# Patient Record
Sex: FEMALE | Race: OTHER | NOT HISPANIC OR LATINO | URBAN - METROPOLITAN AREA
[De-identification: names, ages, dates, MRNs, and addresses within clinical notes are randomized per-mention and may not be internally consistent; named-entity substitution may affect disease eponyms.]

---

## 2019-10-19 ENCOUNTER — INPATIENT (INPATIENT)
Facility: HOSPITAL | Age: 69
LOS: 12 days | Discharge: HOME | DRG: 974 | End: 2019-11-01
Attending: INTERNAL MEDICINE | Admitting: STUDENT IN AN ORGANIZED HEALTH CARE EDUCATION/TRAINING PROGRAM
Payer: SELF-PAY

## 2019-10-19 VITALS
SYSTOLIC BLOOD PRESSURE: 123 MMHG | OXYGEN SATURATION: 88 % | HEART RATE: 127 BPM | RESPIRATION RATE: 18 BRPM | TEMPERATURE: 101 F | WEIGHT: 111.99 LBS | DIASTOLIC BLOOD PRESSURE: 81 MMHG

## 2019-10-19 DIAGNOSIS — Z29.9 ENCOUNTER FOR PROPHYLACTIC MEASURES, UNSPECIFIED: ICD-10-CM

## 2019-10-19 DIAGNOSIS — J18.9 PNEUMONIA, UNSPECIFIED ORGANISM: ICD-10-CM

## 2019-10-19 DIAGNOSIS — R00.0 TACHYCARDIA, UNSPECIFIED: ICD-10-CM

## 2019-10-19 DIAGNOSIS — A41.9 SEPSIS, UNSPECIFIED ORGANISM: ICD-10-CM

## 2019-10-19 DIAGNOSIS — R13.10 DYSPHAGIA, UNSPECIFIED: ICD-10-CM

## 2019-10-19 DIAGNOSIS — N17.9 ACUTE KIDNEY FAILURE, UNSPECIFIED: ICD-10-CM

## 2019-10-19 DIAGNOSIS — R09.02 HYPOXEMIA: ICD-10-CM

## 2019-10-19 DIAGNOSIS — E03.9 HYPOTHYROIDISM, UNSPECIFIED: ICD-10-CM

## 2019-10-19 LAB
ALBUMIN SERPL ELPH-MCNC: 2 G/DL — LOW (ref 3.3–5)
ALP SERPL-CCNC: 87 U/L — SIGNIFICANT CHANGE UP (ref 40–120)
ALT FLD-CCNC: 26 U/L — SIGNIFICANT CHANGE UP (ref 12–78)
ANION GAP SERPL CALC-SCNC: 9 MMOL/L — SIGNIFICANT CHANGE UP (ref 5–17)
AST SERPL-CCNC: 57 U/L — HIGH (ref 15–37)
BASOPHILS # BLD AUTO: 0.02 K/UL — SIGNIFICANT CHANGE UP (ref 0–0.2)
BASOPHILS NFR BLD AUTO: 0.1 % — SIGNIFICANT CHANGE UP (ref 0–2)
BILIRUB SERPL-MCNC: 0.2 MG/DL — SIGNIFICANT CHANGE UP (ref 0.2–1.2)
BUN SERPL-MCNC: 47 MG/DL — HIGH (ref 7–23)
CALCIUM SERPL-MCNC: 9.1 MG/DL — SIGNIFICANT CHANGE UP (ref 8.5–10.1)
CHLORIDE SERPL-SCNC: 107 MMOL/L — SIGNIFICANT CHANGE UP (ref 96–108)
CO2 SERPL-SCNC: 20 MMOL/L — LOW (ref 22–31)
CREAT SERPL-MCNC: 1.4 MG/DL — HIGH (ref 0.5–1.3)
D DIMER BLD IA.RAPID-MCNC: 765 NG/ML DDU — HIGH
EOSINOPHIL # BLD AUTO: 0.13 K/UL — SIGNIFICANT CHANGE UP (ref 0–0.5)
EOSINOPHIL NFR BLD AUTO: 0.9 % — SIGNIFICANT CHANGE UP (ref 0–6)
FLU A RESULT: SIGNIFICANT CHANGE UP
FLU A RESULT: SIGNIFICANT CHANGE UP
FLUAV AG NPH QL: SIGNIFICANT CHANGE UP
FLUBV AG NPH QL: SIGNIFICANT CHANGE UP
GLUCOSE SERPL-MCNC: 105 MG/DL — HIGH (ref 70–99)
HCT VFR BLD CALC: 30 % — LOW (ref 34.5–45)
HGB BLD-MCNC: 10 G/DL — LOW (ref 11.5–15.5)
IMM GRANULOCYTES NFR BLD AUTO: 1.1 % — SIGNIFICANT CHANGE UP (ref 0–1.5)
LACTATE SERPL-SCNC: 1.1 MMOL/L — SIGNIFICANT CHANGE UP (ref 0.7–2)
LYMPHOCYTES # BLD AUTO: 14.4 % — SIGNIFICANT CHANGE UP (ref 13–44)
LYMPHOCYTES # BLD AUTO: 2.07 K/UL — SIGNIFICANT CHANGE UP (ref 1–3.3)
MCHC RBC-ENTMCNC: 30.2 PG — SIGNIFICANT CHANGE UP (ref 27–34)
MCHC RBC-ENTMCNC: 33.3 GM/DL — SIGNIFICANT CHANGE UP (ref 32–36)
MCV RBC AUTO: 90.6 FL — SIGNIFICANT CHANGE UP (ref 80–100)
MONOCYTES # BLD AUTO: 0.37 K/UL — SIGNIFICANT CHANGE UP (ref 0–0.9)
MONOCYTES NFR BLD AUTO: 2.6 % — SIGNIFICANT CHANGE UP (ref 2–14)
NEUTROPHILS # BLD AUTO: 11.59 K/UL — HIGH (ref 1.8–7.4)
NEUTROPHILS NFR BLD AUTO: 80.9 % — HIGH (ref 43–77)
NRBC # BLD: 0 /100 WBCS — SIGNIFICANT CHANGE UP (ref 0–0)
PLATELET # BLD AUTO: 279 K/UL — SIGNIFICANT CHANGE UP (ref 150–400)
POTASSIUM SERPL-MCNC: 4.8 MMOL/L — SIGNIFICANT CHANGE UP (ref 3.5–5.3)
POTASSIUM SERPL-SCNC: 4.8 MMOL/L — SIGNIFICANT CHANGE UP (ref 3.5–5.3)
PROT SERPL-MCNC: 7 G/DL — SIGNIFICANT CHANGE UP (ref 6–8.3)
RBC # BLD: 3.31 M/UL — LOW (ref 3.8–5.2)
RBC # FLD: 12.9 % — SIGNIFICANT CHANGE UP (ref 10.3–14.5)
RSV RESULT: SIGNIFICANT CHANGE UP
RSV RNA RESP QL NAA+PROBE: SIGNIFICANT CHANGE UP
SODIUM SERPL-SCNC: 136 MMOL/L — SIGNIFICANT CHANGE UP (ref 135–145)
WBC # BLD: 14.34 K/UL — HIGH (ref 3.8–10.5)
WBC # FLD AUTO: 14.34 K/UL — HIGH (ref 3.8–10.5)

## 2019-10-19 PROCEDURE — 99285 EMERGENCY DEPT VISIT HI MDM: CPT

## 2019-10-19 PROCEDURE — 99223 1ST HOSP IP/OBS HIGH 75: CPT

## 2019-10-19 PROCEDURE — 93010 ELECTROCARDIOGRAM REPORT: CPT

## 2019-10-19 PROCEDURE — 71045 X-RAY EXAM CHEST 1 VIEW: CPT | Mod: 26

## 2019-10-19 PROCEDURE — 71275 CT ANGIOGRAPHY CHEST: CPT | Mod: 26

## 2019-10-19 RX ORDER — SODIUM CHLORIDE 9 MG/ML
1000 INJECTION INTRAMUSCULAR; INTRAVENOUS; SUBCUTANEOUS ONCE
Refills: 0 | Status: DISCONTINUED | OUTPATIENT
Start: 2019-10-19 | End: 2019-10-19

## 2019-10-19 RX ORDER — LEVOTHYROXINE SODIUM 125 MCG
0 TABLET ORAL
Qty: 0 | Refills: 0 | DISCHARGE

## 2019-10-19 RX ORDER — ACETAMINOPHEN 500 MG
650 TABLET ORAL ONCE
Refills: 0 | Status: DISCONTINUED | OUTPATIENT
Start: 2019-10-19 | End: 2019-10-19

## 2019-10-19 RX ORDER — SODIUM CHLORIDE 9 MG/ML
500 INJECTION INTRAMUSCULAR; INTRAVENOUS; SUBCUTANEOUS ONCE
Refills: 0 | Status: COMPLETED | OUTPATIENT
Start: 2019-10-19 | End: 2019-10-19

## 2019-10-19 RX ORDER — AZITHROMYCIN 500 MG/1
500 TABLET, FILM COATED ORAL EVERY 24 HOURS
Refills: 0 | Status: DISCONTINUED | OUTPATIENT
Start: 2019-10-20 | End: 2019-10-20

## 2019-10-19 RX ORDER — IBUPROFEN 200 MG
400 TABLET ORAL ONCE
Refills: 0 | Status: COMPLETED | OUTPATIENT
Start: 2019-10-19 | End: 2019-10-19

## 2019-10-19 RX ORDER — ACETAMINOPHEN 500 MG
650 TABLET ORAL EVERY 6 HOURS
Refills: 0 | Status: DISCONTINUED | OUTPATIENT
Start: 2019-10-19 | End: 2019-11-01

## 2019-10-19 RX ORDER — PIPERACILLIN AND TAZOBACTAM 4; .5 G/20ML; G/20ML
3.38 INJECTION, POWDER, LYOPHILIZED, FOR SOLUTION INTRAVENOUS ONCE
Refills: 0 | Status: COMPLETED | OUTPATIENT
Start: 2019-10-19 | End: 2019-10-19

## 2019-10-19 RX ORDER — HEPARIN SODIUM 5000 [USP'U]/ML
5000 INJECTION INTRAVENOUS; SUBCUTANEOUS EVERY 8 HOURS
Refills: 0 | Status: DISCONTINUED | OUTPATIENT
Start: 2019-10-19 | End: 2019-10-21

## 2019-10-19 RX ORDER — CEFTRIAXONE 500 MG/1
1000 INJECTION, POWDER, FOR SOLUTION INTRAMUSCULAR; INTRAVENOUS ONCE
Refills: 0 | Status: COMPLETED | OUTPATIENT
Start: 2019-10-19 | End: 2019-10-19

## 2019-10-19 RX ORDER — SODIUM CHLORIDE 9 MG/ML
1000 INJECTION INTRAMUSCULAR; INTRAVENOUS; SUBCUTANEOUS ONCE
Refills: 0 | Status: COMPLETED | OUTPATIENT
Start: 2019-10-19 | End: 2019-10-19

## 2019-10-19 RX ORDER — PIPERACILLIN AND TAZOBACTAM 4; .5 G/20ML; G/20ML
3.38 INJECTION, POWDER, LYOPHILIZED, FOR SOLUTION INTRAVENOUS EVERY 8 HOURS
Refills: 0 | Status: DISCONTINUED | OUTPATIENT
Start: 2019-10-19 | End: 2019-10-22

## 2019-10-19 RX ORDER — SODIUM CHLORIDE 9 MG/ML
1000 INJECTION INTRAMUSCULAR; INTRAVENOUS; SUBCUTANEOUS
Refills: 0 | Status: DISCONTINUED | OUTPATIENT
Start: 2019-10-19 | End: 2019-10-21

## 2019-10-19 RX ORDER — LACTOBACILLUS ACIDOPHILUS 100MM CELL
1 CAPSULE ORAL THREE TIMES A DAY
Refills: 0 | Status: DISCONTINUED | OUTPATIENT
Start: 2019-10-19 | End: 2019-11-01

## 2019-10-19 RX ORDER — AZITHROMYCIN 500 MG/1
500 TABLET, FILM COATED ORAL ONCE
Refills: 0 | Status: COMPLETED | OUTPATIENT
Start: 2019-10-19 | End: 2019-10-19

## 2019-10-19 RX ORDER — ALBUTEROL 90 UG/1
2.5 AEROSOL, METERED ORAL EVERY 6 HOURS
Refills: 0 | Status: DISCONTINUED | OUTPATIENT
Start: 2019-10-19 | End: 2019-11-01

## 2019-10-19 RX ORDER — LEVOTHYROXINE SODIUM 125 MCG
75 TABLET ORAL DAILY
Refills: 0 | Status: DISCONTINUED | OUTPATIENT
Start: 2019-10-19 | End: 2019-10-21

## 2019-10-19 RX ADMIN — SODIUM CHLORIDE 1000 MILLILITER(S): 9 INJECTION INTRAMUSCULAR; INTRAVENOUS; SUBCUTANEOUS at 22:17

## 2019-10-19 RX ADMIN — CEFTRIAXONE 100 MILLIGRAM(S): 500 INJECTION, POWDER, FOR SOLUTION INTRAMUSCULAR; INTRAVENOUS at 18:59

## 2019-10-19 RX ADMIN — Medication 400 MILLIGRAM(S): at 18:58

## 2019-10-19 RX ADMIN — SODIUM CHLORIDE 1000 MILLILITER(S): 9 INJECTION INTRAMUSCULAR; INTRAVENOUS; SUBCUTANEOUS at 22:38

## 2019-10-19 RX ADMIN — Medication 400 MILLIGRAM(S): at 19:30

## 2019-10-19 RX ADMIN — AZITHROMYCIN 255 MILLIGRAM(S): 500 TABLET, FILM COATED ORAL at 19:34

## 2019-10-19 RX ADMIN — SODIUM CHLORIDE 1000 MILLILITER(S): 9 INJECTION INTRAMUSCULAR; INTRAVENOUS; SUBCUTANEOUS at 18:58

## 2019-10-19 RX ADMIN — AZITHROMYCIN 500 MILLIGRAM(S): 500 TABLET, FILM COATED ORAL at 22:17

## 2019-10-19 RX ADMIN — CEFTRIAXONE 1000 MILLIGRAM(S): 500 INJECTION, POWDER, FOR SOLUTION INTRAMUSCULAR; INTRAVENOUS at 19:34

## 2019-10-19 NOTE — ED PROVIDER NOTE - CARE PLAN
Principal Discharge DX:	Pneumonia of both lungs due to infectious organism, unspecified part of lung  Secondary Diagnosis:	Hypoxia

## 2019-10-19 NOTE — H&P ADULT - NSHPPHYSICALEXAM_GEN_ALL_CORE
T(C): 37.5 (10-19-19 @ 22:30), Max: 38.6 (10-19-19 @ 18:25)  HR: 95 (10-19-19 @ 22:30) (95 - 127)  BP: 103/70 (10-19-19 @ 22:30) (95/67 - 123/81)  RR: 16 (10-19-19 @ 22:30) (16 - 18)  SpO2: 99% (10-19-19 @ 22:30) (88% - 99%)    GENERAL: patient appears well, no acute distress, appropriate, pleasant  EYES: sclera clear, no exudates  ENMT: oropharynx clear without erythema, no exudates, moist mucous membranes  NECK: supple, soft, no thyromegaly noted  LUNGS: decreased breath sounds bilaterally, slight wheeze left upper lobe, good air entry  HEART: soft S1/S2, regular rate and rhythm, no murmurs noted, no lower extremity edema  GASTROINTESTINAL: abdomen is soft, nontender, nondistended, normoactive bowel sounds, no palpable masses  INTEGUMENT: good skin turgor, warm skin, appears well perfused  MUSCULOSKELETAL: no clubbing or cyanosis, no obvious deformity  NEUROLOGIC: awake, alert, oriented x3, good muscle tone in 4 extremities, no obvious sensory deficits  PSYCHIATRIC: mood is good, affect is congruent, linear and logical thought process  HEME/LYMPH: no palpable supraclavicular nodules, no obvious ecchymosis or petechiae

## 2019-10-19 NOTE — H&P ADULT - NSICDXPASTSURGICALHX_GEN_ALL_CORE_FT
PAST SURGICAL HISTORY:  No significant past surgical history denies surgical history but CT shows cholecystectomy

## 2019-10-19 NOTE — H&P ADULT - ASSESSMENT
70 y/o F with PMHx of hypothyroidism admitted with sepsis secondary to bilateral pneumonia and dysphagia. Possible aspiration pneumonia.

## 2019-10-19 NOTE — H&P ADULT - PROBLEM SELECTOR PLAN 8
DVT prophylaxis: heparin subQ  fall precuations, aspiration precautions    IMPROVE VTE Individual Risk Assessment          RISK                                                          Points  [  ] Previous VTE                                                3  [  ] Thrombophilia                                             2  [  ] Lower limb paralysis                                   2        (unable to hold up >15 seconds)    [  ] Current Cancer                                             2         (within 6 months)  [ x ] Immobilization > 24 hrs                              1  [  ] ICU/CCU stay > 24 hours                             1  [ x ] Age > 60                                                         1    IMPROVE VTE Score: 2

## 2019-10-19 NOTE — H&P ADULT - PROBLEM SELECTOR PLAN 7
continue synthroid 75mcg daily  unclear how patient follows up for refills (goes [facility])  will check TSH

## 2019-10-19 NOTE — H&P ADULT - HISTORY OF PRESENT ILLNESS
68 y/o F with PMHx of hypothyroidism presents with cough and congestion for 2 weeks in duration. Patient states that for the last 10 days she has been having difficulty swallowing, solids more than liquids. Niece at bedside states that patient has been eating and drinking less but more so noticed patient coughing with water but not with regular food. She now has more of a cough and dyspnea. She had a Tmax of 101.4 at home. Of note, she went to urgent care 1 month ago as she was feeling unwell and was found to be in SVT with HR sustaining in 170s. Was advised to go to the ER at that time but never followed up as she felt better and does not have insurance. Denies chest pain, palpitations, abdominal pain, n/v/d. Denies sick contacts or myalgias.     In the ED, patient met septic criteria with fever 101.4, . Labs significant for WBC 14.34, ddimer 765, BUN/Cr 47/1.40. CXR showed bilateral pna more prominent on right (personal read). CTA was negative for PE showed nonspecific groundglass opacities in both lungs. Nodular opacities in the left upper lobe and   lingula concerning for infection, neoplasm cannot be ruled out. EKG sinus tachycardia at 104bpm.

## 2019-10-19 NOTE — H&P ADULT - PROBLEM SELECTOR PLAN 1
admit to medicine with remote telemetry  s/p 30cc/kg IVF resuscitation (1L by ED +500cc bolus +100cc/hr maintenance)  keep maintenance fluids as pt with soft BP, re-evaluate fluids in AM  s/p azithro and rocephin in the ED, will continue azithro until legionella ruled out  will start on zosyn due to suspicion of aspiration pneumonia  follow up blood/urine cx, legionella ag, strep pna ag, procalcitonin  monitor for fevers, tylenol PRN  ID consult Dr. Ray Owens

## 2019-10-19 NOTE — ED PROVIDER NOTE - OBJECTIVE STATEMENT
70 yo F p/w cough, congestion x past ~ 2 weeks. Now with inc cough, now with some dyspnea. Pos fever at home as well. no chest pains. NO abd pain. no n/v/d. no recent travel. no known sick contacts. no weakness / dizziness. no agg/allev factors. no other inj or co.

## 2019-10-19 NOTE — ED ADULT NURSE NOTE - OBJECTIVE STATEMENT
Pt. received alert and oriented x3 with chief complaint of lethargy w/ cough for two weeks worsening in last three days. Pt. presents w/ bilateral crackles upon auscultation. Pt. denies SOB.

## 2019-10-19 NOTE — ED ADULT TRIAGE NOTE - CHIEF COMPLAINT QUOTE
Per daughter patient has had a cough, fever, chills, and difficulty breathing for 2 weeks. Pt has fever of 101.4 F

## 2019-10-19 NOTE — H&P ADULT - PROBLEM SELECTOR PLAN 2
suspect CAP vs aspiration  will cover with azithro and zosyn for now pending legionella ag  supplemental O2 as needed to keep O2 >91  bronchodilators, antitussives PRN  pulm consult Dr. Villalpando

## 2019-10-19 NOTE — H&P ADULT - PROBLEM SELECTOR PLAN 3
reports dysphagia over past 10 days  will keep NPO and have speech and swallow evaluation  suspect aspiration

## 2019-10-20 LAB
ANION GAP SERPL CALC-SCNC: 9 MMOL/L — SIGNIFICANT CHANGE UP (ref 5–17)
BASE EXCESS BLDA CALC-SCNC: -5.8 MMOL/L — LOW (ref -2–2)
BASE EXCESS BLDV CALC-SCNC: -6.3 MMOL/L — LOW (ref -2–2)
BASOPHILS # BLD AUTO: 0 K/UL — SIGNIFICANT CHANGE UP (ref 0–0.2)
BASOPHILS NFR BLD AUTO: 0 % — SIGNIFICANT CHANGE UP (ref 0–2)
BLOOD GAS COMMENTS ARTERIAL: SIGNIFICANT CHANGE UP
BLOOD GAS COMMENTS, VENOUS: SIGNIFICANT CHANGE UP
BUN SERPL-MCNC: 35 MG/DL — HIGH (ref 7–23)
CALCIUM SERPL-MCNC: 8.5 MG/DL — SIGNIFICANT CHANGE UP (ref 8.5–10.1)
CHLORIDE SERPL-SCNC: 110 MMOL/L — HIGH (ref 96–108)
CO2 SERPL-SCNC: 20 MMOL/L — LOW (ref 22–31)
CREAT SERPL-MCNC: 1.3 MG/DL — SIGNIFICANT CHANGE UP (ref 0.5–1.3)
CRP SERPL-MCNC: 10.69 MG/DL — HIGH (ref 0–0.4)
EOSINOPHIL # BLD AUTO: 0.13 K/UL — SIGNIFICANT CHANGE UP (ref 0–0.5)
EOSINOPHIL NFR BLD AUTO: 1 % — SIGNIFICANT CHANGE UP (ref 0–6)
ERYTHROCYTE [SEDIMENTATION RATE] IN BLOOD: 97 MM/HR — HIGH (ref 0–20)
FERRITIN SERPL-MCNC: 1271 NG/ML — HIGH (ref 15–150)
FOLATE SERPL-MCNC: >20 NG/ML — SIGNIFICANT CHANGE UP
GLUCOSE SERPL-MCNC: 95 MG/DL — SIGNIFICANT CHANGE UP (ref 70–99)
HAV IGM SER-ACNC: SIGNIFICANT CHANGE UP
HBV CORE IGM SER-ACNC: SIGNIFICANT CHANGE UP
HBV SURFACE AG SER-ACNC: SIGNIFICANT CHANGE UP
HCO3 BLDA-SCNC: 20 MMOL/L — LOW (ref 23–27)
HCO3 BLDV-SCNC: 18 MMOL/L — LOW (ref 21–29)
HCT VFR BLD CALC: 29.8 % — LOW (ref 34.5–45)
HCV AB S/CO SERPL IA: 0.25 S/CO — SIGNIFICANT CHANGE UP (ref 0–0.99)
HCV AB S/CO SERPL IA: 0.28 S/CO — SIGNIFICANT CHANGE UP (ref 0–0.99)
HCV AB SERPL-IMP: SIGNIFICANT CHANGE UP
HCV AB SERPL-IMP: SIGNIFICANT CHANGE UP
HGB BLD-MCNC: 9.9 G/DL — LOW (ref 11.5–15.5)
HOROWITZ INDEX BLDA+IHG-RTO: 32 — SIGNIFICANT CHANGE UP
HOROWITZ INDEX BLDV+IHG-RTO: 21 — SIGNIFICANT CHANGE UP
IRON SATN MFR SERPL: 14 % — SIGNIFICANT CHANGE UP (ref 14–50)
IRON SATN MFR SERPL: 21 UG/DL — LOW (ref 30–160)
LEGIONELLA AG UR QL: NEGATIVE — SIGNIFICANT CHANGE UP
LYMPHOCYTES # BLD AUTO: 1.54 K/UL — SIGNIFICANT CHANGE UP (ref 1–3.3)
LYMPHOCYTES # BLD AUTO: 12 % — LOW (ref 13–44)
MAGNESIUM SERPL-MCNC: 1.6 MG/DL — SIGNIFICANT CHANGE UP (ref 1.6–2.6)
MCHC RBC-ENTMCNC: 30.4 PG — SIGNIFICANT CHANGE UP (ref 27–34)
MCHC RBC-ENTMCNC: 33.2 GM/DL — SIGNIFICANT CHANGE UP (ref 32–36)
MCV RBC AUTO: 91.4 FL — SIGNIFICANT CHANGE UP (ref 80–100)
MONOCYTES # BLD AUTO: 0.38 K/UL — SIGNIFICANT CHANGE UP (ref 0–0.9)
MONOCYTES NFR BLD AUTO: 3 % — SIGNIFICANT CHANGE UP (ref 2–14)
NEUTROPHILS # BLD AUTO: 10.38 K/UL — HIGH (ref 1.8–7.4)
NEUTROPHILS NFR BLD AUTO: 77 % — SIGNIFICANT CHANGE UP (ref 43–77)
NRBC # BLD: SIGNIFICANT CHANGE UP /100 WBCS (ref 0–0)
NT-PROBNP SERPL-SCNC: 539 PG/ML — HIGH (ref 0–125)
PCO2 BLDA: 26 MMHG — LOW (ref 32–46)
PCO2 BLDV: 45 MMHG — SIGNIFICANT CHANGE UP (ref 35–50)
PH BLDA: 7.44 — SIGNIFICANT CHANGE UP (ref 7.35–7.45)
PH BLDV: 7.26 — LOW (ref 7.35–7.45)
PHOSPHATE SERPL-MCNC: 3.6 MG/DL — SIGNIFICANT CHANGE UP (ref 2.5–4.5)
PLATELET # BLD AUTO: 276 K/UL — SIGNIFICANT CHANGE UP (ref 150–400)
PO2 BLDA: 52 MMHG — LOW (ref 74–108)
PO2 BLDV: <44 MMHG — SIGNIFICANT CHANGE UP (ref 25–45)
POTASSIUM SERPL-MCNC: 4.2 MMOL/L — SIGNIFICANT CHANGE UP (ref 3.5–5.3)
POTASSIUM SERPL-SCNC: 4.2 MMOL/L — SIGNIFICANT CHANGE UP (ref 3.5–5.3)
PROCALCITONIN SERPL-MCNC: 0.18 NG/ML — HIGH (ref 0–0.04)
RBC # BLD: 3.26 M/UL — LOW (ref 3.8–5.2)
RBC # FLD: 13 % — SIGNIFICANT CHANGE UP (ref 10.3–14.5)
SAO2 % BLDA: 85 % — LOW (ref 92–96)
SAO2 % BLDV: 41 % — LOW (ref 67–88)
SODIUM SERPL-SCNC: 139 MMOL/L — SIGNIFICANT CHANGE UP (ref 135–145)
TIBC SERPL-MCNC: 148 UG/DL — LOW (ref 220–430)
TSH SERPL-MCNC: 1.54 UIU/ML — SIGNIFICANT CHANGE UP (ref 0.36–3.74)
UIBC SERPL-MCNC: 127 UG/DL — SIGNIFICANT CHANGE UP (ref 110–370)
VIT B12 SERPL-MCNC: 1287 PG/ML — HIGH (ref 232–1245)
WBC # BLD: 12.81 K/UL — HIGH (ref 3.8–10.5)
WBC # FLD AUTO: 12.81 K/UL — HIGH (ref 3.8–10.5)

## 2019-10-20 PROCEDURE — 93970 EXTREMITY STUDY: CPT | Mod: 26

## 2019-10-20 PROCEDURE — 93308 TTE F-UP OR LMTD: CPT | Mod: 26

## 2019-10-20 PROCEDURE — 93010 ELECTROCARDIOGRAM REPORT: CPT

## 2019-10-20 PROCEDURE — 99233 SBSQ HOSP IP/OBS HIGH 50: CPT

## 2019-10-20 PROCEDURE — 76604 US EXAM CHEST: CPT | Mod: 26

## 2019-10-20 RX ORDER — IBUPROFEN 200 MG
200 TABLET ORAL EVERY 6 HOURS
Refills: 0 | Status: DISCONTINUED | OUTPATIENT
Start: 2019-10-20 | End: 2019-10-21

## 2019-10-20 RX ORDER — IPRATROPIUM/ALBUTEROL SULFATE 18-103MCG
3 AEROSOL WITH ADAPTER (GRAM) INHALATION EVERY 6 HOURS
Refills: 0 | Status: DISCONTINUED | OUTPATIENT
Start: 2019-10-20 | End: 2019-10-24

## 2019-10-20 RX ADMIN — Medication 650 MILLIGRAM(S): at 13:48

## 2019-10-20 RX ADMIN — HEPARIN SODIUM 5000 UNIT(S): 5000 INJECTION INTRAVENOUS; SUBCUTANEOUS at 21:38

## 2019-10-20 RX ADMIN — Medication 3 MILLILITER(S): at 20:40

## 2019-10-20 RX ADMIN — Medication 1 TABLET(S): at 13:18

## 2019-10-20 RX ADMIN — Medication 650 MILLIGRAM(S): at 15:15

## 2019-10-20 RX ADMIN — Medication 1 TABLET(S): at 21:38

## 2019-10-20 RX ADMIN — SODIUM CHLORIDE 100 MILLILITER(S): 9 INJECTION INTRAMUSCULAR; INTRAVENOUS; SUBCUTANEOUS at 00:21

## 2019-10-20 RX ADMIN — Medication 200 MILLIGRAM(S): at 15:48

## 2019-10-20 RX ADMIN — Medication 650 MILLIGRAM(S): at 08:30

## 2019-10-20 RX ADMIN — HEPARIN SODIUM 5000 UNIT(S): 5000 INJECTION INTRAVENOUS; SUBCUTANEOUS at 13:18

## 2019-10-20 RX ADMIN — Medication 650 MILLIGRAM(S): at 06:52

## 2019-10-20 RX ADMIN — PIPERACILLIN AND TAZOBACTAM 25 GRAM(S): 4; .5 INJECTION, POWDER, LYOPHILIZED, FOR SOLUTION INTRAVENOUS at 09:05

## 2019-10-20 RX ADMIN — PIPERACILLIN AND TAZOBACTAM 200 GRAM(S): 4; .5 INJECTION, POWDER, LYOPHILIZED, FOR SOLUTION INTRAVENOUS at 00:21

## 2019-10-20 RX ADMIN — Medication 75 MICROGRAM(S): at 06:01

## 2019-10-20 RX ADMIN — Medication 200 MILLIGRAM(S): at 17:26

## 2019-10-20 RX ADMIN — HEPARIN SODIUM 5000 UNIT(S): 5000 INJECTION INTRAVENOUS; SUBCUTANEOUS at 06:02

## 2019-10-20 RX ADMIN — Medication 1 TABLET(S): at 06:01

## 2019-10-20 RX ADMIN — PIPERACILLIN AND TAZOBACTAM 25 GRAM(S): 4; .5 INJECTION, POWDER, LYOPHILIZED, FOR SOLUTION INTRAVENOUS at 15:56

## 2019-10-20 NOTE — CONSULT NOTE ADULT - ASSESSMENT
68 y/o F with PMHx of hypothyroidism presents with cough and congestion for 2 weeks in duration now presents with hypoxic resp failure, febrile to 101, likely with PNA, r/o TB     Neuro:  Cardio:  Pulm:  GI:  Gu/renal:  ID:  Heme: 70 y/o F with PMHx of hypothyroidism presents with cough and congestion for 2 weeks in duration now presents with hypoxic resp failure, febrile to 101, likely with PNA, r/o TB     Neuro: No active issues   Cardio: No active issues, keep SBP less than 140   Pulm: Hypoxic resp failure likely from PNA, r/o TB. TB panel sent. cont airborne precautions.  Ct suggestive of groundglass opacities. Deneis hx of smoking or exposure to smoke. Will switch from NC to HFNC. In the meantuime bridge with venti mask. Will accept sats of 92% and higher. Could even use CPAP 5/5 if no HFNC available. nebs as needed  GI: NO active issues. cont multivitamin   Gu/renal: f/u lytes   ID: Fever 2/2 PNA vs TB, no need for bronch at this time for resp failure. Cont zosyn. would give Tylenol and ibuprofen for fever.   Heme: heparin and scds for dvt prophylaxis   Endo: cont synthroid     Dispo: At this time patient does not need ICU level of care. Please reconsult as needed. Case d.w Dr. Cerda

## 2019-10-20 NOTE — CONSULT NOTE ADULT - ASSESSMENT
The patient is a 69 year old female with a history of hypothyroidism who is admitted with PNA.    Plan:  - Telemetry and ECG consistent with sinus tachycardia  - There was no documented SVT when at PMD's office, only that she was tachycardic when vitals were checked  - Tachycardia likely secondary to underlying infection, fever, hypoxia, etc.  - Continue to monitor on telemetry for the development of any arrhythmias  - Echocardiogram ordered; will follow-up results  - BNP pending  - TSH within normal limits  - On zosyn for PNA  - Being ruled-out for TB  - LE venous duplex pending

## 2019-10-20 NOTE — CHART NOTE - NSCHARTNOTEFT_GEN_A_CORE
Resident Rapid Response Note    Rapid response was called at 13:22 for hypoxia.    Events leading up to Rapid Response:    68 y/o F with PMHx of hypothyroidism admitted with sepsis secondary to bilateral pneumonia and dysphagia. Possible aspiration pneumonia. Patient was seen and examined at the bedside by the rapid response team and resident medicine team. Dr. Cerda / ICU PA at bedside. This afternoon the patient was noted to by hypoxic, tachypneic, and tachycardic. Rapid response was called for hypoxia. Patient states that she feels short of breath on 4L NC, O2 saturation noted to be 79%. She denies chest pain, headache, dizziness, numbness, tingling, cough, wheeze, abdominal pain, nausea, vomiting.    Rapid Response Vital Signs:  BP:140/86  HR:134  RR:42  SpO2: 79% on 4L NC  Temp:101.8  FS:99    Vital Signs Last 24 Hrs  T(C): 38.8 (20 Oct 2019 13:48), Max: 38.8 (20 Oct 2019 13:48)  T(F): 101.8 (20 Oct 2019 13:48), Max: 101.8 (20 Oct 2019 13:48)  HR: 113 (20 Oct 2019 12:43) (95 - 127)  BP: 115/78 (20 Oct 2019 12:43) (95/67 - 123/81)  RR: 18 (20 Oct 2019 12:43) (16 - 18)  SpO2: 93% (20 Oct 2019 12:43) (88% - 99%)    Physical Exam:  General: Well developed, well nourished, in no acute distress  HEENT: NCAT, EOMI bl, moist mucous membranes   Neck: Supple, no JVD  Neurology: A&Ox3, nonfocal, CN II-XII grossly intact   Respiratory: CTA B/L, No wheezing, rales, or rhonchi. Tachypneic (RR42)  CV: Tachycardic (), S1/S2 present, no murmurs, rubs, or gallops  Abdominal: Soft, nontender, non-distended, normoactive bowel sounds  Extremities: No C/C/E, peripheral pulses present  Skin: warm, dry, normal color, no obvious rash or abnormal lesions      Assessment/Plan:    Hypoxia likely secondary to pneumonia  - tachypneic, hypoxic on 4L NC  - Patient improved on venti mask (O2sat 99)  - Switch to high flow O2 when high flow is available.   - Patient to stay on 3west, Vitals Q4H    Tachycardia  - , EKG sinus tach  - Likely secondary to fever/hypoxia (temp 101.8)  - Continue to monitor vitals Q4H    Fever  - Temp 101.8, patient admitted for sepsis secondary to pneumonia  - Patient given Tylenol 650  - Continue Abx, Tylenol 650 Q6H for fever    -Discussed with  , who agreed with above plan.  -Will continue to follow, RN to call if any changes.    Dr. Quintero aware, agrees with above plan  Family present at bedside, understand plan going forward. Resident Rapid Response Note    Rapid response was called at 13:22 for hypoxia.    Events leading up to Rapid Response:    70 y/o F with PMHx of hypothyroidism admitted with sepsis secondary to bilateral pneumonia and dysphagia. Possible aspiration pneumonia. Patient was seen and examined at the bedside by the rapid response team and resident medicine team. Dr. Cerda / ICU PA at bedside. This afternoon the patient was noted to by hypoxic, tachypneic, and tachycardic. Rapid response was called for hypoxia. Patient states that she feels short of breath on 4L NC, O2 saturation noted to be 79%. She denies chest pain, headache, dizziness, numbness, tingling, cough, wheeze, abdominal pain, nausea, vomiting.    Rapid Response Vital Signs:  BP:140/86  HR:134  RR:42  SpO2: 79% on 4L NC  Temp:101.8  FS:99    Vital Signs Last 24 Hrs  T(C): 38.8 (20 Oct 2019 13:48), Max: 38.8 (20 Oct 2019 13:48)  T(F): 101.8 (20 Oct 2019 13:48), Max: 101.8 (20 Oct 2019 13:48)  HR: 113 (20 Oct 2019 12:43) (95 - 127)  BP: 115/78 (20 Oct 2019 12:43) (95/67 - 123/81)  RR: 18 (20 Oct 2019 12:43) (16 - 18)  SpO2: 93% (20 Oct 2019 12:43) (88% - 99%)    Physical Exam:  General: Well developed, well nourished, in no acute distress  HEENT: NCAT, EOMI bl, moist mucous membranes   Neck: Supple, no JVD  Neurology: A&Ox3, nonfocal  Respiratory: CTA B/L, No wheezing, rales, or rhonchi. Tachypneic (RR42)  CV: Tachycardic (), S1/S2 present  Abdominal: Soft, nontender, non-distended, normoactive bowel sounds  Extremities: No C/C/E, peripheral pulses present  Skin: warm, dry, normal color      Assessment/Plan:    Hypoxia likely secondary to pneumonia  - tachypneic, hypoxic on 4L NC  - Patient improved on venti mask (O2sat 99)  - Switch to high flow O2 when high flow is available.   - Patient to stay on 3west, Vitals Q4H    Tachycardia  - , EKG sinus tach  - Likely secondary to fever/hypoxia (temp 101.8)  - Continue to monitor vitals Q4H    Fever  - Temp 101.8, patient admitted for sepsis secondary to pneumonia  - Patient given Tylenol 650  - Continue Abx, Tylenol 650 Q6H for fever    -Dr. Quintero aware  -Will continue to follow, RN to call if any changes.    Dr. Quintero aware, agrees with above plan  Family present at bedside, understand plan going forward.

## 2019-10-20 NOTE — PROGRESS NOTE ADULT - PROBLEM SELECTOR PLAN 2
suspect aspiration??  will cover with zosyn for now pending legionella ag  supplemental O2 as needed to keep O2 >91  bronchodilators, antitussives PRN  pulm consult Dr. Villalpando suspect aspiration??  will cover with zosyn for now   supplemental O2 as needed to keep O2 >91  bronchodilators, antitussives PRN  pulm consult Dr. Villalpando

## 2019-10-20 NOTE — CONSULT NOTE ADULT - SUBJECTIVE AND OBJECTIVE BOX
MARYLOU WHYTE [facility] [phone]  1950 DOA 10/19/2019 DR NILAY GUERRA    ALLERGY     nka  CONTACT        Analisa MONET        Initial evaluation/Pulmonary Critical Care consultation requested on 10/20/2019   by Dr Guerra  from Dr Nicholas covering Dr Gonsalez    Patient examined chart reviewed    HOSPITAL ADMISSION   PATIENT CAME  FROM (if information available)      PATIENT MARYLOU WHYTE  1950 DOA 10/19/2019                                  PT DESCRIPTION       This section was excerpted from ER md note but was independently verified by me    · Chief Complaint: The patient is a 69y Female complaining of cold symptoms.  · HPI Objective Statement: 70 yo F p/w cough, congestion x past ~ 2 weeks. Now with inc cough, now with some dyspnea. Pos fever at home as well. no chest pains. NO abd pain. no n/v/d. no recent travel. no known sick contacts. no weakness / dizziness. no agg/allev factors. no other inj or co.  · Negative Findings: no body aches, no chest pain, no diaphoresis, no edema, no fever, no headache, no hemoptysis, no wheezing  · Duration: week(s)  ~2  · Quality: alteration in breathing pattern  · Severity: PAIN SCALE 4 OF 10.  · Context: unknown  · Recent Exposure To: none known  · Aggravated Factors: none  · Relieving Factors: none    PAST MEDICAL/SURGICAL/FAMILY/SOCIAL HISTORY:    Past Medical History:  Hypothyroidism, unspecified type.     Tobacco Usage:  · Tobacco Usage Unknown if ever smoked    ALLERGIES AND HOME MEDICATIONS:   Allergies:        Allergies:  No Known Allergies:     Home Medications:   * Patient Currently Takes Medications as of 19-Oct-2019 18:25 documented in Structured Notes  · levothyroxine: Last Dose Taken:      REVIEW OF SYSTEMS:    Review of Systems:  · RESPIRATORY: - - -  · Respiratory [+]: COUGH, SHORTNESS OF BREATH  · ROS STATEMENT: all other ROS negative except as per HPI    VITAL SIGNS( Pullset):    ,,ED ADULT Flow Sheet:    19-Oct-2019 18:25  · Temp (F): 101.4  · Temp (C) Temp (C): 38.6  · Temp site Temp Site: oral  · Heart Rate Heart Rate (beats/min): 127  · BP Systolic Systolic: 123  · BP Diastolic Diastolic (mm Hg): 81  · Respiration Rate (breaths/min) Respiration Rate (breaths/min): 18  · SpO2 (%) SpO2 (%): 88  · O2 delivery Patient On: room air  · Dosing Weight (KILOGRAMS) Dosing Weight (KILOGRAMS): 50.8  · Dosing Weight  (POUNDS) Dosing Weight (POUNDS): 111.9  · SpO2 (%) SpO2 (%): 88  · O2 delivery Patient On: room air    18:27  · Preferred Language to Address Healthcare Preferred Language to Address Healthcare: Other    PHYSICAL EXAM:   · CONSTITUTIONAL: Well appearing, well nourished, awake, alert, oriented to person, place, time/situation and in no apparent distress.  · ENMT: Airway patent, Nasal mucosa clear. Mouth with normal mucosa. Throat has no vesicles, no oropharyngeal exudates and uvula is midline. MM Moist. neck supple. no meningeal signs.  · EYES: Clear bilaterally, pupils equal, round and reactive to light.  · CARDIAC: Normal rate, regular rhythm.  Heart sounds S1, S2.  No murmurs, rubs or gallops.  · RESPIRATORY: Breath sounds equal bilaterally. nl resp effort. no acc muscle use. Diffuse bs bl bases  · GASTROINTESTINAL: Abdomen soft, non-tender, no guarding. non-distended. no hsm . no cvat  · GENITOURINARY: - - -  · Bladder: non-tender  · MUSCULOSKELETAL: Spine appears normal, range of motion is not limited, no muscle or joint tenderness  · NEUROLOGICAL: Alert and oriented, no focal deficits, no motor or sensory deficits.  · SKIN: Skin normal color for race, warm, dry and intact. No evidence of rash.  · PSYCHIATRIC: Alert and oriented to person, place, time/situation. normal mood and affect. no apparent risk to self or others.  · HEME LYMPH: No adenopathy or splenomegaly. No cervical or inguinal lymphadenopathy.              PATIENT MARYLOU PATO  1950 DOA 10/19/2019                                  VITALS/LABS       10/20/2019 100f 104/65 17 95%   10/20/2019 W 12.8 Hb 9.9 Plt 276 Na 139 K 4.2 CO2 20 Cr 1.2   10/20/2019 pc .18   10/20/2019 vbg ph 726  10/19 Flu ab n RSV N   10/19/2019 CXR  bl reticulonod opac alejandra central l perihilar region Nodular opac mary and lingula   10/19/2019 cta ch  No pe Nonsp gg opac both lungs 1.8 cm r hilar ln        REVIEW OF SYMPTOMS     NOTE Noteworthy changes  if any  in ROS and PE are also entered  in note  below      Able to give ROS  Yes     RELIABLE No   CONSTITUTIONAL Weakness Yes  Chills No Vision changes No  ENDOCRINE No unexplained hair loss No heat or cold intolerance    ALLERGY No hives  Sore throat No   RESP Coughing blood no  Shortness of breath YES   NEURO No Headache  Confusion Pain neck No   CARDIAC No Chest pain No Palpitations   GI No Pain abdomen NO   Vomiting NO     PHYSICAL EXAM    HEENT Unremarkable PERRLA atraumatic   RESP Fair air entry EXP prolonged    Harsh breath sound Resp distres mild   CARDIAC S1 S2 No S3     NO JVD    ABDOMEN SOFT BS PRESENT NOT DISTENDED No hepatosplenomegaly PEDAL EDEMA present No calf tenderness  NO rash   GENERAL Not TOXIC looking    PATIENT MARYLOU WHYTE  1950 DOA 10/19/2019                                  PATIENT DATA   ALLERGY nka  WT      54         BMI                                                                                                                                            HEAD OF BED ELEVATION Yes   DVT PROPHYLAXIS   hpsc (10/19)       STRESS ULCER PROPHYLAXIS  INFECTION PPLX                                                                                 DIET npo (10/19)   DYSPHAGIA EVAL IF APPLICABLE                                                                    GAS EXCHANGE   10/20/2019 ra 88%                                                                                  HEMODYNAMICS 10/20/2019 120/70  DRIPS                                                                                          IV FLUIDS  ns 100.1/2 d (10/19)  INTAKE OUTPUT                                                     MICROBIO    10/20/2019 leg n   10/20/2019 pc .18   10/19 Flu ab n RSV N     ANTIBIOTICS        azithro (10/19)   zosyn (10/19)     PATIENT MARYLOU WHYTE  1950 DOA 10/19/2019                                  Pt description                            CC    cough fever chills sob 2 w     er vitals  120/80 120 101f 88%                     HPI              70 yo F p/w cough, congestion x past  2 weeks. Now with inc cough, now with some dyspnea. Pos fever at home as well. no chest pains. NO abd pain. no n/v/d. no recent travel. no known sick contacts. no weakness / dizziness. no agg/allev factors. no other inj or co.    Past Medical History:  Hypothyroidism

## 2019-10-20 NOTE — CONSULT NOTE ADULT - SUBJECTIVE AND OBJECTIVE BOX
History of Present Illness: The patient is a 69 year old female with a history of hypothyroidism who presents with fever, cough, shortness of breath. History obtained from the niece. The patient has been feeling unwell with cough, fever, shortness of breath, decreased oral intake for the past couple of weeks. She went to see her PMD who noted her to be tachycardic, reportedly to the 170s. No ECG was done at that time. She was recommended to go to ED but declined. Her symptoms persisted so she came to ED yesterday. She was found to have bilateral PNA on imaging.    Past Medical/Surgical History:  Hypothyroidism    Medications:  Home Medications:  levothyroxine 75 mcg (0.075 mg) oral capsule: 1 cap(s) orally once a day (19 Oct 2019 23:15)  Multiple Vitamins oral tablet: 1 tab(s) orally once a day (19 Oct 2019 23:15)      Family History: Non-contributory family history of premature cardiovascular atherosclerotic disease    Social History: No tobacco, alcohol or drug use    Review of Systems:  General: No fevers, chills, weight loss or gain  Skin: No rashes, color changes  Cardiovascular: No chest pain, orthopnea  Respiratory: No shortness of breath, cough  Gastrointestinal: No nausea, abdominal pain  Genitourinary: No incontinence, pain with urination  Musculoskeletal: No pain, swelling, decreased range of motion  Neurological: No headache, weakness  Psychiatric: No depression, anxiety  Endocrine: No weight loss or gain, increased thirst  All other systems are comprehensively negative.    Physical Exam:  Vitals:        Vital Signs Last 24 Hrs  T(C): 38.8 (20 Oct 2019 13:48), Max: 38.8 (20 Oct 2019 13:48)  T(F): 101.8 (20 Oct 2019 13:48), Max: 101.8 (20 Oct 2019 13:48)  HR: 113 (20 Oct 2019 12:43) (95 - 127)  BP: 115/78 (20 Oct 2019 12:43) (95/67 - 123/81)  BP(mean): --  RR: 18 (20 Oct 2019 12:43) (16 - 18)  SpO2: 93% (20 Oct 2019 12:43) (88% - 99%)  General: NAD  HEENT: MMM  Neck: No JVD, no carotid bruit  Lungs: CTAB  CV: RRR, nl S1/S2, no M/R/G  Abdomen: S/NT/ND, +BS  Extremities: No LE edema, no cyanosis  Neuro: AAOx3, non-focal  Skin: No rash    Labs:                        9.9    12.81 )-----------( 276      ( 20 Oct 2019 07:36 )             29.8     10-20    139  |  110<H>  |  35<H>  ----------------------------<  95  4.2   |  20<L>  |  1.30    Ca    8.5      20 Oct 2019 07:36  Phos  3.6     10-20  Mg     1.6     10-20    TPro  7.0  /  Alb  2.0<L>  /  TBili  0.2  /  DBili  x   /  AST  57<H>  /  ALT  26  /  AlkPhos  87  10-19            ECG: Sinus tachycardia, normal axis, no ST abnormality

## 2019-10-20 NOTE — CONSULT NOTE ADULT - ASSESSMENT
PATIENT MARYLOU WHYTE  1950 DOA 10/19/2019                      PULMONARY/CRITICAL CARE ASSESSMENT/RECOMMENDATIONS                      RESP TRACT INFECTION   no ho exposure tb   protracxted course   10/20/2019 esr 97   10/20/2019 W 12.8   10/20/2019 pc .18   10/19 Flu ab n RSV N   10/19/2019 CXR  bl reticulonod opac alejandra central l perihilar region Nodular opac mary and lingula   10/19/2019 cta ch  No pe Nonsp gg opac both lungs 1.8 cm r hilar ln  azityhro (10/20)   zosyn (10/20)   A/R Suggest azithro should be dced once leg comes neg as it may be effective against TB and in case she has tb it is not good to give monoRx   Cont zosyn pending cultures  Follow sp afb and qft     HILOMEDIASTINAL LNE   10/20/2019 cta ch 1.9 cm apw ln 1.8 cm r hilar ln      If T excluded will need robb for sarcoid lymphoma   No ho silica or berryllium exposure elicited     May need bx at that point       AC HYPOXEMIA RESP FAILURE  10/20/2019 bnp 599  10/20/2019 v duplx n    10/20/2019 3l [id number]   A/R Increase FIO2 Target po 90-95%  10/20/2019 Check echo     RO PE   10/20/2019 cta ch n   10/20/2019 v duplex n  vte unlikely    COPD   No ho exposure to smoke  Started bd (10/20)                     TIME SPENT Over 55 minutes aggregate care time spent on encounter; activities included   direct pt care, counseling and/or coordinating care reviewing notes, lab data/ imaging , discussion with multidisciplinary team/ pt /family. Risks, benefits, alternatives  discussed in deta

## 2019-10-20 NOTE — CONSULT NOTE ADULT - SUBJECTIVE AND OBJECTIVE BOX
Infectious Diseases Consult by Solo Owens MD    Reason for Consult :    HPI:  68 y/o F with PMHx of hypothyroidism presents with cough and congestion for 2 weeks in duration. Patient states that for the last 10 days she has been having difficulty swallowing, solids more than liquids. Niece at bedside states that patient has been eating and drinking less but more so noticed patient coughing with water but not with regular food. She now has more of a cough and dyspnea. She had a Tmax of 101.4 at home. Of note, she went to urgent care 1 month ago as she was feeling unwell and was found to be in SVT with HR sustaining in 170s. Was advised to go to the ER at that time but never followed up as she felt better and does not have insurance. Denies chest pain, palpitations, abdominal pain, n/v/d. Denies sick contacts or myalgias.  She has been in [place]  [place] a Vis[place] since april 2019 after here  passed away in [place] , she came her ot stay with relatives . She has weight loss form not eating.    She has chronic producitve cough , not associated wiht hemoptysis. She is a life time non smoker and had no second hand smoking. No hx of blood transfusion in [place] . No prior hx of pneumonia or asthma . She has no known exposure to TB     In the ED, patient met septic criteria with fever 101.4, . Labs significant for WBC 14.34, ddimer 765, BUN/Cr 47/1.40. CXR showed bilateral pna more prominent on right (personal read). CTA was negative for PE showed nonspecific ground glass opacities in both lungs. Nodular opacities in the left upper lobe and lingula concerning for infection, neoplasm cannot be ruled out. EKG sinus tachycardia at 104bpm. (19 Oct 2019 23:08)    She was seen by Pulmonary and Speech therapy, Positive for aspiration    She complaints that for past 2 weeks she has increased SOB on minimal exertion     Past Medical & Surgical Hx:  PAST MEDICAL & SURGICAL HISTORY:  Hypothyroidism, unspecified type  No significant past surgical history: denies surgical history but CT shows cholecystectomy      Social History--  , lives with sister   EtOH: denies   Tobacco: denies   Drug Use: denies     Travel/Environmental/Occupational History: as above    FAMILY HISTORY:  FH: type 2 diabetes: in father      Allergies    No Known Allergies    Intolerances        Home/ Out patient  Medications :  Home Medications:  levothyroxine 75 mcg (0.075 mg) oral capsule: 1 cap(s) orally once a day (19 Oct 2019 23:15)  Multiple Vitamins oral tablet: 1 tab(s) orally once a day (19 Oct 2019 23:15)      Current Inpatient Medications :    ANTIBIOTICS:   piperacillin/tazobactam IVPB.. 3.375 Gram(s) IV Intermittent every 8 hours      OTHER RELEVANT MEDICATIONS :  acetaminophen   Tablet .. 650 milliGRAM(s) Oral every 6 hours PRN  ALBUTerol    0.083% 2.5 milliGRAM(s) Nebulizer every 6 hours PRN  heparin  Injectable 5000 Unit(s) SubCutaneous every 8 hours  levothyroxine 75 MICROGram(s) Oral daily  multivitamin 1 Tablet(s) Oral daily  sodium chloride 0.9%. 1000 milliLiter(s) IV Continuous <Continuous>    ROS:  CONSTITUTIONAL:  Positive for  fever or chills, feels weak, poor appetite  EYES:  Negative  blurry vision or double vision  CARDIOVASCULAR:  Negative for chest pain or palpitations  RESPIRATORY:  Pos  for cough, wheezing, and  SOB   GASTROINTESTINAL:  Negative for nausea, vomiting, diarrhea, constipation, or abdominal pain  GENITOURINARY:  Negative frequency, urgency , dysuria or hematuria   NEUROLOGIC:  No headache, confusion, dizziness, lightheadedness  All other systems were reviewed and are negative          I&O's Detail      Physical Exam:  Vital Signs Last 24 Hrs  T(C): 36.9 (20 Oct 2019 12:43), Max: 38.6 (19 Oct 2019 18:25)  T(F): 98.5 (20 Oct 2019 12:43), Max: 101.4 (19 Oct 2019 18:25)  HR: 113 (20 Oct 2019 12:43) (95 - 127)  BP: 115/78 (20 Oct 2019 12:43) (95/67 - 123/81)  BP(mean): --  RR: 18 (20 Oct 2019 12:43) (16 - 18)  SpO2: 93% (20 Oct 2019 12:43) (88% - 99%)    Weight (kg): 54 (10-19 @ 23:22)    General: Frail poorly nourished female in mod resp.  distress  Eyes: sclera anicteric, pupils equal and reactive to light  ENMT: buccal mucosa moist, pharynx not injected  Neck: supple, trachea midline  Lungs: coarse breath sounds , no wheeze/rhonchi  Cardiovascular: regular rate and rhythm, S1 S2  Abdomen: soft, nontender, no organomegaly present, bowel sounds normal  Neurological:  alert and oriented x3, Cranial Nerves II-XII grossly intact  Skin:no increased ecchymosis/petechiae/purpura  Lymph Nodes: no palpable cervical/supraclavicular lymph nodes enlargements  Extremities: no cyanosis/clubbing/edema    Labs:                 9.9    12.81  )----------(  276       ( 20 Oct 2019 07:36 )               29.8      139    |  110    |  35     ----------------------------<  95         ( 20 Oct 2019 07:36 )  4.2     |  20     |  1.30     Ca    8.5        ( 20 Oct 2019 07:36 )  Phos  3.6       ( 20 Oct 2019 07:36 )  Mg     1.6       ( 20 Oct 2019 07:36 )    TPro  7.0    /  Alb  2.0    /  TBili  0.2    /  DBili  x      /  AST  57     /  ALT  26     /  AlkPhos  87     ( 19 Oct 2019 19:04 )    LIVER FUNCTIONS - ( 19 Oct 2019 19:04 )  Alb: 2.0 g/dL / Pro: 7.0 g/dL / ALK PHOS: 87 U/L / ALT: 26 U/L / AST: 57 U/L / GGT: x           Blood Gas Profile - Venous (10.20.19 @ 07:36)    pH, Venous: 7.26    pCO2, Venous: 45 mmHg    pO2, Venous: <44 mmHg    HCO3, Venous: 18 mmol/L    Base Excess, Venous: -6.3 mmol/L    Oxygen Saturation, Venous: 41 %    FIO2, Venous: 21.0    Blood Gas Comments, Venous: Venous sample drawn by lab    Lactate, Blood: 1.1 mmol/L (10-19-19 @ 19:04)    Procalcitonin, Serum (10.20.19 @ 07:36)    Procalcitonin, Serum: 0.18:         RECENT CULTURES:    RADIOLOGY & ADDITIONAL STUDIES:  CT Angio Chest w/ IV Cont (10.19.19 @ 20:48) >  COMPARISON: Same day chest radiograph.    FINDINGS: The patient's respiratory motion degrades images.    LUNGS AND AIRWAYS: Patent central airways. Nonspecific groundglass   opacities in both lungs. Nodular opacities in the left upper lobe and   lingula.   PLEURA: No pleural effusion or pneumothorax.  HEART: Normal size. No pericardial effusion.  VESSELS: Suboptimal contrast opacification of the subsegmental pulmonary   arteries. No obvious embolus to the level of the segmental pulmonary   arteries. Atherosclerotic change of the thoracic aorta and great vessel   origin.  CHEST WALL AND LOWER NECK: Nonspecific calcification in the left breast   soft tissue.  MEDIASTINUM AND DARCIE: Enlarged lymph nodes, 1.9 x 1.0 cmAP window lymph   node, 1.2 x 1.1 cm left hilar lymph node and 1.8 x 1.1 cm right hilar   lymph node.  UPPER ABDOMEN: Cholecystectomy.  BONES: Degenerative changes of the spine.     IMPRESSION:    Suboptimal evaluation of the subsegmental pulmonary arteries. No obvious   embolus to the level of the segmental pulmonary arteries.    Nonspecific pulmonary groundglass and nodular opacities as described,   which may represent infection/inflammation although neoplasm cannot be   excluded. Recommend follow-up to assess resolution    Nonspecific mediastinal and hilar lymphadenopathy.    Additional findings as described.        Assessment :   68 y/o F with PMHx of hypothyroidism presents with cough and congestion for over 3-4 weeks  in duration associated with intermittent fevers and cough . She has difficulty swallowing and increased cough with liquids. She has progressive in creased sob on minimal exertion . Has weight loss form poor appetite . has not been on any abx PTA. CXR and CTA chest dhows diffuse bilateral infiltrates with hilar and mediastinal adenopathy . As per speech therapy she has posiitve aspiration with thin liquids .    She likely has recurrent aspiration , CT chest shows diffuse infiltrates and she is hypoxic . She may decompensate and may need higher oxygen . She is critically ill and may benefit from ICU evalaution. Called Dr. Villalpando, he is away DR. Nicholas Covering     Plan :   - will continue with IV Zosyn for aspiration  - she may need intubation if gets worse  - will need to r/o TB due to her hx and abnormal CXR/ CT and  chronicity of symptoms .  - check echo cardiogram , HIV, sed rate, ABG , PNP  - sputum AFB q 8 x 3 , QuantiFeron TB gold ,place on airborne precautions   - if gets intubated may benefit from bronch if all cs negative     Continue with present regime .  Approptiate use of antibiotics and adverse effects reviewed.      I have discussed the above plan of care with patient and family in detail. They expressed understanding of the treatment plan . Risks, benefits and alternatives discussed in detail. I have asked if they have any questions or concerns and appropriately addressed them to the best of my ability .      > 75  minutes spent in direct patient care reviewing  the notes, lab data/ imaging , discussion with multidisciplinary team. All questions were addressed and answered to the best of my capacity .    Thank you for allowing me to participate in the care of your patient .      Solo Owens MD  [phone]

## 2019-10-20 NOTE — CONSULT NOTE ADULT - SUBJECTIVE AND OBJECTIVE BOX
REASON FOR CONSULT: hypoxia     CONSULT REQUESTED BY: Dr. Quintero     Patient is a 69y old  Female who presents with a chief complaint of pneumonia (20 Oct 2019 13:11)      BRIEF HOSPITAL COURSE:   70 y/o F with PMHx of hypothyroidism presents with cough and congestion for 2 weeks in duration. Patient states that for the last 10 days she has been having difficulty swallowing, solids more than liquids. Niece at bedside states that patient has been eating and drinking less but more so noticed patient coughing with water but not with regular food. She now has more of a cough and dyspnea. She had a Tmax of 101.4 at home. Of note, she went to urgent care 1 month ago as she was feeling unwell and was found to be in SVT with HR sustaining in 170s. Was advised to go to the ER at that time but never followed up as she felt better and does not have insurance. Denies chest pain, palpitations, abdominal pain, n/v/d. Denies sick contacts or myalgias.     In the ED, patient met septic criteria with fever 101.4, . Labs significant for WBC 14.34, dimer 765, BUN/Cr 47/1.40. CXR showed bilateral pna more prominent on right. CTA was negative for PE showed nonspecific ground-glass opacities in both lungs. Nodular opacities in the left upper lobe and lingula concerning for infection, neoplasm cannot be ruled out. EKG sinus tachycardia at 104bpm    Admitted and started on ABX    Events last 24 hours:   RRT for hypoxia and tachycardia   TB panel sent  started on venti mask  Tylenol for fever       PAST MEDICAL & SURGICAL HISTORY:  Hypothyroidism, unspecified type  No significant past surgical history: denies surgical history but CT shows cholecystectomy    Allergies    No Known Allergies    Intolerances      FAMILY HISTORY:  FH: type 2 diabetes: in father      Review of Systems:  CONSTITUTIONAL: No fever, chills, + fatigue  EYES: No eye pain, visual disturbances, or discharge  ENMT:  No difficulty hearing, tinnitus, vertigo; No sinus or throat pain  NECK: No pain or stiffness  RESPIRATORY: No cough, wheezing, chills or hemoptysis; + shortness of breath  CARDIOVASCULAR: No chest pain, palpitations, dizziness, or leg swelling  GASTROINTESTINAL: No abdominal or epigastric pain. No nausea, vomiting, or hematemesis; No diarrhea or constipation. No melena or hematochezia.  GENITOURINARY: No dysuria, frequency, hematuria, or incontinence  NEUROLOGICAL: No headaches, memory loss, loss of strength, numbness, or tremors  SKIN: No itching, burning, rashes, or lesions   MUSCULOSKELETAL: No joint pain or swelling; No muscle, back, or extremity pain  PSYCHIATRIC: No depression, anxiety, mood swings, or difficulty sleeping      Medications:  piperacillin/tazobactam IVPB.. 3.375 Gram(s) IV Intermittent every 8 hours  ALBUTerol    0.083% 2.5 milliGRAM(s) Nebulizer every 6 hours PRN  acetaminophen   Tablet .. 650 milliGRAM(s) Oral every 6 hours PRN  ibuprofen  Tablet. 200 milliGRAM(s) Oral every 6 hours PRN  heparin  Injectable 5000 Unit(s) SubCutaneous every 8 hours  levothyroxne 75 MICROGram(s) Oral daily  multivitamin 1 Tablet(s) Oral daily  sodium chloride 0.9%. 1000 milliLiter(s) IV Continuous <Continuous>  lactobacillus acidophilus 1 Tablet(s) Oral three times a day      ICU Vital Signs Last 24 Hrs  T(C): 38.4 (20 Oct 2019 15:16), Max: 38.8 (20 Oct 2019 13:48)  T(F): 101.1 (20 Oct 2019 15:16), Max: 101.8 (20 Oct 2019 13:48)  HR: 113 (20 Oct 2019 12:43) (95 - 127)  BP: 115/78 (20 Oct 2019 12:43) (95/67 - 123/81)  RR: 18 (20 Oct 2019 12:43) (16 - 18)  SpO2: 99% (20 Oct 2019 15:51) (88% - 99%)      ABG - ( 20 Oct 2019 13:18 )  pH, Arterial: 7.44  pH, Blood: x     /  pCO2: 26    /  pO2: 52    / HCO3: 20    / Base Excess: -5.8  /  SaO2: 85              I&O's Detail        LABS:                        9.9    12.81 )-----------( 276      ( 20 Oct 2019 07:36 )             29.8     10-20    139  |  110<H>  |  35<H>  ----------------------------<  95  4.2   |  20<L>  |  1.30    Ca    8.5      20 Oct 2019 07:36  Phos  3.6     10-20  Mg     1.6     10-20    TPro  7.0  /  Alb  2.0<L>  /  TBili  0.2  /  DBili  x   /  AST  57<H>  /  ALT  26  /  AlkPhos  87  10-19          CAPILLARY BLOOD GLUCOSE      POCT Blood Glucose.: 99 mg/dL (20 Oct 2019 13:34)        CULTURES:      Physical Examination:    General: No acute distress.  Alert, oriented, interactive, nonfocal    HEENT: Pupils equal, reactive to light.  Symmetric.    PULM: decreased breath sounds b/l    CVS: tachy rate and rhythm, no murmurs, rubs, or gallops    ABD: Soft, nondistended, nontender, normoactive bowel sounds, no masses    EXT: No edema, nontender    SKIN: Warm and well perfused, no rashes noted.    Neuro: axo x 3    RADIOLOGY:   < from: CT Angio Chest w/ IV Cont (10.19.19 @ 20:48) >  NTERPRETATION:  CLINICAL INFORMATION: Shortness of breath, cough,   dyspnea, fever, elevated dimer, assess PE.     PROCEDURE: CT angiography of the chest was performed with intravenous   contrast utilizing dedicated PE protocol. 53 mls of Omnipaque-350   administered without complication. 41 ml discarded. Coronal and sagittal   reconstruction images were obtained. Axial MIP images were obtained from   a separate workstation.      COMPARISON: Same day chest radiograph.    FINDINGS: The patient's respiratory motion degrades images.    LUNGS AND AIRWAYS: Patent central airways. Nonspecific groundglass   opacities in both lungs. Nodular opacities in the left upper lobe and   lingula.   PLEURA: No pleural effusion or pneumothorax.  HEART: Normal size. No pericardial effusion.  VESSELS: Suboptimal contrast opacification of the subsegmental pulmonary   arteries. No obvious embolus to the level of the segmental pulmonary   arteries. Atherosclerotic change of the thoracic aorta and great vessel   origin.  CHEST WALL AND LOWER NECK: Nonspecific calcification in the left breast   soft tissue.  MEDIASTINUM AND DARCIE: Enlarged lymph nodes, 1.9 x 1.0 cmAP window lymph   node, 1.2 x 1.1 cm left hilar lymph node and 1.8 x 1.1 cm right hilar   lymph node.  UPPER ABDOMEN: Cholecystectomy.  BONES: Degenerative changes of the spine.     IMPRESSION:    Suboptimal evaluation of the subsegmental pulmonary arteries. No obvious   embolus to the level of the segmental pulmonary arteries.    Nonspecific pulmonary groundglass and nodular opacities as described,   which may represent infection/inflammation although neoplasm cannot be   excluded. Recommend follow-up to assess resolution    Nonspecific mediastinal and hilar lymphadenopathy.    Additional findings as described.

## 2019-10-20 NOTE — PROGRESS NOTE ADULT - ASSESSMENT
70 y/o F with PMHx of hypothyroidism admitted with sepsis secondary to bilateral pneumonia and dysphagia. Possible aspiration pneumonia.      10/20: on and off fever for weeks and is also SHOB. had air travel 2 months ago. will get vebnous doppler and put pt on precautions for TB and order quantiferon gold with sputum cx. 70 y/o F with PMHx of hypothyroidism admitted with sepsis secondary to bilateral pneumonia and dysphagia. Possible aspiration pneumonia.      10/20: on and off fever for weeks and is also SHOB. had air travel 2 months ago. will get vebnous doppler and put pt on precautions for TB and order quantiferon gold with sputum cx and afb smear. repeated ABG and was puton venti mask. ICU consult obtained. Pt will likely need bronch

## 2019-10-20 NOTE — PROGRESS NOTE ADULT - SUBJECTIVE AND OBJECTIVE BOX
INTERVAL HPI/OVERNIGHT EVENTS:   Patient seen and examined. feels very weak. on and off fever for weeks and is also SHOB. had air travel 2 months ago. on NC in mild distress    MEDICATIONS  (STANDING):  azithromycin  IVPB 500 milliGRAM(s) IV Intermittent every 24 hours  heparin  Injectable 5000 Unit(s) SubCutaneous every 8 hours  lactobacillus acidophilus 1 Tablet(s) Oral three times a day  levothyroxine 75 MICROGram(s) Oral daily  multivitamin 1 Tablet(s) Oral daily  piperacillin/tazobactam IVPB.. 3.375 Gram(s) IV Intermittent every 8 hours  sodium chloride 0.9%. 1000 milliLiter(s) (100 mL/Hr) IV Continuous <Continuous>    MEDICATIONS  (PRN):  acetaminophen   Tablet .. 650 milliGRAM(s) Oral every 6 hours PRN Temp greater or equal to 38C (100.4F), Mild Pain (1 - 3)  ALBUTerol    0.083% 2.5 milliGRAM(s) Nebulizer every 6 hours PRN Shortness of Breath and/or Wheezing      REVIEW OF SYSTEMS:  See HPI,  all others negative    PHYSICAL EXAM:  Vital Signs Last 24 Hrs  T(C): 37.3 (20 Oct 2019 08:20), Max: 38.6 (19 Oct 2019 18:25)  T(F): 99.2 (20 Oct 2019 08:20), Max: 101.4 (19 Oct 2019 18:25)  HR: 101 (20 Oct 2019 05:29) (95 - 127)  BP: 104/65 (20 Oct 2019 05:29) (95/67 - 123/81)  BP(mean): --  RR: 17 (20 Oct 2019 05:29) (16 - 18)  SpO2: 95% (20 Oct 2019 05:29) (88% - 99%)    GENERAL: on NC  HEAD:  Atraumatic, Normocephalic  EYES: EOMI, PERRLA, conjunctiva and sclera clear  ENMT: No tonsillar erythema, exudates, or enlargement; Moist mucous membranes, Good dentition, No lesions  NECK: Supple, No JVD, No Cervical LAD, No thyromegaly, No thyroid nodules felt  NERVOUS SYSTEM:  Good concentration; Moving all 4 extremities; No gross sensory deficits, No facial droop  CHEST WALL: No masses  CHEST/LUNG: BILATERAL RHIONCHI WITH COUGH AND MILD WHEEZING  HEART: Regular rate and rhythm; No murmurs, rubs, or gallops  ABDOMEN: Soft, Nontender, Nondistended, Bowel sounds present, No palpable masses or organomegaly, No bruits  EXTREMITIES:  2+ Peripheral Pulses, No clubbing, cyanosis, or edema  LYMPH: No lymphadenopathy  SKIN: No rashes or lesions    LABS:                        9.9    12.81 )-----------( 276      ( 20 Oct 2019 07:36 )             29.8     20 Oct 2019 07:36    139    |  110    |  35     ----------------------------<  95     4.2     |  20     |  1.30     Ca    8.5        20 Oct 2019 07:36  Phos  3.6       20 Oct 2019 07:36  Mg     1.6       20 Oct 2019 07:36    TPro  7.0    /  Alb  2.0    /  TBili  0.2    /  DBili  x      /  AST  57     /  ALT  26     /  AlkPhos  87     19 Oct 2019 19:04           RADIOLOGY & ADDITIONAL TESTS:

## 2019-10-20 NOTE — CHART NOTE - NSCHARTNOTEFT_GEN_A_CORE
RAPID RESPONSE FOLLOW UP NOTE    Patient seen and examined at bedside. RR called for hypoxia at 13:22. Pt reassessed around 15:45 Patient states she is feeling much better. Her O2 saturation on ventimask is 99%, Repeat temperature 101.1. Denies headaches, dizziness, SOB, cough, wheezing, chest pain, palpitations, abdominal pain, n/v/d.    ICU Vital Signs Last 24 Hrs  T(C): 38.4 (20 Oct 2019 15:16), Max: 38.8 (20 Oct 2019 13:48)  T(F): 101.1 (20 Oct 2019 15:16), Max: 101.8 (20 Oct 2019 13:48)  HR: 113 (20 Oct 2019 12:43) (95 - 127)  BP: 115/78 (20 Oct 2019 12:43) (95/67 - 123/81)  RR: 18 (20 Oct 2019 12:43) (16 - 18)  SpO2: 99% (20 Oct 2019 15:51) (88% - 99%)          PHYSICAL EXAM:  GENERAL: NAD, lying in bed comfortably, on ventimask  HEAD:  Atraumatic, Normocephalic  EYES: EOMI, conjunctiva and sclera clear  ENT: Moist mucous membranes  NECK: Supple, No JVD  CHEST/LUNG: Clear to auscultation bilaterally; No rales, rhonchi, wheezing, or rubs. Unlabored respirations  HEART: Tachycardia, regular rhythm; No murmurs, rubs, or gallops  ABDOMEN: Bowel sounds present; Soft, Nontender, Nondistended   EXTREMITIES:  2+ Peripheral Pulses, brisk capillary refill. No clubbing, cyanosis, or edema  NERVOUS SYSTEM:  Alert & Oriented X3, speech clear. No deficits   SKIN: No rashes or lesions        LABS:                        9.9    12.81 )-----------( 276      ( 20 Oct 2019 07:36 )             29.8       10-20    139  |  110<H>  |  35<H>  ----------------------------<  95  4.2   |  20<L>  |  1.30    Ca    8.5      20 Oct 2019 07:36  Phos  3.6     10-20  Mg     1.6     10-20    TPro  7.0  /  Alb  2.0<L>  /  TBili  0.2  /  DBili  x   /  AST  57<H>  /  ALT  26  /  AlkPhos  87  10-19      LIVER FUNCTIONS - ( 19 Oct 2019 19:04 )  Alb: 2.0 g/dL / Pro: 7.0 g/dL / ALK PHOS: 87 U/L / ALT: 26 U/L / AST: 57 U/L / GGT: x             CAPILLARY BLOOD GLUCOSE      POCT Blood Glucose.: 99 mg/dL (20 Oct 2019 13:34)      ABG - ( 20 Oct 2019 13:18 )  pH, Arterial: 7.44  pH, Blood: x     /  pCO2: 26    /  pO2: 52    / HCO3: 20    / Base Excess: -5.8  /  SaO2: 85            I&O's Summary    IMAGING:    Assessment/Plan:    Hypoxia likely secondary to pneumonia  - tachypneic, hypoxic on 4L NC  - Patient improved on venti mask (O2sat 99)  - Switch to high flow O2 when high flow is available.   - Patient to stay on 3west, Vitals Q4H    Tachycardia  - , EKG sinus tach  - Likely secondary to fever/hypoxia (temp 101.8)  - Continue to monitor vitals Q4H    Fever  - Temp 101.8, patient admitted for sepsis secondary to pneumonia  - Patient given Tylenol 650  - Continue Abx, Tylenol 650 Q6H for fever    -Dr. Quintero aware  -Will continue to follow, RN to call if any changes. RAPID RESPONSE FOLLOW UP NOTE    Patient seen and examined at bedside. RR called for hypoxia at 13:22. Pt reassessed around 15:45 Patient states she is feeling much better. Her O2 saturation on ventimask is 99%, Repeat temperature 101.1. Denies headaches, dizziness, SOB, cough, wheezing, chest pain, palpitations, abdominal pain, n/v/d.    ICU Vital Signs Last 24 Hrs  T(C): 38.4 (20 Oct 2019 15:16), Max: 38.8 (20 Oct 2019 13:48)  T(F): 101.1 (20 Oct 2019 15:16), Max: 101.8 (20 Oct 2019 13:48)  HR: 113 (20 Oct 2019 12:43) (95 - 127)  BP: 115/78 (20 Oct 2019 12:43) (95/67 - 123/81)  RR: 18 (20 Oct 2019 12:43) (16 - 18)  SpO2: 99% (20 Oct 2019 15:51) (88% - 99%)          PHYSICAL EXAM:  GENERAL: NAD, lying in bed comfortably, on 50% ventimask  HEAD:  Atraumatic, Normocephalic  EYES: EOMI, conjunctiva and sclera clear  ENT: Moist mucous membranes  NECK: Supple, No JVD  CHEST/LUNG: Clear to auscultation bilaterally; No rales, rhonchi, wheezing, or rubs. Unlabored respirations  HEART: Tachycardia, regular rhythm; No murmurs, rubs, or gallops  ABDOMEN: Bowel sounds present; Soft, Nontender, Nondistended   EXTREMITIES:  2+ Peripheral Pulses, brisk capillary refill. No clubbing, cyanosis, or edema  NERVOUS SYSTEM:  Alert & Oriented X3, speech clear. No deficits   SKIN: No rashes or lesions        LABS:                        9.9    12.81 )-----------( 276      ( 20 Oct 2019 07:36 )             29.8       10-20    139  |  110<H>  |  35<H>  ----------------------------<  95  4.2   |  20<L>  |  1.30    Ca    8.5      20 Oct 2019 07:36  Phos  3.6     10-20  Mg     1.6     10-20    TPro  7.0  /  Alb  2.0<L>  /  TBili  0.2  /  DBili  x   /  AST  57<H>  /  ALT  26  /  AlkPhos  87  10-19      LIVER FUNCTIONS - ( 19 Oct 2019 19:04 )  Alb: 2.0 g/dL / Pro: 7.0 g/dL / ALK PHOS: 87 U/L / ALT: 26 U/L / AST: 57 U/L / GGT: x             CAPILLARY BLOOD GLUCOSE      POCT Blood Glucose.: 99 mg/dL (20 Oct 2019 13:34)      ABG - ( 20 Oct 2019 13:18 )  pH, Arterial: 7.44  pH, Blood: x     /  pCO2: 26    /  pO2: 52    / HCO3: 20    / Base Excess: -5.8  /  SaO2: 85            I&O's Summary    IMAGING:    Assessment/Plan:    Hypoxia likely secondary to pneumonia  - Patient now on venti mask 50%. saturating at 99%  - Titrate to venti mask 40%, if still saturating well, ween to 4L NC etc.   - Patient to stay on 3west, Vitals Q4H. Contact if O2sat <92%    Tachycardia  - , EKG sinus tach during rapid. Likely secondary to fever/hypoxia (temp 101.8)  - F/U   - Continue to monitor vitals Q4H    Fever  - Temp 101.8, patient admitted for sepsis secondary to pneumonia  - Patient given Tylenol 650  - F/U temperature 101.1  - Continue Abx, Tylenol 650 Q6H for fever    -Dr. Quintero aware  -Will continue to follow, RN to call if any changes.

## 2019-10-21 DIAGNOSIS — J81.0 ACUTE PULMONARY EDEMA: ICD-10-CM

## 2019-10-21 DIAGNOSIS — J96.01 ACUTE RESPIRATORY FAILURE WITH HYPOXIA: ICD-10-CM

## 2019-10-21 DIAGNOSIS — I48.91 UNSPECIFIED ATRIAL FIBRILLATION: ICD-10-CM

## 2019-10-21 DIAGNOSIS — J18.9 PNEUMONIA, UNSPECIFIED ORGANISM: ICD-10-CM

## 2019-10-21 LAB
ALBUMIN SERPL ELPH-MCNC: 1.6 G/DL — LOW (ref 3.3–5)
ALP SERPL-CCNC: 76 U/L — SIGNIFICANT CHANGE UP (ref 40–120)
ALT FLD-CCNC: 21 U/L — SIGNIFICANT CHANGE UP (ref 12–78)
ANION GAP SERPL CALC-SCNC: 9 MMOL/L — SIGNIFICANT CHANGE UP (ref 5–17)
AST SERPL-CCNC: 57 U/L — HIGH (ref 15–37)
BASE EXCESS BLDA CALC-SCNC: -8.6 MMOL/L — LOW (ref -2–2)
BASOPHILS # BLD AUTO: 0 K/UL — SIGNIFICANT CHANGE UP (ref 0–0.2)
BASOPHILS NFR BLD AUTO: 0 % — SIGNIFICANT CHANGE UP (ref 0–2)
BILIRUB SERPL-MCNC: 0.2 MG/DL — SIGNIFICANT CHANGE UP (ref 0.2–1.2)
BLOOD GAS COMMENTS ARTERIAL: SIGNIFICANT CHANGE UP
BUN SERPL-MCNC: 27 MG/DL — HIGH (ref 7–23)
CALCIUM SERPL-MCNC: 7.9 MG/DL — LOW (ref 8.5–10.1)
CHLORIDE SERPL-SCNC: 117 MMOL/L — HIGH (ref 96–108)
CK MB BLD-MCNC: <2.2 % — SIGNIFICANT CHANGE UP (ref 0–3.5)
CK MB CFR SERPL CALC: <1 NG/ML — SIGNIFICANT CHANGE UP (ref 0–3.6)
CK SERPL-CCNC: 46 U/L — SIGNIFICANT CHANGE UP (ref 26–192)
CO2 SERPL-SCNC: 19 MMOL/L — LOW (ref 22–31)
CREAT SERPL-MCNC: 1.1 MG/DL — SIGNIFICANT CHANGE UP (ref 0.5–1.3)
EOSINOPHIL # BLD AUTO: 0 K/UL — SIGNIFICANT CHANGE UP (ref 0–0.5)
EOSINOPHIL NFR BLD AUTO: 0 % — SIGNIFICANT CHANGE UP (ref 0–6)
GAMMA INTERFERON BACKGROUND BLD IA-ACNC: 0.1 IU/ML — SIGNIFICANT CHANGE UP
GLUCOSE SERPL-MCNC: 100 MG/DL — HIGH (ref 70–99)
HCO3 BLDA-SCNC: 18 MMOL/L — LOW (ref 23–27)
HCT VFR BLD CALC: 29.1 % — LOW (ref 34.5–45)
HGB BLD-MCNC: 9.6 G/DL — LOW (ref 11.5–15.5)
HIV 1+2 AB+HIV1 P24 AG SERPL QL IA: REACTIVE
HIV1+2 AB SPEC QL: ABNORMAL
HIV1+2 AB SPEC QL: SIGNIFICANT CHANGE UP
LACTATE SERPL-SCNC: 1.4 MMOL/L — SIGNIFICANT CHANGE UP (ref 0.7–2)
LYMPHOCYTES # BLD AUTO: 0.76 K/UL — LOW (ref 1–3.3)
LYMPHOCYTES # BLD AUTO: 7 % — LOW (ref 13–44)
M TB IFN-G BLD-IMP: ABNORMAL
M TB IFN-G CD4+ BCKGRND COR BLD-ACNC: 0 IU/ML — SIGNIFICANT CHANGE UP
M TB IFN-G CD4+CD8+ BCKGRND COR BLD-ACNC: 0.01 IU/ML — SIGNIFICANT CHANGE UP
MCHC RBC-ENTMCNC: 30.1 PG — SIGNIFICANT CHANGE UP (ref 27–34)
MCHC RBC-ENTMCNC: 33 GM/DL — SIGNIFICANT CHANGE UP (ref 32–36)
MCV RBC AUTO: 91.2 FL — SIGNIFICANT CHANGE UP (ref 80–100)
MONOCYTES # BLD AUTO: 0.22 K/UL — SIGNIFICANT CHANGE UP (ref 0–0.9)
MONOCYTES NFR BLD AUTO: 2 % — SIGNIFICANT CHANGE UP (ref 2–14)
NEUTROPHILS # BLD AUTO: 9.93 K/UL — HIGH (ref 1.8–7.4)
NEUTROPHILS NFR BLD AUTO: 89 % — HIGH (ref 43–77)
NIGHT BLUE STAIN TISS: SIGNIFICANT CHANGE UP
NRBC # BLD: SIGNIFICANT CHANGE UP /100 WBCS (ref 0–0)
NT-PROBNP SERPL-SCNC: 1436 PG/ML — HIGH (ref 0–125)
PCO2 BLDA: 26 MMHG — LOW (ref 32–46)
PH BLDA: 7.39 — SIGNIFICANT CHANGE UP (ref 7.35–7.45)
PLATELET # BLD AUTO: 274 K/UL — SIGNIFICANT CHANGE UP (ref 150–400)
PO2 BLDA: 69 MMHG — LOW (ref 74–108)
POTASSIUM SERPL-MCNC: 4.1 MMOL/L — SIGNIFICANT CHANGE UP (ref 3.5–5.3)
POTASSIUM SERPL-SCNC: 4.1 MMOL/L — SIGNIFICANT CHANGE UP (ref 3.5–5.3)
PROT SERPL-MCNC: 6.3 G/DL — SIGNIFICANT CHANGE UP (ref 6–8.3)
QUANT TB PLUS MITOGEN MINUS NIL: 0.06 IU/ML — SIGNIFICANT CHANGE UP
RBC # BLD: 3.19 M/UL — LOW (ref 3.8–5.2)
RBC # FLD: 13.2 % — SIGNIFICANT CHANGE UP (ref 10.3–14.5)
S PNEUM AG UR QL: NEGATIVE — SIGNIFICANT CHANGE UP
SAO2 % BLDA: 93 % — SIGNIFICANT CHANGE UP (ref 92–96)
SODIUM SERPL-SCNC: 145 MMOL/L — SIGNIFICANT CHANGE UP (ref 135–145)
SPECIMEN SOURCE: SIGNIFICANT CHANGE UP
TROPONIN I SERPL-MCNC: <.015 NG/ML — SIGNIFICANT CHANGE UP (ref 0.01–0.04)
WBC # BLD: 10.91 K/UL — HIGH (ref 3.8–10.5)
WBC # FLD AUTO: 10.91 K/UL — HIGH (ref 3.8–10.5)

## 2019-10-21 PROCEDURE — 93010 ELECTROCARDIOGRAM REPORT: CPT

## 2019-10-21 PROCEDURE — 99233 SBSQ HOSP IP/OBS HIGH 50: CPT

## 2019-10-21 PROCEDURE — 99291 CRITICAL CARE FIRST HOUR: CPT

## 2019-10-21 PROCEDURE — 71045 X-RAY EXAM CHEST 1 VIEW: CPT | Mod: 26

## 2019-10-21 RX ORDER — AMIODARONE HYDROCHLORIDE 400 MG/1
1 TABLET ORAL
Qty: 900 | Refills: 0 | Status: DISCONTINUED | OUTPATIENT
Start: 2019-10-21 | End: 2019-10-21

## 2019-10-21 RX ORDER — SODIUM CHLORIDE 9 MG/ML
1000 INJECTION INTRAMUSCULAR; INTRAVENOUS; SUBCUTANEOUS ONCE
Refills: 0 | Status: COMPLETED | OUTPATIENT
Start: 2019-10-21 | End: 2019-10-21

## 2019-10-21 RX ORDER — APIXABAN 2.5 MG/1
5 TABLET, FILM COATED ORAL
Refills: 0 | Status: DISCONTINUED | OUTPATIENT
Start: 2019-10-21 | End: 2019-11-01

## 2019-10-21 RX ORDER — AMIODARONE HYDROCHLORIDE 400 MG/1
150 TABLET ORAL ONCE
Refills: 0 | Status: COMPLETED | OUTPATIENT
Start: 2019-10-21 | End: 2019-10-21

## 2019-10-21 RX ORDER — SODIUM CHLORIDE 9 MG/ML
3 INJECTION INTRAMUSCULAR; INTRAVENOUS; SUBCUTANEOUS EVERY 8 HOURS
Refills: 0 | Status: DISCONTINUED | OUTPATIENT
Start: 2019-10-21 | End: 2019-10-24

## 2019-10-21 RX ORDER — AMIODARONE HYDROCHLORIDE 400 MG/1
0.5 TABLET ORAL
Qty: 900 | Refills: 0 | Status: DISCONTINUED | OUTPATIENT
Start: 2019-10-21 | End: 2019-10-22

## 2019-10-21 RX ORDER — FUROSEMIDE 40 MG
20 TABLET ORAL ONCE
Refills: 0 | Status: COMPLETED | OUTPATIENT
Start: 2019-10-21 | End: 2019-10-21

## 2019-10-21 RX ORDER — METOPROLOL TARTRATE 50 MG
5 TABLET ORAL ONCE
Refills: 0 | Status: DISCONTINUED | OUTPATIENT
Start: 2019-10-21 | End: 2019-10-21

## 2019-10-21 RX ORDER — DILTIAZEM HCL 120 MG
10 CAPSULE, EXT RELEASE 24 HR ORAL ONCE
Refills: 0 | Status: COMPLETED | OUTPATIENT
Start: 2019-10-21 | End: 2019-10-21

## 2019-10-21 RX ORDER — METOPROLOL TARTRATE 50 MG
5 TABLET ORAL ONCE
Refills: 0 | Status: COMPLETED | OUTPATIENT
Start: 2019-10-21 | End: 2019-10-21

## 2019-10-21 RX ORDER — LEVOTHYROXINE SODIUM 125 MCG
37.5 TABLET ORAL AT BEDTIME
Refills: 0 | Status: DISCONTINUED | OUTPATIENT
Start: 2019-10-21 | End: 2019-10-24

## 2019-10-21 RX ORDER — DEXMEDETOMIDINE HYDROCHLORIDE IN 0.9% SODIUM CHLORIDE 4 UG/ML
0.4 INJECTION INTRAVENOUS
Qty: 200 | Refills: 0 | Status: DISCONTINUED | OUTPATIENT
Start: 2019-10-21 | End: 2019-10-22

## 2019-10-21 RX ORDER — ENOXAPARIN SODIUM 100 MG/ML
55 INJECTION SUBCUTANEOUS ONCE
Refills: 0 | Status: COMPLETED | OUTPATIENT
Start: 2019-10-21 | End: 2019-10-21

## 2019-10-21 RX ADMIN — Medication 3 MILLILITER(S): at 13:45

## 2019-10-21 RX ADMIN — DEXMEDETOMIDINE HYDROCHLORIDE IN 0.9% SODIUM CHLORIDE 5.4 MICROGRAM(S)/KG/HR: 4 INJECTION INTRAVENOUS at 17:58

## 2019-10-21 RX ADMIN — Medication 650 MILLIGRAM(S): at 09:36

## 2019-10-21 RX ADMIN — PIPERACILLIN AND TAZOBACTAM 25 GRAM(S): 4; .5 INJECTION, POWDER, LYOPHILIZED, FOR SOLUTION INTRAVENOUS at 09:06

## 2019-10-21 RX ADMIN — Medication 1 TABLET(S): at 13:48

## 2019-10-21 RX ADMIN — Medication 10 MILLIGRAM(S): at 06:14

## 2019-10-21 RX ADMIN — Medication 37.5 MICROGRAM(S): at 21:20

## 2019-10-21 RX ADMIN — APIXABAN 5 MILLIGRAM(S): 2.5 TABLET, FILM COATED ORAL at 17:39

## 2019-10-21 RX ADMIN — AMIODARONE HYDROCHLORIDE 618 MILLIGRAM(S): 400 TABLET ORAL at 05:32

## 2019-10-21 RX ADMIN — SODIUM CHLORIDE 2000 MILLILITER(S): 9 INJECTION INTRAMUSCULAR; INTRAVENOUS; SUBCUTANEOUS at 02:23

## 2019-10-21 RX ADMIN — Medication 5 MILLIGRAM(S): at 01:49

## 2019-10-21 RX ADMIN — Medication 175 MILLIGRAM(S): at 21:32

## 2019-10-21 RX ADMIN — ENOXAPARIN SODIUM 55 MILLIGRAM(S): 100 INJECTION SUBCUTANEOUS at 05:32

## 2019-10-21 RX ADMIN — Medication 3 MILLILITER(S): at 09:09

## 2019-10-21 RX ADMIN — PIPERACILLIN AND TAZOBACTAM 25 GRAM(S): 4; .5 INJECTION, POWDER, LYOPHILIZED, FOR SOLUTION INTRAVENOUS at 00:04

## 2019-10-21 RX ADMIN — DEXMEDETOMIDINE HYDROCHLORIDE IN 0.9% SODIUM CHLORIDE 5.4 MICROGRAM(S)/KG/HR: 4 INJECTION INTRAVENOUS at 19:21

## 2019-10-21 RX ADMIN — Medication 30 MILLIGRAM(S): at 21:19

## 2019-10-21 RX ADMIN — Medication 10 MILLIGRAM(S): at 02:00

## 2019-10-21 RX ADMIN — AMIODARONE HYDROCHLORIDE 16.67 MG/MIN: 400 TABLET ORAL at 12:30

## 2019-10-21 RX ADMIN — Medication 650 MILLIGRAM(S): at 09:06

## 2019-10-21 RX ADMIN — Medication 650 MILLIGRAM(S): at 01:00

## 2019-10-21 RX ADMIN — PIPERACILLIN AND TAZOBACTAM 25 GRAM(S): 4; .5 INJECTION, POWDER, LYOPHILIZED, FOR SOLUTION INTRAVENOUS at 23:08

## 2019-10-21 RX ADMIN — Medication 3 MILLILITER(S): at 20:31

## 2019-10-21 RX ADMIN — SODIUM CHLORIDE 3 MILLILITER(S): 9 INJECTION INTRAMUSCULAR; INTRAVENOUS; SUBCUTANEOUS at 13:46

## 2019-10-21 RX ADMIN — Medication 20 MILLIGRAM(S): at 17:38

## 2019-10-21 RX ADMIN — SODIUM CHLORIDE 3 MILLILITER(S): 9 INJECTION INTRAMUSCULAR; INTRAVENOUS; SUBCUTANEOUS at 20:31

## 2019-10-21 RX ADMIN — Medication 5 MILLIGRAM(S): at 01:33

## 2019-10-21 RX ADMIN — Medication 650 MILLIGRAM(S): at 00:04

## 2019-10-21 RX ADMIN — AMIODARONE HYDROCHLORIDE 33.33 MG/MIN: 400 TABLET ORAL at 06:29

## 2019-10-21 RX ADMIN — PIPERACILLIN AND TAZOBACTAM 25 GRAM(S): 4; .5 INJECTION, POWDER, LYOPHILIZED, FOR SOLUTION INTRAVENOUS at 16:51

## 2019-10-21 NOTE — PROGRESS NOTE ADULT - SUBJECTIVE AND OBJECTIVE BOX
ID Progress note     Name: PATO HICKMAN  Age: 69y  Gender: Female  MRN: [id number]    Interval History-- Events noted, was transferred to ICU with RRT called for respiratory distress with MARCELINA . She was felt to be in pul.  edema , got diuretics and HR better controlled . She is  still febrile,  await collection of sputum AFB, not much expectoration   Notes reviewed    Past Medical History--  Hypothyroidism, unspecified type  No significant past surgical history      For details regarding the patient's social history, family history, and other miscellaneous elements, please refer the initial infectious diseases consultation and/or the admitting history and physical examination for this admission.    Allergies--  Allergies    No Known Allergies    Intolerances        Medications--  Antibiotics:  piperacillin/tazobactam IVPB.. 3.375 Gram(s) IV Intermittent every 8 hours    Immunologic:    Other:  acetaminophen   Tablet .. PRN  ALBUTerol    0.083% PRN  ALBUTerol/ipratropium for Nebulization  amiodarone Infusion  apixaban  ibuprofen  Tablet. PRN  lactobacillus acidophilus  levothyroxine Injectable  multivitamin  sodium chloride 0.9%.  sodium chloride 3%  Inhalation      Review of Systems--  Review of systems unable to be obtained secondary to clinical condition.    Physical Examination--    Vital Signs: T(F): 100.5 (10-21-19 @ 07:42), Max: 101.8 (10-20-19 @ 13:48)  HR: 96 (10-21-19 @ 11:00)  BP: 109/61 (10-21-19 @ 11:00)  RR: 28 (10-21-19 @ 11:00)  SpO2: 93% (10-21-19 @ 11:00)  Wt(kg): --  General: Nontoxic-appearing Female in no acute distress.  HEENT: AT/NC. PERRL. EOMI. Anicteric. Conjunctiva pink and moist. Oropharynx clear. Dentition fair.  Neck: Not rigid. No sense of mass.  Nodes: None palpable.  Lungs: Coarse breath sounds bilaterally without rales, wheezing or rhonchi  Heart: Regular rate and rhythm. No Murmur. No rub. No gallop. No palpable thrill.  Abdomen: Bowel sounds present and normoactive. Soft. Nondistended. Nontender. No sense of mass. No organomegaly.  Back: No spinal tenderness. No costovertebral angle tenderness.   Extremities: No cyanosis or clubbing. No edema.   Skin: Warm. Dry. Good turgor. No rash. No vasculitic stigmata.  Psychiatric: Appropriate affect and mood for situation.         Laboratory Studies--  CBC                        9.6    10.91 )-----------( 274      ( 21 Oct 2019 05:01 )             29.1       Chemistries  10-21    145  |  117<H>  |  27<H>  ----------------------------<  100<H>  4.1   |  19<L>  |  1.10    Ca    7.9<L>      21 Oct 2019 05:01  Phos  3.6     10-20  Mg     1.6     10-20    TPro  6.3  /  Alb  1.6<L>  /  TBili  0.2  /  DBili  x   /  AST  57<H>  /  ALT  21  /  AlkPhos  76  10-21    Sedimentation Rate, Erythrocyte (10.20.19 @ 14:18)    Sedimentation Rate, Erythrocyte: 97 mm/hr    C-Reactive Protein, Serum (10.20.19 @ 19:08)    C-Reactive Protein, Serum: 10.69 mg/dL    Procalcitonin, Serum (10.20.19 @ 07:36)    Procalcitonin, Serum: 0.18:     Lactate, Blood: 1.4 mmol/L (10-21-19 @ 05:01)  Lactate, Blood: 1.1 mmol/L (10-19-19 @ 19:04)    Serum Pro-Brain Natriuretic Peptide (10.21.19 @ 07:21)    Serum Pro-Brain Natriuretic Peptide: 1436 pg/mL    CARDIAC MARKERS ( 21 Oct 2019 05:01 )  <.015 ng/mL / x     / 46 U/L / x     / <1.0 ng/mL          Culture Data    Culture - Blood (collected 19 Oct 2019 23:13)  Source: .Blood Blood-Peripheral  Preliminary Report (21 Oct 2019 01:02):    No growth to date.    Culture - Blood (collected 19 Oct 2019 23:13)  Source: .Blood Blood-Peripheral  Preliminary Report (21 Oct 2019 01:02):    No growth to date.        Radiology:  US Duplex Venous Lower Ext Complete, Bilateral (10.20.19 @ 16:02) >  FINDINGS:    There is normal compressibility of the bilateral common femoral, femoral   and popliteal veins.     Doppler examination shows normal spontaneous and phasic flow.    No calf vein thrombosis is detected.    IMPRESSION:     No evidence of deep venous thrombosis in either lower extremity.    CT Angio Chest w/ IV Cont (10.19.19 @ 20:48) >  FINDINGS: The patient's respiratory motion degrades images.    LUNGS AND AIRWAYS: Patent central airways. Nonspecific groundglass   opacities in both lungs. Nodular opacities in the left upper lobe and   lingula.   PLEURA: No pleural effusion or pneumothorax.  HEART: Normal size. No pericardial effusion.  VESSELS: Suboptimal contrast opacification of the subsegmental pulmonary   arteries. No obvious embolus to the level of the segmental pulmonary   arteries. Atherosclerotic change of the thoracic aorta and great vessel   origin.  CHEST WALL AND LOWER NECK: Nonspecific calcification in the left breast   soft tissue.  MEDIASTINUM AND DARCIE: Enlarged lymph nodes, 1.9 x 1.0 cmAP window lymph   node, 1.2 x 1.1 cm left hilar lymph node and 1.8 x 1.1 cm right hilar   lymph node.  UPPER ABDOMEN: Cholecystectomy.  BONES: Degenerative changes of the spine.     IMPRESSION:    Suboptimal evaluation of the subsegmental pulmonary arteries. No obvious   embolus to the level of the segmental pulmonary arteries.    Nonspecific pulmonary groundglass and nodular opacities as described,   which may represent infection/inflammation although neoplasm cannot be   excluded. Recommend follow-up to assess resolution    Nonspecific mediastinal and hilar lymphadenopathy.    Additional findings as described.    Assessment--  70 y/o F with PMHx of hypothyroidism presents with cough and congestion for over 3-4 weeks  in duration associated with intermittent fevers and cough . She has difficulty swallowing and increased cough with liquids. She has progressive in creased sob on minimal exertion . Has weight loss form poor appetite . has not been on any abx PTA. CXR and CTA chest dhows diffuse bilateral infiltrates with hilar and mediastinal adenopathy . As per speech therapy she has posiitve aspiration with thin liquids .    She likely has recurrent aspiration , CT chest shows diffuse infiltrates and she is hypoxic . She may have  decompensated HF , with MARCELINA . She needs to have Pul TB ruled out      Plan:  - will continue with IV Zosyn for now pending cs   - send sputum AFB x 3 , may need sputum induction   - serial CXR, follow echo   - will need MBS once off isolation   - if cxr don't improve may need bronchoscopy     Continue with present regime .  Approptiate use of antibiotics and adverse effects reviewed.    I have discussed the above plan of care with patient and family in detail. They expressed understanding of the treatment plan . Risks, benefits and alternatives discussed in detail. I have asked if they have any questions or concerns and appropriately addressed them to the best of my ability .      > 35 minutes spent in direct patient care reviewing  the notes, lab data/ imaging , discussion with multidisciplinary team. All questions were addressed and answered to the best of my capacity .    Thank you for allowing me to participate in the care of your patient .        Solo Owens MD  [phone]

## 2019-10-21 NOTE — PROGRESS NOTE ADULT - SUBJECTIVE AND OBJECTIVE BOX
Pulmonary/Critical Care Followup    PAST MEDICAL & SURGICAL HISTORY:  Hypothyroidism, unspecified type  No significant past surgical history: denies surgical history but CT shows cholecystectomy      Allergies    No Known Allergies    Intolerances        FAMILY HISTORY:  FH: type 2 diabetes: in father      SOCIAL HISTORY:      MEDICATIONS  (STANDING):  ALBUTerol/ipratropium for Nebulization 3 milliLiter(s) Nebulizer every 6 hours  amiodarone Infusion 1 mG/Min (33.333 mL/Hr) IV Continuous <Continuous>  apixaban 5 milliGRAM(s) Oral two times a day  lactobacillus acidophilus 1 Tablet(s) Oral three times a day  levothyroxine Injectable 37.5 MICROGram(s) IV Push at bedtime  multivitamin 1 Tablet(s) Oral daily  piperacillin/tazobactam IVPB.. 3.375 Gram(s) IV Intermittent every 8 hours  sodium chloride 0.9%. 1000 milliLiter(s) (100 mL/Hr) IV Continuous <Continuous>    MEDICATIONS  (PRN):  acetaminophen   Tablet .. 650 milliGRAM(s) Oral every 6 hours PRN Temp greater or equal to 38C (100.4F), Mild Pain (1 - 3)  ALBUTerol    0.083% 2.5 milliGRAM(s) Nebulizer every 6 hours PRN Shortness of Breath and/or Wheezing  ibuprofen  Tablet. 200 milliGRAM(s) Oral every 6 hours PRN Temp greater or equal to 38C (100.4F)      Vital Signs Last 24 Hrs  T(C): 38.1 (21 Oct 2019 07:42), Max: 38.8 (20 Oct 2019 13:48)  T(F): 100.5 (21 Oct 2019 07:42), Max: 101.8 (20 Oct 2019 13:48)  HR: 97 (21 Oct 2019 09:00) (87 - 170)  BP: 119/63 (21 Oct 2019 09:00) (93/63 - 128/85)  BP(mean): 85 (21 Oct 2019 09:00) (77 - 95)  RR: 32 (21 Oct 2019 09:00) (18 - 42)  SpO2: 94% (21 Oct 2019 09:00) (93% - 100%)    LABS:                        9.6    10.91 )-----------( 274      ( 21 Oct 2019 05:01 )             29.1     10-21    145  |  117<H>  |  27<H>  ----------------------------<  100<H>  4.1   |  19<L>  |  1.10    Ca    7.9<L>      21 Oct 2019 05:01  Phos  3.6     10-20  Mg     1.6     10-20    TPro  6.3  /  Alb  1.6<L>  /  TBili  0.2  /  DBili  x   /  AST  57<H>  /  ALT  21  /  AlkPhos  76  10-21          ABG - ( 21 Oct 2019 03:57 )  pH, Arterial: 7.39  pH, Blood: x     /  pCO2: 26    /  pO2: 69    / HCO3: 18    / Base Excess: -8.6  /  SaO2: 93                  WBC:  WBC Count: 10.91 K/uL (10-21 @ 05:01)  WBC Count: 12.81 K/uL (10-20 @ 07:36)  WBC Count: 14.34 K/uL (10-19 @ 19:04)      MICROBIOLOGY:  RECENT CULTURES:  10-19 .Blood Blood-Peripheral XXXX XXXX   No growth to date.          CARDIAC MARKERS ( 21 Oct 2019 05:01 )  <.015 ng/mL / x     / 46 U/L / x     / <1.0 ng/mL            Sodium:  Sodium, Serum: 145 mmol/L (10-21 @ 05:01)  Sodium, Serum: 139 mmol/L (10-20 @ 07:36)  Sodium, Serum: 136 mmol/L (10-19 @ 19:04)      1.10 mg/dL 10-21 @ 05:01  1.30 mg/dL 10-20 @ 07:36  1.40 mg/dL 10-19 @ 19:04      Hemoglobin:  Hemoglobin: 9.6 g/dL (10-21 @ 05:01)  Hemoglobin: 9.9 g/dL (10-20 @ 07:36)  Hemoglobin: 10.0 g/dL (10-19 @ 19:04)      Platelets: Platelet Count - Automated: 274 K/uL (10-21 @ 05:01)  Platelet Count - Automated: 276 K/uL (10-20 @ 07:36)  Platelet Count - Automated: 279 K/uL (10-19 @ 19:04)      LIVER FUNCTIONS - ( 21 Oct 2019 05:01 )  Alb: 1.6 g/dL / Pro: 6.3 g/dL / ALK PHOS: 76 U/L / ALT: 21 U/L / AST: 57 U/L / GGT: x                 RADIOLOGY & ADDITIONAL STUDIES: Pulmonary/Critical Care Followup    PAST MEDICAL & SURGICAL HISTORY:  Hypothyroidism, unspecified type  No significant past surgical history: denies surgical history but CT shows cholecystectomy      Allergies    No Known Allergies    Intolerances        FAMILY HISTORY:  FH: type 2 diabetes: in father      SOCIAL HISTORY:      MEDICATIONS  (STANDING):  ALBUTerol/ipratropium for Nebulization 3 milliLiter(s) Nebulizer every 6 hours  amiodarone Infusion 1 mG/Min (33.333 mL/Hr) IV Continuous <Continuous>  apixaban 5 milliGRAM(s) Oral two times a day  lactobacillus acidophilus 1 Tablet(s) Oral three times a day  levothyroxine Injectable 37.5 MICROGram(s) IV Push at bedtime  multivitamin 1 Tablet(s) Oral daily  piperacillin/tazobactam IVPB.. 3.375 Gram(s) IV Intermittent every 8 hours  sodium chloride 0.9%. 1000 milliLiter(s) (100 mL/Hr) IV Continuous <Continuous>    MEDICATIONS  (PRN):  acetaminophen   Tablet .. 650 milliGRAM(s) Oral every 6 hours PRN Temp greater or equal to 38C (100.4F), Mild Pain (1 - 3)  ALBUTerol    0.083% 2.5 milliGRAM(s) Nebulizer every 6 hours PRN Shortness of Breath and/or Wheezing  ibuprofen  Tablet. 200 milliGRAM(s) Oral every 6 hours PRN Temp greater or equal to 38C (100.4F)      Vital Signs Last 24 Hrs  T(C): 38.1 (21 Oct 2019 07:42), Max: 38.8 (20 Oct 2019 13:48)  T(F): 100.5 (21 Oct 2019 07:42), Max: 101.8 (20 Oct 2019 13:48)  HR: 97 (21 Oct 2019 09:00) (87 - 170)  BP: 119/63 (21 Oct 2019 09:00) (93/63 - 128/85)  BP(mean): 85 (21 Oct 2019 09:00) (77 - 95)  RR: 32 (21 Oct 2019 09:00) (18 - 42)  SpO2: 94% (21 Oct 2019 09:00) (93% - 100%)    LABS:                        9.6    10.91 )-----------( 274      ( 21 Oct 2019 05:01 )             29.1     10-    145  |  117<H>  |  27<H>  ----------------------------<  100<H>  4.1   |  19<L>  |  1.10    Ca    7.9<L>      21 Oct 2019 05:01  Phos  3.6     10-20  Mg     1.6     10-20    TPro  6.3  /  Alb  1.6<L>  /  TBili  0.2  /  DBili  x   /  AST  57<H>  /  ALT  21  /  AlkPhos  76  10-21          ABG - ( 21 Oct 2019 03:57 )  pH, Arterial: 7.39  pH, Blood: x     /  pCO2: 26    /  pO2: 69    / HCO3: 18    / Base Excess: -8.6  /  SaO2: 93                  WBC:  WBC Count: 10.91 K/uL (10-21 @ 05:01)  WBC Count: 12.81 K/uL (10-20 @ 07:36)  WBC Count: 14.34 K/uL (10-19 @ 19:04)      MICROBIOLOGY:  RECENT CULTURES:  10-19 .Blood Blood-Peripheral XXXX XXXX   No growth to date.          CARDIAC MARKERS ( 21 Oct 2019 05:01 )  <.015 ng/mL / x     / 46 U/L / x     / <1.0 ng/mL            Sodium:  Sodium, Serum: 145 mmol/L (10-21 @ 05:01)  Sodium, Serum: 139 mmol/L (10-20 @ 07:36)  Sodium, Serum: 136 mmol/L (10-19 @ 19:04)      1.10 mg/dL 10-21 @ 05:01  1.30 mg/dL 10-20 @ 07:36  1.40 mg/dL 10-19 @ 19:04      Hemoglobin:  Hemoglobin: 9.6 g/dL (10-21 @ 05:01)  Hemoglobin: 9.9 g/dL (10-20 @ 07:36)  Hemoglobin: 10.0 g/dL (10-19 @ 19:04)      Platelets: Platelet Count - Automated: 274 K/uL (10-21 @ 05:01)  Platelet Count - Automated: 276 K/uL (10-20 @ 07:36)  Platelet Count - Automated: 279 K/uL (10-19 @ 19:04)      LIVER FUNCTIONS - ( 21 Oct 2019 05:01 )  Alb: 1.6 g/dL / Pro: 6.3 g/dL / ALK PHOS: 76 U/L / ALT: 21 U/L / AST: 57 U/L / GGT: x                 RADIOLOGY & ADDITIONAL STUDIES:  PATIENT MARYLOU WHYTE  1950 DOA 10/19/2019                                  VITALS/LABS       10/21/2019 1005 87 107/61   10/21/2019 W 10.9 Hb 9.6  Plt 274 Na 145 K 4.1 CO2 19 Cr 1.1  10/21/2019 vm .4 739/26/69   10/20 hep abc n   10/20 v duplx n   10/21/2019 Tr to icu a fib rvr   10/21/2019 bpap .5     PATIENT MARYLOU WHYTE  1950 DOA 10/19/2019                                  EVENTS / CHANGES IN ROS & PE     10/21/2019 A fib rvr Tr to icu 10/21/2019  Amiodarone drip started Apixab started 10/21        REVIEW OF SYMPTOMS     NOTE Noteworthy changes  if any  in ROS and PE are also entered  in note  below      Able to give ROS  Yes     RELIABLE No   CONSTITUTIONAL Weakness Yes  Chills No Vision changes No  ENDOCRINE No unexplained hair loss No heat or cold intolerance    ALLERGY No hives  Sore throat No   RESP Coughing blood no  Shortness of breath YES   NEURO No Headache  Confusion Pain neck No   CARDIAC No Chest pain No Palpitations   GI No Pain abdomen NO   Vomiting NO     PHYSICAL EXAM    HEENT Unremarkable PERRLA atraumatic   RESP Fair air entry EXP prolonged    Harsh breath sound Resp distres mild   CARDIAC S1 S2 No S3     NO JVD    ABDOMEN SOFT BS PRESENT NOT DISTENDED No hepatosplenomegaly PEDAL EDEMA present No calf tenderness  NO rash   GENERAL Not TOXIC looking    PATIENT MARYLOU WHYTE  1950 DOA 10/19/2019                                  PATIENT DATA   ALLERGY nka  WT      54         BMI                                                                                                                                            HEAD OF BED ELEVATION Yes   DVT PROPHYLAXIS   hpsc (10/19)       STRESS ULCER PROPHYLAXIS  INFECTION PPLX                                                                                 DIET npo (10/19)   DYSPHAGIA EVAL IF APPLICABLE                                                                    GAS EXCHANGE   10/20/2019 ra 88%  10/21/2019 bpap /.5    10/21/2019 vm .4                                                                               HEMODYNAMICS 10/20/2019 120/70  DRIPS                                                                                          IV FLUIDS  ns 100.1/2 d (10/19)  INTAKE OUTPUT                                                     MICROBIO    10/21/2019 sp afb n   10/20/2019 leg n   10/20/2019 pc .18   10/19 Flu ab n RSV N    10/20 hep abc n    ANTIBIOTICS        azithro (10/19)   zosyn (10/19)   INDWELLING TUBES  LINES                                                        PATIENT MARYLOU WHYTE  1950 DOA 10/19/2019                                  Pt description                            CC    cough fever chills sob 2 w     er vitals  120/80 120 101f 88%                     HPI              68 yo F p/w cough, congestion x past  2 weeks. Now with inc cough, now with some dyspnea. Pos fever at home as well. no chest pains. NO abd pain. no n/v/d. no recent travel. no known sick contacts. no weakness / dizziness. no agg/allev factors. no other inj or co.    Past Medical History:  Hypothyroidism    home meds

## 2019-10-21 NOTE — PROGRESS NOTE ADULT - SUBJECTIVE AND OBJECTIVE BOX
Chief Complaint: Shortness of breath, fever    Interval Events: Became hypoxic overnight. Febrile. Went into rapid atrial fibrillation.    Review of Systems:  General: No fevers, chills, weight loss or gain  Skin: No rashes, color changes  Cardiovascular: No chest pain, orthopnea  Respiratory: No shortness of breath, cough  Gastrointestinal: No nausea, abdominal pain  Genitourinary: No incontinence, pain with urination  Musculoskeletal: No pain, swelling, decreased range of motion  Neurological: No headache, weakness  Psychiatric: No depression, anxiety  Endocrine: No weight loss or gain, increased thirst  All other systems are comprehensively negative.    Physical Exam:  Vitals:        Vital Signs Last 24 Hrs  T(C): 37.8 (21 Oct 2019 06:00), Max: 38.8 (20 Oct 2019 13:48)  T(F): 100 (21 Oct 2019 06:00), Max: 101.8 (20 Oct 2019 13:48)  HR: 100 (21 Oct 2019 06:00) (99 - 170)  BP: 128/74 (21 Oct 2019 06:00) (93/63 - 128/85)  BP(mean): 95 (21 Oct 2019 06:00) (77 - 95)  RR: 35 (21 Oct 2019 06:00) (18 - 35)  SpO2: 98% (21 Oct 2019 06:00) (93% - 100%)  General: NAD  HEENT: MMM  Neck: No JVD, no carotid bruit  Lungs: CTAB  CV: RRR, nl S1/S2, no M/R/G  Abdomen: S/NT/ND, +BS  Extremities: No LE edema, no cyanosis  Neuro: AAOx3, non-focal  Skin: No rash    Labs:                        9.6    10.91 )-----------( 274      ( 21 Oct 2019 05:01 )             29.1     10-21    145  |  117<H>  |  27<H>  ----------------------------<  100<H>  4.1   |  19<L>  |  1.10    Ca    7.9<L>      21 Oct 2019 05:01  Phos  3.6     10-20  Mg     1.6     10-20    TPro  6.3  /  Alb  1.6<L>  /  TBili  0.2  /  DBili  x   /  AST  57<H>  /  ALT  21  /  AlkPhos  76  10-21    CARDIAC MARKERS ( 21 Oct 2019 05:01 )  <.015 ng/mL / x     / 46 U/L / x     / <1.0 ng/mL          Telemetry: AF -> Sinus rhythm

## 2019-10-21 NOTE — PROVIDER CONTACT NOTE (EICU) - SITUATION
Pt is a 69 y F currently in [facility] for few days for presumed PNA, r/o TB in setting of CT scan findings of RUL infiltrate. While on the medical floor - RRT was called for new onset Afib with labile BP and resp compromise ( tachypnea) . Pt placed on BIPAP,  initially received trial of Cardizem and eventually required Amiodarone bolus followed by infusion. Pt transferred to ICU for hemodynamic monitoring.

## 2019-10-21 NOTE — PROGRESS NOTE ADULT - ASSESSMENT
68 y/o F with PMHx of hypothyroidism presents with cough and congestion for 2 weeks in duration now presents with hypoxic resp failure, febrile to 101, likely with PNA, r/o TB     Neuro: No active issues   Cardio: No active issues, keep SBP less than 140   Pulm: Hypoxic resp failure likely from PNA, r/o TB. TB panel sent. cont airborne precautions.  Ct suggestive of groundglass opacities. Deneis hx of smoking or exposure to smoke. Will switch from NC to HFNC. In the meantuime bridge with venti mask. Will accept sats of 92% and higher. Could even use CPAP 5/5 if no HFNC available. nebs as needed  GI: NO active issues. cont multivitamin   Gu/renal: f/u lytes   ID: Fever 2/2 PNA vs TB, no need for bronch at this time for resp failure. Cont zosyn. would give Tylenol and ibuprofen for fever.   Heme: heparin and scds for dvt prophylaxis   Endo: cont synthroid     Dispo: At this time patient does not need ICU level of care. Please reconsult as needed. Case d.w Dr. Cerda 70 y/o F with PMHx of hypothyroidism presents with cough and congestion for 2 weeks in duration now presents with new onset afib with rvr, acute pulmonary edema causing resp failure requiring BIPAP now transitioned to NC, febrile to 101, likely with PNA, r/o TB     Neuro: No active issues   Cardio: chemical conversion for afib w/Amiodarone infusion, continue Eliquis, f/u echo  Pulm: Hypoxic resp failure likely from PNA, r/o TB. TB panel - sputum, culture and afb smear neg for TB. cont airborne precautions. CT suggestive of ground-glass opacities. Denies hx of smoking or exposure to smoke. NC, will accept sats of 92% and higher. Nebs as needed  GI: No active issues. cont multivitamin   Gu/renal: NICOLASA improving; f/u lytes   ID: Fever 2/2 PNA vs TB, no need for bronch at this time for resp failure. Cont zosyn. would give Tylenol for fever.   Heme: Eliquis and scds for dvt prophylaxis   Endo: cont synthroid   Dispo: Monitor in ICU today; PT consulted 70 y/o F with PMHx of hypothyroidism presents with cough and congestion for 2 weeks in duration now presents with new onset afib with rvr, acute pulmonary edema causing resp failure requiring BIPAP now transitioned to NC, febrile to 101, likely with PNA, r/o TB     Neuro: No active issues   Cardio: chemical conversion for afib w/Amiodarone infusion, continue Eliquis, f/u echo  Pulm: Hypoxic resp failure likely from PNA, r/o TB. TB panel - sputum, culture and afb smear neg for TB. cont airborne precautions. CT suggestive of ground-glass opacities. Denies hx of smoking or exposure to smoke. Transitioned from NC back to BIPAP, will accept sats of 92% and higher. Nebs as needed  GI: No active issues. cont multivitamin   Gu/renal: NICOLASA improving; f/u lytes   ID: Fever 2/2 PNA vs TB, no need for bronch at this time for resp failure. Cont zosyn. would give Tylenol for fever.   Heme: Eliquis and scds for dvt prophylaxis   Endo: cont synthroid   Dispo: Monitor in ICU today; PT consulted 70 y/o F with PMHx of hypothyroidism presents with cough and congestion for 2 weeks in duration now presents with new onset afib with rvr, acute pulmonary edema causing resp failure requiring BIPAP now transitioned to NC, febrile to 101, likely with PNA, r/o TB     Neuro: No active issues   Cardio: chemical conversion for afib w/Amiodarone infusion, continue Eliquis, f/u echo  Pulm: Hypoxic resp failure likely from PNA, r/o TB. TB panel - sputum, culture and afb smear neg for TB. cont airborne precautions. CT suggestive of ground-glass opacities. Denies hx of smoking or exposure to smoke. Transitioned from NC back to BIPAP, will accept sats of 92% and higher. Lasix 20 mg IV x1. Nebs as needed  GI: No active issues. cont multivitamin   Gu/renal: NICOLASA improving; f/u lytes   ID: Fever 2/2 PNA vs TB, no need for bronch at this time for resp failure. Cont zosyn. would give Tylenol for fever.   Heme: Eliquis and scds for dvt prophylaxis   Endo: cont synthroid   Dispo: Monitor in ICU today; PT consulted 68 y/o F with PMHx of hypothyroidism presents with cough and congestion for 2 weeks in duration now presents with new onset afib with rvr, acute pulmonary edema causing resp failure requiring BIPAP, febrile to 101, likely with PNA, r/o TB     Neuro: No active issues   Cardio: chemical conversion for afib w/Amiodarone infusion, continue Eliquis, f/u echo  Pulm: Hypoxic resp failure likely from PNA, r/o TB. TB panel - sputum, culture and afb smear neg for TB. cont airborne precautions. CT suggestive of ground-glass opacities. Denies hx of smoking or exposure to smoke. BIPAP, will accept sats of 92% and higher. Lasix 20 mg IV x1. Nebs as needed  GI: No active issues. cont multivitamin   Gu/renal: NICOLASA improving; f/u lytes   ID: Fever 2/2 PNA vs TB, no need for bronch at this time for resp failure. Cont zosyn. would give Tylenol for fever.   Heme: Eliquis and scds for dvt prophylaxis   Endo: cont synthroid   Dispo: Monitor in ICU today; PT consulted

## 2019-10-21 NOTE — PROGRESS NOTE ADULT - PROBLEM SELECTOR PLAN 2
management as above  supplemental O2 as needed to keep O2 >91  bronchodilators, antitussives PRN  pulm consulted

## 2019-10-21 NOTE — PROGRESS NOTE ADULT - ASSESSMENT
70 y/o F with PMHx of hypothyroidism admitted with sepsis secondary to bilateral pneumonia and dysphagia. Possible aspiration pneumonia.      10/20: on and off fever for weeks and is also SHOB. had air travel 2 months ago. will get vebnous doppler and put pt on precautions for TB and order quantiferon gold with sputum cx and afb smear. repeated ABG and was puton venti mask. ICU consult obtained. Pt will likely need bronch

## 2019-10-21 NOTE — DIETITIAN INITIAL EVALUATION ADULT. - PROBLEM SELECTOR PLAN 6
likely prerenal due to dehydration  also received IV contrast so may remain elevated  will give aggressive IV fluid resuscitation  monitor renal indices, avoid nephrotoxic agents

## 2019-10-21 NOTE — CONSULT NOTE ADULT - PROBLEM SELECTOR RECOMMENDATION 4
continue broad spectrum coverage for MDR orgamsims including staph aureus and gram negatives.   Follow up sputum cultre the narrow specrtum based on culture  sensitivities

## 2019-10-21 NOTE — CONSULT NOTE ADULT - ASSESSMENT
69 year old female with new onset Afib RVR, acute pulmonary edema causing respiratory failure requiring BIPAP, pneumonia     Critical Care time: 45 mins assessing presenting problems of acute illness that poses high probability of life threatening deterioration or end organ damage/dysfunction.  Medical decision making inculding Initiating plan of care, reviewing data, reviewing radiology,direct patient bedside evaluation and interpretation of vital signs, any necessary ventilator management , discusion with multidisciplinary team, discussing goals of care with patient/family, all non inclusive of procedures

## 2019-10-21 NOTE — DIETITIAN INITIAL EVALUATION ADULT. - ADD RECOMMEND
Ensure enlive 8oz po BID nectar thick. Ensure enlive 8oz po BID nectar thick (strawberry). Food preferences/meal alternative obtained through pts family to maximize po intake.

## 2019-10-21 NOTE — DIETITIAN INITIAL EVALUATION ADULT. - PROBLEM SELECTOR PLAN 4
likely due to sepis/infection  pt family reports sustained SVT to 170s at  visit 1 month ago  will monitor on remote telemetry - cardiology consult if develops SVT

## 2019-10-21 NOTE — DIETITIAN INITIAL EVALUATION ADULT. - NUTRITIONGOAL OUTCOME1
Pt to consume >75% meals/supplements daily to meet estimated energy needs. Pt to consume >75% meals/supplements daily to meet estimated energy needs & prevent further wt loss

## 2019-10-21 NOTE — PROGRESS NOTE ADULT - SUBJECTIVE AND OBJECTIVE BOX
INTERVAL HPI/OVERNIGHT EVENTS:  68 y/o F with PMHx of hypothyroidism presented with cough and congestion for 2 weeks . She was found to have bilateral pneumonia . CTA was negative for PE showed nonspecific ground-glass opacities in both lungs. Nodular opacities in the left upper lobe which was concerning for infection so she was admitted to remote [facility] with PNA and rule out TB.     Early last night she was a rapid response for tachycardia insitally thought to be SVT but rate in the low 150's but lopressor slowed the rate down and it was determined she was in afib rvr. The heart rate was eventually slowed down to the low 100s after lopressor and Cardizem but after soft blood pressure and fluids she was sent to telemetry for close monitoring with a blood pressure that did respond to fluids      Second rapid response was called for for afib RVR but now the added complication of respiratory distress. On my arrival I found her with heart rates in the high 150's having SOB with increased work of breathing with accessory muscle use. POCUS found b-lines bilaterally , she was placed on BIPAP for acute respiratory failure secondary to fluid overload from fluid boluses earlier  which were in attempt to treat rate dependant hypotension.    SUBJECTIVE: Patient seen and examined at bedside.     ROS:  CV: Denies chest pain  Resp: Denies SOB  GI: Denies abdominal pain, constipation, diarrhea, nausea, vomiting  : Denies dysuria  ID: Denies fevers, chills  MSK: Denies joint pain     OBJECTIVE:    VITAL SIGNS:  ICU Vital Signs Last 24 Hrs  T(C): 38.1 (21 Oct 2019 07:42), Max: 38.8 (20 Oct 2019 13:48)  T(F): 100.5 (21 Oct 2019 07:42), Max: 101.8 (20 Oct 2019 13:48)  HR: 95 (21 Oct 2019 07:00) (95 - 170)  BP: 108/55 (21 Oct 2019 07:00) (93/63 - 128/85)  BP(mean): 78 (21 Oct 2019 07:00) (77 - 95)  ABP: --  ABP(mean): --  RR: 42 (21 Oct 2019 07:00) (18 - 42)  SpO2: 99% (21 Oct 2019 07:00) (93% - 100%)        10-20 @ 07:01  -  10-21 @ 07:00  --------------------------------------------------------  IN: 1266.6 mL / OUT: 500 mL / NET: 766.6 mL      CAPILLARY BLOOD GLUCOSE      POCT Blood Glucose.: 117 mg/dL (21 Oct 2019 03:48)      PHYSICAL EXAM:    General: NAD, comfortable  HEENT: NCAT, PERRL, clear conjunctiva, no scleral icterus  Neck: supple, no JVD  Respiratory: decreased breath sounds b/l  Cardiovascular: RRR, normal S1S2, no M/R/G  Abdomen: soft, NT/ND, bowel sounds in all four quadrants, no palpable masses  Extremities: WWP, no clubbing, cyanosis, or edema  Neuro:     MEDICATIONS:  MEDICATIONS  (STANDING):  ALBUTerol/ipratropium for Nebulization 3 milliLiter(s) Nebulizer every 6 hours  amiodarone Infusion 1 mG/Min (33.333 mL/Hr) IV Continuous <Continuous>  apixaban 5 milliGRAM(s) Oral two times a day  lactobacillus acidophilus 1 Tablet(s) Oral three times a day  levothyroxine Injectable 37.5 MICROGram(s) IV Push at bedtime  multivitamin 1 Tablet(s) Oral daily  piperacillin/tazobactam IVPB.. 3.375 Gram(s) IV Intermittent every 8 hours  sodium chloride 0.9%. 1000 milliLiter(s) (100 mL/Hr) IV Continuous <Continuous>    MEDICATIONS  (PRN):  acetaminophen   Tablet .. 650 milliGRAM(s) Oral every 6 hours PRN Temp greater or equal to 38C (100.4F), Mild Pain (1 - 3)  ALBUTerol    0.083% 2.5 milliGRAM(s) Nebulizer every 6 hours PRN Shortness of Breath and/or Wheezing  ibuprofen  Tablet. 200 milliGRAM(s) Oral every 6 hours PRN Temp greater or equal to 38C (100.4F)      ALLERGIES:  Allergies    No Known Allergies    Intolerances        LABS:                        9.6    10.91 )-----------( 274      ( 21 Oct 2019 05:01 )             29.1     10-21    145  |  117<H>  |  27<H>  ----------------------------<  100<H>  4.1   |  19<L>  |  1.10    Ca    7.9<L>      21 Oct 2019 05:01  Phos  3.6     10-20  Mg     1.6     10-20    TPro  6.3  /  Alb  1.6<L>  /  TBili  0.2  /  DBili  x   /  AST  57<H>  /  ALT  21  /  AlkPhos  76  10-21          RADIOLOGY & ADDITIONAL TESTS: Reviewed. INTERVAL HPI/OVERNIGHT EVENTS: Patient seen and examined at bedside. Nephew at bedside. Breathing comfortably on 4 L NC. Rapid response last night for tachycardia 's, determined she was in afib rvr after lopressor. HR slowed down s/p lopressor and cardizem. Patient transferred to [facility] for close monitoring after soft blood pressure unresponsive to fluids. Second rapid response was called for for afib RVR 's and respiratory distress with accessory muscle use. POCUS found b-lines bilaterally, she was placed on BIPAP for acute respiratory failure 2/2 to fluid overload from fluid boluses earlier  which were in attempt to treat rate dependant hypotension. No complaints today.    SUBJECTIVE:     ROS:  CV: Denies chest pain  Resp: Denies SOB  GI: Denies abdominal pain, constipation, diarrhea, nausea, vomiting  : Denies dysuria  ID: Denies fevers, chills  MSK: Denies joint pain     OBJECTIVE:    VITAL SIGNS:  ICU Vital Signs Last 24 Hrs  T(C): 38.1 (21 Oct 2019 07:42), Max: 38.8 (20 Oct 2019 13:48)  T(F): 100.5 (21 Oct 2019 07:42), Max: 101.8 (20 Oct 2019 13:48)  HR: 95 (21 Oct 2019 07:00) (95 - 170)  BP: 108/55 (21 Oct 2019 07:00) (93/63 - 128/85)  BP(mean): 78 (21 Oct 2019 07:00) (77 - 95)  ABP: --  ABP(mean): --  RR: 42 (21 Oct 2019 07:00) (18 - 42)  SpO2: 99% (21 Oct 2019 07:00) (93% - 100%)        10-20 @ 07:01  -  10-21 @ 07:00  --------------------------------------------------------  IN: 1266.6 mL / OUT: 500 mL / NET: 766.6 mL      CAPILLARY BLOOD GLUCOSE      POCT Blood Glucose.: 117 mg/dL (21 Oct 2019 03:48)      PHYSICAL EXAM:    General: NAD, comfortable  HEENT: NCAT, PERRL, clear conjunctiva, no scleral icterus  Neck: supple, no JVD  Respiratory: decreased breath sounds b/l  Cardiovascular: RRR, normal S1S2, no M/R/G  Abdomen: soft, NT/ND, bowel sounds in all four quadrants, no palpable masses  Extremities: WWP, no clubbing, cyanosis, or edema  Neuro: A&O x3, no sensory or motor deficits    MEDICATIONS:  MEDICATIONS  (STANDING):  ALBUTerol/ipratropium for Nebulization 3 milliLiter(s) Nebulizer every 6 hours  amiodarone Infusion 1 mG/Min (33.333 mL/Hr) IV Continuous <Continuous>  apixaban 5 milliGRAM(s) Oral two times a day  lactobacillus acidophilus 1 Tablet(s) Oral three times a day  levothyroxine Injectable 37.5 MICROGram(s) IV Push at bedtime  multivitamin 1 Tablet(s) Oral daily  piperacillin/tazobactam IVPB.. 3.375 Gram(s) IV Intermittent every 8 hours  sodium chloride 0.9%. 1000 milliLiter(s) (100 mL/Hr) IV Continuous <Continuous>    MEDICATIONS  (PRN):  acetaminophen   Tablet .. 650 milliGRAM(s) Oral every 6 hours PRN Temp greater or equal to 38C (100.4F), Mild Pain (1 - 3)  ALBUTerol    0.083% 2.5 milliGRAM(s) Nebulizer every 6 hours PRN Shortness of Breath and/or Wheezing  ibuprofen  Tablet. 200 milliGRAM(s) Oral every 6 hours PRN Temp greater or equal to 38C (100.4F)      ALLERGIES:  Allergies    No Known Allergies    Intolerances        LABS:                        9.6    10.91 )-----------( 274      ( 21 Oct 2019 05:01 )             29.1     10-21    145  |  117<H>  |  27<H>  ----------------------------<  100<H>  4.1   |  19<L>  |  1.10    Ca    7.9<L>      21 Oct 2019 05:01  Phos  3.6     10-20  Mg     1.6     10-20    TPro  6.3  /  Alb  1.6<L>  /  TBili  0.2  /  DBili  x   /  AST  57<H>  /  ALT  21  /  AlkPhos  76  10-21          RADIOLOGY & ADDITIONAL TESTS: Reviewed. INTERVAL HPI/OVERNIGHT EVENTS: Patient seen and examined at bedside. Nephew at bedside. Breathing comfortably on 4 L NC. Rapid response last night for tachycardia 's, determined she was in afib rvr after lopressor. HR slowed down s/p lopressor and cardizem. Patient transferred to [facility] for close monitoring after soft blood pressure unresponsive to fluids. Second rapid response was called for for afib RVR 's and respiratory distress with accessory muscle use. POCUS found b-lines bilaterally, she was placed on BIPAP for acute respiratory failure 2/2 to fluid overload from fluid boluses earlier  which were in attempt to treat rate dependant hypotension. No complaints today.    SUBJECTIVE:     ROS:  CV: Denies chest pain  Resp: Denies SOB  GI: Denies abdominal pain, constipation, diarrhea, nausea, vomiting  : Denies dysuria  ID: Denies fevers, chills  MSK: Denies joint pain     OBJECTIVE:    VITAL SIGNS:  ICU Vital Signs Last 24 Hrs  T(C): 38.1 (21 Oct 2019 07:42), Max: 38.8 (20 Oct 2019 13:48)  T(F): 100.5 (21 Oct 2019 07:42), Max: 101.8 (20 Oct 2019 13:48)  HR: 95 (21 Oct 2019 07:00) (95 - 170)  BP: 108/55 (21 Oct 2019 07:00) (93/63 - 128/85)  BP(mean): 78 (21 Oct 2019 07:00) (77 - 95)  ABP: --  ABP(mean): --  RR: 42 (21 Oct 2019 07:00) (18 - 42)  SpO2: 99% (21 Oct 2019 07:00) (93% - 100%)        10-20 @ 07:01  -  10-21 @ 07:00  --------------------------------------------------------  IN: 1266.6 mL / OUT: 500 mL / NET: 766.6 mL      CAPILLARY BLOOD GLUCOSE      POCT Blood Glucose.: 117 mg/dL (21 Oct 2019 03:48)      PHYSICAL EXAM:    General: NAD, comfortable  HEENT: NCAT, PERRL, clear conjunctiva, no scleral icterus  Neck: supple, no JVD  Respiratory: decreased breath sounds b/l  Cardiovascular: RRR, normal S1S2, no M/R/G  Abdomen: soft, NT/ND, bowel sounds in all four quadrants, no palpable masses  Extremities: WWP, no clubbing, cyanosis, or edema  Neuro: A&O x3, no sensory or motor deficits    MEDICATIONS:  MEDICATIONS  (STANDING):  ALBUTerol/ipratropium for Nebulization 3 milliLiter(s) Nebulizer every 6 hours  amiodarone Infusion 1 mG/Min (33.333 mL/Hr) IV Continuous <Continuous>  apixaban 5 milliGRAM(s) Oral two times a day  lactobacillus acidophilus 1 Tablet(s) Oral three times a day  levothyroxine Injectable 37.5 MICROGram(s) IV Push at bedtime  multivitamin 1 Tablet(s) Oral daily  piperacillin/tazobactam IVPB.. 3.375 Gram(s) IV Intermittent every 8 hours  sodium chloride 0.9%. 1000 milliLiter(s) (100 mL/Hr) IV Continuous <Continuous>    MEDICATIONS  (PRN):  acetaminophen   Tablet .. 650 milliGRAM(s) Oral every 6 hours PRN Temp greater or equal to 38C (100.4F), Mild Pain (1 - 3)  ALBUTerol    0.083% 2.5 milliGRAM(s) Nebulizer every 6 hours PRN Shortness of Breath and/or Wheezing  ibuprofen  Tablet. 200 milliGRAM(s) Oral every 6 hours PRN Temp greater or equal to 38C (100.4F)      ALLERGIES:  Allergies    No Known Allergies    Intolerances        LABS:                        9.6    10.91 )-----------( 274      ( 21 Oct 2019 05:01 )             29.1     10-21    145  |  117<H>  |  27<H>  ----------------------------<  100<H>  4.1   |  19<L>  |  1.10    Ca    7.9<L>      21 Oct 2019 05:01  Phos  3.6     10-20  Mg     1.6     10-20    TPro  6.3  /  Alb  1.6<L>  /  TBili  0.2  /  DBili  x   /  AST  57<H>  /  ALT  21  /  AlkPhos  76  10-21      bedside US--B lines throughout, no pleural effusions, difficult to assess IVC    RADIOLOGY & ADDITIONAL TESTS: Reviewed.  < from: Xray Chest 1 View AP/PA (10.21.19 @ 04:08) >    Findings:  Lines: None    Heart/Mediastinum/Lungs: There is now diffuse extensive bilateral   airspace disease, predominantly right upper lobe and perihilar. Mild   cardiomegaly. No pleural effusions.    Impression:    As above.    < end of copied text >    < from: US Duplex Venous Lower Ext Complete, Bilateral (10.20.19 @ 16:02) >  IMPRESSION:     No evidence of deep venous thrombosis in either lower extremity.    < end of copied text >    < from: CT Angio Chest w/ IV Cont (10.19.19 @ 20:48) >  INTERPRETATION:  CLINICAL INFORMATION: Shortness of breath, cough,   dyspnea, fever, elevated dimer, assess PE.     PROCEDURE: CT angiography of the chest was performed with intravenous   contrast utilizing dedicated PE protocol. 53 mls of Omnipaque-350   administered without complication. 41 ml discarded. Coronal and sagittal   reconstruction images were obtained. Axial MIP images were obtained from   a separate workstation.      COMPARISON: Same day chest radiograph.    FINDINGS: The patient's respiratory motion degrades images.    LUNGS AND AIRWAYS: Patent central airways. Nonspecific groundglass   opacities in both lungs. Nodular opacities in the left upper lobe and   lingula.   PLEURA: No pleural effusion or pneumothorax.  HEART: Normal size. No pericardial effusion.  VESSELS: Suboptimal contrast opacification of the subsegmental pulmonary   arteries. No obvious embolus to the level of the segmental pulmonary   arteries. Atherosclerotic change of the thoracic aorta and great vessel   origin.  CHEST WALL AND LOWER NECK: Nonspecific calcification in the left breast   soft tissue.  MEDIASTINUM AND DARCIE: Enlarged lymph nodes, 1.9 x 1.0 cmAP window lymph   node, 1.2 x 1.1 cm left hilar lymph node and 1.8 x 1.1 cm right hilar   lymph node.  UPPER ABDOMEN: Cholecystectomy.  BONES: Degenerative changes of the spine.     IMPRESSION:    Suboptimal evaluation of the subsegmental pulmonary arteries. No obvious   embolus to the level of the segmental pulmonary arteries.    Nonspecific pulmonary groundglass and nodular opacities as described,   which may represent infection/inflammation although neoplasm cannot be   excluded. Recommend follow-up to assess resolution    Nonspecific mediastinal and hilar lymphadenopathy.    Additional findings as described.    < end of copied text >

## 2019-10-21 NOTE — CONSULT NOTE ADULT - PROBLEM SELECTOR RECOMMENDATION 9
will attempt chemeical conversion with Amio   Rate control   start  anticoagulation   F/U cardiac enzymes  F/U Electrolytes  F/U BNP  F/U Thyroid Function  Order TTE

## 2019-10-21 NOTE — CHART NOTE - NSCHARTNOTEFT_GEN_A_CORE
Resident Rapid Response Note    Patient is a 69y old  Female who presents with a chief complaint of pneumonia (20 Oct 2019 18:12)      Rapid response was called at on this 69y Female patient for elevated heart rate     Patient was seen and examined at the bedside by the rapid response team and primary team. Dr. Tolbert at bedside.    Rapid Response Vital Signs:  BP:   HR:  RR:  SpO2: % on   Temp:  FS:    Vital Signs Last 24 Hrs  T(C): 38.6 (21 Oct 2019 00:21), Max: 38.8 (20 Oct 2019 13:48)  T(F): 101.4 (21 Oct 2019 00:21), Max: 101.8 (20 Oct 2019 13:48)  HR: 169 (21 Oct 2019 00:57) (99 - 169)  BP: 128/85 (21 Oct 2019 00:21) (104/65 - 128/85)  BP(mean): --  RR: 18 (20 Oct 2019 17:26) (17 - 18)  SpO2: 97% (20 Oct 2019 23:56) (93% - 100%)    Physical Exam:  General: Well developed, well nourished, in no acute distress  HEENT: NCAT, PERRLA, EOMI bl, moist mucous membranes   Neck: Supple, nontender, no mass  Neurology: A&Ox3, nonfocal, CN II-XII grossly intact, sensation intact, no gait abnormalities   Respiratory: CTA B/L, No wheezing, rales, or rhonchi  CV: RRR, S1/S2 present, no murmurs, rubs, or gallops  Abdominal: Soft, nontender, non-distended, normoactive bowel sounds  Extremities: No C/C/E, peripheral pulses present  MSK: Normal ROM, no joint erythema or warmth, no joint swelling   Skin: warm, dry, normal color, no obvious rash or abnormal lesions    LABS:                        9.9    12.81 )-----------( 276      ( 20 Oct 2019 07:36 )             29.8     20 Oct 2019 07:36    139    |  110    |  35     ----------------------------<  95     4.2     |  20     |  1.30     Ca    8.5        20 Oct 2019 07:36  Phos  3.6       20 Oct 2019 07:36  Mg     1.6       20 Oct 2019 07:36          CAPILLARY BLOOD GLUCOSE      POCT Blood Glucose.: 109 mg/dL (21 Oct 2019 01:27)  POCT Blood Glucose.: 99 mg/dL (20 Oct 2019 13:34)    ABG - ( 20 Oct 2019 13:18 )  pH, Arterial: 7.44  pH, Blood: x     /  pCO2: 26    /  pO2: 52    / HCO3: 20    / Base Excess: -5.8  /  SaO2: 85                  RADIOLOGY & ADDITIONAL TESTS:    Imaging Personally Reviewed:  [ ] YES  [ ] NO    Consultant(s) Notes Reviewed:  [ ] YES  [ ] NO    Care Discussed with Consultants/Other Providers [ ] YES  [ ] NO    Critical care time spent at bedside and coordinating care for patient:    Assessment/Plan:    - Lopressor 5 mg IVP x2   - Cardizem 10 mg x1     -Will continue to follow, RN to call if any changes. Resident Rapid Response Note    Patient is a 69y old  Female who presents with a chief complaint of pneumonia (20 Oct 2019 18:12)      Rapid response was called at on this 69y Female patient for elevated heart rate     Patient was seen and examined at the bedside by the rapid response team and primary team. Dr. Tolbert at bedside.    Rapid Response Vital Signs:  BP:   HR:  RR:  SpO2: % on   Temp:  FS:    Vital Signs Last 24 Hrs  T(C): 38.6 (21 Oct 2019 00:21), Max: 38.8 (20 Oct 2019 13:48)  T(F): 101.4 (21 Oct 2019 00:21), Max: 101.8 (20 Oct 2019 13:48)  HR: 169 (21 Oct 2019 00:57) (99 - 169)  BP: 128/85 (21 Oct 2019 00:21) (104/65 - 128/85)  BP(mean): --  RR: 18 (20 Oct 2019 17:26) (17 - 18)  SpO2: 97% (20 Oct 2019 23:56) (93% - 100%)    Physical Exam:  General: Well developed, well nourished, in no acute distress  HEENT: NCAT, PERRLA, EOMI bl, moist mucous membranes   Neck: Supple, nontender, no mass  Neurology: A&Ox3, nonfocal, CN II-XII grossly intact, sensation intact, no gait abnormalities   Respiratory: CTA B/L, No wheezing, rales, or rhonchi  CV: RRR, S1/S2 present, no murmurs, rubs, or gallops  Abdominal: Soft, nontender, non-distended, normoactive bowel sounds  Extremities: No C/C/E, peripheral pulses present  MSK: Normal ROM, no joint erythema or warmth, no joint swelling   Skin: warm, dry, normal color, no obvious rash or abnormal lesions    LABS:                        9.9    12.81 )-----------( 276      ( 20 Oct 2019 07:36 )             29.8     20 Oct 2019 07:36    139    |  110    |  35     ----------------------------<  95     4.2     |  20     |  1.30     Ca    8.5        20 Oct 2019 07:36  Phos  3.6       20 Oct 2019 07:36  Mg     1.6       20 Oct 2019 07:36          CAPILLARY BLOOD GLUCOSE      POCT Blood Glucose.: 109 mg/dL (21 Oct 2019 01:27)  POCT Blood Glucose.: 99 mg/dL (20 Oct 2019 13:34)    ABG - ( 20 Oct 2019 13:18 )  pH, Arterial: 7.44  pH, Blood: x     /  pCO2: 26    /  pO2: 52    / HCO3: 20    / Base Excess: -5.8  /  SaO2: 85                  RADIOLOGY & ADDITIONAL TESTS:    Imaging Personally Reviewed:  [ ] YES  [ ] NO    Consultant(s) Notes Reviewed:  [ ] YES  [ ] NO    Care Discussed with Consultants/Other Providers [ ] YES  [ ] NO    Critical care time spent at bedside and coordinating care for patient:    Assessment/Plan:  - EKG   - Lopressor 5 mg IVP x2   - Cardizem 10 mg x1     -Will continue to follow, RN to call if any changes. Resident Rapid Response Note    Patient is a 69y old  Female who presents with a chief complaint of pneumonia (20 Oct 2019 18:12)      Rapid response was called on this 69y Female patient for tachycardia 190's by telemetry.    Patient was seen and examined at the bedside by the rapid response team, Dr. Tolbert and ICU PA.  Patient reports no complaints; denies chest pain, heart palpitations, SOB, dizziness, n/v/d/c.  Patient speaks Dilan, nephew at bedside acting as .    Rapid Vitals:      PHYSICAL EXAM:  GENERAL: NAD, well-groomed, well-developed  HEAD:  Atraumatic, Normocephalic  NERVOUS SYSTEM:  Alert & Oriented X3, Good concentration; follows commands  CHEST/LUNG: Clear to auscultation bilaterally; No rales, rhonchi, wheezing, or rubs  HEART: tachycardic; undiscernable rhythm  EXTREMITIES:  No clubbing, cyanosis, or edema  LYMPH: No lymphadenopathy noted  SKIN: No rashes or lesions    A/P:  70 y/o F with PMHx of hypothyroidism admitted with sepsis secondary to bilateral pneumonia and dysphagia; now w/ new onset a. fib.  Patient febrile at 00:00 now s/p Tylenol w/ oral temp 100.2.  - EKG shows HR in 170's irregular, no P-waves;   - Lopressor 5 mg IVP x1 given   - Patient intially responsive: however HR remained 140's clearly a. fib  - Lopressor 5mg IVP x1 given: no response  - Cardizem 10 mg x1: patient rate controlled a. fib 90's  -   -Will continue to follow, RN to call if any changes. Resident Rapid Response Note    Patient is a 69y old  Female who presents with a chief complaint of pneumonia (20 Oct 2019 18:12)      Rapid response was called on this 69y Female patient for tachycardia 190's by telemetry.    Patient was seen and examined at the bedside by the rapid response team, Dr. Tolbert and ICU PA.  Patient reports no complaints; denies chest pain, heart palpitations, SOB, dizziness, n/v/d/c.  Patient speaks Dilan, nephew at bedside acting as .    Rapid Vitals:  BP: 128/64  HR: 180  RR 20  Temp: 100.2 oral  SpO2 96% on 40% venti mask  BS: 109    PHYSICAL EXAM:  GENERAL: NAD, well-groomed, well-developed  HEAD:  Atraumatic, Normocephalic  NERVOUS SYSTEM:  Alert & Oriented X3, Good concentration; follows commands  CHEST/LUNG: Clear to auscultation bilaterally; No rales, rhonchi, wheezing, or rubs  HEART: tachycardic; undiscernable rhythm  EXTREMITIES:  No clubbing, cyanosis, or edema  LYMPH: No lymphadenopathy noted  SKIN: No rashes or lesions    A/P:  70 y/o F with PMHx of hypothyroidism admitted with sepsis secondary to bilateral pneumonia and dysphagia; now w/ new onset a. fib in the setting of sepsis 2/2 pneumonia.  Patient febrile at 00:00 now s/p Tylenol w/ oral temp 100.2.  - EKG shows HR in 170's irregular, no P-waves;   - Lopressor 5 mg IVP x1 given patient initially responsive: however HR remained 140's clearly a. fib  - Lopressor 5mg IVP x1 given: no response  - Cardizem 10 mg x1: patient rate controlled a. fib 90's  - transfer to telemetry  - patient given 1L NS bolus  - given new onset a. fib CHADsVasc score 2: will give patient 1 time dose of Lovenox 55mg;   - case d/w Dr. Tolbert  -Will continue to follow, RN to call if any changes.

## 2019-10-21 NOTE — DIETITIAN INITIAL EVALUATION ADULT. - PERTINENT LABORATORY DATA
(10/21) WBC 10.91, Hgb 9.6, hct 29.1, Cl 117, BUN 27, gluc 100, Ca 7.9, GFR 52, Alb 1.6, CRP 10.69, Fe 21, Ferritin 1271, B12 1287, Folate >20

## 2019-10-21 NOTE — DIETITIAN INITIAL EVALUATION ADULT. - NS FNS REASON FOR WEIGHT CHANG
other (specify)/decreased po intake/acute illness, dx PNA (likely aspiration) r/o TB, SOB/cough/dysphagia,

## 2019-10-21 NOTE — DIETITIAN INITIAL EVALUATION ADULT. - ETIOLOGY
related to decreased ability to consume sufficient energy related to motor causes related to inadequate protein energy intake with dysphagia, anorexia

## 2019-10-21 NOTE — CONSULT NOTE ADULT - PROBLEM SELECTOR RECOMMENDATION 3
-Patient currently on non invasive vent support in form of BIPAP  -titrate settings to maintain SaO2 >90%, or pH >7.25  -Peridex oral care and VAP prophylaxis with HOB 30 degrees   -aggressive chest PT and suctioning  -repeat ABG

## 2019-10-21 NOTE — PROGRESS NOTE ADULT - ASSESSMENT
cont rx PATIENT MARYLOU WHYTE  1950 DOA 10/19/2019                      PULMONARY/CRITICAL CARE ASSESSMENT/RECOMMENDATIONS                      RESP TRACT INFECTION   no ho exposure tb   protracxted course   10/20/2019 esr 97   10/20-10/21/2019 W 12.8 -10.9  10/20/2019 pc .18   10/19 Flu ab n RSV N   10/19/2019 CXR  bl reticulonod opac alejandra central l perihilar region Nodular opac mary and lingula   10/19/2019 cta ch  No pe Nonsp gg opac both lungs 1.8 cm r hilar ln  azityhro (10/20)   zosyn (10/20)   A/R   Cont zosyn pending cultures  Follow sp afb and qft   Off zithro     HILOMEDIASTINAL LNE   10/20/2019 cta ch 1.9 cm apw ln 1.8 cm r hilar ln      If T excluded will need robb for sarcoid lymphoma   No ho silica or berryllium exposure elicited     May need bx at that point       AC HYPOXEMIA RESP FAILURE  10/20/2019 bnp 599  10/20/2019 v duplx n    10/21/2019 vm .4 739/26/69   10/20/2019 3l 744/26/52   A/R Increase FIO2 Target po 90-95%  10/20/2019 Check echo     RO PE   10/20/2019 cta ch n   10/20/2019 v duplex n  vte unlikely    COPD   No ho exposure to smoke  Started bd (10/20)     RO CHF  10/21/2019 Tr 1 n   10/21/2019 bnp 1436     A fib RVR  Amiodarone drip started 10/21   apixab (10/21)  Tr to ICU 10/21                               TIME SPENT Over 25 minutes aggregate care time spent on encounter; activities included   direct pt care, counseling and/or coordinating care reviewing notes, lab data/ imaging , discussion with multidisciplinary team/ pt /family. Risks, benefits, alternatives  discussed in

## 2019-10-21 NOTE — PROVIDER CONTACT NOTE (EICU) - RECOMMENDATIONS
ICU hemodynamic monitoirng  Amiodarone infusion ( pt converted to SR  on Amio)  ECHO  cardiology f/up  continue current Abx for PNA as per ID  continue resp isolation, f/up AFB's ICU hemodynamic monitoirng  Amiodarone infusion ( pt converted to SR  on Amio)  ECHO  cardiology f/up  continue current Abx for PNA as per ID  continue resp isolation, f/up AFB's  strict i/O  low threshold for intubation if  resp status not improved with BIPAP

## 2019-10-21 NOTE — DIETITIAN INITIAL EVALUATION ADULT. - SIGNS/SYMPTOMS
as evidenced by acute illness PNA r/o TB, currently on Bipap, decreased po intake x2wks pta evidenced by dx PNA(likely asp),abnormal swallow study 10/20-dys II mechanical soft w/ nectar thick as evidenced by 22lb(15.6%) weight loss x 1-2mos, consuming <50% est energy needs > 2 weeks

## 2019-10-21 NOTE — DIETITIAN INITIAL EVALUATION ADULT. - FACTORS AFF FOOD INTAKE
difficulty chewing/difficulty swallowing/persistent lack of appetite/other (specify)/SOB on Bipap, cough, dx PNA, r/oTB

## 2019-10-21 NOTE — CONSULT NOTE ADULT - SUBJECTIVE AND OBJECTIVE BOX
69y  Female  No Known Allergies    CC: Patient is a 69y old  Female who presented with a chief complaint of shortness of breath and cough    Note patient speaks daniel her nephew is at bedside for translation     HPI:  68 y/o F with PMHx of hypothyroidism presented with cough and congestion for 2 weeks . She was found to have bilateral pneumonia . CTA was negative for PE showed nonspecific ground-glass opacities in both lungs. Nodular opacities in the left upper lobe which was concerning for infection so she was admitted to remote [facility] with PNA and rule out TB.     Early last night she was a rapid response for tachycardia insitally thought to be SVT but rate in the low 150's but lopressor slowed the rate down and it was determined she was in afib rvr. The heart rate was eventually slowed down to the low 100s after lopressor and Cardizem but after soft blood pressure and fluids she was sent to telemetry for close monitoring with a blood pressure that did respond to fluids      Second rapid response was called for for afib RVR but now the added complication of respiratory distress. On my arrival I found her with heart rates in the high 150's having SOB with increased work of breathing with accessory muscle use. POCUS found b-lines bilaterally , she was placed on BIPAP for acute respiratory failure secondary to fluid overload from fluid boluses earlier  which were in attempt to treat rate dependant hypotension.      PAST MEDICAL & SURGICAL HISTORY:  Hypothyroidism, unspecified type  No significant past surgical history: denies surgical history but CT shows cholecystectomy    FAMILY HISTORY:  FH: type 2 diabetes: in father      Vitals   Vital Signs Last 24 Hrs  T(C): 38.3 (21 Oct 2019 03:35), Max: 38.8 (20 Oct 2019 13:48)  T(F): 100.9 (21 Oct 2019 03:35), Max: 101.8 (20 Oct 2019 13:48)  HR: 150 (21 Oct 2019 04:23) (99 - 169)  BP: 93/63 (21 Oct 2019 03:35) (93/63 - 128/85)  BP(mean): --  RR: 30 (21 Oct 2019 03:35) (17 - 30)  SpO2: 94% (21 Oct 2019 04:23) (93% - 100%)  ABG - ( 21 Oct 2019 03:57 )  pH, Arterial: 7.39  pH, Blood: x     /  pCO2: 26    /  pO2: 69    / HCO3: 18    / Base Excess: -8.6  /  SaO2: 93                I&O's Summary    20 Oct 2019 07:01  -  21 Oct 2019 04:43  --------------------------------------------------------  IN: 1100 mL / OUT: 0 mL / NET: 1100 mL        LABS  10-20    139  |  110<H>  |  35<H>  ----------------------------<  95  4.2   |  20<L>  |  1.30    Ca    8.5      20 Oct 2019 07:36  Phos  3.6     10-20  Mg     1.6     10-20    TPro  7.0  /  Alb  2.0<L>  /  TBili  0.2  /  DBili  x   /  AST  57<H>  /  ALT  26  /  AlkPhos  87  10-19                          9.9    12.81 )-----------( 276      ( 20 Oct 2019 07:36 )             29.8           LIVER FUNCTIONS - ( 19 Oct 2019 19:04 )  Alb: 2.0 g/dL / Pro: 7.0 g/dL / ALK PHOS: 87 U/L / ALT: 26 U/L / AST: 57 U/L / GGT: x           CAPILLARY BLOOD GLUCOSE      POCT Blood Glucose.: 117 mg/dL (21 Oct 2019 03:48)        VENT SETTINGS       Meds  MEDICATIONS  (STANDING):  ALBUTerol/ipratropium for Nebulization 3 milliLiter(s) Nebulizer every 6 hours  diltiazem Injectable 10 milliGRAM(s) IV Push once  enoxaparin Injectable 55 milliGRAM(s) SubCutaneous once  lactobacillus acidophilus 1 Tablet(s) Oral three times a day  levothyroxine 75 MICROGram(s) Oral daily  multivitamin 1 Tablet(s) Oral daily  piperacillin/tazobactam IVPB.. 3.375 Gram(s) IV Intermittent every 8 hours  sodium chloride 0.9% Bolus 1000 milliLiter(s) IV Bolus once  sodium chloride 0.9%. 1000 milliLiter(s) (100 mL/Hr) IV Continuous <Continuous>      REVIEW OF SYSTEMS:    CONSTITUTIONAL: No fever, weight loss, or fatigue  EYES: No eye pain, visual disturbances, or discharge  ENMT:  No difficulty hearing, tinnitus, vertigo; No sinus or throat pain  NECK: No pain or stiffness  BREASTS: No pain, masses, or nipple discharge  RESPIRATORY: No cough, wheezing, chills or hemoptysis; No shortness of breath  CARDIOVASCULAR: No chest pain, palpitations, dizziness, or leg swelling  GASTROINTESTINAL: No abdominal or epigastric pain. No nausea, vomiting, or hematemesis; No diarrhea or constipation. No melena or hematochezia.  GENITOURINARY: No dysuria, frequency, hematuria, or incontinence  NEUROLOGICAL: No headaches, memory loss, loss of strength, numbness, or tremors  SKIN: No itching, burning, rashes, or lesions   LYMPH NODES: No enlarged glands  ENDOCRINE: No heat or cold intolerance; No hair loss  MUSCULOSKELETAL: No joint pain or swelling; No muscle, back, or extremity pain  PSYCHIATRIC: No depression, anxiety, mood swings, or difficulty sleeping  HEME/LYMPH: No easy bruising, or bleeding gums  ALLERY AND IMMUNOLOGIC: No hives or eczema      Physicial Exam:     Constitutional: NAD, well-groomed, well-developed  HEENT: PERRLA, EOMI, no drainage or redness  Neck: No bruits; no thyromegaly or nodules,  No JVD  Back: Normal spine flexure, No CVA tenderness, No deformity or limitation of movement  Respiratory: Breath Sounds equal & clear to percussion & auscultation, no accessory muscle use  Cardiovascular: Regular rate & rhythm, normal S1, S2; no murmurs, gallops or rubs; no S3, S4  Gastrointestinal: Soft, non-tender, non distended no hepatosplenomegaly, normal bowel sounds  Extremities: No peripheral edema, No cyanosis, clubbing   Vascular: Equal and normal pulses: 2+ peripheral pulses throughout  Neurological: GCS:    A&O x 3; no sensory, motor  deficits, normal reflexes  Psychiatric: Normal mood, normal affect  Musculoskeletal: No joint pain, swelling or deformity; no limitation of movement  Skin: No rashes 69y  Female  No Known Allergies    CC: Patient is a 69y old  Female who presented with a chief complaint of shortness of breath and cough    Note patient speaks daniel her nephew is at bedside for translation     HPI:  68 y/o F with PMHx of hypothyroidism presented with cough and congestion for 2 weeks . She was found to have bilateral pneumonia . CTA was negative for PE showed nonspecific ground-glass opacities in both lungs. Nodular opacities in the left upper lobe which was concerning for infection so she was admitted to remote [facility] with PNA and rule out TB.     Early last night she was a rapid response for tachycardia insitally thought to be SVT but rate in the low 150's but lopressor slowed the rate down and it was determined she was in afib rvr. The heart rate was eventually slowed down to the low 100s after lopressor and Cardizem but after soft blood pressure and fluids she was sent to telemetry for close monitoring with a blood pressure that did respond to fluids      Second rapid response was called for for afib RVR but now the added complication of respiratory distress. On my arrival I found her with heart rates in the high 150's to low 160's having SOB with increased work of breathing with accessory muscle use. POCUS found b-lines bilaterally , she was placed on BIPAP for acute respiratory failure secondary to fluid overload from fluid boluses earlier which were in attempt to treat rate dependant hypotension and transported to ICU    PAST MEDICAL & SURGICAL HISTORY:  Hypothyroidism, unspecified type  No significant past surgical history: denies surgical history but CT shows cholecystectomy    FAMILY HISTORY:  FH: type 2 diabetes: in father      Vitals   Vital Signs Last 24 Hrs  T(C): 38.3 (21 Oct 2019 03:35), Max: 38.8 (20 Oct 2019 13:48)  T(F): 100.9 (21 Oct 2019 03:35), Max: 101.8 (20 Oct 2019 13:48)  HR: 150 (21 Oct 2019 04:23) (99 - 169)  BP: 93/63 (21 Oct 2019 03:35) (93/63 - 128/85)  BP(mean): --  RR: 30 (21 Oct 2019 03:35) (17 - 30)  SpO2: 94% (21 Oct 2019 04:23) (93% - 100%)  ABG - ( 21 Oct 2019 03:57 )  pH, Arterial: 7.39  pH, Blood: x     /  pCO2: 26    /  pO2: 69    / HCO3: 18    / Base Excess: -8.6  /  SaO2: 93                I&O's Summary    20 Oct 2019 07:01  -  21 Oct 2019 04:43  --------------------------------------------------------  IN: 1100 mL / OUT: 0 mL / NET: 1100 mL        LABS  10-20    139  |  110<H>  |  35<H>  ----------------------------<  95  4.2   |  20<L>  |  1.30    Ca    8.5      20 Oct 2019 07:36  Phos  3.6     10-20  Mg     1.6     10-20    TPro  7.0  /  Alb  2.0<L>  /  TBili  0.2  /  DBili  x   /  AST  57<H>  /  ALT  26  /  AlkPhos  87  10-19                          9.9    12.81 )-----------( 276      ( 20 Oct 2019 07:36 )             29.8           LIVER FUNCTIONS - ( 19 Oct 2019 19:04 )  Alb: 2.0 g/dL / Pro: 7.0 g/dL / ALK PHOS: 87 U/L / ALT: 26 U/L / AST: 57 U/L / GGT: x           CAPILLARY BLOOD GLUCOSE      POCT Blood Glucose.: 117 mg/dL (21 Oct 2019 03:48)        VENT SETTINGS       Meds  MEDICATIONS  (STANDING):  ALBUTerol/ipratropium for Nebulization 3 milliLiter(s) Nebulizer every 6 hours  diltiazem Injectable 10 milliGRAM(s) IV Push once  enoxaparin Injectable 55 milliGRAM(s) SubCutaneous once  lactobacillus acidophilus 1 Tablet(s) Oral three times a day  levothyroxine 75 MICROGram(s) Oral daily  multivitamin 1 Tablet(s) Oral daily  piperacillin/tazobactam IVPB.. 3.375 Gram(s) IV Intermittent every 8 hours  sodium chloride 0.9% Bolus 1000 milliLiter(s) IV Bolus once  sodium chloride 0.9%. 1000 milliLiter(s) (100 mL/Hr) IV Continuous <Continuous>      REVIEW OF SYSTEMS:    CONSTITUTIONAL: No fever, weight loss, or fatigue  EYES: No eye pain, visual disturbances, or discharge  ENMT:  No difficulty hearing, tinnitus, vertigo; No sinus or throat pain  NECK: No pain or stiffness  BREASTS: No pain, masses, or nipple discharge  RESPIRATORY: No cough, wheezing, chills or hemoptysis; No shortness of breath  CARDIOVASCULAR: No chest pain, palpitations, dizziness, or leg swelling  GASTROINTESTINAL: No abdominal or epigastric pain. No nausea, vomiting, or hematemesis; No diarrhea or constipation. No melena or hematochezia.  GENITOURINARY: No dysuria, frequency, hematuria, or incontinence  NEUROLOGICAL: No headaches, memory loss, loss of strength, numbness, or tremors  SKIN: No itching, burning, rashes, or lesions   LYMPH NODES: No enlarged glands  ENDOCRINE: No heat or cold intolerance; No hair loss  MUSCULOSKELETAL: No joint pain or swelling; No muscle, back, or extremity pain  PSYCHIATRIC: No depression, anxiety, mood swings, or difficulty sleeping  HEME/LYMPH: No easy bruising, or bleeding gums  ALLERY AND IMMUNOLOGIC: No hives or eczema      Physicial Exam:     Constitutional: NAD, well-groomed, well-developed  HEENT: PERRLA, EOMI, no drainage or redness  Neck: No bruits; no thyromegaly or nodules,  No JVD  Back: Normal spine flexure, No CVA tenderness, No deformity or limitation of movement  Respiratory: Breath Sounds equal & clear to percussion & auscultation, no accessory muscle use  Cardiovascular: Regular rate & rhythm, normal S1, S2; no murmurs, gallops or rubs; no S3, S4  Gastrointestinal: Soft, non-tender, non distended no hepatosplenomegaly, normal bowel sounds  Extremities: No peripheral edema, No cyanosis, clubbing   Vascular: Equal and normal pulses: 2+ peripheral pulses throughout  Neurological: GCS:    A&O x 3; no sensory, motor  deficits, normal reflexes  Psychiatric: Normal mood, normal affect  Musculoskeletal: No joint pain, swelling or deformity; no limitation of movement  Skin: No rashes

## 2019-10-21 NOTE — PROGRESS NOTE ADULT - SUBJECTIVE AND OBJECTIVE BOX
Patient is a 69y old  Female who presents with a chief complaint of pneumonia (21 Oct 2019 11:42)      FROM ADMISSION H+P:   HPI:  70 y/o F with PMHx of hypothyroidism presents with cough and congestion for 2 weeks in duration. Patient states that for the last 10 days she has been having difficulty swallowing, solids more than liquids. Niece at bedside states that patient has been eating and drinking less but more so noticed patient coughing with water but not with regular food. She now has more of a cough and dyspnea. She had a Tmax of 101.4 at home. Of note, she went to urgent care 1 month ago as she was feeling unwell and was found to be in SVT with HR sustaining in 170s. Was advised to go to the ER at that time but never followed up as she felt better and does not have insurance. Denies chest pain, palpitations, abdominal pain, n/v/d. Denies sick contacts or myalgias.     ----  INTERVAL HPI/OVERNIGHT EVENTS: Pt seen and evaluated at the bedside. No acute overnight events occurred. Pt reports shortness of breath, chills and admits severe anxiety. Family @ bedside to interpret, they decline  phone at the time of my eval. Pt is limited historian 2/2 anxiety and respiratory distress.     ----  PAST MEDICAL & SURGICAL HISTORY:  Hypothyroidism, unspecified type  No significant past surgical history: denies surgical history but CT shows cholecystectomy      FAMILY HISTORY:  FH: type 2 diabetes: in father      Allergies    No Known Allergies    Intolerances        ----  REVIEW OF SYSTEMS:  CONSTITUTIONAL: denies fever, chills, admits severe fatigue and persistent generalized weakness    CARDIOVASCULAR: denies chest pain, chest pressure, palpitations  RESPIRATORY: admits heavy cough w/ sputum production and tachypnea  GASTROINTESTINAL: denies nausea, vomiting, diarrhea, abdominal pain. admits reduced appetiten  NEUROLOGICAL: denies numbness, headache, focal weakness  MUSCULOSKELETAL: denies new joint pain, muscle aches  LYMPHATICS: denies enlarged lymph nodes, extremity swelling  PSYCHIATRIC: admits severe anxiety, denies depression  ENDOCRINOLOGIC: admits diaphoresis, denies weight loss    ----  PHYSICAL EXAM:  GENERAL: patient appears anxious, in mild distress  EYES: sclera clear, no exudates  ENMT: oropharynx clear without erythema, dry mucous membranes  LUNGS: tachypneic, shallow respiratrions, coarse rhonchi throughout b/l chest  HEART: soft S1/S2, tachycardic, regular rhythm, no murmurs noted, no noted edema to b/l LE  GASTROINTESTINAL: abdomen is soft, nontender, nondistended, hypoactive bowel sounds  INTEGUMENT: skin is pale and cool, diaphoretic  MUSCULOSKELETAL: no clubbing or cyanosis, no obvious deformity   PSYCHIATRIC: mood is anxious, affect is congruent    T(C): 36.9 (10-21-19 @ 16:00), Max: 38.6 (10-21-19 @ 00:21)  HR: 89 (10-21-19 @ 21:00) (80 - 170)  BP: 95/52 (10-21-19 @ 21:00) (88/54 - 146/71)  RR: 38 (10-21-19 @ 21:00) (20 - 42)  SpO2: 98% (10-21-19 @ 21:00) (91% - 100%)  Wt(kg): --    ----  I&O's Summary    20 Oct 2019 07:01  -  21 Oct 2019 07:00  --------------------------------------------------------  IN: 1266.6 mL / OUT: 500 mL / NET: 766.6 mL    21 Oct 2019 07:01  -  21 Oct 2019 21:22  --------------------------------------------------------  IN: 538.5 mL / OUT: 1300 mL / NET: -761.5 mL        LABS:                        9.6    10.91 )-----------( 274      ( 21 Oct 2019 05:01 )             29.1     10-21    145  |  117<H>  |  27<H>  ----------------------------<  100<H>  4.1   |  19<L>  |  1.10    Ca    7.9<L>      21 Oct 2019 05:01  Phos  3.6     10-20  Mg     1.6     10-20    TPro  6.3  /  Alb  1.6<L>  /  TBili  0.2  /  DBili  x   /  AST  57<H>  /  ALT  21  /  AlkPhos  76  10-21        CAPILLARY BLOOD GLUCOSE      POCT Blood Glucose.: 117 mg/dL (21 Oct 2019 03:48)  POCT Blood Glucose.: 109 mg/dL (21 Oct 2019 01:27)    ABG - ( 21 Oct 2019 03:57 )  pH, Arterial: 7.39  pH, Blood: x     /  pCO2: 26    /  pO2: 69    / HCO3: 18    / Base Excess: -8.6  /  SaO2: 93                10-19 @ 23:13   No growth to date.  --  --          Culture - Acid Fast - Sputum w/Smear (collected 10-21-19 @ 08:32)  Source: .Sputum Sputum.CONICAL

## 2019-10-21 NOTE — PROGRESS NOTE ADULT - ASSESSMENT
The patient is a 69 year old female with a history of hypothyroidism who is admitted with PNA.    Plan:  - Atrial fibrillation - now back in sinus rhythm. Received amiodarone 150 mg. Continue drip at 1 mg/min for 6 hour and then 0.5 mg/min for 18 hours. Transition to 400 mg PO bid tomorrow for 1 week. Will lower dose after.  - Start apixaban 5 mg bid for thromboprophylaxis  - CXR with significant pulmonary edema. Unclear if this is heart failure or worsening ARDS.  - Now that BP improved, can do a trial of diuretics and wean off of BIPAP.  - BNP not significantly elevated at 539. Will repeat as that value was taken before her rapid atrial fibrillation and hypoxia episode.  - Echocardiogram pending  - On zosyn for PNA  - Being ruled-out for TB  - Patient is at high risk for further decompensation

## 2019-10-21 NOTE — CHART NOTE - NSCHARTNOTEFT_GEN_A_CORE
Resident Rapid Response Note    Rapid response was called at 0345 for O2 Sat 88%    Events leading up to Rapid Response:    Patient was seen and examined at the bedside by the rapid response team and resident medicine team. Dialogue was translated by pt's son. Pt states she is tired, but denies headache, nausea, vomiting, SOB, chest pain, heart palpitations. Dr. Lyle/ ICU [facility] at bedside.    Rapid Response Vital Signs:  BP: 109/79  HR: 183  RR: 40  SpO2: 93% on 40% ventimask  Temp: 100.9 rectal      Physical Exam:  General: NAD, sitting up comfortably.   HEENT: NCAT, PERRLA, EOMI.   Neurology: A&Ox3, nonfocal, CN II-XII grossly intact, sensation intact  Respiratory: CTA B/L, No wheezing, rales, or rhonchi  CV: RRR, S1/S2 present, no murmurs, rubs, or gallops  Abdominal: Soft, nontender, non-distended, normoactive bowel sounds  Extremities: No C/C/E, peripheral pulses present        Assessment/Plan: 68 y/o F with PMHx of hypothyroidism admitted with sepsis secondary to bilateral pneumonia and dysphagia; now w/ new onset a. fib in the setting of sepsis 2/2 pneumonia.    -CXR  -ABG  -Cardiac enzymes  -Sputum w/ smear ordered for 6AM  -CT abd/pelvis w/o cont   -Lovenox for afib  -1L Nacl Bolus  -Cardizem 10mg IV push  -Transferred to ICU      -Discussed with Dr. Lyle who agreed with above plan.  -Will continue to follow, RN to call if any changes. Resident Rapid Response Note    Rapid response was called at 0345 for O2 Sat 88%    Events leading up to Rapid Response:    Patient was seen and examined at the bedside by the rapid response team and resident medicine team. Dialogue was translated by pt's son. Pt states she is tired, but denies headache, nausea, vomiting, SOB, chest pain, heart palpitations. Dr. Lyle/ [facility] at bedside.    Rapid Response Vital Signs:  BP: 109/79  HR: 183  RR: 40  SpO2: 93% on 40% ventimask  Temp: 100.9 rectal      Physical Exam:  General: NAD, sitting up comfortably.   HEENT: NCAT, PERRLA, EOMI.   Neurology: A&Ox3, nonfocal, CN II-XII grossly intact, sensation intact  Respiratory: CTA B/L, No wheezing, rales, or rhonchi  CV: RRR, S1/S2 present, no murmurs, rubs, or gallops  Abdominal: Soft, nontender, non-distended, normoactive bowel sounds  Extremities: No C/C/E, peripheral pulses present    Assessment/Plan: 68 y/o F with PMHx of hypothyroidism admitted with sepsis secondary to bilateral pneumonia and dysphagia; now w/ new onset a. fib in the setting of sepsis 2/2 pneumonia.    -CXR shows volume overload  -ABG WNL  - given increased work of breathing and fluid overload patient placed on BiPAP: fluid overload likely 2/2 to a. fib w/ RVR versus sepsis  - CBC, CMP, Lactate ordered stat  -Cardiac enzymes  -Sputum w/ smear ordered for 6AM  -Lovenox for afib  -1L Nacl Bolus  -Cardizem 10mg IV push: with no response  -Transferred to ICU      -Discussed with Dr. Lyle who agreed with above plan.  -Will continue to follow, RN to call if any changes.

## 2019-10-21 NOTE — CONSULT NOTE ADULT - PROBLEM SELECTOR RECOMMENDATION 2
will hold diursis secondary to low blood pressure  will diurse gently if hypotansion imporves  continue BIPAP

## 2019-10-21 NOTE — DIETITIAN INITIAL EVALUATION ADULT. - OTHER INFO
S/p rapid response 10/20, transferred to ICU. Dx PNA (likely aspiration) r/o TB. Per records pt with decreased appetite/po intake with cough & difficulty swallowing past 2 weeks pta. S/p SLP evaluation (10/20) with recommendation for dysphagia II mechanical soft diet w/ nectar thick liquids. Per MD, will need MBS study once off isolation. Diet held for breakfast and lunch today(10/21) as pt on continuous Bipap. GI wdl. BM 10/21. Pt A+Ox4, Dilan speaking. Pt son in law, Olman, at bedside. Son in law interviewed as pt declined  phone. Dx PNA (likely aspiration) r/o TB. S/p rapid response 10/20, transferred to ICU. Per records & family, pt with decreased appetite & poor po intake with cough & difficulty swallowing past 2 weeks pta. Follows regular diet, no beef no pork however has had worsening dysphagia to solids foods over past couple of weeks. Drinks ensure 1-2 bottles/day pta. Prefers strawberry. Significant weight loss of ~22lbs over past 1-2months per family. S/p SLP evaluation (10/20) with recommendation for dysphagia II mechanical soft diet w/ nectar thick liquids. Poor po intake since admission, Bipap at times, now off. Per MD, will need MBS study once off isolation. GI wdl. BM 10/21. Food preferences/meal alternative obtained to increase po intake/meal satisfaction. Encourage consumption of HBV protein sources.

## 2019-10-21 NOTE — CHART NOTE - NSCHARTNOTEFT_GEN_A_CORE
Upon Nutritional Assessment by the Registered Dietitian your patient was determined to meet criteria / has evidence of the following diagnosis/diagnoses:          [ ]  Mild Protein Calorie Malnutrition        [ ]  Moderate Protein Calorie Malnutrition        [x ] Severe Protein Calorie Malnutrition        [ ] Unspecified Protein Calorie Malnutrition        [ ] Underweight / BMI <19        [ ] Morbid Obesity / BMI > 40      Findings as based on:  •  Comprehensive nutrition assessment and consultation  •  Calorie counts (nutrient intake analysis)  •  Food acceptance and intake status from observations by staff  •  Follow up  •  Patient education  •  Intervention secondary to interdisciplinary rounds  •   concerns    Pt meets criteria for severe protein calorie malnutrition in context of acute illness related to inadequate protein energy intake as evidenced by:  *22lb(15.6%) unintentional weight loss over past 1-2 months  *consuming <50% estimated energy needs for > 2 weeks     Treatment:    The following diet has been recommended:  *Dysphagia II mechanical soft diet with nectar thick liquids, no beef no pork  *Ensure enlive 8oz po BID nectar thick (strawberry)    PROVIDER Section:     By signing this assessment you are acknowledging and agree with the diagnosis/diagnoses assigned by the Registered Dietitian    Comments:

## 2019-10-21 NOTE — DIETITIAN INITIAL EVALUATION ADULT. - PHYSICAL APPEARANCE
Unable to assess BMI as pts height not available. Unable to complete NFPE at present due to current medical condition/on continuous Bipap. other (specify)/BMI 22.5

## 2019-10-21 NOTE — DIETITIAN INITIAL EVALUATION ADULT. - PROBLEM SELECTOR PLAN 7
continue synthroid 75mcg daily  unclear how patient follows up for refills (goes to [facility])  will check TSH

## 2019-10-22 LAB
4/8 RATIO: 0.09 RATIO — LOW (ref 0.86–4.14)
ABS CD8: 465 /UL — SIGNIFICANT CHANGE UP (ref 90–775)
ALBUMIN SERPL ELPH-MCNC: 1.5 G/DL — LOW (ref 3.3–5)
ALP SERPL-CCNC: 69 U/L — SIGNIFICANT CHANGE UP (ref 40–120)
ALT FLD-CCNC: 17 U/L — SIGNIFICANT CHANGE UP (ref 12–78)
ANION GAP SERPL CALC-SCNC: 9 MMOL/L — SIGNIFICANT CHANGE UP (ref 5–17)
AST SERPL-CCNC: 47 U/L — HIGH (ref 15–37)
BASOPHILS # BLD AUTO: 0 K/UL — SIGNIFICANT CHANGE UP (ref 0–0.2)
BASOPHILS NFR BLD AUTO: 0 % — SIGNIFICANT CHANGE UP (ref 0–2)
BILIRUB SERPL-MCNC: 0.2 MG/DL — SIGNIFICANT CHANGE UP (ref 0.2–1.2)
BUN SERPL-MCNC: 31 MG/DL — HIGH (ref 7–23)
CALCIUM SERPL-MCNC: 8.1 MG/DL — LOW (ref 8.5–10.1)
CD16+CD56+ CELLS NFR BLD: 11 % — SIGNIFICANT CHANGE UP (ref 7–27)
CD16+CD56+ CELLS NFR SPEC: 69 /UL — LOW (ref 80–426)
CD19 BLASTS SPEC-ACNC: 23 /UL — LOW (ref 32–326)
CD19 BLASTS SPEC-ACNC: 4 % — SIGNIFICANT CHANGE UP (ref 4–18)
CD3 BLASTS SPEC-ACNC: 509 /UL — SIGNIFICANT CHANGE UP (ref 396–2024)
CD3 BLASTS SPEC-ACNC: 84 % — SIGNIFICANT CHANGE UP (ref 58–84)
CD4 %: 7 % — LOW (ref 30–56)
CD8 %: 77 % — HIGH (ref 11–43)
CHLORIDE SERPL-SCNC: 115 MMOL/L — HIGH (ref 96–108)
CO2 SERPL-SCNC: 19 MMOL/L — LOW (ref 22–31)
CREAT SERPL-MCNC: 1.4 MG/DL — HIGH (ref 0.5–1.3)
EOSINOPHIL # BLD AUTO: 0.01 K/UL — SIGNIFICANT CHANGE UP (ref 0–0.5)
EOSINOPHIL NFR BLD AUTO: 0.2 % — SIGNIFICANT CHANGE UP (ref 0–6)
GLUCOSE SERPL-MCNC: 227 MG/DL — HIGH (ref 70–99)
HCT VFR BLD CALC: 25.8 % — LOW (ref 34.5–45)
HGB BLD-MCNC: 8.6 G/DL — LOW (ref 11.5–15.5)
HIV-1 VIRAL LOAD RESULT: ABNORMAL
HIV1 RNA # SERPL NAA+PROBE: SIGNIFICANT CHANGE UP
HIV1 RNA SER-IMP: SIGNIFICANT CHANGE UP
HIV1 RNA SERPL NAA+PROBE-ACNC: ABNORMAL
HIV1 RNA SERPL NAA+PROBE-LOG#: 6.79 — SIGNIFICANT CHANGE UP
IMM GRANULOCYTES NFR BLD AUTO: 0.8 % — SIGNIFICANT CHANGE UP (ref 0–1.5)
LYMPHOCYTES # BLD AUTO: 0.58 K/UL — LOW (ref 1–3.3)
LYMPHOCYTES # BLD AUTO: 9 % — LOW (ref 13–44)
MAGNESIUM SERPL-MCNC: 2.2 MG/DL — SIGNIFICANT CHANGE UP (ref 1.6–2.6)
MCHC RBC-ENTMCNC: 30.2 PG — SIGNIFICANT CHANGE UP (ref 27–34)
MCHC RBC-ENTMCNC: 33.3 GM/DL — SIGNIFICANT CHANGE UP (ref 32–36)
MCV RBC AUTO: 90.5 FL — SIGNIFICANT CHANGE UP (ref 80–100)
MONOCYTES # BLD AUTO: 0.08 K/UL — SIGNIFICANT CHANGE UP (ref 0–0.9)
MONOCYTES NFR BLD AUTO: 1.2 % — LOW (ref 2–14)
NEUTROPHILS # BLD AUTO: 5.73 K/UL — SIGNIFICANT CHANGE UP (ref 1.8–7.4)
NEUTROPHILS NFR BLD AUTO: 88.8 % — HIGH (ref 43–77)
NIGHT BLUE STAIN TISS: SIGNIFICANT CHANGE UP
NRBC # BLD: 0 /100 WBCS — SIGNIFICANT CHANGE UP (ref 0–0)
PHOSPHATE SERPL-MCNC: 2.9 MG/DL — SIGNIFICANT CHANGE UP (ref 2.5–4.5)
PLATELET # BLD AUTO: 259 K/UL — SIGNIFICANT CHANGE UP (ref 150–400)
POTASSIUM SERPL-MCNC: 3.7 MMOL/L — SIGNIFICANT CHANGE UP (ref 3.5–5.3)
POTASSIUM SERPL-SCNC: 3.7 MMOL/L — SIGNIFICANT CHANGE UP (ref 3.5–5.3)
PROT SERPL-MCNC: 6.2 G/DL — SIGNIFICANT CHANGE UP (ref 6–8.3)
RBC # BLD: 2.85 M/UL — LOW (ref 3.8–5.2)
RBC # FLD: 13.5 % — SIGNIFICANT CHANGE UP (ref 10.3–14.5)
SODIUM SERPL-SCNC: 143 MMOL/L — SIGNIFICANT CHANGE UP (ref 135–145)
SPECIMEN SOURCE: SIGNIFICANT CHANGE UP
T-CELL CD4 SUBSET PNL BLD: 42 /UL — LOW (ref 325–1251)
WBC # BLD: 6.45 K/UL — SIGNIFICANT CHANGE UP (ref 3.8–10.5)
WBC # FLD AUTO: 6.45 K/UL — SIGNIFICANT CHANGE UP (ref 3.8–10.5)

## 2019-10-22 PROCEDURE — 99291 CRITICAL CARE FIRST HOUR: CPT

## 2019-10-22 PROCEDURE — 99233 SBSQ HOSP IP/OBS HIGH 50: CPT

## 2019-10-22 RX ORDER — GLUCAGON INJECTION, SOLUTION 0.5 MG/.1ML
1 INJECTION, SOLUTION SUBCUTANEOUS ONCE
Refills: 0 | Status: DISCONTINUED | OUTPATIENT
Start: 2019-10-22 | End: 2019-10-24

## 2019-10-22 RX ORDER — AMIODARONE HYDROCHLORIDE 400 MG/1
400 TABLET ORAL
Refills: 0 | Status: DISCONTINUED | OUTPATIENT
Start: 2019-10-22 | End: 2019-10-28

## 2019-10-22 RX ORDER — DEXTROSE 50 % IN WATER 50 %
12.5 SYRINGE (ML) INTRAVENOUS ONCE
Refills: 0 | Status: DISCONTINUED | OUTPATIENT
Start: 2019-10-22 | End: 2019-10-24

## 2019-10-22 RX ORDER — DEXTROSE 50 % IN WATER 50 %
15 SYRINGE (ML) INTRAVENOUS ONCE
Refills: 0 | Status: DISCONTINUED | OUTPATIENT
Start: 2019-10-22 | End: 2019-10-24

## 2019-10-22 RX ORDER — DEXTROSE 50 % IN WATER 50 %
25 SYRINGE (ML) INTRAVENOUS ONCE
Refills: 0 | Status: DISCONTINUED | OUTPATIENT
Start: 2019-10-22 | End: 2019-10-24

## 2019-10-22 RX ORDER — SODIUM CHLORIDE 9 MG/ML
1000 INJECTION, SOLUTION INTRAVENOUS
Refills: 0 | Status: DISCONTINUED | OUTPATIENT
Start: 2019-10-22 | End: 2019-10-24

## 2019-10-22 RX ORDER — POTASSIUM CHLORIDE 20 MEQ
40 PACKET (EA) ORAL ONCE
Refills: 0 | Status: COMPLETED | OUTPATIENT
Start: 2019-10-22 | End: 2019-10-22

## 2019-10-22 RX ORDER — FLUCONAZOLE 150 MG/1
200 TABLET ORAL DAILY
Refills: 0 | Status: COMPLETED | OUTPATIENT
Start: 2019-10-22 | End: 2019-10-29

## 2019-10-22 RX ORDER — INSULIN LISPRO 100/ML
VIAL (ML) SUBCUTANEOUS
Refills: 0 | Status: DISCONTINUED | OUTPATIENT
Start: 2019-10-22 | End: 2019-10-24

## 2019-10-22 RX ORDER — NYSTATIN 500MM UNIT
500000 POWDER (EA) MISCELLANEOUS
Refills: 0 | Status: DISCONTINUED | OUTPATIENT
Start: 2019-10-22 | End: 2019-10-22

## 2019-10-22 RX ORDER — FLUCONAZOLE 150 MG/1
200 TABLET ORAL ONCE
Refills: 0 | Status: DISCONTINUED | OUTPATIENT
Start: 2019-10-22 | End: 2019-10-22

## 2019-10-22 RX ADMIN — Medication 1 TABLET(S): at 14:55

## 2019-10-22 RX ADMIN — Medication 1 TABLET(S): at 05:27

## 2019-10-22 RX ADMIN — Medication 1 TABLET(S): at 22:35

## 2019-10-22 RX ADMIN — APIXABAN 5 MILLIGRAM(S): 2.5 TABLET, FILM COATED ORAL at 17:18

## 2019-10-22 RX ADMIN — SODIUM CHLORIDE 75 MILLILITER(S): 9 INJECTION, SOLUTION INTRAVENOUS at 09:58

## 2019-10-22 RX ADMIN — AMIODARONE HYDROCHLORIDE 16.67 MG/MIN: 400 TABLET ORAL at 02:05

## 2019-10-22 RX ADMIN — Medication 500000 UNIT(S): at 12:41

## 2019-10-22 RX ADMIN — SODIUM CHLORIDE 3 MILLILITER(S): 9 INJECTION INTRAMUSCULAR; INTRAVENOUS; SUBCUTANEOUS at 07:51

## 2019-10-22 RX ADMIN — SODIUM CHLORIDE 3 MILLILITER(S): 9 INJECTION INTRAMUSCULAR; INTRAVENOUS; SUBCUTANEOUS at 20:54

## 2019-10-22 RX ADMIN — Medication 30 MILLIGRAM(S): at 05:26

## 2019-10-22 RX ADMIN — FLUCONAZOLE 200 MILLIGRAM(S): 150 TABLET ORAL at 17:07

## 2019-10-22 RX ADMIN — Medication 6: at 12:41

## 2019-10-22 RX ADMIN — Medication 2: at 22:36

## 2019-10-22 RX ADMIN — Medication 175 MILLIGRAM(S): at 12:41

## 2019-10-22 RX ADMIN — SODIUM CHLORIDE 75 MILLILITER(S): 9 INJECTION, SOLUTION INTRAVENOUS at 22:36

## 2019-10-22 RX ADMIN — PIPERACILLIN AND TAZOBACTAM 25 GRAM(S): 4; .5 INJECTION, POWDER, LYOPHILIZED, FOR SOLUTION INTRAVENOUS at 08:16

## 2019-10-22 RX ADMIN — Medication 4: at 17:19

## 2019-10-22 RX ADMIN — Medication 175 MILLIGRAM(S): at 17:16

## 2019-10-22 RX ADMIN — SODIUM CHLORIDE 3 MILLILITER(S): 9 INJECTION INTRAMUSCULAR; INTRAVENOUS; SUBCUTANEOUS at 14:32

## 2019-10-22 RX ADMIN — Medication 30 MILLIGRAM(S): at 17:16

## 2019-10-22 RX ADMIN — Medication 37.5 MICROGRAM(S): at 22:36

## 2019-10-22 RX ADMIN — Medication 40 MILLIEQUIVALENT(S): at 09:58

## 2019-10-22 RX ADMIN — APIXABAN 5 MILLIGRAM(S): 2.5 TABLET, FILM COATED ORAL at 05:27

## 2019-10-22 RX ADMIN — Medication 1 TABLET(S): at 12:40

## 2019-10-22 RX ADMIN — Medication 175 MILLIGRAM(S): at 05:26

## 2019-10-22 RX ADMIN — Medication 3 MILLILITER(S): at 02:07

## 2019-10-22 RX ADMIN — AMIODARONE HYDROCHLORIDE 400 MILLIGRAM(S): 400 TABLET ORAL at 17:18

## 2019-10-22 RX ADMIN — Medication 3 MILLILITER(S): at 14:32

## 2019-10-22 RX ADMIN — Medication 3 MILLILITER(S): at 20:54

## 2019-10-22 RX ADMIN — Medication 3 MILLILITER(S): at 07:48

## 2019-10-22 NOTE — PROGRESS NOTE ADULT - ASSESSMENT
The patient is a 69 year old female with a history of hypothyroidism who is admitted with PNA.    Plan:  - Transition to amiodarone 400 mg bid for one week and likely transition to 200 mg daily after  - Continue apixaban 5 mg bid for thromboprophylaxis  - BNP not significantly elevated  - Echocardiogram with normal LV systolic function, mild pulm HTN  - Being ruled-out for TB  - HIV positive  - On zosyn for PNA  - Treatment for PCP started with bactrim  - ID follow-up

## 2019-10-22 NOTE — PROGRESS NOTE ADULT - ASSESSMENT
cont rx PATIENT MARYLOU WHYTE  1950 DOA 10/19/2019                      PULMONARY/CRITICAL CARE ASSESSMENT/RECOMMENDATIONS                      RESP TRACT INFECTION   no ho exposure tb   protracxted course   10/20/2019 esr 97   10/20-10/21/2019 W 12.8 -10.9  10/20/2019 pc .18   10/19 Flu ab n RSV N   10/19/2019 CXR  bl reticulonod opac alejandra central l perihilar region Nodular opac mary and lingula   10/19/2019 cta ch  No pe Nonsp gg opac both lungs 1.8 cm r hilar ln  azityhro (10/20-10/20)   solumed 30.2 (10/21)   zosyn (10/20-10/22)   trimethopr sulfa 200.4 (10/22) (poss pcp)  A/R   On empiric pcp Rx   and  steroids   Follow sp afb and qft   Off zithro           HILOMEDIASTINAL LNE   10/20/2019 cta ch 1.9 cm apw ln 1.8 cm r hilar ln      If T excluded will need robb for sarcoid lymphoma   No ho silica or berryllium exposure elicited     May need bx at that point       AC HYPOXEMIA RESP FAILURE  10/20/2019 bnp 599  10/20/2019 v duplx n    10/21/2019 vm .4 [id number]   10/20/2019 3l 744/   A/R Increase FIO2 Target po 90-95%  10/20/2019 Check echo     RO PE   10/20/2019 cta ch n   10/20/2019 v duplex n  vte unlikely    COPD   No ho exposure to smoke  Started bd (10/20)     RO CHF  10/21/2019 Tr 1 n   10/21/2019 bnp 1436     A fib RVR  Amiodarone drip started 10/21   apixab (10/21)  Tr to ICU 10/21     HIV  10/22/2019 CD4 42  ID to decide re MELARA       TIME SPENT Over 25 minutes aggregate care time spent on encounter; activities included   direct pt care, counseling and/or coordinating care reviewing notes, lab data/ imaging , discussion with multidisciplinary team/ pt /family. Risks, benefits, alternatives  discussed in deta

## 2019-10-22 NOTE — PROGRESS NOTE ADULT - SUBJECTIVE AND OBJECTIVE BOX
Pulmonary/Critical Care Followup    PAST MEDICAL & SURGICAL HISTORY:  Hypothyroidism, unspecified type  No significant past surgical history: denies surgical history but CT shows cholecystectomy      Allergies    No Known Allergies    Intolerances        FAMILY HISTORY:  FH: type 2 diabetes: in father      SOCIAL HISTORY:      MEDICATIONS  (STANDING):  ALBUTerol/ipratropium for Nebulization 3 milliLiter(s) Nebulizer every 6 hours  amiodarone    Tablet 400 milliGRAM(s) Oral two times a day  apixaban 5 milliGRAM(s) Oral two times a day  dextrose 5%. 1000 milliLiter(s) (50 mL/Hr) IV Continuous <Continuous>  dextrose 50% Injectable 12.5 Gram(s) IV Push once  dextrose 50% Injectable 25 Gram(s) IV Push once  dextrose 50% Injectable 25 Gram(s) IV Push once  insulin lispro (HumaLOG) corrective regimen sliding scale   SubCutaneous Before meals and at bedtime  lactated ringers. 1000 milliLiter(s) (75 mL/Hr) IV Continuous <Continuous>  lactobacillus acidophilus 1 Tablet(s) Oral three times a day  levothyroxine Injectable 37.5 MICROGram(s) IV Push at bedtime  methylPREDNISolone sodium succinate Injectable 30 milliGRAM(s) IV Push every 12 hours  multivitamin 1 Tablet(s) Oral daily  nystatin    Suspension [id number] Unit(s) Swish and Swallow four times a day  piperacillin/tazobactam IVPB.. 3.375 Gram(s) IV Intermittent every 8 hours  sodium chloride 3%  Inhalation 3 milliLiter(s) Inhalation every 8 hours  trimethoprim / sulfamethoxazole IVPB 200 milliGRAM(s) IV Intermittent every 6 hours    MEDICATIONS  (PRN):  acetaminophen   Tablet .. 650 milliGRAM(s) Oral every 6 hours PRN Temp greater or equal to 38C (100.4F), Mild Pain (1 - 3)  ALBUTerol    0.083% 2.5 milliGRAM(s) Nebulizer every 6 hours PRN Shortness of Breath and/or Wheezing  dextrose 40% Gel 15 Gram(s) Oral once PRN Blood Glucose LESS THAN 70 milliGRAM(s)/deciliter  glucagon  Injectable 1 milliGRAM(s) IntraMuscular once PRN Glucose LESS THAN 70 milligrams/deciliter      Vital Signs Last 24 Hrs  T(C): 37.2 (22 Oct 2019 08:08), Max: 37.2 (22 Oct 2019 03:59)  T(F): 98.9 (22 Oct 2019 08:08), Max: 99 (22 Oct 2019 03:59)  HR: 99 (22 Oct 2019 11:00) (80 - 113)  BP: 98/58 (22 Oct 2019 10:00) (88/52 - 146/71)  BP(mean): 72 (22 Oct 2019 10:00) (65 - 100)  RR: 28 (22 Oct 2019 11:00) (20 - 40)  SpO2: 94% (22 Oct 2019 11:00) (84% - 100%)    LABS:                        8.6    6.45  )-----------( 259      ( 22 Oct 2019 05:52 )             25.8     10-22    143  |  115<H>  |  31<H>  ----------------------------<  227<H>  3.7   |  19<L>  |  1.40<H>    Ca    8.1<L>      22 Oct 2019 05:52  Phos  2.9     10-22  Mg     2.2     10-22    TPro  6.2  /  Alb  1.5<L>  /  TBili  0.2  /  DBili  x   /  AST  47<H>  /  ALT  17  /  AlkPhos  69  10-22          ABG - ( 21 Oct 2019 03:57 )  pH, Arterial: 7.39  pH, Blood: x     /  pCO2: 26    /  pO2: 69    / HCO3: 18    / Base Excess: -8.6  /  SaO2: 93                  WBC:  WBC Count: 6.45 K/uL (10-22 @ 05:52)  WBC Count: 10.91 K/uL (10-21 @ 05:01)  WBC Count: 12.81 K/uL (10-20 @ 07:36)  WBC Count: 14.34 K/uL (10-19 @ 19:04)      MICROBIOLOGY:  RECENT CULTURES:  10-21 .Sputum Sputum XXXX XXXX XXXX    10-21 .Sputum Sputum.CONICAL XXXX XXXX XXXX    10-19 .Blood Blood-Peripheral XXXX XXXX   No growth to date.          CARDIAC MARKERS ( 21 Oct 2019 05:01 )  <.015 ng/mL / x     / 46 U/L / x     / <1.0 ng/mL            Sodium:  Sodium, Serum: 143 mmol/L (10-22 @ 05:52)  Sodium, Serum: 145 mmol/L (10-21 @ 05:01)  Sodium, Serum: 139 mmol/L (10-20 @ 07:36)  Sodium, Serum: 136 mmol/L (10-19 @ 19:04)      1.40 mg/dL 10-22 @ 05:52  1.10 mg/dL 10-21 @ 05:01  1.30 mg/dL 10-20 @ 07:36  1.40 mg/dL 10-19 @ 19:04      Hemoglobin:  Hemoglobin: 8.6 g/dL (10-22 @ 05:52)  Hemoglobin: 9.6 g/dL (10-21 @ 05:01)  Hemoglobin: 9.9 g/dL (10-20 @ 07:36)  Hemoglobin: 10.0 g/dL (10-19 @ 19:04)      Platelets: Platelet Count - Automated: 259 K/uL (10-22 @ 05:52)  Platelet Count - Automated: 274 K/uL (10-21 @ 05:01)  Platelet Count - Automated: 276 K/uL (10-20 @ 07:36)  Platelet Count - Automated: 279 K/uL (10-19 @ 19:04)      LIVER FUNCTIONS - ( 22 Oct 2019 05:52 )  Alb: 1.5 g/dL / Pro: 6.2 g/dL / ALK PHOS: 69 U/L / ALT: 17 U/L / AST: 47 U/L / GGT: x                 RADIOLOGY & ADDITIONAL STUDIES: Pulmonary/Critical Care Followup    PAST MEDICAL & SURGICAL HISTORY:  Hypothyroidism, unspecified type  No significant past surgical history: denies surgical history but CT shows cholecystectomy      Allergies    No Known Allergies    Intolerances        FAMILY HISTORY:  FH: type 2 diabetes: in father      SOCIAL HISTORY:      MEDICATIONS  (STANDING):  ALBUTerol/ipratropium for Nebulization 3 milliLiter(s) Nebulizer every 6 hours  amiodarone    Tablet 400 milliGRAM(s) Oral two times a day  apixaban 5 milliGRAM(s) Oral two times a day  dextrose 5%. 1000 milliLiter(s) (50 mL/Hr) IV Continuous <Continuous>  dextrose 50% Injectable 12.5 Gram(s) IV Push once  dextrose 50% Injectable 25 Gram(s) IV Push once  dextrose 50% Injectable 25 Gram(s) IV Push once  insulin lispro (HumaLOG) corrective regimen sliding scale   SubCutaneous Before meals and at bedtime  lactated ringers. 1000 milliLiter(s) (75 mL/Hr) IV Continuous <Continuous>  lactobacillus acidophilus 1 Tablet(s) Oral three times a day  levothyroxine Injectable 37.5 MICROGram(s) IV Push at bedtime  methylPREDNISolone sodium succinate Injectable 30 milliGRAM(s) IV Push every 12 hours  multivitamin 1 Tablet(s) Oral daily  nystatin    Suspension [id number] Unit(s) Swish and Swallow four times a day  piperacillin/tazobactam IVPB.. 3.375 Gram(s) IV Intermittent every 8 hours  sodium chloride 3%  Inhalation 3 milliLiter(s) Inhalation every 8 hours  trimethoprim / sulfamethoxazole IVPB 200 milliGRAM(s) IV Intermittent every 6 hours    MEDICATIONS  (PRN):  acetaminophen   Tablet .. 650 milliGRAM(s) Oral every 6 hours PRN Temp greater or equal to 38C (100.4F), Mild Pain (1 - 3)  ALBUTerol    0.083% 2.5 milliGRAM(s) Nebulizer every 6 hours PRN Shortness of Breath and/or Wheezing  dextrose 40% Gel 15 Gram(s) Oral once PRN Blood Glucose LESS THAN 70 milliGRAM(s)/deciliter  glucagon  Injectable 1 milliGRAM(s) IntraMuscular once PRN Glucose LESS THAN 70 milligrams/deciliter      Vital Signs Last 24 Hrs  T(C): 37.2 (22 Oct 2019 08:08), Max: 37.2 (22 Oct 2019 03:59)  T(F): 98.9 (22 Oct 2019 08:08), Max: 99 (22 Oct 2019 03:59)  HR: 99 (22 Oct 2019 11:00) (80 - 113)  BP: 98/58 (22 Oct 2019 10:00) (88/52 - 146/71)  BP(mean): 72 (22 Oct 2019 10:00) (65 - 100)  RR: 28 (22 Oct 2019 11:00) (20 - 40)  SpO2: 94% (22 Oct 2019 11:00) (84% - 100%)    LABS:                        8.6    6.45  )-----------( 259      ( 22 Oct 2019 05:52 )             25.8     10-22    143  |  115<H>  |  31<H>  ----------------------------<  227<H>  3.7   |  19<L>  |  1.40<H>    Ca    8.1<L>      22 Oct 2019 05:52  Phos  2.9     10-22  Mg     2.2     10-22    TPro  6.2  /  Alb  1.5<L>  /  TBili  0.2  /  DBili  x   /  AST  47<H>  /  ALT  17  /  AlkPhos  69  10-22          ABG - ( 21 Oct 2019 03:57 )  pH, Arterial: 7.39  pH, Blood: x     /  pCO2: 26    /  pO2: 69    / HCO3: 18    / Base Excess: -8.6  /  SaO2: 93                  WBC:  WBC Count: 6.45 K/uL (10-22 @ 05:52)  WBC Count: 10.91 K/uL (10-21 @ 05:01)  WBC Count: 12.81 K/uL (10-20 @ 07:36)  WBC Count: 14.34 K/uL (10-19 @ 19:04)      MICROBIOLOGY:  RECENT CULTURES:  10-21 .Sputum Sputum XXXX XXXX XXXX    10-21 .Sputum Sputum.CONICAL XXXX XXXX XXXX    10-19 .Blood Blood-Peripheral XXXX XXXX   No growth to date.          CARDIAC MARKERS ( 21 Oct 2019 05:01 )  <.015 ng/mL / x     / 46 U/L / x     / <1.0 ng/mL            Sodium:  Sodium, Serum: 143 mmol/L (10-22 @ 05:52)  Sodium, Serum: 145 mmol/L (10-21 @ 05:01)  Sodium, Serum: 139 mmol/L (10-20 @ 07:36)  Sodium, Serum: 136 mmol/L (10-19 @ 19:04)      1.40 mg/dL 10-22 @ 05:52  1.10 mg/dL 10-21 @ 05:01  1.30 mg/dL 10-20 @ 07:36  1.40 mg/dL 10-19 @ 19:04      Hemoglobin:  Hemoglobin: 8.6 g/dL (10-22 @ 05:52)  Hemoglobin: 9.6 g/dL (10-21 @ 05:01)  Hemoglobin: 9.9 g/dL (10-20 @ 07:36)  Hemoglobin: 10.0 g/dL (10-19 @ 19:04)      Platelets: Platelet Count - Automated: 259 K/uL (10-22 @ 05:52)  Platelet Count - Automated: 274 K/uL (10-21 @ 05:01)  Platelet Count - Automated: 276 K/uL (10-20 @ 07:36)  Platelet Count - Automated: 279 K/uL (10-19 @ 19:04)      LIVER FUNCTIONS - ( 22 Oct 2019 05:52 )  Alb: 1.5 g/dL / Pro: 6.2 g/dL / ALK PHOS: 69 U/L / ALT: 17 U/L / AST: 47 U/L / GGT: x                 RADIOLOGY & ADDITIONAL STUDIES:    PATIENT MARYLOU WHYTE  1950 DO[birth year] 10/19/2019                                  VITALS/LABS       10/22/2019 afeb 117 120/70   10/22/2019 2a 8/4/.4   10/22/2019 W 6.4 Hb 8.6 Plt 259 Na 143 K 3.7 CO2 19 Cr 1.4     PATIENT MARYLOU WHYTE  1950 DOA 10/19/2019                                  EVENTS / CHANGES IN ROS & PE  10/22/2019 10/22/2019 hiv positive CD4 42      10/21 abio changed to trimethopr sulfa poss pcp     REVIEW OF SYMPTOMS     NOTE Noteworthy changes  if any  in ROS and PE are also entered  in note  below      Able to give ROS  Yes     RELIABLE No   CONSTITUTIONAL Weakness Yes  Chills No Vision changes No  ENDOCRINE No unexplained hair loss No heat or cold intolerance    ALLERGY No hives  Sore throat No   RESP Coughing blood no  Shortness of breath YES   NEURO No Headache  Confusion Pain neck No   CARDIAC No Chest pain No Palpitations   GI No Pain abdomen NO   Vomiting NO     PHYSICAL EXAM    HEENT Unremarkable PERRLA atraumatic   RESP Fair air entry EXP prolonged    Harsh breath sound Resp distres mild   CARDIAC S1 S2 No S3     NO JVD    ABDOMEN SOFT BS PRESENT NOT DISTENDED No hepatosplenomegaly PEDAL EDEMA present No calf tenderness  NO rash   GENERAL Not TOXIC looking    PATIENT MARYLOU WHYTE  1950 DOA 10/19/2019                                  PATIENT DATA   ALLERGY nka  WT      54         BMI                                                                                                                                            HEAD OF BED ELEVATION Yes   DVT PROPHYLAXIS   hpsc (10/19)       STRESS ULCER PROPHYLAXIS  INFECTION PPLX      10/22/2019 CD4 42                                                                           DIET npo (10/19)   DYSPHAGIA EVAL IF APPLICABLE                                                                    GAS EXCHANGE   10/20/2019 ra 88%  10/21/2019 bpap /.5    10/21/2019 vm .4 739//69                                                                              HEMODYNAMICS 10/20/2019 120/70  DRIPS                                                                                          IV FLUIDS  ns 100.1/2 d (10/19) ch  lr 75 (10/22)   INTAKE OUTPUT                                                     MICROBIO    10/21/2019 sp afb n.2  10/20/2019 strep pneum ag n   10/19 blod culture n  10/22 hiv d    10/20/2019 leg n   10/20/2019 pc .18   10/19 Flu ab n RSV N    10/20 hep abc n    ANTIBIOTICS    fluconazole 200.7d (10/22) (esophag candida)   trimethoprim sulfa 200.4 (10/21) (Poss pcp)       azithro (10/19-10/19)   zosyn (10/1-10/22)   INDWELLING TUBES                   PATIENT MARYLOU WHYTE  1950 DOA 10/19/2019                                  Pt description                            CC    cough fever chills sob 2 w     er vitals  120/80 120 101f 88%                     HPI              70 yo F p/w cough, congestion x past  2 weeks. Now with inc cough, now with some dyspnea. Pos fever at home as well. no chest pains. NO abd pain. no n/v/d. no recent travel. no known sick contacts. no weakness / dizziness. no agg/allev factors. no other inj or co.    Past Medical History:  Hypothyroidism    home meds

## 2019-10-22 NOTE — PROGRESS NOTE ADULT - SUBJECTIVE AND OBJECTIVE BOX
PATO HICKMAN is a 69yFemale , patient examined and chart reviewed. Patient being followed for Bilateral pneumonia vs aspiration pneumonia with hypoxic respiratory failure     INTERVAL HPI/ OVERNIGHT EVENTS: Events noted, HIV test positive , confirmed and also noted ot have very HiV Viral load and very low CD 4 count , she likely has severe PCP pneumonia based on her presentation . She has advanced HIV and by definition has AIDS . She has oral thrush . She remains on HF oxygen . HR is controlled, low grade fevers , she was placed on IV Bactrim and steroids based on HIV results . Patient was informed about the result , she also wanted us to discuss her case with her niece       Past Medical History--  PAST MEDICAL & SURGICAL HISTORY:  Hypothyroidism, unspecified type  No significant past surgical history: denies surgical history but CT shows cholecystectomy      For details regarding the patient's social history, family history, and other miscellaneous elements, please refer the initial infectious diseases consultation and/or the admitting history and physical examination for this admission.      ROS:  CONSTITUTIONAL:  Pos for fever or chills, feels well, good appetite  EYES:  Negative  blurry vision or double vision  CARDIOVASCULAR:  Negative for chest pain or palpitations  RESPIRATORY:  Positive for cough, wheezing, and SOB   GASTROINTESTINAL:  Negative for nausea, vomiting, diarrhea, constipation, or abdominal pain  GENITOURINARY:  Negative frequency, urgency , dysuria or hematuria   NEUROLOGIC:  No headache, confusion, dizziness, lightheadedness  All other systems were reviewed and are negative     Allergies:      Current inpatient medications :    ANTIBIOTICS/RELEVANT:  lactobacillus acidophilus 1 Tablet(s) Oral three times a day  nystatin    Suspension [id number] Unit(s) Swish and Swallow four times a day  piperacillin/tazobactam IVPB.. 3.375 Gram(s) IV Intermittent every 8 hours  trimethoprim / sulfamethoxazole IVPB 200 milliGRAM(s) IV Intermittent every 6 hours      acetaminophen   Tablet .. 650 milliGRAM(s) Oral every 6 hours PRN  ALBUTerol    0.083% 2.5 milliGRAM(s) Nebulizer every 6 hours PRN  ALBUTerol/ipratropium for Nebulization 3 milliLiter(s) Nebulizer every 6 hours  amiodarone    Tablet 400 milliGRAM(s) Oral two times a day  apixaban 5 milliGRAM(s) Oral two times a day  dextrose 40% Gel 15 Gram(s) Oral once PRN  dextrose 5%. 1000 milliLiter(s) IV Continuous <Continuous>  dextrose 50% Injectable 12.5 Gram(s) IV Push once  dextrose 50% Injectable 25 Gram(s) IV Push once  dextrose 50% Injectable 25 Gram(s) IV Push once  glucagon  Injectable 1 milliGRAM(s) IntraMuscular once PRN  insulin lispro (HumaLOG) corrective regimen sliding scale   SubCutaneous Before meals and at bedtime  lactated ringers. 1000 milliLiter(s) IV Continuous <Continuous>  levothyroxine Injectable 37.5 MICROGram(s) IV Push at bedtime  methylPREDNISolone sodium succinate Injectable 30 milliGRAM(s) IV Push every 12 hours  multivitamin 1 Tablet(s) Oral daily  sodium chloride 3%  Inhalation 3 milliLiter(s) Inhalation every 8 hours      Objective:    10-21 @ 07:01  -  10-22 @ 07:00  --------------------------------------------------------  IN: 1305.5 mL / OUT: 1300 mL / NET: 5.5 mL    10-22 @ 07:01  -  10-22 @ 15:13  --------------------------------------------------------  IN: 526.7 mL / OUT: 400 mL / NET: 126.7 mL      T(C): 37.2 (10-22-19 @ 12:00), Max: 37.2 (10-22-19 @ 03:59)  HR: 98 (10-22-19 @ 14:35) (80 - 113)  BP: 117/68 (10-22-19 @ 14:00) (88/52 - 146/71)  RR: 25 (10-22-19 @ 14:00) (25 - 40)  SpO2: 99% (10-22-19 @ 14:35) (84% - 100%)  Wt(kg): --      Physical Exam:  GEN: cachectic female , on HF with mild distress   HEENT: normocephalic and atraumatic. EOMI. NICHOLAS. Moist mucosa. oral thrush .  NECK: Supple. No carotid bruits.  No lymphadenopathy or thyromegaly.  LUNGS: Coarse rhonchi  to auscultation.  HEART: irregular rate and rhythm without murmur.  ABDOMEN: Soft, nontender, and nondistended.  Positive bowel sounds.  No hepatosplenomegaly was noted.  EXTREMITIES: Without any cyanosis, clubbing, rash, lesions or edema.  NEUROLOGIC: A & O x3, No focal neurological deficits   SKIN: No ulceration or induration present.      LABS:                        8.6    6.45  )-----------( 259      ( 22 Oct 2019 05:52 )             25.8       10    143  |  115<H>  |  31<H>  ----------------------------<  227<H>  3.7   |  19<L>  |  1.40<H>    Ca    8.1<L>      22 Oct 2019 05:52  Phos  2.9     10-22  Mg     2.2     10-22    TPro  6.2  /  Alb  1.5<L>  /  TBili  0.2  /  DBili  x   /  AST  47<H>  /  ALT  17  /  AlkPhos  69  10-22    CAPILLARY BLOOD GLUCOSE      POCT Blood Glucose.: 409 mg/dL (22 Oct 2019 12:37)  POCT Blood Glucose.: 439 mg/dL (22 Oct 2019 12:35)          ABG - ( 21 Oct 2019 03:57 )  pH, Arterial: 7.39  pH, Blood: x     /  pCO2: 26    /  pO2: 69    / HCO3: 18    / Base Excess: -8.6  /  SaO2: 93        HIV 1/2 AB Confirmation (10.20. @ 19:08)    HIV Result: HIV-1 Pos    HIV-1/HIV-2 Interpretation: See Comment HIV 1/2 AB Confirmation and Differentiation Assay Interpretation:  HIV-1 antibody POSITIVE. Laboratory evidence of HIV-1 infection is  present.  Under Public Health Law, within 14 days of diagnosis medical providers  are required to report to the [facility] cases of HIV infection, HIV-related  illness, AIDS and for newly diagnosed cases, the names of all contacts  known to the provider. The Provider Report Form is now able to be  completed electronically at [email or web address]. Please contact  the [facility] at [phone] for additional information.  It is recommended that first time reactive specimens be confirmed by  testing a second specimen. For pediatric patients, maternally transferred  HIV antibodies may persist for up to 15 months after birth, therefore  antibody tests cannot be relied upon for HIV diagnosis. [facility]  offers pediatric HIV diagnostic testing for infants born to HIV positive  mothers. Please contact the laboratory if such testing is needed. If  assistance with partner notification is needed, please contact your local  department of health or call the [place] HIV/AIDS Hotline at  [phone].  For information on HIV care referral at [facility], please call:  [phone](HIV adult patients) or [phone] (HIV pediatric  patients up to age 24). For other HIV care referral resources, please  call the [facility] HIV/AIDS Hotline at [phone].    Full T Cell Subset (10.22.19 @ 09:21)    CD8 %: 77 %    ABS [id number]: 69 /uL    ABS CD19: 23 /uL    ABS CD3: 509 /uL    ABS CD4: 42 /uL    ABS CD8: 465 /uL    [id number] %: 11 %    CD19 %: 4 %    CD3 %: 84: Reference ranges for pediatric population (0-18 years old) are from  Adelso Avalos, et al. "Lymphocyte subsets in healthy children from  birth through 18 years of age: the Pediatric AIDS Clinical Trials Group   study. "Journal of Allergy and Clinical Immunology 112.5 (2003):  973-980.  Reference range for adult (18-65 years old) and geriatric (65 years old  and above) populations are developed at [place], [place]. %    CD4 %: 7 %      HIV-1 RNA Quantitative, Viral Load (10.22.19 @ 08:11)    HIV-1 RNA Quantitative, Viral Load:   6,186,564    HIV-1 RNA Quantitative, Vir Load Interp: See Comment METHOD: Transcription Mediated Amplification (TMA) – [place].  Results from HIV-1 RNA tests that use other  methods might differ.  Abbreviations:  DET. = Detected,  not det. = Not Detected  n/a = not available  .    HIV-1 RNA Quantitative, Viral Load Lo.79    HIV-1 Viral Load Result: DET.         RECENT CULTURES:    Culture - Acid Fast - Sputum w/Smear (collected 10-21-19 @ 20:34)  Source: .Sputum Sputum    Culture - Acid Fast - Sputum w/Smear (collected 10-21-19 @ 08:32)  Source: .Sputum Sputum.CONICAL    Culture - Blood (collected 10-19-19 @ 23:13)  Source: .Blood Blood-Peripheral  Preliminary Report (10-21-19 @ 01:02):    No growth to date.    Culture - Blood (collected 10-19-19 @ 23:13)  Source: .Blood Blood-Peripheral  Preliminary Report (10-21-19 @ 01:02):    No growth to date.            RADIOLOGY & ADDITIONAL STUDIES:  Xray Chest 1 View AP/PA (10.21.19 @ 04:08) >  Comparison:10/19/2019    Findings:  Lines: None    Heart/Mediastinum/Lungs: There is now diffuse extensive bilateral   airspace disease, predominantly right upper lobe and perihilar. Mild   cardiomegaly. No pleural effusions.    Impression:        Assessment :  CT Angio Chest w/ IV Cont (10.19.19 @ 20:48) >  FINDINGS: The patient's respiratory motion degrades images.    LUNGS AND AIRWAYS: Patent central airways. Nonspecific groundglass   opacities in both lungs. Nodular opacities in the left upper lobe and   lingula.   PLEURA: No pleural effusion or pneumothorax.  HEART: Normal size. No pericardial effusion.  VESSELS: Suboptimal contrast opacification of the subsegmental pulmonary   arteries. No obvious embolus to the level of the segmental pulmonary   arteries. Atherosclerotic change of the thoracic aorta and great vessel   origin.  CHEST WALL AND LOWER NECK: Nonspecific calcification in the left breast   soft tissue.  MEDIASTINUM AND DARCIE: Enlarged lymph nodes, 1.9 x 1.0 cmAP window lymph   node, 1.2 x 1.1 cm left hilar lymph node and 1.8 x 1.1 cm right hilar   lymph node.  UPPER ABDOMEN: Cholecystectomy.  BONES: Degenerative changes of the spine.     IMPRESSION:    Suboptimal evaluation of the subsegmental pulmonary arteries. No obvious   embolus to the level of the segmental pulmonary arteries.    Nonspecific pulmonary groundglass and nodular opacities as described,   which may represent infection/inflammation although neoplasm cannot be   excluded. Recommend follow-up to assess resolution    Nonspecific mediastinal and hilar lymphadenopathy.    Additional findings as described.      TTE Echo Doppler w/o Cont (10.21.19 @ 12:47) >  MEASUREMENTS  IVS: 0.9 cm  PWT: 0.9 cm  LA: 2.5 cm  AO: 3.6 cm  LVIDd: 4.1 cm  LVIDs: 1.9 cm    LVEF: 70%  RVSP: 40 mmHg  RA Pressure: 3 mmHg    FINDINGS  Left Ventricle: The left ventricle is normal in size, wall thickness, and   systolic function. No wall motion abnormalities. Estimated EF is 70%.  Right Ventricle: The right ventricle is normal in size and function.  Left Atrium: The left atrium is normal in size.  Right Atrium: The right atrium is normal in size.  Mitral Valve: Mitral annular calcification. Trace mitral regurgitation.  Aortic Valve: The aortic valve is grossly normal. No aortic regurgitation.  Tricuspid Valve: The tricuspid valve is structurally normal. Mild   tricuspid regurgitation. Estimated pulmonary artery systolic pressure is   40 mmHg.  PulmonicValve: The pulmonic valve is not well-visualized.  Diastolic Function: Impaired relaxation secondary to age. Normal   diastolic filling pressures.  Pericardium/Pleura: No pericardial effusion visualized.  Aorta: The aortic root is normal in size.    CONCLUSIONS:  1. Normal left ventricular systolic function.      Assessment--  68 y/o F with PMHx of hypothyroidism presents with cough and congestion for over 3-4 weeks  in duration associated with intermittent fevers and cough . She has difficulty swallowing and increased cough with liquids. She has progressive in creased sob on minimal exertion . Has weight loss form poor appetite . has not been on any abx PTA. CXR and CTA chest dhows diffuse bilateral infiltrates with hilar and mediastinal adenopathy . As per speech therapy she has posiitve aspiration with thin liquids .    She likely has recurrent aspiration , CT chest shows diffuse infiltrates and she is hypoxic . She may have  decompensated HF , with MARCELINA . She needs to have Pul TB ruled out      Her HIV test is positive and she has very low CD 4 count so she most likely has PCP pneumonia and she was started on IV Bactrim with steroids , she will need bronchoscopy to confirm it     She also has severe oral thrush and may have candidal esophagitis which may be the cause of her difficulty swallowing     Plan:  - will continue with IV bactrim and steroids , dc Zosyn   - add Diflucan 200 mg daily x 5-7 days   - send sputum AFB x 1  , may need sputum induction   - needs  bronchoscopy to prove PCP   - monitor renal function closely   - screen for other oppurtunistic infections exposure - Cryptococcal antigen, toxo titer, RPR, CMV   - send HIV genosure to see if has any resistant virus , Hold starting ATV for at least 2-3 weeks   - social service consult to help obtain insurance     case discussed with Dr. Goodman and ICU team     Continue with present regime .  Appropriate use of antibiotics and adverse effects reviewed.      I have discussed the above plan of care with patient and her Niece who was present at bedside in detail. Patient was given HIV post test counselling and explained her the prognosis and stage of her disease. They expressed understanding of the  treatment plan . Risks, benefits and alternatives discussed in detail. I have asked if they have any questions or concerns and appropriately addressed them to the best of my ability .      Critical care time greater then 65 minutes reviewing notes, labs data/ imaging , discussion with multidisciplinary team.    Thank you for allowing me to participate in care of your patient .        Solo Owens MD  [phone]

## 2019-10-22 NOTE — PROGRESS NOTE ADULT - SUBJECTIVE AND OBJECTIVE BOX
INTERVAL HPI/OVERNIGHT EVENTS:    SUBJECTIVE: Patient seen and examined at bedside.     ROS:  CV: Denies chest pain  Resp: Denies SOB  GI: Denies abdominal pain, constipation, diarrhea, nausea, vomiting  : Denies dysuria  ID: Denies fevers, chills  MSK: Denies joint pain     OBJECTIVE:    VITAL SIGNS:  ICU Vital Signs Last 24 Hrs  T(C): 37.2 (22 Oct 2019 08:08), Max: 37.2 (22 Oct 2019 03:59)  T(F): 98.9 (22 Oct 2019 08:08), Max: 99 (22 Oct 2019 03:59)  HR: 82 (22 Oct 2019 07:00) (80 - 113)  BP: 98/61 (22 Oct 2019 07:00) (88/52 - 146/71)  BP(mean): 74 (22 Oct 2019 07:00) (65 - 100)  ABP: --  ABP(mean): --  RR: 29 (22 Oct 2019 07:00) (20 - 40)  SpO2: 100% (22 Oct 2019 07:00) (84% - 100%)        10-21 @ 07:01  -  10-22 @ 07:00  --------------------------------------------------------  IN: 1305.5 mL / OUT: 1300 mL / NET: 5.5 mL      CAPILLARY BLOOD GLUCOSE      POCT Blood Glucose.: 117 mg/dL (21 Oct 2019 03:48)      PHYSICAL EXAM:    General: NAD, comfortable  HEENT: NCAT, PERRL, clear conjunctiva, no scleral icterus  Neck: supple, no JVD  Respiratory: CTA b/l, no wheezing, rhonchi, rales  Cardiovascular: RRR, normal S1S2, no M/R/G  Abdomen: soft, NT/ND, bowel sounds in all four quadrants, no palpable masses  Extremities: WWP, no clubbing, cyanosis, or edema  Neuro:     MEDICATIONS:  MEDICATIONS  (STANDING):  ALBUTerol/ipratropium for Nebulization 3 milliLiter(s) Nebulizer every 6 hours  amiodarone Infusion 0.5 mG/Min (16.667 mL/Hr) IV Continuous <Continuous>  apixaban 5 milliGRAM(s) Oral two times a day  dexmedetomidine Infusion 0.4 MICROgram(s)/kG/Hr (5.4 mL/Hr) IV Continuous <Continuous>  lactobacillus acidophilus 1 Tablet(s) Oral three times a day  levothyroxine Injectable 37.5 MICROGram(s) IV Push at bedtime  methylPREDNISolone sodium succinate Injectable 30 milliGRAM(s) IV Push every 12 hours  multivitamin 1 Tablet(s) Oral daily  piperacillin/tazobactam IVPB.. 3.375 Gram(s) IV Intermittent every 8 hours  sodium chloride 3%  Inhalation 3 milliLiter(s) Inhalation every 8 hours  trimethoprim / sulfamethoxazole IVPB 200 milliGRAM(s) IV Intermittent every 6 hours    MEDICATIONS  (PRN):  acetaminophen   Tablet .. 650 milliGRAM(s) Oral every 6 hours PRN Temp greater or equal to 38C (100.4F), Mild Pain (1 - 3)  ALBUTerol    0.083% 2.5 milliGRAM(s) Nebulizer every 6 hours PRN Shortness of Breath and/or Wheezing      ALLERGIES:  Allergies    No Known Allergies    Intolerances        LABS:                        8.6    6.45  )-----------( 259      ( 22 Oct 2019 05:52 )             25.8     10-22    143  |  115<H>  |  31<H>  ----------------------------<  227<H>  3.7   |  19<L>  |  1.40<H>    Ca    8.1<L>      22 Oct 2019 05:52  Phos  2.9     10-22  Mg     2.2     10-22    TPro  6.2  /  Alb  1.5<L>  /  TBili  0.2  /  DBili  x   /  AST  47<H>  /  ALT  17  /  AlkPhos  69  10-22          RADIOLOGY & ADDITIONAL TESTS: Reviewed. INTERVAL HPI/OVERNIGHT EVENTS: Patient seen and examined at bedside. Discussed HIV status with patient via  (ID [id number]). Patient understood and gave permission to discuss with niece. No complaints today.      SUBJECTIVE:   ROS:  CV: Denies chest pain  Resp: Denies SOB  GI: Denies abdominal pain, constipation, diarrhea, nausea, vomiting  : Denies dysuria  ID: Denies fevers, chills  MSK: Denies joint pain     OBJECTIVE:    VITAL SIGNS:  ICU Vital Signs Last 24 Hrs  T(C): 37.2 (22 Oct 2019 08:08), Max: 37.2 (22 Oct 2019 03:59)  T(F): 98.9 (22 Oct 2019 08:08), Max: 99 (22 Oct 2019 03:59)  HR: 82 (22 Oct 2019 07:00) (80 - 113)  BP: 98/61 (22 Oct 2019 07:00) (88/52 - 146/71)  BP(mean): 74 (22 Oct 2019 07:00) (65 - 100)  ABP: --  ABP(mean): --  RR: 29 (22 Oct 2019 07:00) (20 - 40)  SpO2: 100% (22 Oct 2019 07:00) (84% - 100%)        10-21 @ 07:01  -  10-22 @ 07:00  --------------------------------------------------------  IN: 1305.5 mL / OUT: 1300 mL / NET: 5.5 mL      CAPILLARY BLOOD GLUCOSE      POCT Blood Glucose.: 117 mg/dL (21 Oct 2019 03:48)      PHYSICAL EXAM:    General: NAD, comfortable  HEENT: NCAT, PERRL, clear conjunctiva, no scleral icterus  Neck: supple, no JVD  Respiratory: CTA b/l, no wheezing, rhonchi, rales  Cardiovascular: RRR, normal S1S2, no M/R/G  Abdomen: soft, NT/ND, bowel sounds in all four quadrants, no palpable masses  Extremities: WWP, no clubbing, cyanosis, or edema  Neuro:     MEDICATIONS:  MEDICATIONS  (STANDING):  ALBUTerol/ipratropium for Nebulization 3 milliLiter(s) Nebulizer every 6 hours  amiodarone Infusion 0.5 mG/Min (16.667 mL/Hr) IV Continuous <Continuous>  apixaban 5 milliGRAM(s) Oral two times a day  dexmedetomidine Infusion 0.4 MICROgram(s)/kG/Hr (5.4 mL/Hr) IV Continuous <Continuous>  lactobacillus acidophilus 1 Tablet(s) Oral three times a day  levothyroxine Injectable 37.5 MICROGram(s) IV Push at bedtime  methylPREDNISolone sodium succinate Injectable 30 milliGRAM(s) IV Push every 12 hours  multivitamin 1 Tablet(s) Oral daily  piperacillin/tazobactam IVPB.. 3.375 Gram(s) IV Intermittent every 8 hours  sodium chloride 3%  Inhalation 3 milliLiter(s) Inhalation every 8 hours  trimethoprim / sulfamethoxazole IVPB 200 milliGRAM(s) IV Intermittent every 6 hours    MEDICATIONS  (PRN):  acetaminophen   Tablet .. 650 milliGRAM(s) Oral every 6 hours PRN Temp greater or equal to 38C (100.4F), Mild Pain (1 - 3)  ALBUTerol    0.083% 2.5 milliGRAM(s) Nebulizer every 6 hours PRN Shortness of Breath and/or Wheezing      ALLERGIES:  Allergies    No Known Allergies    Intolerances        LABS:                        8.6    6.45  )-----------( 259      ( 22 Oct 2019 05:52 )             25.8     10-22    143  |  115<H>  |  31<H>  ----------------------------<  227<H>  3.7   |  19<L>  |  1.40<H>    Ca    8.1<L>      22 Oct 2019 05:52  Phos  2.9     10-22  Mg     2.2     10-22    TPro  6.2  /  Alb  1.5<L>  /  TBili  0.2  /  DBili  x   /  AST  47<H>  /  ALT  17  /  AlkPhos  69  10-22          RADIOLOGY & ADDITIONAL TESTS: Reviewed. INTERVAL HPI/OVERNIGHT EVENTS: Patient seen and examined at bedside. Patient comfortable on high flow. Discussed HIV status with patient via  (ID [id number]). Patient understood and gave permission to discuss with niece and nephew-in-law. No complaints today.    SUBJECTIVE:   ROS:  CV: Denies chest pain  Resp: Denies SOB  GI: Denies abdominal pain, constipation, diarrhea, nausea, vomiting  : Denies dysuria  ID: Denies fevers, chills  MSK: Denies joint pain     OBJECTIVE:    VITAL SIGNS:  ICU Vital Signs Last 24 Hrs  T(C): 37.2 (22 Oct 2019 08:08), Max: 37.2 (22 Oct 2019 03:59)  T(F): 98.9 (22 Oct 2019 08:08), Max: 99 (22 Oct 2019 03:59)  HR: 82 (22 Oct 2019 07:00) (80 - 113)  BP: 98/61 (22 Oct 2019 07:00) (88/52 - 146/71)  BP(mean): 74 (22 Oct 2019 07:00) (65 - 100)  ABP: --  ABP(mean): --  RR: 29 (22 Oct 2019 07:00) (20 - 40)  SpO2: 100% (22 Oct 2019 07:00) (84% - 100%)        10-21 @ 07:01  -  10-22 @ 07:00  --------------------------------------------------------  IN: 1305.5 mL / OUT: 1300 mL / NET: 5.5 mL      CAPILLARY BLOOD GLUCOSE      POCT Blood Glucose.: 117 mg/dL (21 Oct 2019 03:48)      PHYSICAL EXAM:    General: NAD, comfortable  HEENT: NCAT, PERRL, clear conjunctiva, no scleral icterus; oral thrush  Neck: supple, no JVD  Respiratory: decreased breath sounds b/l  Cardiovascular: RRR, normal S1S2, no M/R/G  Abdomen: soft, NT/ND, bowel sounds in all four quadrants, no palpable masses  Extremities: WWP, no clubbing, cyanosis, or edema  Neuro: A&O x3, no sensory or motor deficits    MEDICATIONS:  MEDICATIONS  (STANDING):  ALBUTerol/ipratropium for Nebulization 3 milliLiter(s) Nebulizer every 6 hours  amiodarone Infusion 0.5 mG/Min (16.667 mL/Hr) IV Continuous <Continuous>  apixaban 5 milliGRAM(s) Oral two times a day  dexmedetomidine Infusion 0.4 MICROgram(s)/kG/Hr (5.4 mL/Hr) IV Continuous <Continuous>  lactobacillus acidophilus 1 Tablet(s) Oral three times a day  levothyroxine Injectable 37.5 MICROGram(s) IV Push at bedtime  methylPREDNISolone sodium succinate Injectable 30 milliGRAM(s) IV Push every 12 hours  multivitamin 1 Tablet(s) Oral daily  piperacillin/tazobactam IVPB.. 3.375 Gram(s) IV Intermittent every 8 hours  sodium chloride 3%  Inhalation 3 milliLiter(s) Inhalation every 8 hours  trimethoprim / sulfamethoxazole IVPB 200 milliGRAM(s) IV Intermittent every 6 hours    MEDICATIONS  (PRN):  acetaminophen   Tablet .. 650 milliGRAM(s) Oral every 6 hours PRN Temp greater or equal to 38C (100.4F), Mild Pain (1 - 3)  ALBUTerol    0.083% 2.5 milliGRAM(s) Nebulizer every 6 hours PRN Shortness of Breath and/or Wheezing      ALLERGIES:  Allergies    No Known Allergies    Intolerances        LABS:                        8.6    6.45  )-----------( 259      ( 22 Oct 2019 05:52 )             25.8     10-22    143  |  115<H>  |  31<H>  ----------------------------<  227<H>  3.7   |  19<L>  |  1.40<H>    Ca    8.1<L>      22 Oct 2019 05:52  Phos  2.9     10-22  Mg     2.2     10-22    TPro  6.2  /  Alb  1.5<L>  /  TBili  0.2  /  DBili  x   /  AST  47<H>  /  ALT  17  /  AlkPhos  69  10-22          RADIOLOGY & ADDITIONAL TESTS: Reviewed. INTERVAL HPI/OVERNIGHT EVENTS: Patient seen and examined at bedside. Patient comfortable on high flow. Discussed HIV status with patient via  (ID [id number]). Patient understood and gave permission to discuss HIV status and plan with niece and nephew-in-law. No complaints today.    SUBJECTIVE:   ROS:  CV: Denies chest pain  Resp: Denies SOB  GI: Denies abdominal pain, constipation, diarrhea, nausea, vomiting  : Denies dysuria  ID: Denies fevers, chills  MSK: Denies joint pain     OBJECTIVE:    VITAL SIGNS:  ICU Vital Signs Last 24 Hrs  T(C): 37.2 (22 Oct 2019 08:08), Max: 37.2 (22 Oct 2019 03:59)  T(F): 98.9 (22 Oct 2019 08:08), Max: 99 (22 Oct 2019 03:59)  HR: 82 (22 Oct 2019 07:00) (80 - 113)  BP: 98/61 (22 Oct 2019 07:00) (88/52 - 146/71)  BP(mean): 74 (22 Oct 2019 07:00) (65 - 100)  ABP: --  ABP(mean): --  RR: 29 (22 Oct 2019 07:00) (20 - 40)  SpO2: 100% (22 Oct 2019 07:00) (84% - 100%)        10-21 @ 07:01  -  10-22 @ 07:00  --------------------------------------------------------  IN: 1305.5 mL / OUT: 1300 mL / NET: 5.5 mL      CAPILLARY BLOOD GLUCOSE      POCT Blood Glucose.: 117 mg/dL (21 Oct 2019 03:48)      PHYSICAL EXAM:    General: NAD, comfortable  HEENT: NCAT, PERRL, clear conjunctiva, no scleral icterus; oral thrush  Neck: supple, no JVD  Respiratory: decreased breath sounds b/l  Cardiovascular: RRR, normal S1S2, no M/R/G  Abdomen: soft, NT/ND, bowel sounds in all four quadrants, no palpable masses  Extremities: WWP, no clubbing, cyanosis, or edema  Neuro: A&O x3, no sensory or motor deficits    MEDICATIONS:  MEDICATIONS  (STANDING):  ALBUTerol/ipratropium for Nebulization 3 milliLiter(s) Nebulizer every 6 hours  amiodarone Infusion 0.5 mG/Min (16.667 mL/Hr) IV Continuous <Continuous>  apixaban 5 milliGRAM(s) Oral two times a day  dexmedetomidine Infusion 0.4 MICROgram(s)/kG/Hr (5.4 mL/Hr) IV Continuous <Continuous>  lactobacillus acidophilus 1 Tablet(s) Oral three times a day  levothyroxine Injectable 37.5 MICROGram(s) IV Push at bedtime  methylPREDNISolone sodium succinate Injectable 30 milliGRAM(s) IV Push every 12 hours  multivitamin 1 Tablet(s) Oral daily  piperacillin/tazobactam IVPB.. 3.375 Gram(s) IV Intermittent every 8 hours  sodium chloride 3%  Inhalation 3 milliLiter(s) Inhalation every 8 hours  trimethoprim / sulfamethoxazole IVPB 200 milliGRAM(s) IV Intermittent every 6 hours    MEDICATIONS  (PRN):  acetaminophen   Tablet .. 650 milliGRAM(s) Oral every 6 hours PRN Temp greater or equal to 38C (100.4F), Mild Pain (1 - 3)  ALBUTerol    0.083% 2.5 milliGRAM(s) Nebulizer every 6 hours PRN Shortness of Breath and/or Wheezing      ALLERGIES:  Allergies    No Known Allergies    Intolerances        LABS:                        8.6    6.45  )-----------( 259      ( 22 Oct 2019 05:52 )             25.8     10-22    143  |  115<H>  |  31<H>  ----------------------------<  227<H>  3.7   |  19<L>  |  1.40<H>    Ca    8.1<L>      22 Oct 2019 05:52  Phos  2.9     10-22  Mg     2.2     10-22    TPro  6.2  /  Alb  1.5<L>  /  TBili  0.2  /  DBili  x   /  AST  47<H>  /  ALT  17  /  AlkPhos  69  10-22          RADIOLOGY & ADDITIONAL TESTS: Reviewed. INTERVAL HPI/OVERNIGHT EVENTS: Patient seen and examined at bedside. Patient comfortable on high flow. Discussed HIV status with patient via [employer] video  (ID [id number]). Patient understood and gave permission to discuss HIV status and plan with niece and nephew-in-law. No complaints today.    SUBJECTIVE:   ROS:  CV: Denies chest pain  Resp: Denies SOB  GI: Denies abdominal pain, constipation, diarrhea, nausea, vomiting  : Denies dysuria  ID: Denies fevers, chills  MSK: Denies joint pain     OBJECTIVE:    VITAL SIGNS:  ICU Vital Signs Last 24 Hrs  T(C): 37.2 (22 Oct 2019 08:08), Max: 37.2 (22 Oct 2019 03:59)  T(F): 98.9 (22 Oct 2019 08:08), Max: 99 (22 Oct 2019 03:59)  HR: 82 (22 Oct 2019 07:00) (80 - 113)  BP: 98/61 (22 Oct 2019 07:00) (88/52 - 146/71)  BP(mean): 74 (22 Oct 2019 07:00) (65 - 100)  ABP: --  ABP(mean): --  RR: 29 (22 Oct 2019 07:00) (20 - 40)  SpO2: 100% (22 Oct 2019 07:00) (84% - 100%)        10-21 @ 07:01  -  10-22 @ 07:00  --------------------------------------------------------  IN: 1305.5 mL / OUT: 1300 mL / NET: 5.5 mL      CAPILLARY BLOOD GLUCOSE      POCT Blood Glucose.: 117 mg/dL (21 Oct 2019 03:48)      PHYSICAL EXAM:    General: NAD, comfortable  HEENT: NCAT, PERRL, clear conjunctiva, no scleral icterus; oral thrush  Neck: supple, no JVD  Respiratory: decreased breath sounds b/l  Cardiovascular: RRR, normal S1S2, no M/R/G  Abdomen: soft, NT/ND, bowel sounds in all four quadrants, no palpable masses  Extremities: WWP, no clubbing, cyanosis, or edema  Neuro: A&O x3, no sensory or motor deficits    MEDICATIONS:  MEDICATIONS  (STANDING):  ALBUTerol/ipratropium for Nebulization 3 milliLiter(s) Nebulizer every 6 hours  amiodarone Infusion 0.5 mG/Min (16.667 mL/Hr) IV Continuous <Continuous>  apixaban 5 milliGRAM(s) Oral two times a day  dexmedetomidine Infusion 0.4 MICROgram(s)/kG/Hr (5.4 mL/Hr) IV Continuous <Continuous>  lactobacillus acidophilus 1 Tablet(s) Oral three times a day  levothyroxine Injectable 37.5 MICROGram(s) IV Push at bedtime  methylPREDNISolone sodium succinate Injectable 30 milliGRAM(s) IV Push every 12 hours  multivitamin 1 Tablet(s) Oral daily  piperacillin/tazobactam IVPB.. 3.375 Gram(s) IV Intermittent every 8 hours  sodium chloride 3%  Inhalation 3 milliLiter(s) Inhalation every 8 hours  trimethoprim / sulfamethoxazole IVPB 200 milliGRAM(s) IV Intermittent every 6 hours    MEDICATIONS  (PRN):  acetaminophen   Tablet .. 650 milliGRAM(s) Oral every 6 hours PRN Temp greater or equal to 38C (100.4F), Mild Pain (1 - 3)  ALBUTerol    0.083% 2.5 milliGRAM(s) Nebulizer every 6 hours PRN Shortness of Breath and/or Wheezing      ALLERGIES:  Allergies    No Known Allergies    Intolerances        LABS:                        8.6    6.45  )-----------( 259      ( 22 Oct 2019 05:52 )             25.8     10-22    143  |  115<H>  |  31<H>  ----------------------------<  227<H>  3.7   |  19<L>  |  1.40<H>    Ca    8.1<L>      22 Oct 2019 05:52  Phos  2.9     10-22  Mg     2.2     10-22    TPro  6.2  /  Alb  1.5<L>  /  TBili  0.2  /  DBili  x   /  AST  47<H>  /  ALT  17  /  AlkPhos  69  10-22          RADIOLOGY & ADDITIONAL TESTS: Reviewed.  < from: TTE Echo Doppler w/o Cont (10.21.19 @ 12:47) >  FINDINGS  Left Ventricle: The left ventricle is normal in size, wall thickness, and   systolic function. No wall motion abnormalities. Estimated EF is 70%.  Right Ventricle: The right ventricle is normal in size and function.  Left Atrium: The left atrium is normal in size.  Right Atrium: The right atrium is normal in size.  Mitral Valve: Mitral annular calcification. Trace mitral regurgitation.  Aortic Valve: The aortic valve is grossly normal. No aortic regurgitation.  Tricuspid Valve: The tricuspid valve is structurally normal. Mild   tricuspid regurgitation. Estimated pulmonary artery systolic pressure is   40 mmHg.  PulmonicValve: The pulmonic valve is not well-visualized.  Diastolic Function: Impaired relaxation secondary to age. Normal   diastolic filling pressures.  Pericardium/Pleura: No pericardial effusion visualized.  Aorta: The aortic root is normal in size.    CONCLUSIONS:  1. Normal left ventricular systolic function.  2. Mild pulmonary hypertension.    < end of copied text >

## 2019-10-22 NOTE — PROGRESS NOTE ADULT - ASSESSMENT
68 y/o F with PMHx of hypothyroidism presents with cough and congestion for 2 weeks in duration now presents with new onset afib with rvr, acute pulmonary edema causing resp failure requiring BIPAP, febrile to 101, likely with PNA, r/o TB     Neuro: No active issues   Cardio: afib controlled w/Amiodarone infusion, continue Eliquis, echo - normal LV systolic fx, mild pulm HTN  Pulm: Hypoxic resp failure likely from PNA, r/o TB. doubt TB, sputum neg for TB, check AFB x3 and quant gold. cont airborne precautions. CT suggestive of ground-glass opacities. Denies hx of smoking or exposure to smoke. Tolerating NC for a few hours then transitioned to BIPAP, will try high flow NC, will accept sats of 92% and higher. Lasix 20 mg IV x1. Nebs as needed  GI: No active issues. cont multivitamin   Gu/renal: NICOLASA; f/u lytes   ID: Fever 2/2 PNA vs TB, no need for bronch at this time for resp failure. Cont zosyn. would give Tylenol for fever; check sputum cx, legionella ag and MRSA swab  Heme: Eliquis and scds for dvt prophylaxis   Endo: cont synthroid   Dispo: Monitor in ICU today; PT consulted 68 y/o F with PMHx of hypothyroidism presents with cough and congestion for 2 weeks in duration now presents with new onset afib with rvr, acute pulmonary edema causing resp failure requiring BIPAP, febrile to 101, likely with PNA, r/o TB     Neuro: No active issues   Cardio: afib controlled w/Amiodarone 400 mg PO BID, continue Eliquis, echo - normal LV systolic fx, mild pulm HTN  Pulm: Hypoxic resp failure likely from PNA, r/o TB. doubt TB, sputum neg for TB, check AFB x3 and quant gold. cont airborne precautions. CT suggestive of ground-glass opacities. Denies hx of smoking or exposure to smoke. High flow NC, will accept sats of 92% and higher. Lasix 20 mg IV x1. Nebs as needed  GI: No active issues. cont multivitamin; NPO except ice chips, sips of water  Gu/renal: NICOLASA; f/u lytes; IV LR @75 ml/hr  ID: HIV ab reactive. viral load elevated and CD4 count low; D/c zosyn, started bactrim; fluconazole for candidal esophagitis; nystatin for oral thrush; Fever 2/2 PNA vs TB; possible bronch if tolerates, sputum cytology; would give Tylenol for fever; check sputum cx, legionella ag and MRSA swab  Heme: Eliquis and scds for dvt prophylaxis   Endo: cont synthroid; low dose sliding scale, monitor FS qhs and before meals  Dispo: Monitor in ICU today; PT consulted 68 y/o F with PMHx of hypothyroidism presents with cough and congestion for 2 weeks in duration now presents with new onset afib with rvr, acute pulmonary edema causing resp failure requiring BIPAP, febrile to 101, likely with PNA, r/o TB     Neuro: No active issues   Cardio: afib controlled w/Amiodarone 400 mg PO BID, continue Eliquis, echo - normal LV systolic fx, mild pulm HTN  Pulm: Hypoxic resp failure likely from PNA, r/o TB. doubt TB, sputum neg for TB, check AFB x3 and quant gold. cont airborne precautions. CT suggestive of ground-glass opacities. Denies hx of smoking or exposure to smoke. High flow NC, will accept sats of 92% and higher. Nebs as needed  GI: No active issues. cont multivitamin; NPO except ice chips, sips of water  Gu/renal: NICOLASA; start IV LR @75 ml/hr; f/u lytes  ID: HIV ab reactive, viral load elevated and CD4 count low; D/c zosyn, started bactrim; fluconazole for candidal esophagitis; nystatin for oral thrush; Fever 2/2 PNA vs TB; possible bronch if tolerates, sputum cytology; would give Tylenol for fever; check sputum cx, legionella ag and MRSA swab  Heme: Eliquis and scds for dvt prophylaxis   Endo: cont synthroid; low dose sliding scale, monitor FS qhs and before meals  Dispo: Monitor in ICU today; PT consulted 70 y/o F with PMHx of hypothyroidism presents with cough and congestion for 2 weeks in duration now presents with new onset afib with rvr, acute pulmonary edema causing resp failure requiring BIPAP, febrile to 101, likely with PNA, r/o TB     Neuro: No active issues   Cardio: afib controlled, switched from amio gtt to 400 mg PO BID, continue Eliquis, echo shows normal LV systolic fx, mild pulm HTN  Pulm: Hypoxic resp failure likely from PCP PNA given HIV status. R/o TB, sputum neg for TB, check AFB x3 and quant gold. cont airborne precautions. CT suggestive of ground-glass opacities. Denies hx of smoking or exposure to smoke. High flow NC, will accept sats of 92% and higher. Nebs as needed  GI: No active issues. cont multivitamin; NPO except ice chips, sips of water  Gu/renal: NICOLASA; start IV LR @75 ml/hr; f/u lytes  ID: HIV ab reactive, viral load elevated and CD4 count low; D/c zosyn, started bactrim; fluconazole for candidal esophagitis; nystatin for oral thrush; Fever 2/2 PNA vs TB; possible bronch if tolerates, sputum cytology; would give Tylenol for fever; check sputum cx, legionella ag and MRSA swab  Heme: Eliquis and scds for dvt prophylaxis   Endo: cont synthroid; low dose sliding scale, monitor FS qhs and before meals  Dispo: Monitor in ICU today; PT consulted 70 y/o F with PMHx of hypothyroidism presents with cough and congestion for 2 weeks in duration now presents with new onset afib with rvr, acute pulmonary edema causing resp failure requiring BIPAP, febrile to 101, likely with PNA, r/o TB     Neuro: No active issues   Cardio: afib controlled, switched from amio gtt to 400 mg PO BID, continue Eliquis, echo shows normal LV systolic fx, mild pulm HTN  Pulm: Hypoxic resp failure likely from PCP PNA given HIV status. R/o TB, sputum neg for TB, check AFB x3 and quant gold. cont airborne precautions. CT suggestive of ground-glass opacities. Denies hx of smoking or exposure to smoke. High flow NC, will accept sats of 92% and higher. Nebs as needed  GI: No active issues. cont multivitamin; NPO except ice chips, sips of water  Gu/renal: NICOLASA; start IV LR @75 ml/hr; f/u lytes  ID: HIV ab reactive, viral load elevated and CD4 count low; D/c zosyn, started bactrim; fluconazole for candidal esophagitis; nystatin for oral thrush; Fever 2/2 PNA vs TB; possible bronch if tolerates, sputum cytology; would give Tylenol for fever; check legionella ag and MRSA swab  Heme: Eliquis and scds for dvt prophylaxis   Endo: cont synthroid; low dose sliding scale, monitor FS qhs and before meals  Dispo: Monitor in ICU today; PT consulted 68 y/o F with PMHx of hypothyroidism presents with cough and congestion for 2 weeks in duration now presents with new onset afib with rvr, acute pulmonary edema causing resp failure requiring BIPAP, febrile to 101, likely with PNA, r/o TB     Neuro: No active issues   Cardio: afib controlled, switched from amio gtt to 400 mg PO BID, continue Eliquis, echo shows normal LV systolic fx, mild pulm HTN  Pulm: Hypoxic resp failure likely from PCP PNA given HIV status. R/o TB, sputum neg for TB, check AFB x3 and quant gold. cont airborne precautions. CT suggestive of ground-glass opacities. Denies hx of smoking or exposure to smoke. High flow NC, will accept sats of 92% and higher. Nebs as needed  GI: No active issues. cont multivitamin; NPO except ice chips, sips of water  Gu/renal: NICOLASA; start IV LR @75 ml/hr; f/u lytes  ID: HIV ab reactive, viral load elevated and CD4 count low; D/c zosyn, started bactrim; fluconazole for candidal esophagitis; nystatin for oral thrush; Fever 2/2 PNA vs TB; possible bronch if tolerates, sputum cytology; would give Tylenol for fever; check legionella ag and MRSA swab  Heme: Eliquis and scds for dvt prophylaxis   Endo: cont synthroid; low dose ISS, monitor FS qhs and qac  Dispo: Monitor in ICU today; PT consulted

## 2019-10-22 NOTE — PROGRESS NOTE ADULT - ASSESSMENT
70 y/o F with PMHx of hypothyroidism admitted with sepsis secondary to bilateral pneumonia and dysphagia. HIV positive. ?PCP pneumonia, needs bronch

## 2019-10-22 NOTE — PROGRESS NOTE ADULT - SUBJECTIVE AND OBJECTIVE BOX
Patient is a 69y old  Female who presents with a chief complaint of pneumonia (22 Oct 2019 15:12)      FROM ADMISSION H+P:   HPI:  68 y/o F with PMHx of hypothyroidism presents with cough and congestion for 2 weeks in duration. Patient states that for the last 10 days she has been having difficulty swallowing, solids more than liquids. Niece at bedside states that patient has been eating and drinking less but more so noticed patient coughing with water but not with regular food. She now has more of a cough and dyspnea. She had a Tmax of 101.4 at home. Of note, she went to urgent care 1 month ago as she was feeling unwell and was found to be in SVT with HR sustaining in 170s. Was advised to go to the ER at that time but never followed up as she felt better and does not have insurance. Denies chest pain, palpitations, abdominal pain, n/v/d. Denies sick contacts or myalgias.       ----  INTERVAL HPI/OVERNIGHT EVENTS: Pt seen and evaluated at the bedside. No acute overnight events occurred. Tolerating high flow supplemental O2 today much better than she toleraring bipap yesterday. anxiety better. pt/family anxious regarding diagnosis and prognosis. pt chose a HCP today and signed paperwork w/ our palliative care team    ----  PAST MEDICAL & SURGICAL HISTORY:  Hypothyroidism, unspecified type  No significant past surgical history: denies surgical history but CT shows cholecystectomy      FAMILY HISTORY:  FH: type 2 diabetes: in father      Allergies    No Known Allergies    Intolerances        ----  REVIEW OF SYSTEMS:  CONSTITUTIONAL: admits severe fatigue and persistent generalized weakness    CARDIOVASCULAR: denies chest pain, chest pressure, palpitations  RESPIRATORY: admits heavy cough w/ sputum production and tachypnea  GASTROINTESTINAL: denies nausea, vomiting, diarrhea, abdominal pain. admits reduced appetite  NEUROLOGICAL: denies numbness, headache, focal weakness  MUSCULOSKELETAL: denies new joint pain, muscle aches  LYMPHATICS: denies enlarged lymph nodes, extremity swelling  PSYCHIATRIC: admits severe anxiety, denies depression  ENDOCRINOLOGIC: admits diaphoresis, denies weight loss    ----  PHYSICAL EXAM:  GENERAL: patient appears anxious, no distress, comfortable on high flow NC  EYES: sclera clear, no exudates  ENMT: oropharynx clear without erythema, dry mucous membranes  LUNGS: still tachypneic but less so, shallow respirations coarse rhonchi throughout b/l chest  HEART: soft S1/S2, tachycardic, regular rhythm, no murmurs noted, no noted edema to b/l LE  GASTROINTESTINAL: abdomen is soft, nontender, nondistended, hypoactive bowel sounds  INTEGUMENT: skin is pale and cool, diaphoretic  MUSCULOSKELETAL: no clubbing or cyanosis, no obvious deformity   PSYCHIATRIC: mood is anxious, affect is congruent    T(C): 36.9 (10-22-19 @ 21:00), Max: 37.2 (10-22-19 @ 03:59)  HR: 110 (10-22-19 @ 22:00) (82 - 117)  BP: 115/64 (10-22-19 @ 22:00) (93/55 - 135/68)  RR: 32 (10-22-19 @ 22:00) (23 - 40)  SpO2: 94% (10-22-19 @ 22:00) (84% - 100%)  Wt(kg): --    ----  I&O's Summary    21 Oct 2019 07:01  -  22 Oct 2019 07:00  --------------------------------------------------------  IN: 1305.5 mL / OUT: 1300 mL / NET: 5.5 mL    22 Oct 2019 07:01  -  22 Oct 2019 22:27  --------------------------------------------------------  IN: 1951.7 mL / OUT: 700 mL / NET: 1251.7 mL        LABS:                        8.6    6.45  )-----------( 259      ( 22 Oct 2019 05:52 )             25.8     10-22    143  |  115<H>  |  31<H>  ----------------------------<  227<H>  3.7   |  19<L>  |  1.40<H>    Ca    8.1<L>      22 Oct 2019 05:52  Phos  2.9     10-22  Mg     2.2     10-22    TPro  6.2  /  Alb  1.5<L>  /  TBili  0.2  /  DBili  x   /  AST  47<H>  /  ALT  17  /  AlkPhos  69  10-22        CAPILLARY BLOOD GLUCOSE      POCT Blood Glucose.: 204 mg/dL (22 Oct 2019 21:56)  POCT Blood Glucose.: 331 mg/dL (22 Oct 2019 17:09)  POCT Blood Glucose.: 409 mg/dL (22 Oct 2019 12:37)  POCT Blood Glucose.: 439 mg/dL (22 Oct 2019 12:35)    ABG - ( 21 Oct 2019 03:57 )  pH, Arterial: 7.39  pH, Blood: x     /  pCO2: 26    /  pO2: 69    / HCO3: 18    / Base Excess: -8.6  /  SaO2: 93                10-19 @ 23:13   No growth to date.  --  --

## 2019-10-22 NOTE — PROGRESS NOTE ADULT - SUBJECTIVE AND OBJECTIVE BOX
Chief Complaint: Shortness of breath, fever    Interval Events: No events overnight. HIV resulted as positive.    Review of Systems:  General: No fevers, chills, weight loss or gain  Skin: No rashes, color changes  Cardiovascular: No chest pain, orthopnea  Respiratory: No shortness of breath, cough  Gastrointestinal: No nausea, abdominal pain  Genitourinary: No incontinence, pain with urination  Musculoskeletal: No pain, swelling, decreased range of motion  Neurological: No headache, weakness  Psychiatric: No depression, anxiety  Endocrine: No weight loss or gain, increased thirst  All other systems are comprehensively negative.    Physical Exam:  Vital Signs Last 24 Hrs  T(C): 37.2 (22 Oct 2019 03:59), Max: 37.2 (22 Oct 2019 03:59)  T(F): 99 (22 Oct 2019 03:59), Max: 99 (22 Oct 2019 03:59)  HR: 82 (22 Oct 2019 07:00) (80 - 113)  BP: 98/61 (22 Oct 2019 07:00) (88/52 - 146/71)  BP(mean): 74 (22 Oct 2019 07:00) (65 - 100)  RR: 29 (22 Oct 2019 07:00) (20 - 40)  SpO2: 100% (22 Oct 2019 07:00) (84% - 100%)  General: NAD  HEENT: MMM  Neck: No JVD, no carotid bruit  Lungs: CTAB  CV: RRR, nl S1/S2, no M/R/G  Abdomen: S/NT/ND, +BS  Extremities: No LE edema, no cyanosis  Neuro: AAOx3, non-focal  Skin: No rash    Labs:             10-22    143  |  115<H>  |  31<H>  ----------------------------<  227<H>  3.7   |  19<L>  |  1.40<H>    Ca    8.1<L>      22 Oct 2019 05:52  Phos  2.9     10-22  Mg     2.2     10-22    TPro  6.2  /  Alb  1.5<L>  /  TBili  0.2  /  DBili  x   /  AST  47<H>  /  ALT  17  /  AlkPhos  69  10-22      Telemetry: Sinus rhythm

## 2019-10-23 ENCOUNTER — RESULT REVIEW (OUTPATIENT)
Age: 69
End: 2019-10-23

## 2019-10-23 LAB
ANION GAP SERPL CALC-SCNC: 9 MMOL/L — SIGNIFICANT CHANGE UP (ref 5–17)
APTT BLD: 28 SEC — LOW (ref 28.5–37)
BASOPHILS # BLD AUTO: 0 K/UL — SIGNIFICANT CHANGE UP (ref 0–0.2)
BASOPHILS NFR BLD AUTO: 0 % — SIGNIFICANT CHANGE UP (ref 0–2)
BUN SERPL-MCNC: 35 MG/DL — HIGH (ref 7–23)
CALCIUM SERPL-MCNC: 8.4 MG/DL — LOW (ref 8.5–10.1)
CHLORIDE SERPL-SCNC: 114 MMOL/L — HIGH (ref 96–108)
CO2 SERPL-SCNC: 19 MMOL/L — LOW (ref 22–31)
CREAT SERPL-MCNC: 1.2 MG/DL — SIGNIFICANT CHANGE UP (ref 0.5–1.3)
EOSINOPHIL # BLD AUTO: 0 K/UL — SIGNIFICANT CHANGE UP (ref 0–0.5)
EOSINOPHIL NFR BLD AUTO: 0 % — SIGNIFICANT CHANGE UP (ref 0–6)
GLUCOSE SERPL-MCNC: 206 MG/DL — HIGH (ref 70–99)
GRAM STN FLD: SIGNIFICANT CHANGE UP
HBA1C BLD-MCNC: 6.8 % — HIGH (ref 4–5.6)
HCT VFR BLD CALC: 26.2 % — LOW (ref 34.5–45)
HGB BLD-MCNC: 8.7 G/DL — LOW (ref 11.5–15.5)
INR BLD: 1.38 RATIO — HIGH (ref 0.88–1.16)
LYMPHOCYTES # BLD AUTO: 0.29 K/UL — LOW (ref 1–3.3)
LYMPHOCYTES # BLD AUTO: 8 % — LOW (ref 13–44)
MANUAL SMEAR VERIFICATION: YES — SIGNIFICANT CHANGE UP
MCHC RBC-ENTMCNC: 29.6 PG — SIGNIFICANT CHANGE UP (ref 27–34)
MCHC RBC-ENTMCNC: 33.2 GM/DL — SIGNIFICANT CHANGE UP (ref 32–36)
MCV RBC AUTO: 89.1 FL — SIGNIFICANT CHANGE UP (ref 80–100)
MONOCYTES # BLD AUTO: 0.04 K/UL — SIGNIFICANT CHANGE UP (ref 0–0.9)
MONOCYTES NFR BLD AUTO: 1 % — LOW (ref 2–14)
MRSA PCR RESULT.: SIGNIFICANT CHANGE UP
NEUTROPHILS # BLD AUTO: 3.28 K/UL — SIGNIFICANT CHANGE UP (ref 1.8–7.4)
NEUTROPHILS NFR BLD AUTO: 90 % — HIGH (ref 43–77)
NEUTS BAND # BLD: 1 % — SIGNIFICANT CHANGE UP (ref 0–8)
NIGHT BLUE STAIN TISS: SIGNIFICANT CHANGE UP
NRBC # BLD: 0 — SIGNIFICANT CHANGE UP
NRBC # BLD: SIGNIFICANT CHANGE UP /100 WBCS (ref 0–0)
PLAT MORPH BLD: NORMAL — SIGNIFICANT CHANGE UP
PLATELET # BLD AUTO: 260 K/UL — SIGNIFICANT CHANGE UP (ref 150–400)
POTASSIUM SERPL-MCNC: 4.1 MMOL/L — SIGNIFICANT CHANGE UP (ref 3.5–5.3)
POTASSIUM SERPL-SCNC: 4.1 MMOL/L — SIGNIFICANT CHANGE UP (ref 3.5–5.3)
PROTHROM AB SERPL-ACNC: 15.7 SEC — HIGH (ref 10–12.9)
RBC # BLD: 2.94 M/UL — LOW (ref 3.8–5.2)
RBC # FLD: 13.3 % — SIGNIFICANT CHANGE UP (ref 10.3–14.5)
RBC BLD AUTO: SIGNIFICANT CHANGE UP
S AUREUS DNA NOSE QL NAA+PROBE: SIGNIFICANT CHANGE UP
SODIUM SERPL-SCNC: 142 MMOL/L — SIGNIFICANT CHANGE UP (ref 135–145)
SPECIMEN SOURCE: SIGNIFICANT CHANGE UP
SPECIMEN SOURCE: SIGNIFICANT CHANGE UP
T GONDII IGG SER QL: 3.3 IU/ML — SIGNIFICANT CHANGE UP
T GONDII IGG SER QL: NEGATIVE — SIGNIFICANT CHANGE UP
T PALLIDUM AB TITR SER: NEGATIVE — SIGNIFICANT CHANGE UP
WBC # BLD: 3.6 K/UL — LOW (ref 3.8–10.5)
WBC # FLD AUTO: 3.6 K/UL — LOW (ref 3.8–10.5)

## 2019-10-23 PROCEDURE — 88305 TISSUE EXAM BY PATHOLOGIST: CPT | Mod: 26

## 2019-10-23 PROCEDURE — 99152 MOD SED SAME PHYS/QHP 5/>YRS: CPT

## 2019-10-23 PROCEDURE — 31624 DX BRONCHOSCOPE/LAVAGE: CPT

## 2019-10-23 PROCEDURE — 88108 CYTOPATH CONCENTRATE TECH: CPT | Mod: 26

## 2019-10-23 PROCEDURE — 88312 SPECIAL STAINS GROUP 1: CPT | Mod: 26

## 2019-10-23 PROCEDURE — 99232 SBSQ HOSP IP/OBS MODERATE 35: CPT

## 2019-10-23 PROCEDURE — 99233 SBSQ HOSP IP/OBS HIGH 50: CPT | Mod: GC,25

## 2019-10-23 PROCEDURE — 99153 MOD SED SAME PHYS/QHP EA: CPT

## 2019-10-23 RX ORDER — LIDOCAINE HCL 20 MG/ML
4 VIAL (ML) INJECTION ONCE
Refills: 0 | Status: COMPLETED | OUTPATIENT
Start: 2019-10-23 | End: 2019-10-23

## 2019-10-23 RX ORDER — MIDAZOLAM HYDROCHLORIDE 1 MG/ML
1 INJECTION, SOLUTION INTRAMUSCULAR; INTRAVENOUS ONCE
Refills: 0 | Status: DISCONTINUED | OUTPATIENT
Start: 2019-10-23 | End: 2019-10-23

## 2019-10-23 RX ORDER — LIDOCAINE 4 G/100G
1 CREAM TOPICAL ONCE
Refills: 0 | Status: COMPLETED | OUTPATIENT
Start: 2019-10-23 | End: 2019-10-23

## 2019-10-23 RX ORDER — FENTANYL CITRATE 50 UG/ML
50 INJECTION INTRAVENOUS ONCE
Refills: 0 | Status: DISCONTINUED | OUTPATIENT
Start: 2019-10-23 | End: 2019-10-23

## 2019-10-23 RX ADMIN — Medication 1 TABLET(S): at 05:44

## 2019-10-23 RX ADMIN — AMIODARONE HYDROCHLORIDE 400 MILLIGRAM(S): 400 TABLET ORAL at 05:44

## 2019-10-23 RX ADMIN — Medication 30 MILLIGRAM(S): at 17:48

## 2019-10-23 RX ADMIN — Medication 1: at 12:15

## 2019-10-23 RX ADMIN — MIDAZOLAM HYDROCHLORIDE 1 MILLIGRAM(S): 1 INJECTION, SOLUTION INTRAMUSCULAR; INTRAVENOUS at 13:54

## 2019-10-23 RX ADMIN — AMIODARONE HYDROCHLORIDE 400 MILLIGRAM(S): 400 TABLET ORAL at 17:48

## 2019-10-23 RX ADMIN — Medication 37.5 MICROGRAM(S): at 21:52

## 2019-10-23 RX ADMIN — Medication 175 MILLIGRAM(S): at 23:52

## 2019-10-23 RX ADMIN — Medication 175 MILLIGRAM(S): at 18:22

## 2019-10-23 RX ADMIN — Medication 3 MILLILITER(S): at 02:10

## 2019-10-23 RX ADMIN — Medication 175 MILLIGRAM(S): at 05:46

## 2019-10-23 RX ADMIN — FLUCONAZOLE 200 MILLIGRAM(S): 150 TABLET ORAL at 17:49

## 2019-10-23 RX ADMIN — Medication 4 MILLILITER(S): at 14:59

## 2019-10-23 RX ADMIN — FENTANYL CITRATE 50 MICROGRAM(S): 50 INJECTION INTRAVENOUS at 13:40

## 2019-10-23 RX ADMIN — Medication 175 MILLIGRAM(S): at 00:13

## 2019-10-23 RX ADMIN — SODIUM CHLORIDE 3 MILLILITER(S): 9 INJECTION INTRAMUSCULAR; INTRAVENOUS; SUBCUTANEOUS at 08:02

## 2019-10-23 RX ADMIN — SODIUM CHLORIDE 75 MILLILITER(S): 9 INJECTION, SOLUTION INTRAVENOUS at 12:16

## 2019-10-23 RX ADMIN — SODIUM CHLORIDE 3 MILLILITER(S): 9 INJECTION INTRAMUSCULAR; INTRAVENOUS; SUBCUTANEOUS at 20:32

## 2019-10-23 RX ADMIN — APIXABAN 5 MILLIGRAM(S): 2.5 TABLET, FILM COATED ORAL at 05:44

## 2019-10-23 RX ADMIN — Medication 3 MILLILITER(S): at 20:32

## 2019-10-23 RX ADMIN — Medication 1: at 22:41

## 2019-10-23 RX ADMIN — LIDOCAINE 1 APPLICATION(S): 4 CREAM TOPICAL at 13:40

## 2019-10-23 RX ADMIN — MIDAZOLAM HYDROCHLORIDE 1 MILLIGRAM(S): 1 INJECTION, SOLUTION INTRAMUSCULAR; INTRAVENOUS at 14:17

## 2019-10-23 RX ADMIN — Medication 3 MILLILITER(S): at 07:56

## 2019-10-23 RX ADMIN — Medication 30 MILLIGRAM(S): at 05:44

## 2019-10-23 RX ADMIN — Medication 1: at 18:28

## 2019-10-23 RX ADMIN — Medication 175 MILLIGRAM(S): at 12:01

## 2019-10-23 RX ADMIN — MIDAZOLAM HYDROCHLORIDE 1 MILLIGRAM(S): 1 INJECTION, SOLUTION INTRAMUSCULAR; INTRAVENOUS at 14:06

## 2019-10-23 RX ADMIN — Medication 2: at 08:14

## 2019-10-23 RX ADMIN — APIXABAN 5 MILLIGRAM(S): 2.5 TABLET, FILM COATED ORAL at 18:22

## 2019-10-23 RX ADMIN — Medication 1 TABLET(S): at 21:52

## 2019-10-23 NOTE — PROGRESS NOTE ADULT - ASSESSMENT
70 y/o F with PMHx of hypothyroidism presents with cough and congestion for 2 weeks in duration now presents with new onset afib with rvr, acute pulmonary edema causing resp failure requiring BIPAP, febrile to 101, likely with PNA, r/o TB     Neuro: No active issues   Cardio: afib controlled, switched from amio gtt to 400 mg PO BID, continue Eliquis, echo shows normal LV systolic fx, mild pulm HTN  Pulm: Hypoxic resp failure likely from PCP PNA given HIV status. R/o TB, sputum neg for TB, check AFB x3 and quant gold. cont airborne precautions. CT suggestive of ground-glass opacities. Denies hx of smoking or exposure to smoke. High flow NC, will accept sats of 92% and higher. Nebs as needed  GI: No active issues. cont multivitamin; NPO except ice chips, sips of water  Gu/renal: NICOLASA; start IV LR @75 ml/hr; f/u lytes  ID: HIV ab reactive, viral load elevated and CD4 count low; D/c zosyn, started bactrim; fluconazole for candidal esophagitis; nystatin for oral thrush; Fever 2/2 PNA vs TB; possible bronch if tolerates, sputum cytology; would give Tylenol for fever; check legionella ag and MRSA swab  Heme: Eliquis and scds for dvt prophylaxis   Endo: cont synthroid; low dose ISS, monitor FS qhs and qac  Dispo: Monitor in ICU today; PT consulted 70 y/o F with PMHx of hypothyroidism presents with cough and congestion for 2 weeks in duration now presents with new onset afib with rvr, acute pulmonary edema causing resp failure requiring BIPAP, febrile to 101, likely with PNA, r/o TB     Neuro: No active issues   Cardio: afib controlled, amio 400 mg PO BID, continue Eliquis, echo shows normal LV systolic fx, mild pulm HTN  Pulm: Hypoxic resp failure likely from PCP PNA given HIV status. R/o TB, sputum neg for TB, check AFB x3, quant gold intermediate result. cont airborne precautions. CT suggestive of ground-glass opacities. Denies hx of smoking or exposure to smoke. On NC, will accept sats of 92% and higher. Nebs as needed  GI: No active issues. cont multivitamin; NPO except ice chips, sips of water  Gu/renal: NICOLASA; start IV LR @75 ml/hr; f/u lytes  ID: HIV ab reactive, viral load elevated and CD4 count low; continue bactrim; fluconazole for candidal esophagitis; nystatin for oral thrush; solumedrol 30 mg IV q12h; Fever 2/2 PNA vs TB; bronch done today; would give Tylenol for fever; MRSA not detected; check legionella ag, cryptococcal ag, toxoplasma g, sputum culture cytology  Heme: Eliquis and scds for dvt prophylaxis   Endo: cont synthroid; low dose ISS, monitor FS qhs and qac; Hemoglobin A1C 6.8  Dispo: Monitor in ICU today 68 y/o F with PMHx of hypothyroidism presents with cough and congestion for 2 weeks in duration now presents with new onset afib with rvr, acute pulmonary edema causing resp failure requiring BIPAP, febrile to 101, likely with PNA, r/o TB     Neuro: No active issues   Cardio: afib controlled, amio 400 mg PO BID, continue Eliquis, echo shows normal LV systolic fx, mild pulm HTN  Pulm: Hypoxic resp failure likely from PCP PNA given HIV status. R/o TB, sputum neg for TB, check AFB x3, quant gold intermediate result. cont airborne precautions. CT suggestive of ground-glass opacities. Denies hx of smoking or exposure to smoke. On NC, will accept sats of 92% and higher. Nebs as needed  GI: No active issues. cont multivitamin; NPO except ice chips, sips of water  Gu/renal: NICOLASA improved, Cr downtrending; IV LR @75 ml/hr; f/u lytes  ID: HIV ab reactive, viral load elevated and CD4 count low; continue bactrim; fluconazole for candidal esophagitis; solumedrol 30 mg IV q12h; Fever 2/2 PNA vs TB, would give Tylenol for fever; bronch done today; MRSA not detected; check legionella ag, cryptococcal ag, toxoplasma g, sputum culture cytology  Heme: Eliquis and scds for dvt prophylaxis   Endo: cont synthroid; low dose ISS, monitor FS qhs and qac; Hemoglobin A1C 6.8  Dispo: Monitor in ICU today 70 y/o F with PMHx of hypothyroidism presents with cough and congestion for 2 weeks in duration now presents with new onset afib with rvr, acute pulmonary edema causing resp failure requiring BIPAP, febrile to 101, likely with PNA, r/o TB     Neuro: No active issues   Cardio: afib controlled, amio 400 mg PO BID, continue Eliquis, echo shows normal LV systolic fx, mild pulm HTN  Pulm: Hypoxic resp failure likely from PCP PNA given HIV status. R/o TB, sputum neg for TB, check AFB x3, quant gold intermediate result. cont airborne precautions. CT suggestive of ground-glass opacities. Denies hx of smoking or exposure to smoke. On NC, will accept sats of 92% and higher. Nebs as needed  GI: No active issues. cont multivitamin; NPO except ice chips, sips of water  Gu/renal: NICOLASA improved, Cr downtrending; IV LR @75 ml/hr; f/u lytes  ID: HIV ab reactive, viral load elevated and CD4 count low; continue bactrim; fluconazole for candidal esophagitis; solumedrol 30 mg IV q12h; Fever 2/2 PNA vs TB, would give Tylenol for fever; bronchoscopy with BAL done today; MRSA not detected; check legionella ag, cryptococcal ag, toxoplasma g, sputum culture cytology  Heme: Eliquis and scds for dvt prophylaxis   Endo: cont synthroid; low dose ISS, monitor FS qhs and qac; Hemoglobin A1C 6.8  Dispo: Monitor in ICU today

## 2019-10-23 NOTE — PROGRESS NOTE ADULT - SUBJECTIVE AND OBJECTIVE BOX
Chief Complaint: Shortness of breath, fever    Interval Events: No events overnight. Breathing stable.    Review of Systems:  General: No fevers, chills, weight loss or gain  Skin: No rashes, color changes  Cardiovascular: No chest pain, orthopnea  Respiratory: No shortness of breath, cough  Gastrointestinal: No nausea, abdominal pain  Genitourinary: No incontinence, pain with urination  Musculoskeletal: No pain, swelling, decreased range of motion  Neurological: No headache, weakness  Psychiatric: No depression, anxiety  Endocrine: No weight loss or gain, increased thirst  All other systems are comprehensively negative.    Physical Exam:  Vital Signs Last 24 Hrs  T(C): 36.9 (23 Oct 2019 04:31), Max: 37.2 (22 Oct 2019 12:00)  T(F): 98.5 (23 Oct 2019 04:31), Max: 98.9 (22 Oct 2019 12:00)  HR: 102 (23 Oct 2019 07:58) (80 - 117)  BP: 125/78 (23 Oct 2019 07:00) (94/51 - 135/68)  BP(mean): 97 (23 Oct 2019 07:00) (66 - 97)  RR: 26 (23 Oct 2019 07:00) (23 - 32)  SpO2: 100% (23 Oct 2019 07:58) (92% - 100%)  General: NAD  HEENT: MMM  Neck: No JVD, no carotid bruit  Lungs: CTAB  CV: RRR, nl S1/S2, no M/R/G  Abdomen: S/NT/ND, +BS  Extremities: No LE edema, no cyanosis  Neuro: AAOx3, non-focal  Skin: No rash    Labs:    10-23    142  |  114<H>  |  35<H>  ----------------------------<  206<H>  4.1   |  19<L>  |  1.20    Ca    8.4<L>      23 Oct 2019 05:57  Phos  2.9     10-22  Mg     2.2     10-22    TPro  6.2  /  Alb  1.5<L>  /  TBili  0.2  /  DBili  x   /  AST  47<H>  /  ALT  17  /  AlkPhos  69  10-22                        8.7    3.60  )-----------( 260      ( 23 Oct 2019 05:57 )             26.2       Telemetry: Sinus rhythm

## 2019-10-23 NOTE — PROCEDURE NOTE - NSICDXPROCEDURE_GEN_ALL_CORE_FT
PROCEDURES:  Bronchoscopy, with BAL 23-Oct-2019 16:18:31  Dede Goodman  Bronchoscopy with moderate sedation 23-Oct-2019 16:08:49  Dede Goodman
PROCEDURES:  Bronchoscopy with moderate sedation 23-Oct-2019 16:08:49  Dede Goodman

## 2019-10-23 NOTE — PROGRESS NOTE ADULT - SUBJECTIVE AND OBJECTIVE BOX
CHIEF COMPLAINT/INTERVAL HISTORY:  Pt. seen and evaluated for sepsis and acute respiratory failure 2/2 multifocal pneumonia.  Pt. is now on nasal canula.  Off high flow.  Tolerating IV antibiotic.      REVIEW OF SYSTEMS:  No fever, CP, acute respiratory failure, or abdominal pain.     Vital Signs Last 24 Hrs  T(C): 36.6 (23 Oct 2019 12:10), Max: 37.1 (22 Oct 2019 23:43)  T(F): 97.9 (23 Oct 2019 12:10), Max: 98.7 (22 Oct 2019 23:43)  HR: 94 (23 Oct 2019 12:58) (80 - 117)  BP: 120/73 (23 Oct 2019 12:00) (107/64 - 135/68)  BP(mean): 92 (23 Oct 2019 12:00) (81 - 99)  RR: 29 (23 Oct 2019 12:58) (23 - 32)  SpO2: 96% (23 Oct 2019 12:58) (94% - 100%)    PHYSICAL EXAM:  GENERAL: NAD  HEENT: EOMI, hearing normal, conjunctiva and sclera clear  Chest: Diminished BS bilaterally, no wheezing  CV: S1S2, RRR,   GI: soft, +BS, NT/ND  Musculoskeletal: no edema  Psychiatric: affect nL, mood nL  Skin: warm and dry    LABS:                        8.7    3.60  )-----------( 260      ( 23 Oct 2019 05:57 )             26.2     10-23    142  |  114<H>  |  35<H>  ----------------------------<  206<H>  4.1   |  19<L>  |  1.20    Ca    8.4<L>      23 Oct 2019 05:57  Phos  2.1     10-23  Mg     2.0     10-23    TPro  6.2  /  Alb  1.5<L>  /  TBili  0.2  /  DBili  x   /  AST  47<H>  /  ALT  17  /  AlkPhos  69  10-22    PT/INR - ( 23 Oct 2019 05:57 )   PT: 15.7 sec;   INR: 1.38 ratio         PTT - ( 23 Oct 2019 05:57 )  PTT:28.0 sec      Assessment and Plan:  -Sepsis and acute respiratory failure 2/2 multifocal pneumonia:  continue Bactrim 200mg IV Q6h, Solu-medrol 30mg IV Q12h, and Duoneb tx Q6h.  O2 support as needed.  Possible bronchoscopy today.  ID, Pulmonary, and Intensivist f/u CHIEF COMPLAINT/INTERVAL HISTORY:  Pt. seen and evaluated for sepsis and acute respiratory failure 2/2 multifocal pneumonia.  Pt. is now on nasal canula.  Off high flow.  Tolerating IV antibiotic.      REVIEW OF SYSTEMS:  No fever, CP, acute respiratory failure, or abdominal pain.     Vital Signs Last 24 Hrs  T(C): 36.6 (23 Oct 2019 12:10), Max: 37.1 (22 Oct 2019 23:43)  T(F): 97.9 (23 Oct 2019 12:10), Max: 98.7 (22 Oct 2019 23:43)  HR: 94 (23 Oct 2019 12:58) (80 - 117)  BP: 120/73 (23 Oct 2019 12:00) (107/64 - 135/68)  BP(mean): 92 (23 Oct 2019 12:00) (81 - 99)  RR: 29 (23 Oct 2019 12:58) (23 - 32)  SpO2: 96% (23 Oct 2019 12:58) (94% - 100%)    PHYSICAL EXAM:  GENERAL: NAD  HEENT: EOMI, hearing normal, conjunctiva and sclera clear  Chest: Diminished BS bilaterally, no wheezing  CV: S1S2, RRR,   GI: soft, +BS, NT/ND  Musculoskeletal: no edema  Psychiatric: affect nL, mood nL  Skin: warm and dry    LABS:                        8.7    3.60  )-----------( 260      ( 23 Oct 2019 05:57 )             26.2     10-23    142  |  114<H>  |  35<H>  ----------------------------<  206<H>  4.1   |  19<L>  |  1.20    Ca    8.4<L>      23 Oct 2019 05:57  Phos  2.1     10-23  Mg     2.0     10-23    TPro  6.2  /  Alb  1.5<L>  /  TBili  0.2  /  DBili  x   /  AST  47<H>  /  ALT  17  /  AlkPhos  69  10-22    PT/INR - ( 23 Oct 2019 05:57 )   PT: 15.7 sec;   INR: 1.38 ratio         PTT - ( 23 Oct 2019 05:57 )  PTT:28.0 sec      Assessment and Plan:  -Sepsis and acute respiratory failure 2/2 multifocal pneumonia:  Suspected PCP with HIV status.  Continue Bactrim 200mg IV Q6h, Solu-medrol 30mg IV Q12h, and Duoneb tx Q6h.  O2 support as needed.  Possible bronchoscopy today.  ID, Pulmonary, and Intensivist f/u  -Dysphagia:  Resume dysphagia 2 nectar consistency diet after bronchoscopy  -Atrial fibrillation:  continue Amiodarone 400mg PO BID and Eliquis 5mg PO BID.  Cardiology f/u  -NICOLASA: Renal function appears stable.    -Hypothyroidism:  continue Synthroid 37.5mcg IV QHS  -VTE ppx:  On Eliquis

## 2019-10-23 NOTE — PROCEDURE NOTE - NSSEDATIONDETAIL_GEN_A_CORE
Oxygen therapy was applied./IV access was obtained./End tidal CO2 was monitored./There was continuous monitoring of patient's oxygen saturation, respiratory rate, heart rate, blood pressure, level of consciousness, and physiological status.

## 2019-10-23 NOTE — PROCEDURAL SAFETY CHECKLIST WITH OR WITHOUT SEDATION - NSTEAMPROVIDERFT_GEN_ALL_CORE
JOYCE Juares, JOYCE Juares, Oumou Garner, Resident, Rocael hsu-SONDRA student JOYCE Juares, Oumou Tee, Resident, Rocael Gustafson student JOYCE Juares, Franny Watkins-resident, Oumou Garner, Resident, Rocael hsu-NP student

## 2019-10-23 NOTE — PROGRESS NOTE ADULT - ASSESSMENT
The patient is a 69 year old female with a history of hypothyroidism who is admitted with PNA.    Plan:  - Transition to amiodarone 400 mg bid for one week and likely transition to 200 mg daily after  - Continue apixaban 5 mg bid for thromboprophylaxis  - Echocardiogram with normal LV systolic function, mild pulm HTN  - Being ruled-out for TB  - HIV positive  - On zosyn for PNA  - Possible PCP - on bactrim and steroids  - ID follow-up

## 2019-10-23 NOTE — PROCEDURE NOTE - ADDITIONAL PROCEDURE DETAILS
Bronchoscope inserted through L nare. Tiara appeared sharp.  Tracheobronchial tree was examined to at least the 1st subsegmental level.  There were thin, frothy, mucoid secretions seen throughout the tracheobronchial tree.  There were no lesions seen.  BAL was performed in the RML medial segment, RUL anterior segment, and the BRADY anterior segment.  A total of 80cc of saline was instilled and about 50cc returned.  BAL fluid was cellular appearing.  Specimens were combined into one and sent for bacterial Cx, fungal Cx, AFB Cx, and cytology for PCP, and fungitell.  Pt tolerated procedure well.

## 2019-10-23 NOTE — PROGRESS NOTE ADULT - SUBJECTIVE AND OBJECTIVE BOX
Date/Time Patient Seen:  		  Referring MD:   Data Reviewed	       Patient is a 69y old  Female who presents with a chief complaint of pneumonia (22 Oct 2019 22:27)      Subjective/HPI     PAST MEDICAL & SURGICAL HISTORY:  Hypothyroidism, unspecified type  No significant past surgical history: denies surgical history but CT shows cholecystectomy        Medication list         MEDICATIONS  (STANDING):  ALBUTerol/ipratropium for Nebulization 3 milliLiter(s) Nebulizer every 6 hours  amiodarone    Tablet 400 milliGRAM(s) Oral two times a day  apixaban 5 milliGRAM(s) Oral two times a day  dextrose 5%. 1000 milliLiter(s) (50 mL/Hr) IV Continuous <Continuous>  dextrose 50% Injectable 12.5 Gram(s) IV Push once  dextrose 50% Injectable 25 Gram(s) IV Push once  dextrose 50% Injectable 25 Gram(s) IV Push once  fluconAZOLE   Tablet 200 milliGRAM(s) Oral daily  insulin lispro (HumaLOG) corrective regimen sliding scale   SubCutaneous Before meals and at bedtime  lactated ringers. 1000 milliLiter(s) (75 mL/Hr) IV Continuous <Continuous>  lactobacillus acidophilus 1 Tablet(s) Oral three times a day  levothyroxine Injectable 37.5 MICROGram(s) IV Push at bedtime  methylPREDNISolone sodium succinate Injectable 30 milliGRAM(s) IV Push every 12 hours  multivitamin 1 Tablet(s) Oral daily  sodium chloride 3%  Inhalation 3 milliLiter(s) Inhalation every 8 hours  trimethoprim / sulfamethoxazole IVPB 200 milliGRAM(s) IV Intermittent every 6 hours    MEDICATIONS  (PRN):  acetaminophen   Tablet .. 650 milliGRAM(s) Oral every 6 hours PRN Temp greater or equal to 38C (100.4F), Mild Pain (1 - 3)  ALBUTerol    0.083% 2.5 milliGRAM(s) Nebulizer every 6 hours PRN Shortness of Breath and/or Wheezing  dextrose 40% Gel 15 Gram(s) Oral once PRN Blood Glucose LESS THAN 70 milliGRAM(s)/deciliter  glucagon  Injectable 1 milliGRAM(s) IntraMuscular once PRN Glucose LESS THAN 70 milligrams/deciliter         Vitals log        ICU Vital Signs Last 24 Hrs  T(C): 36.9 (23 Oct 2019 04:31), Max: 37.2 (22 Oct 2019 08:08)  T(F): 98.5 (23 Oct 2019 04:31), Max: 98.9 (22 Oct 2019 08:08)  HR: 91 (23 Oct 2019 05:00) (80 - 117)  BP: 122/74 (23 Oct 2019 05:00) (94/51 - 135/68)  BP(mean): 92 (23 Oct 2019 05:00) (66 - 95)  ABP: --  ABP(mean): --  RR: 24 (23 Oct 2019 05:00) (23 - 32)  SpO2: 100% (23 Oct 2019 05:00) (92% - 100%)           Input and Output:  I&O's Detail    22 Oct 2019 07:01  -  23 Oct 2019 07:00  --------------------------------------------------------  IN:    amiodarone Infusion: 16.7 mL    lactated ringers.: 1425 mL    Oral Fluid: 560 mL    Solution: 762 mL    Solution: 100 mL  Total IN: 2863.7 mL    OUT:    Voided: 700 mL  Total OUT: 700 mL    Total NET: 2163.7 mL          Lab Data                        8.7    3.60  )-----------( 260      ( 23 Oct 2019 05:57 )             26.2     10-23    142  |  114<H>  |  35<H>  ----------------------------<  206<H>  4.1   |  19<L>  |  1.20    Ca    8.4<L>      23 Oct 2019 05:57  Phos  2.9     10-22  Mg     2.2     10-22    TPro  6.2  /  Alb  1.5<L>  /  TBili  0.2  /  DBili  x   /  AST  47<H>  /  ALT  17  /  AlkPhos  69  10-22            Review of Systems	      Objective     Physical Examination    heart s1s2  lung dec BS      Pertinent Lab findings & Imaging      Gisela:  NO   Adequate UO     I&O's Detail    22 Oct 2019 07:01  -  23 Oct 2019 07:00  --------------------------------------------------------  IN:    amiodarone Infusion: 16.7 mL    lactated ringers.: 1425 mL    Oral Fluid: 560 mL    Solution: 762 mL    Solution: 100 mL  Total IN: 2863.7 mL    OUT:    Voided: 700 mL  Total OUT: 700 mL    Total NET: 2163.7 mL               Discussed with:     Cultures:	        Radiology

## 2019-10-23 NOTE — PROGRESS NOTE ADULT - SUBJECTIVE AND OBJECTIVE BOX
ID progress note     Name: PATO HICKMAN  Age: 69y  Gender: Female  MRN: [id number]    Interval History-- Events noted, doing well, afebrile , was on nasal canula thia am, had bedside bronchoscopy with BAL . Sputum AFB x3 negative on smear . She feels better   Notes reviewed    Past Medical History--  Hypothyroidism, unspecified type  No significant past surgical history      For details regarding the patient's social history, family history, and other miscellaneous elements, please refer the initial infectious diseases consultation and/or the admitting history and physical examination for this admission.    Allergies--  Allergies    No Known Allergies    Intolerances        Medications--  Antibiotics:  fluconAZOLE   Tablet 200 milliGRAM(s) Oral daily  trimethoprim / sulfamethoxazole IVPB 200 milliGRAM(s) IV Intermittent every 6 hours    Immunologic:    Other:  acetaminophen   Tablet .. PRN  ALBUTerol    0.083% PRN  ALBUTerol/ipratropium for Nebulization  amiodarone    Tablet  apixaban  dextrose 40% Gel PRN  dextrose 5%.  dextrose 50% Injectable  dextrose 50% Injectable  dextrose 50% Injectable  fentaNYL    Injectable  glucagon  Injectable PRN  insulin lispro (HumaLOG) corrective regimen sliding scale  lactated ringers.  lactobacillus acidophilus  levothyroxine Injectable  lidocaine 2% Gel  lidocaine 4% Injection for Nebulization  methylPREDNISolone sodium succinate Injectable  midazolam Injectable  midazolam Injectable  midazolam Injectable  multivitamin  sodium chloride 3%  Inhalation      Review of Systems--  A 10-point review of systems was obtained.     Pertinent positives and negatives--  Constitutional: No fevers. No Chills. No Rigors.   Cardiovascular: No chest pain. No palpitations.  Respiratory: Pos  shortness of breath. No cough.  Gastrointestinal: No nausea or vomiting. No diarrhea or constipation.   Psychiatric: very anxious Pleasant. Appropriate affect.    Review of systems otherwise negative except as previously noted.    Physical Examination--  Vital Signs: T(F): 98.3 (10-23-19 @ 14:35), Max: 98.7 (10-22-19 @ 23:43)  HR: 86 (10-23-19 @ 15:00)  BP: 106/56 (10-23-19 @ 15:00)  RR: 34 (10-23-19 @ 15:00)  SpO2: 100% (10-23-19 @ 15:00)  Wt(kg): --  General: Nontoxic-appearing Female in no acute distress.  HEENT: AT/NC. PERRL. EOMI. Anicteric. Conjunctiva pink and moist. Oropharynx clear. Dentition fair.  Neck: Not rigid. No sense of mass.  Nodes: None palpable.  Lungs: Corse breath sounds  bilaterally without rales, wheezing or rhonchi  Heart: Regular rate and rhythm. No Murmur. No rub. No gallop. No palpable thrill.  Abdomen: Bowel sounds present and normoactive. Soft. Nondistended. Nontender. No sense of mass. No organomegaly.  Back: No spinal tenderness. No costovertebral angle tenderness.   Extremities: No cyanosis or clubbing. No edema.   Skin: Warm. Dry. Good turgor. No rash. No vasculitic stigmata.  Psychiatric: Appropriate affect and mood for situation.         Laboratory Studies--  CBC                        8.7    3.60  )-----------( 260      ( 23 Oct 2019 05:57 )             26.2       Chemistries  10-23    142  |  114<H>  |  35<H>  ----------------------------<  206<H>  4.1   |  19<L>  |  1.20    Ca    8.4<L>      23 Oct 2019 05:57  Phos  2.1     10-23  Mg     2.0     10-23    TPro  6.2  /  Alb  1.5<L>  /  TBili  0.2  /  DBili  x   /  AST  47<H>  /  ALT  17  /  AlkPhos  69  10-22    Syphilis Screen (10.23.19 @ 08:39)    Treponema Pallidum Antibody Interpretation: Negative:     Hemoglobin A1C, Whole Blood (10.23.19 @ 08:34)    Hemoglobin A1C, Whole Blood: 6.8:     CAPILLARY BLOOD GLUCOSE      POCT Blood Glucose.: 178 mg/dL (23 Oct 2019 11:55)  POCT Blood Glucose.: 246 mg/dL (23 Oct 2019 08:03)  POCT Blood Glucose.: 204 mg/dL (22 Oct 2019 21:56)  POCT Blood Glucose.: 331 mg/dL (22 Oct 2019 17:09)    HIV 1/2 AB Confirmation (10.20.19 @ 19:08)    HIV Result: HIV-1 Pos    HIV-1/HIV-2 Interpretation: See Comment HIV 1/2 AB Confirmation and Differentiation Assay Interpretation:  HIV-1 antibody POSITIVE. Laboratory evidence of HIV-1 infection is  present.  Under Public Health Law, within 14 days of diagnosis medical providers  are required to report to the [facility] cases of HIV infection, HIV-related  illness, AIDS and for newly diagnosed cases, the names of all contacts  known to the provider. The Provider Report Form is now able to be  completed electronically at [email or web address]. Please contact  the [facility] at [phone] for additional information.  It is recommended that first time reactive specimens be confirmed by  testing a second specimen. For pediatric patients, maternally transferred  HIV antibodies may persist for up to 15 months after birth, therefore  antibody tests cannot be relied upon for HIV diagnosis. [facility]  offers pediatric HIV diagnostic testing for infants born to HIV positive  mothers. Please contact the laboratory if such testing is needed. If  assistance with partner notification is needed, please contact your local  department of health or call the [place] HIV/AIDS Hotline at  [phone].  For information on HIV care referral at [facility], please call:  [phone](HIV adult patients) or [phone] (HIV pediatric  patients up to age 24). For other HIV care referral resources, please  call the [facility] HIV/AIDS Hotline at [phone].    Full T Cell Subset (10.22.19 @ 09:21)    CD8 %: 77 %    ABS [id number]: 69 /uL    ABS CD19: 23 /uL    ABS CD3: 509 /uL    ABS CD4: 42 /uL    ABS CD8: 465 /uL    [id number] %: 11 %    CD19 %: 4 %    CD3 %: 84: Reference ranges for pediatric population (0-18 years old) are from  Adelso Avalos, et al. "Lymphocyte subsets in healthy children from  birth through 18 years of age: the Pediatric AIDS Clinical Trials Group   study. "Journal of Allergy and Clinical Immunology 112.5 (2003):  973-980.  Reference range for adult (18-65 years old) and geriatric (65 years old  and above) populations are developed at [place], [place]. %    CD4 %: 7 %      HIV-1 RNA Quantitative, Viral Load (10.22.19 @ 08:11)    HIV-1 RNA Quantitative, Viral Load:   6,186,564    HIV-1 RNA Quantitative, Vir Load Interp: See Comment METHOD: Transcription Mediated Amplification (TMA) – [employer].  Results from HIV-1 RNA tests that use other  methods might differ.  Abbreviations:  DET. = Detected,  not det. = Not Detected  n/a = not available  .    HIV-1 RNA Quantitative, Viral Load Lo.79    HIV-1 Viral Load Result: DET.      Culture Data    Culture - Sputum (collected 23 Oct 2019 00:29)  Source: .Sputum Sputum  Gram Stain (23 Oct 2019 02:53):    Few polymorphonuclear leukocytes per low power field    Moderate Squamous epithelial cells per low power field    Few Yeast like cells per oil power field    Few Gram Positive Cocci in Clusters per oil power field    Culture - Acid Fast - Sputum w/Smear (collected 23 Oct 2019 00:29)  Source: .Sputum Sputum    Culture - Acid Fast - Sputum w/Smear (collected 21 Oct 2019 20:34)  Source: .Sputum Sputum  Preliminary Report (23 Oct 2019 15:04):    Culture is being performed.    Culture - Acid Fast - Sputum w/Smear (collected 21 Oct 2019 08:32)  Source: .Sputum Sputum.CONICAL  Preliminary Report (23 Oct 2019 15:04):    Culture is being performed.    Culture - Blood (collected 19 Oct 2019 23:13)  Source: .Blood Blood-Peripheral  Preliminary Report (21 Oct 2019 01:02):    No growth to date.    Culture - Blood (collected 19 Oct 2019 23:13)  Source: .Blood Blood-Peripheral  Preliminary Report (21 Oct 2019 01:02):    No growth to date.    RADIOLOGY:  Xray Chest 1 View AP/PA (10.21.19 @ 04:08) >  Comparison:10/19/2019    Findings:  Lines: None    Heart/Mediastinum/Lungs: There is now diffuse extensive bilateral   airspace disease, predominantly right upper lobe and perihilar. Mild   cardiomegaly. No pleural effusions.    Impression:        Assessment :  CT Angio Chest w/ IV Cont (10.19.19 @ 20:48) >  FINDINGS: The patient's respiratory motion degrades images.    LUNGS AND AIRWAYS: Patent central airways. Nonspecific groundglass   opacities in both lungs. Nodular opacities in the left upper lobe and   lingula.   PLEURA: No pleural effusion or pneumothorax.  HEART: Normal size. No pericardial effusion.  VESSELS: Suboptimal contrast opacification of the subsegmental pulmonary   arteries. No obvious embolus to the level of the segmental pulmonary   arteries. Atherosclerotic change of the thoracic aorta and great vessel   origin.  CHEST WALL AND LOWER NECK: Nonspecific calcification in the left breast   soft tissue.  MEDIASTINUM AND DARCIE: Enlarged lymph nodes, 1.9 x 1.0 cmAP window lymph   node, 1.2 x 1.1 cm left hilar lymph node and 1.8 x 1.1 cm right hilar   lymph node.  UPPER ABDOMEN: Cholecystectomy.  BONES: Degenerative changes of the spine.     IMPRESSION:    Suboptimal evaluation of the subsegmental pulmonary arteries. No obvious   embolus to the level of the segmental pulmonary arteries.    Nonspecific pulmonary groundglass and nodular opacities as described,   which may represent infection/inflammation although neoplasm cannot be   excluded. Recommend follow-up to assess resolution    Nonspecific mediastinal and hilar lymphadenopathy.    Additional findings as described.      Assessment--  68 y/o F with PMHx of hypothyroidism presents with cough and congestion for over 3-4 weeks  in duration associated with intermittent fevers and cough . She has difficulty swallowing and increased cough with liquids. She has progressive in creased sob on minimal exertion . Has weight loss form poor appetite . has not been on any abx PTA. CXR and CTA chest dhows diffuse bilateral infiltrates with hilar and mediastinal adenopathy . As per speech therapy she has posiitve aspiration with thin liquids .    Her HIV test is positive, she denies any HIV risk factors . She has very low CD 4 count and very high HIV Viral load , so she most likely has PCP pneumonia and she was started on IV Bactrim with steroids , she just had  bronchoscopy to confirm it. Clinically he looks better . Sputum afb x 3 negative     She also has severe oral thrush and may have candidal esophagitis which may be the cause of her difficulty swallowing     Plan:  - will continue with IV bactrim and steroids   - add Diflucan 200 mg daily x 5-7 days    - follow BAL results to r/o PCP   - monitor renal function closely   - screen for other oppurtunistic infections exposure - Cryptococcal antigen, toxo titer, RPR, CMV   - send HIV genosure to see if has any resistant virus , Hold starting ATV for at least 2-3 weeks   - social service consult to help obtain insurance   - repeat CXR in am     case discussed with Dr. Goodman and ICU team     Continue with present regime .  Appropriate use of antibiotics and adverse effects reviewed.    I have discussed the above plan of care with patient and her family in detail. They expressed understanding of the treatment plan . Risks, benefits and alternatives discussed in detail. I have asked if they have any questions or concerns and appropriately addressed them to the best of my ability .      > 35 minutes spent in direct patient care reviewing  the notes, lab data/ imaging , discussion with multidisciplinary team. All questions were addressed and answered to the best of my capacity .    Thank you for allowing me to participate in the care of your patient .        Solo Owens MD  [phone]

## 2019-10-23 NOTE — PROCEDURAL SAFETY CHECKLIST WITH OR WITHOUT SEDATION - NSPOSTCOMMENTFT_GEN_ALL_CORE
Medical Necessity Information: It is in your best interest to select a reason for this procedure from the list below. All of these items fulfill various CMS LCD requirements except the new and changing color options.
Include Z78.9 (Other Specified Conditions Influencing Health Status) As An Associated Diagnosis?: No
Consent: The patient's consent was obtained including but not limited to risks of crusting, scabbing, blistering, scarring, darker or lighter pigmentary change, recurrence, incomplete removal and infection.
Post-Care Instructions: I reviewed with the patient in detail post-care instructions. Patient is to wear sunprotection, and avoid picking at any of the treated lesions. Pt may apply Vaseline to crusted or scabbing areas.
Detail Level: Detailed
Medical Necessity Clause: This procedure was medically necessary because the lesions that were treated were:
patient tolerated procedure well

## 2019-10-24 LAB
ALBUMIN SERPL ELPH-MCNC: 1.5 G/DL — LOW (ref 3.3–5)
ALP SERPL-CCNC: 58 U/L — SIGNIFICANT CHANGE UP (ref 40–120)
ALT FLD-CCNC: 22 U/L — SIGNIFICANT CHANGE UP (ref 12–78)
ANION GAP SERPL CALC-SCNC: 8 MMOL/L — SIGNIFICANT CHANGE UP (ref 5–17)
ANION GAP SERPL CALC-SCNC: 8 MMOL/L — SIGNIFICANT CHANGE UP (ref 5–17)
APTT BLD: 28.5 SEC — SIGNIFICANT CHANGE UP (ref 28.5–37)
AST SERPL-CCNC: 42 U/L — HIGH (ref 15–37)
BASOPHILS # BLD AUTO: 0 K/UL — SIGNIFICANT CHANGE UP (ref 0–0.2)
BASOPHILS # BLD AUTO: 0 K/UL — SIGNIFICANT CHANGE UP (ref 0–0.2)
BASOPHILS NFR BLD AUTO: 0 % — SIGNIFICANT CHANGE UP (ref 0–2)
BASOPHILS NFR BLD AUTO: 0 % — SIGNIFICANT CHANGE UP (ref 0–2)
BILIRUB SERPL-MCNC: <0.1 MG/DL — LOW (ref 0.2–1.2)
BUN SERPL-MCNC: 30 MG/DL — HIGH (ref 7–23)
BUN SERPL-MCNC: 32 MG/DL — HIGH (ref 7–23)
CALCIUM SERPL-MCNC: 7.7 MG/DL — LOW (ref 8.5–10.1)
CALCIUM SERPL-MCNC: 8.2 MG/DL — LOW (ref 8.5–10.1)
CHLORIDE SERPL-SCNC: 112 MMOL/L — HIGH (ref 96–108)
CHLORIDE SERPL-SCNC: 113 MMOL/L — HIGH (ref 96–108)
CO2 SERPL-SCNC: 20 MMOL/L — LOW (ref 22–31)
CO2 SERPL-SCNC: 21 MMOL/L — LOW (ref 22–31)
CREAT SERPL-MCNC: 1.2 MG/DL — SIGNIFICANT CHANGE UP (ref 0.5–1.3)
CREAT SERPL-MCNC: 1.3 MG/DL — SIGNIFICANT CHANGE UP (ref 0.5–1.3)
CRYPTOC AG FLD QL: NEGATIVE — SIGNIFICANT CHANGE UP
CULTURE RESULTS: SIGNIFICANT CHANGE UP
EOSINOPHIL # BLD AUTO: 0 K/UL — SIGNIFICANT CHANGE UP (ref 0–0.5)
EOSINOPHIL # BLD AUTO: 0 K/UL — SIGNIFICANT CHANGE UP (ref 0–0.5)
EOSINOPHIL NFR BLD AUTO: 0 % — SIGNIFICANT CHANGE UP (ref 0–6)
EOSINOPHIL NFR BLD AUTO: 0 % — SIGNIFICANT CHANGE UP (ref 0–6)
GLUCOSE SERPL-MCNC: 232 MG/DL — HIGH (ref 70–99)
GLUCOSE SERPL-MCNC: 282 MG/DL — HIGH (ref 70–99)
GRAM STN FLD: SIGNIFICANT CHANGE UP
HCT VFR BLD CALC: 22.2 % — LOW (ref 34.5–45)
HCT VFR BLD CALC: 25 % — LOW (ref 34.5–45)
HGB BLD-MCNC: 7.3 G/DL — LOW (ref 11.5–15.5)
HGB BLD-MCNC: 8.3 G/DL — LOW (ref 11.5–15.5)
IMM GRANULOCYTES NFR BLD AUTO: 0.9 % — SIGNIFICANT CHANGE UP (ref 0–1.5)
INR BLD: 1.61 RATIO — HIGH (ref 0.88–1.16)
LYMPHOCYTES # BLD AUTO: 0.14 K/UL — LOW (ref 1–3.3)
LYMPHOCYTES # BLD AUTO: 0.19 K/UL — LOW (ref 1–3.3)
LYMPHOCYTES # BLD AUTO: 11 % — LOW (ref 13–44)
LYMPHOCYTES # BLD AUTO: 16.5 % — SIGNIFICANT CHANGE UP (ref 13–44)
MAGNESIUM SERPL-MCNC: 2 MG/DL — SIGNIFICANT CHANGE UP (ref 1.6–2.6)
MAGNESIUM SERPL-MCNC: 2.1 MG/DL — SIGNIFICANT CHANGE UP (ref 1.6–2.6)
MCHC RBC-ENTMCNC: 29.4 PG — SIGNIFICANT CHANGE UP (ref 27–34)
MCHC RBC-ENTMCNC: 29.5 PG — SIGNIFICANT CHANGE UP (ref 27–34)
MCHC RBC-ENTMCNC: 32.9 GM/DL — SIGNIFICANT CHANGE UP (ref 32–36)
MCHC RBC-ENTMCNC: 33.2 GM/DL — SIGNIFICANT CHANGE UP (ref 32–36)
MCV RBC AUTO: 89 FL — SIGNIFICANT CHANGE UP (ref 80–100)
MCV RBC AUTO: 89.5 FL — SIGNIFICANT CHANGE UP (ref 80–100)
MONOCYTES # BLD AUTO: 0.04 K/UL — SIGNIFICANT CHANGE UP (ref 0–0.9)
MONOCYTES # BLD AUTO: 0.1 K/UL — SIGNIFICANT CHANGE UP (ref 0–0.9)
MONOCYTES NFR BLD AUTO: 3 % — SIGNIFICANT CHANGE UP (ref 2–14)
MONOCYTES NFR BLD AUTO: 8.7 % — SIGNIFICANT CHANGE UP (ref 2–14)
NEUTROPHILS # BLD AUTO: 0.85 K/UL — LOW (ref 1.8–7.4)
NEUTROPHILS # BLD AUTO: 1.06 K/UL — LOW (ref 1.8–7.4)
NEUTROPHILS NFR BLD AUTO: 73.9 % — SIGNIFICANT CHANGE UP (ref 43–77)
NEUTROPHILS NFR BLD AUTO: 77 % — SIGNIFICANT CHANGE UP (ref 43–77)
NIGHT BLUE STAIN TISS: SIGNIFICANT CHANGE UP
NRBC # BLD: 0 /100 WBCS — SIGNIFICANT CHANGE UP (ref 0–0)
NRBC # BLD: SIGNIFICANT CHANGE UP /100 WBCS (ref 0–0)
PHOSPHATE SERPL-MCNC: 2 MG/DL — LOW (ref 2.5–4.5)
PHOSPHATE SERPL-MCNC: 2.5 MG/DL — SIGNIFICANT CHANGE UP (ref 2.5–4.5)
PLATELET # BLD AUTO: 187 K/UL — SIGNIFICANT CHANGE UP (ref 150–400)
PLATELET # BLD AUTO: 192 K/UL — SIGNIFICANT CHANGE UP (ref 150–400)
POTASSIUM SERPL-MCNC: 3.8 MMOL/L — SIGNIFICANT CHANGE UP (ref 3.5–5.3)
POTASSIUM SERPL-MCNC: 4 MMOL/L — SIGNIFICANT CHANGE UP (ref 3.5–5.3)
POTASSIUM SERPL-SCNC: 3.8 MMOL/L — SIGNIFICANT CHANGE UP (ref 3.5–5.3)
POTASSIUM SERPL-SCNC: 4 MMOL/L — SIGNIFICANT CHANGE UP (ref 3.5–5.3)
PROT SERPL-MCNC: 5.4 G/DL — LOW (ref 6–8.3)
PROTHROM AB SERPL-ACNC: 18.6 SEC — HIGH (ref 10–12.9)
RBC # BLD: 2.48 M/UL — LOW (ref 3.8–5.2)
RBC # BLD: 2.81 M/UL — LOW (ref 3.8–5.2)
RBC # FLD: 13.4 % — SIGNIFICANT CHANGE UP (ref 10.3–14.5)
RBC # FLD: 13.5 % — SIGNIFICANT CHANGE UP (ref 10.3–14.5)
SODIUM SERPL-SCNC: 140 MMOL/L — SIGNIFICANT CHANGE UP (ref 135–145)
SODIUM SERPL-SCNC: 142 MMOL/L — SIGNIFICANT CHANGE UP (ref 135–145)
SPECIMEN SOURCE: SIGNIFICANT CHANGE UP
WBC # BLD: 1.15 K/UL — LOW (ref 3.8–10.5)
WBC # BLD: 1.29 K/UL — LOW (ref 3.8–10.5)
WBC # FLD AUTO: 1.15 K/UL — LOW (ref 3.8–10.5)
WBC # FLD AUTO: 1.29 K/UL — LOW (ref 3.8–10.5)

## 2019-10-24 PROCEDURE — 99232 SBSQ HOSP IP/OBS MODERATE 35: CPT

## 2019-10-24 PROCEDURE — 99233 SBSQ HOSP IP/OBS HIGH 50: CPT | Mod: GC

## 2019-10-24 PROCEDURE — 71045 X-RAY EXAM CHEST 1 VIEW: CPT | Mod: 26

## 2019-10-24 RX ORDER — DEXTROSE 50 % IN WATER 50 %
15 SYRINGE (ML) INTRAVENOUS ONCE
Refills: 0 | Status: DISCONTINUED | OUTPATIENT
Start: 2019-10-24 | End: 2019-11-01

## 2019-10-24 RX ORDER — INSULIN LISPRO 100/ML
VIAL (ML) SUBCUTANEOUS
Refills: 0 | Status: DISCONTINUED | OUTPATIENT
Start: 2019-10-24 | End: 2019-11-01

## 2019-10-24 RX ORDER — INSULIN LISPRO 100/ML
VIAL (ML) SUBCUTANEOUS AT BEDTIME
Refills: 0 | Status: DISCONTINUED | OUTPATIENT
Start: 2019-10-24 | End: 2019-11-01

## 2019-10-24 RX ORDER — GLUCAGON INJECTION, SOLUTION 0.5 MG/.1ML
1 INJECTION, SOLUTION SUBCUTANEOUS ONCE
Refills: 0 | Status: DISCONTINUED | OUTPATIENT
Start: 2019-10-24 | End: 2019-11-01

## 2019-10-24 RX ORDER — SODIUM CHLORIDE 9 MG/ML
1000 INJECTION, SOLUTION INTRAVENOUS
Refills: 0 | Status: DISCONTINUED | OUTPATIENT
Start: 2019-10-24 | End: 2019-11-01

## 2019-10-24 RX ORDER — DEXTROSE 10 % IN WATER 10 %
125 INTRAVENOUS SOLUTION INTRAVENOUS ONCE
Refills: 0 | Status: DISCONTINUED | OUTPATIENT
Start: 2019-10-24 | End: 2019-11-01

## 2019-10-24 RX ORDER — DEXTROSE 10 % IN WATER 10 %
250 INTRAVENOUS SOLUTION INTRAVENOUS ONCE
Refills: 0 | Status: DISCONTINUED | OUTPATIENT
Start: 2019-10-24 | End: 2019-11-01

## 2019-10-24 RX ORDER — LEVOTHYROXINE SODIUM 125 MCG
75 TABLET ORAL DAILY
Refills: 0 | Status: DISCONTINUED | OUTPATIENT
Start: 2019-10-24 | End: 2019-11-01

## 2019-10-24 RX ADMIN — Medication 75 MICROGRAM(S): at 17:01

## 2019-10-24 RX ADMIN — Medication 1 TABLET(S): at 22:47

## 2019-10-24 RX ADMIN — Medication 1 TABLET(S): at 06:01

## 2019-10-24 RX ADMIN — Medication 4: at 12:04

## 2019-10-24 RX ADMIN — Medication 3 MILLILITER(S): at 12:36

## 2019-10-24 RX ADMIN — Medication 40 MILLIGRAM(S): at 17:10

## 2019-10-24 RX ADMIN — Medication 175 MILLIGRAM(S): at 12:56

## 2019-10-24 RX ADMIN — Medication: at 07:30

## 2019-10-24 RX ADMIN — Medication 1 TABLET(S): at 12:05

## 2019-10-24 RX ADMIN — ALBUTEROL 2.5 MILLIGRAM(S): 90 AEROSOL, METERED ORAL at 21:43

## 2019-10-24 RX ADMIN — Medication 175 MILLIGRAM(S): at 17:11

## 2019-10-24 RX ADMIN — Medication 3: at 17:00

## 2019-10-24 RX ADMIN — Medication 175 MILLIGRAM(S): at 06:05

## 2019-10-24 RX ADMIN — AMIODARONE HYDROCHLORIDE 400 MILLIGRAM(S): 400 TABLET ORAL at 06:01

## 2019-10-24 RX ADMIN — Medication 3 MILLILITER(S): at 01:52

## 2019-10-24 RX ADMIN — Medication 1 TABLET(S): at 14:10

## 2019-10-24 RX ADMIN — APIXABAN 5 MILLIGRAM(S): 2.5 TABLET, FILM COATED ORAL at 06:05

## 2019-10-24 RX ADMIN — Medication 3 MILLILITER(S): at 07:49

## 2019-10-24 RX ADMIN — SODIUM CHLORIDE 3 MILLILITER(S): 9 INJECTION INTRAMUSCULAR; INTRAVENOUS; SUBCUTANEOUS at 07:49

## 2019-10-24 RX ADMIN — Medication 30 MILLIGRAM(S): at 06:01

## 2019-10-24 RX ADMIN — APIXABAN 5 MILLIGRAM(S): 2.5 TABLET, FILM COATED ORAL at 17:11

## 2019-10-24 RX ADMIN — SODIUM CHLORIDE 75 MILLILITER(S): 9 INJECTION, SOLUTION INTRAVENOUS at 12:54

## 2019-10-24 RX ADMIN — FLUCONAZOLE 200 MILLIGRAM(S): 150 TABLET ORAL at 12:05

## 2019-10-24 RX ADMIN — AMIODARONE HYDROCHLORIDE 400 MILLIGRAM(S): 400 TABLET ORAL at 17:10

## 2019-10-24 NOTE — PROGRESS NOTE ADULT - SUBJECTIVE AND OBJECTIVE BOX
24 hour events:     Review of Systems:  Constitutional: No fever, chills, fatigue  Neuro: No headache, numbness, weakness  Resp: No cough, wheezing, shortness of breath  CVS: No chest pain, palpitations, leg swelling  GI: No abdominal pain, nausea, vomiting, diarrhea   : No dysuria, frequency, incontinence  Skin: No itching, burning, rashes, or lesions   Msk: No joint pain or swelling  Psych: No depression, anxiety, mood swings    T(F): 98.1 (10-24-19 @ 15:59), Max: 98.6 (10-23-19 @ 23:53)  HR: 97 (10-24-19 @ 16:00) (79 - 106)  BP: 110/66 (10-24-19 @ 16:00) (91/52 - 125/63)  RR: 34 (10-24-19 @ 16:00) (18 - 38)  SpO2: 94% (10-24-19 @ 16:00) (92% - 100%)  Wt(kg): --      10-23-19 @ 07:01  -  10-24-19 @ 07:00  --------------------------------------------------------  IN: 2870 mL / OUT: 1300 mL / NET: 1570 mL    10-24-19 @ 07:01  -  10-24-19 @ 16:47  --------------------------------------------------------  IN: 700 mL / OUT: 0 mL / NET: 700 mL        CAPILLARY BLOOD GLUCOSE      POCT Blood Glucose.: 339 mg/dL (24 Oct 2019 12:02)      I&O's Summary    23 Oct 2019 07:01  -  24 Oct 2019 07:00  --------------------------------------------------------  IN: 2870 mL / OUT: 1300 mL / NET: 1570 mL    24 Oct 2019 07:01  -  24 Oct 2019 16:47  --------------------------------------------------------  IN: 700 mL / OUT: 0 mL / NET: 700 mL        Physical Exam:   Gen:  Neuro:  HEENT:  Resp:  CVS:  Abd:  Ext:  Skin:    Meds:  fluconAZOLE   Tablet Oral  trimethoprim / sulfamethoxazole IVPB IV Intermittent    amiodarone    Tablet Oral    dextrose 40% Gel Oral PRN  dextrose 50% Injectable IV Push  dextrose 50% Injectable IV Push  dextrose 50% Injectable IV Push  glucagon  Injectable IntraMuscular PRN  insulin lispro (HumaLOG) corrective regimen sliding scale SubCutaneous  levothyroxine Oral  predniSONE   Tablet Oral    ALBUTerol    0.083% Nebulizer PRN    acetaminophen   Tablet .. Oral PRN      apixaban Oral        dextrose 5%. IV Continuous  multivitamin Oral        lactobacillus acidophilus Oral                            8.3    1.29  )-----------( 187      ( 24 Oct 2019 08:18 )             25.0     Bands 5.0    10-24    140  |  112<H>  |  30<H>  ----------------------------<  282<H>  4.0   |  20<L>  |  1.20    Ca    8.2<L>      24 Oct 2019 08:18  Phos  2.0     10-24  Mg     2.0     10-24    TPro  5.4<L>  /  Alb  1.5<L>  /  TBili  <0.1<L>  /  DBili  x   /  AST  42<H>  /  ALT  22  /  AlkPhos  58  10-24          PT/INR - ( 24 Oct 2019 08:18 )   PT: 18.6 sec;   INR: 1.61 ratio         PTT - ( 24 Oct 2019 08:18 )  PTT:28.5 sec    Bronch Wash Bronchoalveolar Washings   Testing in progress   Few polymorphonuclear leukocytes seen per low power field  Few Squamous epithelial cells seen per low power field  Rare Yeast like cells seen per oil power field 10-23 @ 20:55  .Sputum Sputum   Normal Respiratory Madelyn present   Few polymorphonuclear leukocytes per low power field  Moderate Squamous epithelial cells per low power field  Few Yeast like cells per oil power field  Few Gram Positive Cocci in Clusters per oil power field 10-23 @ 00:29  .Sputum Sputum   Culture is being performed. -- 10-21 @ 20:34  .Sputum Sputum.CONICAL   Culture is being performed. -- 10-21 @ 08:32  .Blood Blood-Peripheral   No growth to date. -- 10-19 @ 23:13              Radiology: ***  Bedside ultrasound: ***    CENTRAL LINE: N/Y          DATE INSERTED:              REMOVE: Y/N  FAJARDO: N/Y                       DATE INSERTED:              REMOVE: Y/N  A-LINE: N/Y                       DATE INSERTED:              REMOVE: Y/N    GLOBAL ISSUE/BEST PRACTICE:  Analgesia:  Sedation:  CAM-ICU:   HOB elevation: yes  Stress ulcer prophylaxis:  VTE prophylaxis:  Glycemic control:  Nutrition:    CODE STATUS: *** 24 hour events: Patient seen and examined at bedside. Patient lying in comfortably on nasal cannula, listening to music on her phone. She states she slept well last night. No complaints today.     Review of Systems:  Constitutional: No fever, chills, fatigue  Neuro: No headache, numbness, weakness  Resp: No cough, wheezing, shortness of breath  CVS: No chest pain, palpitations, leg swelling  GI: No abdominal pain, nausea, vomiting, diarrhea   : No dysuria  Msk: No joint pain or swelling    T(F): 98.1 (10-24-19 @ 15:59), Max: 98.6 (10-23-19 @ 23:53)  HR: 97 (10-24-19 @ 16:00) (79 - 106)  BP: 110/66 (10-24-19 @ 16:00) (91/52 - 125/63)  RR: 34 (10-24-19 @ 16:00) (18 - 38)  SpO2: 94% (10-24-19 @ 16:00) (92% - 100%)  Wt(kg): --      10-23-19 @ 07:01  -  10-24-19 @ 07:00  --------------------------------------------------------  IN: 2870 mL / OUT: 1300 mL / NET: 1570 mL    10-24-19 @ 07:01  -  10-24-19 @ 16:47  --------------------------------------------------------  IN: 700 mL / OUT: 0 mL / NET: 700 mL        CAPILLARY BLOOD GLUCOSE      POCT Blood Glucose.: 339 mg/dL (24 Oct 2019 12:02)      I&O's Summary    23 Oct 2019 07:01  -  24 Oct 2019 07:00  --------------------------------------------------------  IN: 2870 mL / OUT: 1300 mL / NET: 1570 mL    24 Oct 2019 07:01  -  24 Oct 2019 16:47  --------------------------------------------------------  IN: 700 mL / OUT: 0 mL / NET: 700 mL        Physical Exam:   General: NAD, comfortable  HEENT: NCAT, PERRL, clear conjunctiva, no scleral icterus; oral thrush  Neck: supple, no JVD  Respiratory: decreased breath sounds b/l  Cardiovascular: RRR, normal S1S2, no M/R/G  Abdomen: soft, NT/ND, bowel sounds in all four quadrants, no palpable masses  Extremities: WWP, no clubbing, cyanosis, or edema  Neuro: A&O x3, no sensory or motor deficits    Meds:  fluconAZOLE   Tablet Oral  trimethoprim / sulfamethoxazole IVPB IV Intermittent    amiodarone    Tablet Oral    dextrose 40% Gel Oral PRN  dextrose 50% Injectable IV Push  dextrose 50% Injectable IV Push  dextrose 50% Injectable IV Push  glucagon  Injectable IntraMuscular PRN  insulin lispro (HumaLOG) corrective regimen sliding scale SubCutaneous  levothyroxine Oral  predniSONE   Tablet Oral    ALBUTerol    0.083% Nebulizer PRN    acetaminophen   Tablet .. Oral PRN      apixaban Oral        dextrose 5%. IV Continuous  multivitamin Oral        lactobacillus acidophilus Oral                            8.3    1.29  )-----------( 187      ( 24 Oct 2019 08:18 )             25.0     Bands 5.0    10-24    140  |  112<H>  |  30<H>  ----------------------------<  282<H>  4.0   |  20<L>  |  1.20    Ca    8.2<L>      24 Oct 2019 08:18  Phos  2.0     10-24  Mg     2.0     10-24    TPro  5.4<L>  /  Alb  1.5<L>  /  TBili  <0.1<L>  /  DBili  x   /  AST  42<H>  /  ALT  22  /  AlkPhos  58  10-24          PT/INR - ( 24 Oct 2019 08:18 )   PT: 18.6 sec;   INR: 1.61 ratio         PTT - ( 24 Oct 2019 08:18 )  PTT:28.5 sec    Bronch Wash Bronchoalveolar Washings   Testing in progress   Few polymorphonuclear leukocytes seen per low power field  Few Squamous epithelial cells seen per low power field  Rare Yeast like cells seen per oil power field 10-23 @ 20:55  .Sputum Sputum   Normal Respiratory Madelyn present   Few polymorphonuclear leukocytes per low power field  Moderate Squamous epithelial cells per low power field  Few Yeast like cells per oil power field  Few Gram Positive Cocci in Clusters per oil power field 10-23 @ 00:29  .Sputum Sputum   Culture is being performed. -- 10-21 @ 20:34  .Sputum Sputum.CONICAL   Culture is being performed. -- 10-21 @ 08:32  .Blood Blood-Peripheral   No growth to date. -- 10-19 @ 23:13      Radiology:   EXAM:  XR CHEST PORTABLE ROUTINE 1V                            PROCEDURE DATE:  10/24/2019          INTERPRETATION:  CLINICAL INDICATION: 69 years  Female with Pneumonia.    COMPARISON: 10/21/2019    AP view of the chest demonstrates improvement inbilateral diffuse   airspace infiltrates. There is no pleural effusion. There is no   pneumothorax.    The heart is enlarged. There is no mediastinal or hilar mass.     The pulmonary vasculature is normal.     Mild thoracic degenerative changes are present.    IMPRESSION:    Diffuse bilateral airspace infiltrates improved but not resolved.    Bedside ultrasound: N/A    CENTRAL LINE: N/Y          DATE INSERTED:              REMOVE: Y/N  FAJARDO: N/Y                       DATE INSERTED:              REMOVE: Y/N  A-LINE: N/Y                       DATE INSERTED:              REMOVE: Y/N    GLOBAL ISSUE/BEST PRACTICE:  Analgesia: No  Sedation: No  HOB elevation: yes  Stress ulcer prophylaxis: No  VTE prophylaxis: Eliquis 5 mg PO BID  Glycemic control: Insulin sliding scale  Nutrition: Dysphagia 2 Mechanical Soft-Nectar Consistency Fluid    CODE STATUS: *** 24 hour events: Patient seen and examined at bedside. Patient lying in comfortably on nasal cannula, listening to music on her phone. She states she slept well last night. No complaints today.     Review of Systems:  Constitutional: No fever, chills, fatigue  Neuro: No headache, numbness, weakness  Resp: No cough, wheezing, shortness of breath  CVS: No chest pain, palpitations, leg swelling  GI: No abdominal pain, nausea, vomiting, diarrhea   : No dysuria  Msk: No joint pain or swelling    T(F): 98.1 (10-24-19 @ 15:59), Max: 98.6 (10-23-19 @ 23:53)  HR: 97 (10-24-19 @ 16:00) (79 - 106)  BP: 110/66 (10-24-19 @ 16:00) (91/52 - 125/63)  RR: 34 (10-24-19 @ 16:00) (18 - 38)  SpO2: 94% (10-24-19 @ 16:00) (92% - 100%)  Wt(kg): --      10-23-19 @ 07:01  -  10-24-19 @ 07:00  --------------------------------------------------------  IN: 2870 mL / OUT: 1300 mL / NET: 1570 mL    10-24-19 @ 07:01  -  10-24-19 @ 16:47  --------------------------------------------------------  IN: 700 mL / OUT: 0 mL / NET: 700 mL        CAPILLARY BLOOD GLUCOSE      POCT Blood Glucose.: 339 mg/dL (24 Oct 2019 12:02)      I&O's Summary    23 Oct 2019 07:01  -  24 Oct 2019 07:00  --------------------------------------------------------  IN: 2870 mL / OUT: 1300 mL / NET: 1570 mL    24 Oct 2019 07:01  -  24 Oct 2019 16:47  --------------------------------------------------------  IN: 700 mL / OUT: 0 mL / NET: 700 mL        Physical Exam:   General: NAD, comfortable  HEENT: NCAT, PERRL, clear conjunctiva, no scleral icterus; oral thrush  Neck: supple, no JVD  Respiratory: decreased breath sounds b/l  Cardiovascular: RRR, normal S1S2, no M/R/G  Abdomen: soft, NT/ND, bowel sounds in all four quadrants, no palpable masses  Extremities: WWP, no clubbing, cyanosis, or edema  Neuro: A&O x3, no sensory or motor deficits    Meds:  fluconAZOLE   Tablet Oral  trimethoprim / sulfamethoxazole IVPB IV Intermittent    amiodarone    Tablet Oral    dextrose 40% Gel Oral PRN  dextrose 50% Injectable IV Push  dextrose 50% Injectable IV Push  dextrose 50% Injectable IV Push  glucagon  Injectable IntraMuscular PRN  insulin lispro (HumaLOG) corrective regimen sliding scale SubCutaneous  levothyroxine Oral  predniSONE   Tablet Oral    ALBUTerol    0.083% Nebulizer PRN    acetaminophen   Tablet .. Oral PRN      apixaban Oral        dextrose 5%. IV Continuous  multivitamin Oral        lactobacillus acidophilus Oral                            8.3    1.29  )-----------( 187      ( 24 Oct 2019 08:18 )             25.0     Bands 5.0    10-24    140  |  112<H>  |  30<H>  ----------------------------<  282<H>  4.0   |  20<L>  |  1.20    Ca    8.2<L>      24 Oct 2019 08:18  Phos  2.0     10-24  Mg     2.0     10-24    TPro  5.4<L>  /  Alb  1.5<L>  /  TBili  <0.1<L>  /  DBili  x   /  AST  42<H>  /  ALT  22  /  AlkPhos  58  10-24          PT/INR - ( 24 Oct 2019 08:18 )   PT: 18.6 sec;   INR: 1.61 ratio         PTT - ( 24 Oct 2019 08:18 )  PTT:28.5 sec    Bronch Wash Bronchoalveolar Washings   Testing in progress   Few polymorphonuclear leukocytes seen per low power field  Few Squamous epithelial cells seen per low power field  Rare Yeast like cells seen per oil power field 10-23 @ 20:55    .Sputum Sputum   Normal Respiratory Madelyn present   Few polymorphonuclear leukocytes per low power field  Moderate Squamous epithelial cells per low power field  Few Yeast like cells per oil power field  Few Gram Positive Cocci in Clusters per oil power field 10-23 @ 00:29    .Sputum Sputum   Culture is being performed. -- 10-21 @ 20:34    .Sputum Sputum.CONICAL   Culture is being performed. -- 10-21 @ 08:32    .Blood Blood-Peripheral   No growth to date. -- 10-19 @ 23:13    Cytopathology - Non Gyn Report:   ACCESSION No:  [id number]    NEGATIVE FOR MALIGNANT CELLS.  Specimen consists of  benign bronchial epithelial cells,  macrophages, lymphocytes, leukocytes and foamy material.  AFB stain for mycobacteria is negative.  GMS stain is positive for pneumocystis.          Radiology:   EXAM:  XR CHEST PORTABLE ROUTINE 1V                            PROCEDURE DATE:  10/24/2019          INTERPRETATION:  CLINICAL INDICATION: 69 years  Female with Pneumonia.    COMPARISON: 10/21/2019    AP view of the chest demonstrates improvement inbilateral diffuse   airspace infiltrates. There is no pleural effusion. There is no   pneumothorax.    The heart is enlarged. There is no mediastinal or hilar mass.     The pulmonary vasculature is normal.     Mild thoracic degenerative changes are present.    IMPRESSION:    Diffuse bilateral airspace infiltrates improved but not resolved.    Bedside ultrasound: N/A    CENTRAL LINE: N/Y          DATE INSERTED:              REMOVE: Y/N  FAJARDO: N/Y                       DATE INSERTED:              REMOVE: Y/N  A-LINE: N/Y                       DATE INSERTED:              REMOVE: Y/N    GLOBAL ISSUE/BEST PRACTICE:  Analgesia: No  Sedation: No  HOB elevation: yes  Stress ulcer prophylaxis: No  VTE prophylaxis: Eliquis 5 mg PO BID  Glycemic control: Insulin sliding scale  Nutrition: Dysphagia 2 Mechanical Soft-Nectar Consistency Fluid    CODE STATUS: ***

## 2019-10-24 NOTE — CHART NOTE - NSCHARTNOTEFT_GEN_A_CORE
Assessment: Patient seen for 3 day follow up in ICU. Chart reviewed, hospital course noted. 69 year old Dilan speaking female with PMH of hypothyroidism admitted with sepsis secondary to bilateral pneumonia and dysphagia. TB ruled out, found to be HIV positive, PCP pneumonia. S/P bronchoscopy yesterday. Has been NPO since 10/22. Noted with persistent hyperglycemia, POCT -439 (x past 3 days) likely steroid induced. HgbA1c 6.8%. Last BM 10/22.     Factors impacting intake: [ ] none [ ] nausea  [ ] vomiting [ ] diarrhea [ ] constipation  [ ]chewing problems [X] swallowing issues  [X] other: NPO status, persistent lack of appetite per chart review    Diet, NPO:   Except Medications  With Ice Chips/Sips of Water (10-22-19 @ 09:04)    Intake: N/A, patient has been NPO since 10/22. Admitted with poor appetite/po intake.    Current Weight: 119 pounds (10/19)    Pertinent Medications: MEDICATIONS  (STANDING):  ALBUTerol/ipratropium for Nebulization 3 milliLiter(s) Nebulizer every 6 hours  amiodarone    Tablet 400 milliGRAM(s) Oral two times a day  apixaban 5 milliGRAM(s) Oral two times a day  dextrose 5%. 1000 milliLiter(s) (50 mL/Hr) IV Continuous <Continuous>  dextrose 50% Injectable 12.5 Gram(s) IV Push once  dextrose 50% Injectable 25 Gram(s) IV Push once  dextrose 50% Injectable 25 Gram(s) IV Push once  fluconAZOLE   Tablet 200 milliGRAM(s) Oral daily  insulin lispro (HumaLOG) corrective regimen sliding scale   SubCutaneous Before meals and at bedtime  lactated ringers. 1000 milliLiter(s) (75 mL/Hr) IV Continuous <Continuous>  lactobacillus acidophilus 1 Tablet(s) Oral three times a day  levothyroxine Injectable 37.5 MICROGram(s) IV Push at bedtime  methylPREDNISolone sodium succinate Injectable 30 milliGRAM(s) IV Push every 12 hours  multivitamin 1 Tablet(s) Oral daily  sodium chloride 3%  Inhalation 3 milliLiter(s) Inhalation every 8 hours  trimethoprim / sulfamethoxazole IVPB 200 milliGRAM(s) IV Intermittent every 6 hours    MEDICATIONS  (PRN):  acetaminophen   Tablet .. 650 milliGRAM(s) Oral every 6 hours PRN Temp greater or equal to 38C (100.4F), Mild Pain (1 - 3)  ALBUTerol    0.083% 2.5 milliGRAM(s) Nebulizer every 6 hours PRN Shortness of Breath and/or Wheezing  dextrose 40% Gel 15 Gram(s) Oral once PRN Blood Glucose LESS THAN 70 milliGRAM(s)/deciliter  glucagon  Injectable 1 milliGRAM(s) IntraMuscular once PRN Glucose LESS THAN 70 milligrams/deciliter    Pertinent Labs: 10-24 Na140 mmol/L Glu 282 mg/dL<H> K+ 4.0 mmol/L Cr  1.20 mg/dL BUN 30 mg/dL<H> 10-24 Phos 2.0 mg/dL<L> 10-24 Alb 1.5 g/dL<L> 10-23 [id number] 6.8 %<H>     CAPILLARY BLOOD GLUCOSE      POCT Blood Glucose.: 269 mg/dL (24 Oct 2019 08:22)  POCT Blood Glucose.: 196 mg/dL (23 Oct 2019 22:33)  POCT Blood Glucose.: 170 mg/dL (23 Oct 2019 18:26)  POCT Blood Glucose.: 178 mg/dL (23 Oct 2019 11:55)    Skin: +1 generalized edema    Estimated Needs:   [X] no change since previous assessment  [ ] recalculated:     Previous Nutrition Diagnosis:   [ ] Inadequate Energy Intake [ ]Inadequate Oral Intake [ ] Excessive Energy Intake   [ ] Underweight [ ] Increased Nutrient Needs [ ] Overweight/Obesity   [ ] Altered GI Function [ ] Unintended Weight Loss [ ] Food & Nutrition Related Knowledge Deficit [X] Malnutrition (severe, acute)    Nutrition Diagnosis is [X] ongoing  [ ] resolved [ ] not applicable     New Nutrition Diagnosis: [X] not applicable       Interventions:   Recommend  [X] Change Diet To: Consistent Carbohydrate (with snack), Dysphagia 2 mechanical soft with nectar thickened liquids.  [X] Nutrition Supplement: Add Glucerna (nectar thick, strawberry flavor) BID for additional kcal/protein in setting of poor appetite. ICU NP aware of recommendation and pending verification placed.  [ ] Nutrition Support  [X] Other: Encourage po intake at all meals. Maintain aspiration precautions. Consider SLP re-evaluation soon to see if patient can tolerate upgraded diet texture and fluid consistency.    Monitoring and Evaluation:   [X] PO intake [ x ] Tolerance to diet prescription [ x ] weights [ x ] labs[ x ] follow up per protocol  [ ] other: Assessment: Patient seen for 3 day follow up in ICU. Chart reviewed, hospital course noted. 69 year old Dilan speaking female with PMH of hypothyroidism admitted with sepsis secondary to bilateral pneumonia and dysphagia. TB ruled out, found to be HIV positive, PCP pneumonia. S/P bronchoscopy yesterday. Has been NPO since 10/22. Noted with persistent hyperglycemia, POCT -439 (x past 3 days) likely steroid induced. HgbA1c 6.8%. Last BM 10/22. Up for transfer to general medicine floor today per NP.     Factors impacting intake: [ ] none [ ] nausea  [ ] vomiting [ ] diarrhea [ ] constipation  [ ]chewing problems [X] swallowing issues  [X] other: NPO status, persistent lack of appetite per chart review    Diet, NPO:   Except Medications  With Ice Chips/Sips of Water (10-22-19 @ 09:04)    Intake: N/A, patient has been NPO since 10/22. Admitted with poor appetite/po intake.    Current Weight: 119 pounds (10/19)    Pertinent Medications: MEDICATIONS  (STANDING):  ALBUTerol/ipratropium for Nebulization 3 milliLiter(s) Nebulizer every 6 hours  amiodarone    Tablet 400 milliGRAM(s) Oral two times a day  apixaban 5 milliGRAM(s) Oral two times a day  dextrose 5%. 1000 milliLiter(s) (50 mL/Hr) IV Continuous <Continuous>  dextrose 50% Injectable 12.5 Gram(s) IV Push once  dextrose 50% Injectable 25 Gram(s) IV Push once  dextrose 50% Injectable 25 Gram(s) IV Push once  fluconAZOLE   Tablet 200 milliGRAM(s) Oral daily  insulin lispro (HumaLOG) corrective regimen sliding scale   SubCutaneous Before meals and at bedtime  lactated ringers. 1000 milliLiter(s) (75 mL/Hr) IV Continuous <Continuous>  lactobacillus acidophilus 1 Tablet(s) Oral three times a day  levothyroxine Injectable 37.5 MICROGram(s) IV Push at bedtime  methylPREDNISolone sodium succinate Injectable 30 milliGRAM(s) IV Push every 12 hours  multivitamin 1 Tablet(s) Oral daily  sodium chloride 3%  Inhalation 3 milliLiter(s) Inhalation every 8 hours  trimethoprim / sulfamethoxazole IVPB 200 milliGRAM(s) IV Intermittent every 6 hours    MEDICATIONS  (PRN):  acetaminophen   Tablet .. 650 milliGRAM(s) Oral every 6 hours PRN Temp greater or equal to 38C (100.4F), Mild Pain (1 - 3)  ALBUTerol    0.083% 2.5 milliGRAM(s) Nebulizer every 6 hours PRN Shortness of Breath and/or Wheezing  dextrose 40% Gel 15 Gram(s) Oral once PRN Blood Glucose LESS THAN 70 milliGRAM(s)/deciliter  glucagon  Injectable 1 milliGRAM(s) IntraMuscular once PRN Glucose LESS THAN 70 milligrams/deciliter    Pertinent Labs: 10-24 Na140 mmol/L Glu 282 mg/dL<H> K+ 4.0 mmol/L Cr  1.20 mg/dL BUN 30 mg/dL<H> 10-24 Phos 2.0 mg/dL<L> 10-24 Alb 1.5 g/dL<L> 10-23 [id number] 6.8 %<H>     CAPILLARY BLOOD GLUCOSE      POCT Blood Glucose.: 269 mg/dL (24 Oct 2019 08:22)  POCT Blood Glucose.: 196 mg/dL (23 Oct 2019 22:33)  POCT Blood Glucose.: 170 mg/dL (23 Oct 2019 18:26)  POCT Blood Glucose.: 178 mg/dL (23 Oct 2019 11:55)    Skin: +1 generalized edema    Estimated Needs:   [X] no change since previous assessment  [ ] recalculated:     Previous Nutrition Diagnosis:   [ ] Inadequate Energy Intake [ ]Inadequate Oral Intake [ ] Excessive Energy Intake   [ ] Underweight [ ] Increased Nutrient Needs [ ] Overweight/Obesity   [ ] Altered GI Function [ ] Unintended Weight Loss [ ] Food & Nutrition Related Knowledge Deficit [X] Malnutrition (severe, acute)    Nutrition Diagnosis is [X] ongoing  [ ] resolved [ ] not applicable     New Nutrition Diagnosis: [X] not applicable       Interventions:   Recommend  [X] Change Diet To: Consistent Carbohydrate (with snack), Dysphagia 2 mechanical soft with nectar thickened liquids.  [X] Nutrition Supplement: Add Glucerna (nectar thick, strawberry flavor) BID for additional kcal/protein in setting of poor appetite. ICU NP aware of recommendation and pending verification placed.  [ ] Nutrition Support  [X] Other: Encourage po intake at all meals. Maintain aspiration precautions. Consider SLP re-evaluation soon to see if patient can tolerate upgraded diet texture and fluid consistency.    Monitoring and Evaluation:   [X] PO intake [ x ] Tolerance to diet prescription [ x ] weights [ x ] labs[ x ] follow up per protocol  [ ] other: Assessment: Patient seen for 3 day follow up in ICU. Chart reviewed, hospital course noted. 69 year old Dilan speaking female with PMH of hypothyroidism admitted with sepsis secondary to bilateral pneumonia and dysphagia. TB ruled out, found to be HIV positive, PCP pneumonia. S/P bronchoscopy yesterday. Has been NPO since 10/22. Noted with persistent hyperglycemia, POCT -439 (x past 3 days) likely steroid induced. HgbA1c 6.8%. Last BM 10/22. Friend at bedside provided Dilan translation. Up for transfer to general medicine floor today per NP.     Factors impacting intake: [ ] none [ ] nausea  [ ] vomiting [ ] diarrhea [ ] constipation  [ ]chewing problems [X] swallowing issues  [X] other: NPO status, persistent lack of appetite per chart review    Diet, NPO:   Except Medications  With Ice Chips/Sips of Water (10-22-19 @ 09:04)    Intake: N/A, patient has been NPO since 10/22. Admitted with poor appetite/po intake.    Current Weight: 119 pounds (10/19)    Pertinent Medications: MEDICATIONS  (STANDING):  ALBUTerol/ipratropium for Nebulization 3 milliLiter(s) Nebulizer every 6 hours  amiodarone    Tablet 400 milliGRAM(s) Oral two times a day  apixaban 5 milliGRAM(s) Oral two times a day  dextrose 5%. 1000 milliLiter(s) (50 mL/Hr) IV Continuous <Continuous>  dextrose 50% Injectable 12.5 Gram(s) IV Push once  dextrose 50% Injectable 25 Gram(s) IV Push once  dextrose 50% Injectable 25 Gram(s) IV Push once  fluconAZOLE   Tablet 200 milliGRAM(s) Oral daily  insulin lispro (HumaLOG) corrective regimen sliding scale   SubCutaneous Before meals and at bedtime  lactated ringers. 1000 milliLiter(s) (75 mL/Hr) IV Continuous <Continuous>  lactobacillus acidophilus 1 Tablet(s) Oral three times a day  levothyroxine Injectable 37.5 MICROGram(s) IV Push at bedtime  methylPREDNISolone sodium succinate Injectable 30 milliGRAM(s) IV Push every 12 hours  multivitamin 1 Tablet(s) Oral daily  sodium chloride 3%  Inhalation 3 milliLiter(s) Inhalation every 8 hours  trimethoprim / sulfamethoxazole IVPB 200 milliGRAM(s) IV Intermittent every 6 hours    MEDICATIONS  (PRN):  acetaminophen   Tablet .. 650 milliGRAM(s) Oral every 6 hours PRN Temp greater or equal to 38C (100.4F), Mild Pain (1 - 3)  ALBUTerol    0.083% 2.5 milliGRAM(s) Nebulizer every 6 hours PRN Shortness of Breath and/or Wheezing  dextrose 40% Gel 15 Gram(s) Oral once PRN Blood Glucose LESS THAN 70 milliGRAM(s)/deciliter  glucagon  Injectable 1 milliGRAM(s) IntraMuscular once PRN Glucose LESS THAN 70 milligrams/deciliter    Pertinent Labs: 10-24 Na140 mmol/L Glu 282 mg/dL<H> K+ 4.0 mmol/L Cr  1.20 mg/dL BUN 30 mg/dL<H> 10-24 Phos 2.0 mg/dL<L> 10-24 Alb 1.5 g/dL<L> 10-23 [id number] 6.8 %<H>     CAPILLARY BLOOD GLUCOSE      POCT Blood Glucose.: 269 mg/dL (24 Oct 2019 08:22)  POCT Blood Glucose.: 196 mg/dL (23 Oct 2019 22:33)  POCT Blood Glucose.: 170 mg/dL (23 Oct 2019 18:26)  POCT Blood Glucose.: 178 mg/dL (23 Oct 2019 11:55)    Skin: +1 generalized edema    Estimated Needs:   [X] no change since previous assessment  [ ] recalculated:     Previous Nutrition Diagnosis:   [ ] Inadequate Energy Intake [ ]Inadequate Oral Intake [ ] Excessive Energy Intake   [ ] Underweight [ ] Increased Nutrient Needs [ ] Overweight/Obesity   [ ] Altered GI Function [ ] Unintended Weight Loss [ ] Food & Nutrition Related Knowledge Deficit [X] Malnutrition (severe, acute)    Nutrition Diagnosis is [X] ongoing  [ ] resolved [ ] not applicable     New Nutrition Diagnosis: [X] not applicable       Interventions:   Recommend  [X] Change Diet To: Consistent Carbohydrate (with snack), Dysphagia 2 mechanical soft with nectar thickened liquids.  [X] Nutrition Supplement: Add Glucerna (nectar thick) BID for additional kcal/protein in setting of poor appetite. ICU NP aware of recommendation and pending verification placed.  [ ] Nutrition Support  [X] Other: Encourage po intake at all meals. RD to provide food preferences to assist with po intake. Maintain aspiration precautions. Small frequent meals as tolerated. Consider SLP re-evaluation soon to see if patient can tolerate upgraded diet texture and fluid consistency.    Monitoring and Evaluation:   [X] PO intake [ x ] Tolerance to diet prescription [ x ] weights [ x ] labs[ x ] follow up per protocol  [ ] other:

## 2019-10-24 NOTE — PROGRESS NOTE ADULT - ATTENDING COMMENTS
32 minutes of critical care time spent with the patient and coordinating care with nursing, hospitalist, and consultants. Patient is critically ill requiring ICU care due to respiratory failure, rapid atrial fibrillation, sepsis. The patient is high risk for deterioration and death.
70 yo F with Hx hypothyroid with newly diagnosed AIDS and PCP pneumonia.  Oxygenation continues to improve.      --Afib, continue PO amio, continue eliquis for AC  --hypoxemia improving, on 5L NC  wean as tolerates  --CKD stable, d/c IVF  --dysphagia, continue dysphagia diet  --PCP pneumonia  continue IV bactrim, change solumedrol to prednisone  AIDS, f/u genotype, and workup for crypto, toxo  thrush, possible candidal esophagitis causing her subacute anorexia, nystatin swish and swallow and fluconazole  --hyperglycemia from steroids, increase sliding scale insulin  --stable for floor with remote tele and SpO2
68 yo F with Hx hypothyroid presenting with fever, cough, congestion, anorexia, decompensating with worsening hypoxemia, worsening bilateral GGO and uncontrolled afib.  Possible pneumonia v pulm edema v ARDS.    --mentating well  --Afib, now controlled with amio gtt  continue eliquis for sedation  f/u echo  empiric diuresis  --hypoxemic respiratory failure  tolerating NC for few hours this afternoon but then required BiPAP  will try high flow NC  doubt TB but check sputum AFB x 3 and quant gold  --NICOLASA improved  --diet if respiratory status allows  --continue empiric zosyn for pneumonia  check sputum Cx, legionella Ag, MRSA swab  --plan discussed with pt and family at bedside.    Addendum--This evening found that HIV Ab is reactive, raising the likelihood of PCP pneumonia.  Tonight will start IV bactrim, solumedrol (or prednisone if possible) q12h.  Possible bronch if tolerates but send sputum for cytology.  Check CD4 profile and VL.  Discussed with Dr. Owens.  Pt not yet aware, will discuss via  phone in am.      Pt critically ill. CC time 60min
68 yo F with Hx hypothyroid presenting with fever, cough, congestion, anorexia, decompensating with worsening hypoxemia, worsening bilateral GGO and uncontrolled afib.  Found to be have HIV/AIDS and likely PCP pneumonia.  Oxygenation markedly improved and on NC this am.      --mentating well  --Afib, continue PO amio, continue eliquis for AC  --hypoxemic respiratory failure from PCP pneumonia markedly improved  now on NC, wean as tolerates  continue bronchodilators and saline nebs to help with expectoration  doubt TB but check sputum AFB x 3, quantiferion indeterminant likely from annergy  --CKD stable  --NPO for bronch today, then can take PO  --PCP pneumonia  continue IV bactrim and solumedrol  plan for bronch this afternoon  AIDS, f/u genotype, and workup for crypto, toxo  thrush, possible candidal esophagitis causing her subacute anorexia, nystatin swish and swallow and fluconazole  --hyperglycemia from steroids, start insulin sliding scale, f/u Hb A1c  --plan discussed with pt, angie and nephew-in-law  discussed with ID
70 yo F with Hx hypothyroid presenting with fever, cough, congestion, anorexia, decompensating with worsening hypoxemia, worsening bilateral GGO and uncontrolled afib.  Found to be have HIV/AIDS and likely PCP pneumonia.      --mentating well  --Afib, now controlled with amio gtt, transition to PO  continue eliquis for AC  --hypoxemic respiratory failure from PCP pneumonia  tolerating high flow NC well, wean as tolerates  doubt TB but check sputum AFB x 3, quantiferion indeterminant likely from annergy  --mild NICOLASA, likely from volume depletion, start LR  --diet if respiratory status allows  --PCP pneumonia  continue IV bactrim and solumedrol  d/c zosyn  possible bronch if hypoxemia improves, sent sputum cytology for PCP in the meantime  AIDS, f/u genotype, no HAART at this time while critically ill  thrush, possible candidal esophagitis causing her subacute anorexia, nystatin swish and swallow and fluconazole  --hyperglycemia from steroids, start insulin sliding scale, check Hb A1c  --discussed HIV diagnosis with Rever[employer] video  (see above).  pt requests that we discuss this with her neice and nephew.  --Pt critically ill. CC time 60min
The tx plan was discussed in detail with the patient and family members at the bedside. Their questions and concerns were addressed to the best of my ability. They are in agreement with the plan as detailed above. They demonstrated adequate understanding of the counseling which I have provided.
The tx plan was discussed in detail with the patient and family members at the bedside. Their questions and concerns were addressed to the best of my ability. They are in agreement with the plan as detailed above. They demonstrated adequate understanding of the counseling which I have provided.

## 2019-10-24 NOTE — PROVIDER CONTACT NOTE (OTHER) - SITUATION
pt reports anxiety intermittently. VSS. normal sinus rhythm. family requesting something to calm her down

## 2019-10-24 NOTE — PROGRESS NOTE ADULT - SUBJECTIVE AND OBJECTIVE BOX
Date/Time Patient Seen:  		  Referring MD:   Data Reviewed	       Patient is a 69y old  Female who presents with a chief complaint of pneumonia (23 Oct 2019 15:32)      Subjective/HPI     PAST MEDICAL & SURGICAL HISTORY:  Hypothyroidism, unspecified type  No significant past surgical history: denies surgical history but CT shows cholecystectomy        Medication list         MEDICATIONS  (STANDING):  ALBUTerol/ipratropium for Nebulization 3 milliLiter(s) Nebulizer every 6 hours  amiodarone    Tablet 400 milliGRAM(s) Oral two times a day  apixaban 5 milliGRAM(s) Oral two times a day  dextrose 5%. 1000 milliLiter(s) (50 mL/Hr) IV Continuous <Continuous>  dextrose 50% Injectable 12.5 Gram(s) IV Push once  dextrose 50% Injectable 25 Gram(s) IV Push once  dextrose 50% Injectable 25 Gram(s) IV Push once  fluconAZOLE   Tablet 200 milliGRAM(s) Oral daily  insulin lispro (HumaLOG) corrective regimen sliding scale   SubCutaneous Before meals and at bedtime  lactated ringers. 1000 milliLiter(s) (75 mL/Hr) IV Continuous <Continuous>  lactobacillus acidophilus 1 Tablet(s) Oral three times a day  levothyroxine Injectable 37.5 MICROGram(s) IV Push at bedtime  methylPREDNISolone sodium succinate Injectable 30 milliGRAM(s) IV Push every 12 hours  multivitamin 1 Tablet(s) Oral daily  sodium chloride 3%  Inhalation 3 milliLiter(s) Inhalation every 8 hours  trimethoprim / sulfamethoxazole IVPB 200 milliGRAM(s) IV Intermittent every 6 hours    MEDICATIONS  (PRN):  acetaminophen   Tablet .. 650 milliGRAM(s) Oral every 6 hours PRN Temp greater or equal to 38C (100.4F), Mild Pain (1 - 3)  ALBUTerol    0.083% 2.5 milliGRAM(s) Nebulizer every 6 hours PRN Shortness of Breath and/or Wheezing  dextrose 40% Gel 15 Gram(s) Oral once PRN Blood Glucose LESS THAN 70 milliGRAM(s)/deciliter  glucagon  Injectable 1 milliGRAM(s) IntraMuscular once PRN Glucose LESS THAN 70 milligrams/deciliter         Vitals log        ICU Vital Signs Last 24 Hrs  T(C): 36.8 (24 Oct 2019 03:49), Max: 37 (23 Oct 2019 23:53)  T(F): 98.2 (24 Oct 2019 03:49), Max: 98.6 (23 Oct 2019 23:53)  HR: 92 (24 Oct 2019 05:00) (79 - 104)  BP: 100/59 (24 Oct 2019 05:00) (91/52 - 141/71)  BP(mean): 76 (24 Oct 2019 05:00) (65 - 99)  ABP: --  ABP(mean): --  RR: 22 (24 Oct 2019 05:00) (18 - 35)  SpO2: 97% (24 Oct 2019 05:00) (91% - 100%)           Input and Output:  I&O's Detail    22 Oct 2019 07:01  -  23 Oct 2019 07:00  --------------------------------------------------------  IN:    amiodarone Infusion: 16.7 mL    lactated ringers.: 1650 mL    Oral Fluid: 560 mL    Solution: 100 mL    Solution: 1025 mL  Total IN: 3351.7 mL    OUT:    Voided: 1100 mL  Total OUT: 1100 mL    Total NET: 2251.7 mL      23 Oct 2019 07:01  -  24 Oct 2019 06:33  --------------------------------------------------------  IN:    lactated ringers.: 1575 mL    Oral Fluid: 50 mL    Solution: 770 mL  Total IN: 2395 mL    OUT:    Voided: 900 mL  Total OUT: 900 mL    Total NET: 1495 mL          Lab Data                        7.3    1.15  )-----------( 192      ( 24 Oct 2019 05:33 )             22.2     10-24    142  |  113<H>  |  32<H>  ----------------------------<  232<H>  3.8   |  21<L>  |  1.30    Ca    7.7<L>      24 Oct 2019 05:33  Phos  2.5     10-24  Mg     2.1     10-24              Review of Systems	      Objective     Physical Examination    heart s1s2  lung dec BS  abd soft  on nc o2 support      Pertinent Lab findings & Imaging      Gisela:  NO   Adequate UO     I&O's Detail    22 Oct 2019 07:01  -  23 Oct 2019 07:00  --------------------------------------------------------  IN:    amiodarone Infusion: 16.7 mL    lactated ringers.: 1650 mL    Oral Fluid: 560 mL    Solution: 100 mL    Solution: 1025 mL  Total IN: 3351.7 mL    OUT:    Voided: 1100 mL  Total OUT: 1100 mL    Total NET: 2251.7 mL      23 Oct 2019 07:01  -  24 Oct 2019 06:33  --------------------------------------------------------  IN:    lactated ringers.: 1575 mL    Oral Fluid: 50 mL    Solution: 770 mL  Total IN: 2395 mL    OUT:    Voided: 900 mL  Total OUT: 900 mL    Total NET: 1495 mL               Discussed with:     Cultures:	        Radiology

## 2019-10-24 NOTE — PROGRESS NOTE ADULT - SUBJECTIVE AND OBJECTIVE BOX
ID Progress note    Name: PATO HICKMAN  Age: 69y  Gender: Female  MRN: [id number]    Interval History-- Events noted, doing well, afebrile , BAL positive for PCP . Now on nasal oxygen 5 liters   Notes reviewed    Past Medical History--  Hypothyroidism, unspecified type  No significant past surgical history      For details regarding the patient's social history, family history, and other miscellaneous elements, please refer the initial infectious diseases consultation and/or the admitting history and physical examination for this admission.    Allergies--  Allergies    No Known Allergies    Intolerances        Medications--  Antibiotics:  fluconAZOLE   Tablet 200 milliGRAM(s) Oral daily  trimethoprim / sulfamethoxazole IVPB 200 milliGRAM(s) IV Intermittent every 6 hours    Immunologic:    Other:  acetaminophen   Tablet .. PRN  ALBUTerol    0.083% PRN  amiodarone    Tablet  apixaban  dextrose 40% Gel PRN  dextrose 5%.  dextrose 50% Injectable  dextrose 50% Injectable  dextrose 50% Injectable  glucagon  Injectable PRN  insulin lispro (HumaLOG) corrective regimen sliding scale  lactobacillus acidophilus  levothyroxine  multivitamin  predniSONE   Tablet      Review of Systems--  Review of systems unable to be obtained secondary to clinical condition.    Physical Examination--    Vital Signs: T(F): 98.1 (10-24-19 @ 15:59), Max: 98.6 (10-23-19 @ 23:53)  HR: 94 (10-24-19 @ 17:00)  BP: 115/67 (10-24-19 @ 17:00)  RR: 33 (10-24-19 @ 17:00)  SpO2: 94% (10-24-19 @ 17:00)  Wt(kg): --  General: Nontoxic frail appearing Female in no acute distress.  HEENT: AT/NC. PERRL. EOMI. Anicteric. Conjunctiva pink and moist. Oropharynx clear. Dentition fair.  Neck: Not rigid. No sense of mass.  Nodes: None palpable.  Lungs: Coarse breath sounds  bilaterally without rales, wheezing or rhonchi  Heart: Regular rate and rhythm. No Murmur. No rub. No gallop. No palpable thrill.  Abdomen: Bowel sounds present and normoactive. Soft. Nondistended. Nontender. No sense of mass. No organomegaly.  Back: No spinal tenderness. No costovertebral angle tenderness.   Extremities: No cyanosis or clubbing. No edema.   Skin: Warm. Dry. Good turgor. No rash. No vasculitic stigmata.  Psychiatric: Appropriate affect and mood for situation.         Laboratory Studies--  CBC                        8.3    1.29  )-----------( 187      ( 24 Oct 2019 08:18 )             25.0       Chemistries  10-24    140  |  112<H>  |  30<H>  ----------------------------<  282<H>  4.0   |  20<L>  |  1.20    Ca    8.2<L>      24 Oct 2019 08:18  Phos  2.0     10-24  Mg     2.0     10-24    TPro  5.4<L>  /  Alb  1.5<L>  /  TBili  <0.1<L>  /  DBili  x   /  AST  42<H>  /  ALT  22  /  AlkPhos  58  10-24    CAPILLARY BLOOD GLUCOSE      POCT Blood Glucose.: 268 mg/dL (24 Oct 2019 16:54)  POCT Blood Glucose.: 339 mg/dL (24 Oct 2019 12:02)  POCT Blood Glucose.: 269 mg/dL (24 Oct 2019 08:22)  POCT Blood Glucose.: 196 mg/dL (23 Oct 2019 22:33)  POCT Blood Glucose.: 170 mg/dL (23 Oct 2019 18:26)      HIV-1 RNA Quantitative, Viral Load (10.22.19 @ 08:11)    HIV-1 RNA Quantitative, Viral Load:   6,186,564    Full T Cell Subset (10.22.19 @ 09:21)    CD8 %: 77 %    ABS CG8529: 69 /uL    ABS CD19: 23 /uL    ABS CD3: 509 /uL    ABS CD4: 42 /uL    ABS CD8: 465 /uL    [id number] %: 11 %    CD19 %: 4 %    CD3 %: 84    CD4 %: 7 %    HIV 1/2 AB Confirmation (10.20.19 @ 19:08)    HIV Result: HIV-1 Pos    HIV-1/HIV-2 Interpretation:        Cryptococcal Antigen - Serum (10.23.19 @ 08:21)    Cryptococcal Antigen - Serum: Negative    Toxoplasma IgG Antibody (10.23.19 @ 08:39)    Toxoplasma IgG Screen: 3.3 IU/mL    Toxoplasma IgG Interpretation: Negative: TOXOPLASMA IGG ANTIBODY    Culture Data    Culture - Acid Fast - Bronchial w/Smear (collected 23 Oct 2019 20:55)  Source: BAL Bronchoalveolar Washings    Culture - Fungal, Bronchial (collected 23 Oct 2019 20:55)  Source: .Bronchial Bronchoalveolar Washings  Preliminary Report (24 Oct 2019 06:50):    Testing in progress    Culture - Bronchial (collected 23 Oct 2019 20:55)  Source: Bronch Wash Bronchoalveolar Washings  Gram Stain (24 Oct 2019 02:38):    Few polymorphonuclear leukocytes seen per low power field    Few Squamous epithelial cells seen per low power field    Rare Yeast like cells seen per oil power field    Culture - Sputum (collected 23 Oct 2019 00:29)  Source: .Sputum Sputum  Gram Stain (23 Oct 2019 02:53):    Few polymorphonuclear leukocytes per low power field    Moderate Squamous epithelial cells per low power field    Few Yeast like cells per oil power field    Few Gram Positive Cocci in Clusters per oil power field  Preliminary Report (23 Oct 2019 21:50):    Normal Respiratory Madelyn present    Culture - Acid Fast - Sputum w/Smear (collected 23 Oct 2019 00:29)  Source: .Sputum Sputum    Culture - Acid Fast - Sputum w/Smear (collected 21 Oct 2019 20:34)  Source: .Sputum Sputum  Preliminary Report (23 Oct 2019 15:04):    Culture is being performed.    Culture - Acid Fast - Sputum w/Smear (collected 21 Oct 2019 08:32)  Source: .Sputum Sputum.CONICAL  Preliminary Report (23 Oct 2019 15:04):    Culture is being performed.    Culture - Blood (collected 19 Oct 2019 23:13)  Source: .Blood Blood-Peripheral  Preliminary Report (21 Oct 2019 01:02):    No growth to date.    Culture - Blood (collected 19 Oct 2019 23:13)  Source: .Blood Blood-Peripheral  Preliminary Report (21 Oct 2019 01:02):    No growth to date.      Cytopathology - Non Gyn Report (10.23.19 @ 14:20)    Cytopathology - Non Gyn Report:   ACCESSION No:  [id number]    [facility]                          1  Cytopathology Report    Specimen(s) Submitted  Bronchial washings      Clinical History  69 year old female with newly diagnosed AIDS, hypoxemic  respiratory failure and ground glass opacities.  PCP pneumonia,  rule out MAC/TB  _      Gross Description  20 cc cloudy whitish fluid received in 50% alcohol      Final Diagnosis  NEGATIVE FOR MALIGNANT CELLS.  Specimen consists of  benign bronchial epithelial cells,  macrophages, lymphocytes, leukocytes and foamy material.  AFB stain for mycobacteria is negative.  GMS stain is positive for pneumocystis.    Screened by: Clayton Duckworth  Verified by: Clayton Duckworth  (Electronic Signature)  Reported on: 10/24/19 14:15 EDT, [address], [place] [address]  _________________________________________________________________  Comment  Dede Zimmerman was informed of the results on 10/24/19 at 1:35  PM.            Radiology:  Xray Chest 1 View- PORTABLE-Routine (10.24.19 @ 06:53) >    COMPARISON: 10/21/2019    AP view of the chest demonstrates improvement inbilateral diffuse   airspace infiltrates. There is no pleural effusion. There is no   pneumothorax.    The heart is enlarged. There is no mediastinal or hilar mass.     The pulmonary vasculature is normal.     Mild thoracic degenerative changes are present.    IMPRESSION:    Diffuse bilateral airspace infiltrates improved but not resolved.      Assessment--  70 y/o F with PMHx of hypothyroidism presents with cough and congestion for over 3-4 weeks  in duration associated with intermittent fevers and cough . She has difficulty swallowing and increased cough with liquids. She has progressive in creased sob on minimal exertion . Has weight loss form poor appetite . has not been on any abx PTA. CXR and CTA chest dhows diffuse bilateral infiltrates with hilar and mediastinal adenopathy . As per speech therapy she has posiitve aspiration with thin liquids .    Her HIV test is positive, she denies any HIV risk factors . She has very low CD 4 count and very high HIV Viral load , so she most likely has PCP pneumonia and she was started on IV Bactrim with steroids , she just had  bronchoscopy to confirm it. Clinically he looks better . Sputum afb x 3 negative     She also has severe oral thrush and may have candidal esophagitis which may be the cause of her difficulty swallowing     Plan:  - will continue with IV bactrim and steroids , chhanged to po prednisone as per protocol   - add Diflucan 200 mg daily x 5-7 days     - monitor CBC and  renal function closely   - send HIV genosure to see if has any resistant virus , Hold starting ATV for at least 2-3 weeks   - social service consult to help obtain insurance      case discussed with Dr. Goodman and ICU team     Continue with present regime .  Appropriate use of antibiotics and adverse effects reviewed.      I have discussed the above plan of care with patient/family in detail. They expressed understanding of the treatment plan . Risks, benefits and alternatives discussed in detail. I have asked if they have any questions or concerns and appropriately addressed them to the best of my ability .      > 35 minutes spent in direct patient care reviewing  the notes, lab data/ imaging , discussion with multidisciplinary team. All questions were addressed and answered to the best of my capacity .    Thank you for allowing me to participate in the care of your patient .        Solo Owens MD  [phone]

## 2019-10-24 NOTE — PROGRESS NOTE ADULT - ASSESSMENT
The patient is a 69 year old female with a history of hypothyroidism who is admitted with PNA.    Plan:  - Transition to amiodarone 400 mg bid for 4 more days and likely transition to 200 mg daily after  - Continue apixaban 5 mg bid for thromboprophylaxis  - Echocardiogram with normal LV systolic function, mild pulm HTN  - AFB negative  - HIV positive  - Possible PCP - on bactrim and steroids  - ID follow-up  - Bronchoscopy results pending

## 2019-10-24 NOTE — PROGRESS NOTE ADULT - ASSESSMENT
70 y/o F with PMHx of hypothyroidism presents with cough and congestion for 2 weeks in duration presents with new onset afib with rvr, acute pulmonary edema causing resp failure requiring BIPAP, febrile to 101, now with PCP PNA. Improving today.    Neuro: No active issues   Cardio: afib controlled, amio 400 mg PO BID, continue Eliquis, echo shows normal LV systolic fx, mild pulm HTN  Pulm: respiratory status markedly improved. TB ruled out. PCP pna confirmed. On NC, will accept sats of 92% and higher. Nebs as needed  GI: No active issues. cont multivitamin;  Dysphagia 2 Mechanical Soft-Nectar Consistency Fluid  Gu/renal: NICOLASA improved  ID: HIV ab reactive, viral load elevated and CD4 count low; continue bactrim for PCP pna; fluconazole for candidal esophagitis; prednisone 40 mg PO BID; MRSA not detected; toxoplasma ag neg, treponema pallidum ab neg, legionella ag neg, cryptococcal ag neg  Heme: Eliquis and scds for dvt prophylaxis   Endo: synthroid 75 mg PO qd; low dose ISS, monitor FS qhs and qac; Hemoglobin A1C 6.8  Dispo: Transfer to floor with remote telemetry 68 y/o F with PMHx of hypothyroidism presents with cough and congestion for 2 weeks in duration presents with new onset afib with rvr, acute pulmonary edema causing resp failure requiring BIPAP, febrile to 101, now with PCP PNA. Improving today.    Neuro: No active issues   Cardio: afib controlled, amio 400 mg PO BID, continue Eliquis, echo shows normal LV systolic fx, mild pulm HTN  Pulm: respiratory status markedly improved. TB ruled out. PCP pna confirmed. On NC, will accept sats of 92% and higher. Nebs as needed  GI: No active issues. cont multivitamin;  Dysphagia 2 Mechanical Soft-Nectar Consistency Fluid  Gu/renal: NICOLASA improved  ID: HIV ab reactive, viral load elevated and CD4 count low; continue bactrim for PCP pna; fluconazole for candidal esophagitis; Continue prednisone 40 mg PO BID x5 days (until 10/25), 40 mg PO qd x5 days (10/26-10/30), 20 mg PO qd x11 days (10/31-11/10); MRSA not detected; toxoplasma ag neg, treponema pallidum ab neg, legionella ag neg, cryptococcal ag neg  Heme: Eliquis and scds for dvt prophylaxis   Endo: synthroid 75 mg PO qd; low dose ISS, monitor FS qhs and qac; Hemoglobin A1C 6.8  Dispo: Transfer to floor with remote telemetry

## 2019-10-24 NOTE — PROGRESS NOTE ADULT - SUBJECTIVE AND OBJECTIVE BOX
CHIEF COMPLAINT/INTERVAL HISTORY:  Pt. seen and evaluated for multifocal pneumonia.  Pt. is feeling weak but otherwise in no distress.  Tolerating IV antibiotic and steroids.      REVIEW OF SYSTEMS:  No fever, CP, acute respiratory distress, or abdominal pain.     Vital Signs Last 24 Hrs  T(C): 36.6 (24 Oct 2019 12:16), Max: 37 (23 Oct 2019 23:53)  T(F): 97.8 (24 Oct 2019 12:16), Max: 98.6 (23 Oct 2019 23:53)  HR: 101 (24 Oct 2019 13:00) (79 - 104)  BP: 117/66 (24 Oct 2019 13:00) (91/52 - 141/71)  BP(mean): 78 (24 Oct 2019 12:00) (65 - 98)  RR: 34 (24 Oct 2019 13:00) (18 - 38)  SpO2: 100% (24 Oct 2019 13:00) (91% - 100%)    PHYSICAL EXAM:  GENERAL: NAD  HEENT: EOMI, hearing normal, conjunctiva and sclera clear  Chest: mild inspiratory wheeze at L. base  CV: S1S2, RRR,   GI: soft, +BS, NT/ND  Musculoskeletal: no edema  Psychiatric: affect nL, mood nL  Skin: warm and dry    LABS:                        8.3    1.29  )-----------( 187      ( 24 Oct 2019 08:18 )             25.0     10-24    140  |  112<H>  |  30<H>  ----------------------------<  282<H>  4.0   |  20<L>  |  1.20    Ca    8.2<L>      24 Oct 2019 08:18  Phos  2.0     10-24  Mg     2.0     10-24    TPro  5.4<L>  /  Alb  1.5<L>  /  TBili  <0.1<L>  /  DBili  x   /  AST  42<H>  /  ALT  22  /  AlkPhos  58  10-24    PT/INR - ( 24 Oct 2019 08:18 )   PT: 18.6 sec;   INR: 1.61 ratio         PTT - ( 24 Oct 2019 08:18 )  PTT:28.5 sec      Assessment and Plan:  -Sepsis and acute respiratory failure 2/2 multifocal pneumonia:  Suspected PCP with +HIV status.  s/p bronchoscopy.   Continue Bactrim 200mg IV Q6h, Prednisone 40mg PO BID, and Albuterol neb tx Q6h PRN.  O2 support as needed.  Check cultures and cytology from bronchoscopy.  ID, Pulmonary, and Intensivist f/u  -Dysphagia:  Tolerating dysphagia 2 nectar consistency diet  -Possible candidal esophagitis:  continue Fluconazole 200mg PO daily  -Atrial fibrillation:  continue Amiodarone 400mg PO BID and Eliquis 5mg PO BID.  Cardiology f/u  -NICOLASA: Renal function appears stable.    -Hypothyroidism:  continue Synthroid 75mcg PO daily  -VTE ppx:  On Eliquis

## 2019-10-25 DIAGNOSIS — B59 PNEUMOCYSTOSIS: ICD-10-CM

## 2019-10-25 LAB
ANION GAP SERPL CALC-SCNC: 8 MMOL/L — SIGNIFICANT CHANGE UP (ref 5–17)
APTT BLD: 26.5 SEC — LOW (ref 28.5–37)
BASOPHILS # BLD AUTO: 0.01 K/UL — SIGNIFICANT CHANGE UP (ref 0–0.2)
BASOPHILS NFR BLD AUTO: 0.5 % — SIGNIFICANT CHANGE UP (ref 0–2)
BUN SERPL-MCNC: 29 MG/DL — HIGH (ref 7–23)
CALCIUM SERPL-MCNC: 8.1 MG/DL — LOW (ref 8.5–10.1)
CHLORIDE SERPL-SCNC: 108 MMOL/L — SIGNIFICANT CHANGE UP (ref 96–108)
CO2 SERPL-SCNC: 21 MMOL/L — LOW (ref 22–31)
CREAT SERPL-MCNC: 1.4 MG/DL — HIGH (ref 0.5–1.3)
CULTURE RESULTS: SIGNIFICANT CHANGE UP
EOSINOPHIL # BLD AUTO: 0 K/UL — SIGNIFICANT CHANGE UP (ref 0–0.5)
EOSINOPHIL NFR BLD AUTO: 0 % — SIGNIFICANT CHANGE UP (ref 0–6)
GLUCOSE SERPL-MCNC: 245 MG/DL — HIGH (ref 70–99)
HCT VFR BLD CALC: 26.9 % — LOW (ref 34.5–45)
HGB BLD-MCNC: 8.9 G/DL — LOW (ref 11.5–15.5)
HIV 2 PROVIRAL DNA SERPL QL NAA+PROBE: SIGNIFICANT CHANGE UP
IMM GRANULOCYTES NFR BLD AUTO: 1.4 % — SIGNIFICANT CHANGE UP (ref 0–1.5)
INR BLD: 1.52 RATIO — HIGH (ref 0.88–1.16)
LYMPHOCYTES # BLD AUTO: 0.22 K/UL — LOW (ref 1–3.3)
LYMPHOCYTES # BLD AUTO: 10.5 % — LOW (ref 13–44)
MAGNESIUM SERPL-MCNC: 1.8 MG/DL — SIGNIFICANT CHANGE UP (ref 1.6–2.6)
MCHC RBC-ENTMCNC: 29.5 PG — SIGNIFICANT CHANGE UP (ref 27–34)
MCHC RBC-ENTMCNC: 33.1 GM/DL — SIGNIFICANT CHANGE UP (ref 32–36)
MCV RBC AUTO: 89.1 FL — SIGNIFICANT CHANGE UP (ref 80–100)
MONOCYTES # BLD AUTO: 0.09 K/UL — SIGNIFICANT CHANGE UP (ref 0–0.9)
MONOCYTES NFR BLD AUTO: 4.3 % — SIGNIFICANT CHANGE UP (ref 2–14)
NEUTROPHILS # BLD AUTO: 1.75 K/UL — LOW (ref 1.8–7.4)
NEUTROPHILS NFR BLD AUTO: 83.3 % — HIGH (ref 43–77)
NRBC # BLD: 0 /100 WBCS — SIGNIFICANT CHANGE UP (ref 0–0)
PHOSPHATE SERPL-MCNC: 2.7 MG/DL — SIGNIFICANT CHANGE UP (ref 2.5–4.5)
PLATELET # BLD AUTO: 191 K/UL — SIGNIFICANT CHANGE UP (ref 150–400)
POTASSIUM SERPL-MCNC: 4.5 MMOL/L — SIGNIFICANT CHANGE UP (ref 3.5–5.3)
POTASSIUM SERPL-SCNC: 4.5 MMOL/L — SIGNIFICANT CHANGE UP (ref 3.5–5.3)
PROTHROM AB SERPL-ACNC: 17.4 SEC — HIGH (ref 10–12.9)
RBC # BLD: 3.02 M/UL — LOW (ref 3.8–5.2)
RBC # FLD: 13.6 % — SIGNIFICANT CHANGE UP (ref 10.3–14.5)
SODIUM SERPL-SCNC: 137 MMOL/L — SIGNIFICANT CHANGE UP (ref 135–145)
SPECIMEN SOURCE: SIGNIFICANT CHANGE UP
WBC # BLD: 2.1 K/UL — LOW (ref 3.8–10.5)
WBC # FLD AUTO: 2.1 K/UL — LOW (ref 3.8–10.5)

## 2019-10-25 PROCEDURE — 99232 SBSQ HOSP IP/OBS MODERATE 35: CPT

## 2019-10-25 PROCEDURE — 71045 X-RAY EXAM CHEST 1 VIEW: CPT | Mod: 26

## 2019-10-25 PROCEDURE — 93010 ELECTROCARDIOGRAM REPORT: CPT

## 2019-10-25 RX ORDER — FUROSEMIDE 40 MG
20 TABLET ORAL ONCE
Refills: 0 | Status: COMPLETED | OUTPATIENT
Start: 2019-10-25 | End: 2019-10-25

## 2019-10-25 RX ORDER — SODIUM CHLORIDE 9 MG/ML
1000 INJECTION, SOLUTION INTRAVENOUS
Refills: 0 | Status: DISCONTINUED | OUTPATIENT
Start: 2019-10-25 | End: 2019-10-26

## 2019-10-25 RX ORDER — MAGNESIUM HYDROXIDE 400 MG/1
30 TABLET, CHEWABLE ORAL DAILY
Refills: 0 | Status: DISCONTINUED | OUTPATIENT
Start: 2019-10-25 | End: 2019-11-01

## 2019-10-25 RX ADMIN — AMIODARONE HYDROCHLORIDE 400 MILLIGRAM(S): 400 TABLET ORAL at 06:01

## 2019-10-25 RX ADMIN — Medication 175 MILLIGRAM(S): at 00:47

## 2019-10-25 RX ADMIN — FLUCONAZOLE 200 MILLIGRAM(S): 150 TABLET ORAL at 12:51

## 2019-10-25 RX ADMIN — Medication 175 MILLIGRAM(S): at 15:06

## 2019-10-25 RX ADMIN — MAGNESIUM HYDROXIDE 30 MILLILITER(S): 400 TABLET, CHEWABLE ORAL at 12:49

## 2019-10-25 RX ADMIN — AMIODARONE HYDROCHLORIDE 400 MILLIGRAM(S): 400 TABLET ORAL at 17:46

## 2019-10-25 RX ADMIN — Medication 4: at 08:22

## 2019-10-25 RX ADMIN — Medication 75 MICROGRAM(S): at 06:01

## 2019-10-25 RX ADMIN — Medication 1 TABLET(S): at 17:46

## 2019-10-25 RX ADMIN — Medication 1 TABLET(S): at 21:47

## 2019-10-25 RX ADMIN — APIXABAN 5 MILLIGRAM(S): 2.5 TABLET, FILM COATED ORAL at 06:01

## 2019-10-25 RX ADMIN — Medication 20 MILLIGRAM(S): at 02:01

## 2019-10-25 RX ADMIN — Medication 1 TABLET(S): at 12:51

## 2019-10-25 RX ADMIN — Medication 40 MILLIGRAM(S): at 06:01

## 2019-10-25 RX ADMIN — Medication 175 MILLIGRAM(S): at 21:46

## 2019-10-25 RX ADMIN — Medication 8: at 17:11

## 2019-10-25 RX ADMIN — Medication 1 TABLET(S): at 06:01

## 2019-10-25 RX ADMIN — APIXABAN 5 MILLIGRAM(S): 2.5 TABLET, FILM COATED ORAL at 17:46

## 2019-10-25 RX ADMIN — Medication 175 MILLIGRAM(S): at 06:02

## 2019-10-25 RX ADMIN — Medication 40 MILLIGRAM(S): at 17:46

## 2019-10-25 NOTE — CONSULT NOTE ADULT - SUBJECTIVE AND OBJECTIVE BOX
INCOMPLETE NOTE.  Documentation in Progress  PT SEEN AND EVALUATED.     FULL/ADDITIONAL RECOMMENDATIONS TO FOLLOW   ***************************************************************  ***************************************************************    Patient is a 69y old  Female who presents with a chief complaint of pneumonia (25 Oct 2019 11:31)      HPI:  70 y/o F with PMHx of hypothyroidism presents with cough and congestion for 2 weeks in duration. Patient states that for the last 10 days she has been having difficulty swallowing, solids more than liquids. Niece at bedside states that patient has been eating and drinking less but more so noticed patient coughing with water but not with regular food. She now has more of a cough and dyspnea. She had a Tmax of 101.4 at home. Of note, she went to urgent care 1 month ago as she was feeling unwell and was found to be in SVT with HR sustaining in 170s. Was advised to go to the ER at that time but never followed up as she felt better and does not have insurance. Denies chest pain, palpitations, abdominal pain, n/v/d. Denies sick contacts or myalgias.     In the ED, patient met septic criteria with fever 101.4, . Labs significant for WBC 14.34, ddimer 765, BUN/Cr 47/1.40. CXR showed bilateral pna more prominent on right (personal read). CTA was negative for PE showed nonspecific groundglass opacities in both lungs. Nodular opacities in the left upper lobe and   lingula concerning for infection, neoplasm cannot be ruled out. EKG sinus tachycardia at 104bpm. (19 Oct 2019 23:08)    Heme asked to see pt for leukoepnia.   ON admission with leukocytosis.   Dx HIV with AIDS, PJP/PCP pneumonia.   CD4 count 42, VL 1  Started    PAST MEDICAL & SURGICAL HISTORY:  Hypothyroidism, unspecified type  No significant past surgical history: denies surgical history but CT shows cholecystectomy      HEALTH ISSUES - PROBLEM Dx:  Pneumocystis jiroveci pneumonia: Pneumocystis jiroveci pneumonia  Pneumonia: Pneumonia  Acute respiratory failure with hypoxia: Acute respiratory failure with hypoxia  Acute pulmonary edema: Acute pulmonary edema  Atrial fibrillation, unspecified type: Atrial fibrillation, unspecified type  Need for prophylactic measure: Need for prophylactic measure  Hypothyroidism, unspecified type: Hypothyroidism, unspecified type  NICOLASA (acute kidney injury): NICOLASA (acute kidney injury)  Hypoxia: Hypoxia  Tachycardia: Tachycardia  Dysphagia: Dysphagia  Pneumonia of both lungs due to infectious organism, unspecified part of lung: Pneumonia of both lungs due to infectious organism, unspecified part of lung  Sepsis: Sepsis          Pneumonia due to organism (J18.9)  Hypothyroidism, unspecified type (E03.9)  No significant past surgical history ([id number])  Hypoxia (R09.02)      FAMILY HISTORY:  FH: type 2 diabetes: in father        [SOCIAL HISTORY: ]     smoking:       EtOH:       illicit drugs:       occupation:       marital status:       Other:       [ALLERGIES/INTOLERANCES:]  Allergies    No Known Allergies  Intolerances          [MEDICATIONS]  MEDICATIONS  (STANDING):  amiodarone    Tablet 400 milliGRAM(s) Oral two times a day  apixaban 5 milliGRAM(s) Oral two times a day  dextrose 10% Bolus 125 milliLiter(s) IV Bolus once  dextrose 10% Bolus 250 milliLiter(s) IV Bolus once  dextrose 5%. 1000 milliLiter(s) (50 mL/Hr) IV Continuous <Continuous>  fluconAZOLE   Tablet 200 milliGRAM(s) Oral daily  insulin lispro (HumaLOG) corrective regimen sliding scale   SubCutaneous three times a day before meals  insulin lispro (HumaLOG) corrective regimen sliding scale   SubCutaneous at bedtime  lactobacillus acidophilus 1 Tablet(s) Oral three times a day  levothyroxine 75 MICROGram(s) Oral daily  multivitamin 1 Tablet(s) Oral daily  predniSONE   Tablet 40 milliGRAM(s) Oral two times a day  sodium chloride 0.45%. 1000 milliLiter(s) (75 mL/Hr) IV Continuous <Continuous>  trimethoprim / sulfamethoxazole IVPB 200 milliGRAM(s) IV Intermittent every 6 hours    MEDICATIONS  (PRN):  acetaminophen   Tablet .. 650 milliGRAM(s) Oral every 6 hours PRN Temp greater or equal to 38C (100.4F), Mild Pain (1 - 3)  ALBUTerol    0.083% 2.5 milliGRAM(s) Nebulizer every 6 hours PRN Shortness of Breath and/or Wheezing  dextrose 40% Gel 15 Gram(s) Oral once PRN Blood Glucose LESS THAN 70 milliGRAM(s)/deciLiter  glucagon  Injectable 1 milliGRAM(s) IntraMuscular once PRN Glucose <70 milliGRAM(s)/deciLiter  magnesium hydroxide Suspension 30 milliLiter(s) Oral daily PRN Constipation        [REVIEW OF SYSTEMS: ]  CONSTITUTIONAL: normal, no fever, no shakes, no chills   EYES: No eye pain, no visual disturbances, no discharge  ENMT:  no discharge  NECK: No pain, no stiffness  BREASTS: No pain, no masses, no nipple discharge  RESPIRATORY: No cough, no wheezing, no chills, no hemoptysis; No shortness of breath  CARDIOVASCULAR: No chest pain, no palpitations, no dizziness, no leg swelling  GASTROINTESTINAL: No abdominal, no epigastric pain. No nausea, no vomiting, no hematemesis; No diarrhea , no constipation. No melena, no hematochezia.  GENITOURINARY: No dysuria, no frequency, no hematuria, no incontinence  NEUROLOGICAL: No headaches, no memory loss, no loss of strength, no numbness, no tremors  SKIN: No itching, no burning, no rashes, no lesions   LYMPH NODES: No enlarged glands  ENDOCRINE: No heat or cold intolerance; No hair loss  MUSCULOSKELETAL: No joint pain or swelling; No muscle, no back, no extremity pain  PSYCHIATRIC: No depression, no anxiety, no mood swings, no difficulty sleeping  HEME/LYMPH: No easy bruising, no bleeding gums      [VITALS SIGNS 24hrs]  Vital Signs Last 24 Hrs  T(C): 36.4 (25 Oct 2019 13:47), Max: 37.1 (25 Oct 2019 01:35)  T(F): 97.6 (25 Oct 2019 13:47), Max: 98.8 (25 Oct 2019 01:35)  HR: 88 (25 Oct 2019 13:47) (85 - 117)  BP: 117/79 (25 Oct 2019 13:47) (102/68 - 136/91)  BP(mean): 85 (24 Oct 2019 17:00) (81 - 85)  RR: 18 (25 Oct 2019 13:47) (18 - 42)  SpO2: 96% (25 Oct 2019 13:47) (76% - 99%)    [PHYSICAL EXAM]  General: adult in NAD,  WN,  WD.  HEENT: clear oropharynx, anicteric sclera, pink conjunctivae.  Neck: supple, no masses.  CV: normal S1S2, no murmur, no rubs, no gallops.  Lungs: clear to auscultation, no wheezes, no rales, no rhonchi.  Abdomen: soft, non-tender, non-distended, no hepatosplenomegaly, normal BS, no guarding.  Ext: no clubbing, no cyanosis, no edema.  Skin: no rashes,  no petechiae, no venous stasis changes.  Neuro: alert and oriented X3, no focal motor deficits.  LN: no SC VANESSA.      [LABS:]                        8.9    2.10  )-----------( 191      ( 25 Oct 2019 08:36 )             26.9     CBC Full  -  ( 25 Oct 2019 08:36 )  WBC Count : 2.10 K/uL  RBC Count : 3.02 M/uL  Hemoglobin : 8.9 g/dL  Hematocrit : 26.9 %  Platelet Count - Automated : 191 K/uL  Mean Cell Volume : 89.1 fl  Mean Cell Hemoglobin : 29.5 pg  Mean Cell Hemoglobin Concentration : 33.1 gm/dL  Auto Neutrophil # : 1.75 K/uL  Auto Lymphocyte # : 0.22 K/uL  Auto Monocyte # : 0.09 K/uL  Auto Eosinophil # : 0.00 K/uL  Auto Basophil # : 0.01 K/uL  Auto Neutrophil % : 83.3 %  Auto Lymphocyte % : 10.5 %  Auto Monocyte % : 4.3 %  Auto Eosinophil % : 0.0 %  Auto Basophil % : 0.5 %    10-25    137  |  108  |  29<H>  ----------------------------<  245<H>  4.5   |  21<L>  |  1.40<H>    Ca    8.1<L>      25 Oct 2019 08:36  Phos  2.7     10-25  Mg     1.8     10-25    TPro  5.4<L>  /  Alb  1.5<L>  /  TBili  <0.1<L>  /  DBili  x   /  AST  42<H>  /  ALT  22  /  AlkPhos  58  10-24    PT/INR - ( 25 Oct 2019 08:36 )   PT: 17.4 sec;   INR: 1.52 ratio         PTT - ( 25 Oct 2019 08:36 )  PTT:26.5 sec  LIVER FUNCTIONS - ( 24 Oct 2019 08:18 )  Alb: 1.5 g/dL / Pro: 5.4 g/dL / ALK PHOS: 58 U/L / ALT: 22 U/L / AST: 42 U/L / GGT: x                   WBC  TREND (5 Days)  WBC Count: 2.10 K/uL (10-25 @ 08:36)  WBC Count: 1.29 K/uL (10-24 @ 08:18)  WBC Count: 1.15 K/uL (10-24 @ 05:33)  WBC Count: 3.60 K/uL (10-23 @ 05:57)    HGB  TREND (5 Days)  Hemoglobin: 8.9 g/dL (10-25 @ 08:36)  Hemoglobin: 8.3 g/dL (10-24 @ 08:18)  Hemoglobin: 7.3 g/dL (10-24 @ 05:33)  Hemoglobin: 8.7 g/dL (10-23 @ 05:57)    HCT  TREND (5 Days)  Hematocrit: 26.9 % (10-25 @ 08:36)  Hematocrit: 25.0 % (10-24 @ 08:18)  Hematocrit: 22.2 % (10-24 @ 05:33)  Hematocrit: 26.2 % (10-23 @ 05:57)    PLT  TREND (5 Days)  Platelet Count - Automated: 191 K/uL (10-25 @ 08:36)  Platelet Count - Automated: 187 K/uL (10-24 @ 08:18)  Platelet Count - Automated: 192 K/uL (10-24 @ 05:33)  Platelet Count - Automated: 260 K/uL (10-23 @ 05:57)      Anemia Studies  Ferritin, Serum: 1271 ng/mL (10-20 @ 11:12)  Iron - Total Binding Capacity.: 148 ug/dL (10-20 @ 11:13)  Vitamin B12, Serum: 1287 pg/mL (10-20 @ 11:12)  Folate, Serum: >20.0 ng/mL (10-20 @ 11:12)     Microbiology  Full T Cell Subset (10.22.19 @ 09:21)    CD19 %: 4 %    CD3 %: 84: Reference ranges for pediatric population (0-18 years old) are from  Adelso Avalos et al. "Lymphocyte subsets in healthy children from  birth through 18 years of age: the Pediatric AIDS Clinical Trials Group   study. "Journal of Allergy and Clinical Immunology 112.5 (2003):  973-980.  Reference range for adult (18-65 years old) and geriatric (65 years old  and above) populations are developed at [facility]  at [place], [place]. %    CD4 %: 7 %    CD8 %: 77 %    ABS [id number]: 69 /uL    ABS CD19: 23 /uL    ABS CD3: 509 /uL    ABS CD4: 42 /uL    ABS CD8: 465 /uL    [id number] %: 11 %      Culture - Sputum . (10.23.19 @ 00:29)    Gram Stain:   Few polymorphonuclear leukocytes per low power field  Moderate Squamous epithelial cells per low power field  Few Yeast like cells per oil power field  Few Gram Positive Cocci in Clusters per oil power field    Specimen Source: .Sputum Sputum    Culture Results:   Normal Respiratory Madelyn present      Culture - Acid Fast - Sputum w/Smear . (10.23.19 @ 00:29)    Specimen Source: .Sputum Sputum    Acid Fast Bacilli Smear:   No acid fast bacilli seen by fluorochrome stain          [PATHOLOGY]  Cytopathology - Non Gyn Report:   ACCESSION No:  [id number]  Cytopathology Report  Specimen(s) Submitted: Bronchial washings  Clinical History: 69 year old female with newly diagnosed AIDS, hypoxemic respiratory failure and ground glass opacities.  PCP pneumonia, rule out MAC/TB  Gross Description: 20 cc cloudy whitish fluid received in 50% alcohol  Final Diagnosis: NEGATIVE FOR MALIGNANT CELLS.  Specimen consists of  benign bronchial epithelial cells, macrophages, lymphocytes, leukocytes and foamy material.   AFB stain for mycobacteria is negative.  GMS stain is positive for pneumocystis.    Screened by: Clayton Duckworth  Verified by: Clayton Duckworth (Electronic Signature)  Reported on: 10/24/19 14:15 EDT, [address] [place] [address], [place] [address]  _________________________________________________________________  Comment: Dede Zimmerman was informed of the results on 10/24/19 at 1:35PM. (10-23 @ 14:20)             [RADIOLOGY & ADDITIONAL STUDIES:]    < from: CT Angio Chest w/ IV Cont (10.19.19 @ 20:48) >  EXAM:  CT ANGIO CHEST (W)AW IC                        PROCEDURE DATE:  10/19/2019    INTERPRETATION:  CLINICAL INFORMATION: Shortness of breath, cough, dyspnea, fever, elevated dimer, assess PE.   PROCEDURE: CT angiography of the chest was performed with intravenous contrast utilizing dedicated PE protocol. 53 mls of Omnipaque-350 administered without complication. 41 ml discarded. Coronal and sagittal reconstruction images were obtained. Axial MIP images were obtained from a separate workstation.    COMPARISON: Same day chest radiograph.  FINDINGS: The patient's respiratory motion degrades images.    LUNGS AND AIRWAYS: Patent central airways. Nonspecific groundglass opacities in both lungs. Nodular opacities in the left upper lobe and lingula.   PLEURA: No pleural effusion or pneumothorax.  HEART: Normal size. No pericardial effusion.  VESSELS: Suboptimal contrast opacification of the subsegmental pulmonary arteries. No obvious embolus to the level of the segmental pulmonary arteries. Atherosclerotic change of the thoracic aorta and great vessel origin.  CHEST WALL AND LOWER NECK: Nonspecific calcification in the left breast soft tissue.  MEDIASTINUM AND DARCIE: Enlarged lymph nodes, 1.9 x 1.0 cmAP window lymph node, 1.2 x 1.1 cm left hilar lymph node and 1.8 x 1.1 cm right hilar lymph node.  UPPER ABDOMEN: Cholecystectomy.  BONES: Degenerative changes of the spine.     IMPRESSION:  Suboptimal evaluation of the subsegmental pulmonary arteries. No obvious embolus to the level of the segmental pulmonary arteries.  Nonspecific pulmonary groundglass and nodular opacities as described, which may represent infection/inflammation although neoplasm cannot be excluded. Recommend follow-up to assess resolution  Nonspecific mediastinal and hilar lymphadenopathy.  Additional findings as described.  < end of copied text >      < from: Xray Chest 1 View-PORTABLE IMMEDIATE (10.25.19 @ 01:35) >  EXAM:  XR CHEST PORTABLE IMMED 1V                        PROCEDURE DATE:  10/25/2019    INTERPRETATION:  Follow-up.  AP chest. Prior 10/24/2019.  Low lung volumes. No change heart mediastinum. Extensive bilateral airspace disease grossly unchanged. No pleural collection pneumothorax or other new abnormality  Impression: No gross interval change  < end of copied text > Patient is a 69y old  Female who presents with a chief complaint of pneumonia (25 Oct 2019 11:31)      HPI:  70 y/o F with PMHx of hypothyroidism presents with cough and congestion for 2 weeks in duration. Patient states that for the last 10 days she has been having difficulty swallowing, solids more than liquids. Niece at bedside states that patient has been eating and drinking less but more so noticed patient coughing with water but not with regular food. She now has more of a cough and dyspnea. She had a Tmax of 101.4 at home. Of note, she went to urgent care 1 month ago as she was feeling unwell and was found to be in SVT with HR sustaining in 170s. Was advised to go to the ER at that time but never followed up as she felt better and does not have insurance. Denies chest pain, palpitations, abdominal pain, n/v/d. Denies sick contacts or myalgias.     In the ED, patient met septic criteria with fever 101.4, . Labs significant for WBC 14.34, ddimer 765, BUN/Cr 47/1.40. CXR showed bilateral pna more prominent on right (personal read). CTA was negative for PE showed nonspecific groundglass opacities in both lungs. Nodular opacities in the left upper lobe and   lingula concerning for infection, neoplasm cannot be ruled out. EKG sinus tachycardia at 104bpm. (19 Oct 2019 23:08)    Heme asked to see pt for leukoepnia.   ON admission with leukocytosis.   Dx HIV with AIDS, PJP/PCP pneumonia.   CD4 count 42, VL 1  Started    PAST MEDICAL & SURGICAL HISTORY:  Hypothyroidism, unspecified type  No significant past surgical history: denies surgical history but CT shows cholecystectomy      HEALTH ISSUES - PROBLEM Dx:  Pneumocystis jiroveci pneumonia: Pneumocystis jiroveci pneumonia  Pneumonia: Pneumonia  Acute respiratory failure with hypoxia: Acute respiratory failure with hypoxia  Acute pulmonary edema: Acute pulmonary edema  Atrial fibrillation, unspecified type: Atrial fibrillation, unspecified type  Need for prophylactic measure: Need for prophylactic measure  Hypothyroidism, unspecified type: Hypothyroidism, unspecified type  NICOLASA (acute kidney injury): NICOLASA (acute kidney injury)  Hypoxia: Hypoxia  Tachycardia: Tachycardia  Dysphagia: Dysphagia  Pneumonia of both lungs due to infectious organism, unspecified part of lung: Pneumonia of both lungs due to infectious organism, unspecified part of lung  Sepsis: Sepsis          Pneumonia due to organism (J18.9)  Hypothyroidism, unspecified type (E03.9)  No significant past surgical history ([id number])  Hypoxia (R09.02)      FAMILY HISTORY:  FH: type 2 diabetes: in father        [SOCIAL HISTORY: ]     smoking:       EtOH:       illicit drugs:       occupation:       marital status:       Other:       [ALLERGIES/INTOLERANCES:]  Allergies    No Known Allergies  Intolerances          [MEDICATIONS]  MEDICATIONS  (STANDING):  amiodarone    Tablet 400 milliGRAM(s) Oral two times a day  apixaban 5 milliGRAM(s) Oral two times a day  dextrose 10% Bolus 125 milliLiter(s) IV Bolus once  dextrose 10% Bolus 250 milliLiter(s) IV Bolus once  dextrose 5%. 1000 milliLiter(s) (50 mL/Hr) IV Continuous <Continuous>  fluconAZOLE   Tablet 200 milliGRAM(s) Oral daily  insulin lispro (HumaLOG) corrective regimen sliding scale   SubCutaneous three times a day before meals  insulin lispro (HumaLOG) corrective regimen sliding scale   SubCutaneous at bedtime  lactobacillus acidophilus 1 Tablet(s) Oral three times a day  levothyroxine 75 MICROGram(s) Oral daily  multivitamin 1 Tablet(s) Oral daily  predniSONE   Tablet 40 milliGRAM(s) Oral two times a day  sodium chloride 0.45%. 1000 milliLiter(s) (75 mL/Hr) IV Continuous <Continuous>  trimethoprim / sulfamethoxazole IVPB 200 milliGRAM(s) IV Intermittent every 6 hours    MEDICATIONS  (PRN):  acetaminophen   Tablet .. 650 milliGRAM(s) Oral every 6 hours PRN Temp greater or equal to 38C (100.4F), Mild Pain (1 - 3)  ALBUTerol    0.083% 2.5 milliGRAM(s) Nebulizer every 6 hours PRN Shortness of Breath and/or Wheezing  dextrose 40% Gel 15 Gram(s) Oral once PRN Blood Glucose LESS THAN 70 milliGRAM(s)/deciLiter  glucagon  Injectable 1 milliGRAM(s) IntraMuscular once PRN Glucose <70 milliGRAM(s)/deciLiter  magnesium hydroxide Suspension 30 milliLiter(s) Oral daily PRN Constipation        [REVIEW OF SYSTEMS: ]  CONSTITUTIONAL: normal, no fever, no shakes, no chills   EYES: No eye pain, no visual disturbances, no discharge  ENMT:  no discharge  NECK: No pain, no stiffness  BREASTS: No pain, no masses, no nipple discharge  RESPIRATORY: No cough, no wheezing, no chills, no hemoptysis; No shortness of breath  CARDIOVASCULAR: No chest pain, no palpitations, no dizziness, no leg swelling  GASTROINTESTINAL: No abdominal, no epigastric pain. No nausea, no vomiting, no hematemesis; No diarrhea , no constipation. No melena, no hematochezia.  GENITOURINARY: No dysuria, no frequency, no hematuria, no incontinence  NEUROLOGICAL: No headaches, no memory loss, no loss of strength, no numbness, no tremors  SKIN: No itching, no burning, no rashes, no lesions   LYMPH NODES: No enlarged glands  ENDOCRINE: No heat or cold intolerance; No hair loss  MUSCULOSKELETAL: No joint pain or swelling; No muscle, no back, no extremity pain  PSYCHIATRIC: No depression, no anxiety, no mood swings, no difficulty sleeping  HEME/LYMPH: No easy bruising, no bleeding gums      [VITALS SIGNS 24hrs]  Vital Signs Last 24 Hrs  T(C): 36.4 (25 Oct 2019 13:47), Max: 37.1 (25 Oct 2019 01:35)  T(F): 97.6 (25 Oct 2019 13:47), Max: 98.8 (25 Oct 2019 01:35)  HR: 88 (25 Oct 2019 13:47) (85 - 117)  BP: 117/79 (25 Oct 2019 13:47) (102/68 - 136/91)  BP(mean): 85 (24 Oct 2019 17:00) (81 - 85)  RR: 18 (25 Oct 2019 13:47) (18 - 42)  SpO2: 96% (25 Oct 2019 13:47) (76% - 99%)    [PHYSICAL EXAM]  General: adult in NAD,  WN,  WD.  HEENT: clear oropharynx, anicteric sclera, pink conjunctivae.  Neck: supple, no masses.  CV: normal S1S2, no murmur, no rubs, no gallops.  Lungs: clear to auscultation, no wheezes, no rales, no rhonchi.  Abdomen: soft, non-tender, non-distended, no hepatosplenomegaly, normal BS, no guarding.  Ext: no clubbing, no cyanosis, no edema.  Skin: no rashes,  no petechiae, no venous stasis changes.  Neuro: alert and oriented X3, no focal motor deficits.  LN: no SC VANESSA.      [LABS:]                        8.9    2.10  )-----------( 191      ( 25 Oct 2019 08:36 )             26.9     CBC Full  -  ( 25 Oct 2019 08:36 )  WBC Count : 2.10 K/uL  RBC Count : 3.02 M/uL  Hemoglobin : 8.9 g/dL  Hematocrit : 26.9 %  Platelet Count - Automated : 191 K/uL  Mean Cell Volume : 89.1 fl  Mean Cell Hemoglobin : 29.5 pg  Mean Cell Hemoglobin Concentration : 33.1 gm/dL  Auto Neutrophil # : 1.75 K/uL  Auto Lymphocyte # : 0.22 K/uL  Auto Monocyte # : 0.09 K/uL  Auto Eosinophil # : 0.00 K/uL  Auto Basophil # : 0.01 K/uL  Auto Neutrophil % : 83.3 %  Auto Lymphocyte % : 10.5 %  Auto Monocyte % : 4.3 %  Auto Eosinophil % : 0.0 %  Auto Basophil % : 0.5 %    10-25    137  |  108  |  29<H>  ----------------------------<  245<H>  4.5   |  21<L>  |  1.40<H>    Ca    8.1<L>      25 Oct 2019 08:36  Phos  2.7     10-25  Mg     1.8     10-25    TPro  5.4<L>  /  Alb  1.5<L>  /  TBili  <0.1<L>  /  DBili  x   /  AST  42<H>  /  ALT  22  /  AlkPhos  58  10-24    PT/INR - ( 25 Oct 2019 08:36 )   PT: 17.4 sec;   INR: 1.52 ratio         PTT - ( 25 Oct 2019 08:36 )  PTT:26.5 sec  LIVER FUNCTIONS - ( 24 Oct 2019 08:18 )  Alb: 1.5 g/dL / Pro: 5.4 g/dL / ALK PHOS: 58 U/L / ALT: 22 U/L / AST: 42 U/L / GGT: x                   WBC  TREND (5 Days)  WBC Count: 2.10 K/uL (10-25 @ 08:36)  WBC Count: 1.29 K/uL (10-24 @ 08:18)  WBC Count: 1.15 K/uL (10-24 @ 05:33)  WBC Count: 3.60 K/uL (10-23 @ 05:57)    HGB  TREND (5 Days)  Hemoglobin: 8.9 g/dL (10-25 @ 08:36)  Hemoglobin: 8.3 g/dL (10-24 @ 08:18)  Hemoglobin: 7.3 g/dL (10-24 @ 05:33)  Hemoglobin: 8.7 g/dL (10-23 @ 05:57)    HCT  TREND (5 Days)  Hematocrit: 26.9 % (10-25 @ 08:36)  Hematocrit: 25.0 % (10-24 @ 08:18)  Hematocrit: 22.2 % (10-24 @ 05:33)  Hematocrit: 26.2 % (10-23 @ 05:57)    PLT  TREND (5 Days)  Platelet Count - Automated: 191 K/uL (10-25 @ 08:36)  Platelet Count - Automated: 187 K/uL (10-24 @ 08:18)  Platelet Count - Automated: 192 K/uL (10-24 @ 05:33)  Platelet Count - Automated: 260 K/uL (10-23 @ 05:57)      Anemia Studies  Ferritin, Serum: 1271 ng/mL (10-20 @ 11:12)  Iron - Total Binding Capacity.: 148 ug/dL (10-20 @ 11:13)  Vitamin B12, Serum: 1287 pg/mL (10-20 @ 11:12)  Folate, Serum: >20.0 ng/mL (10-20 @ 11:12)     Microbiology  Full T Cell Subset (10.22.19 @ 09:21)    CD19 %: 4 %    CD3 %: 84: Reference ranges for pediatric population (0-18 years old) are from  Adelso Avalos, et al. "Lymphocyte subsets in healthy children from  birth through 18 years of age: the Pediatric AIDS Clinical Trials Group   study. "Journal of Allergy and Clinical Immunology 112.5 (2003):  973-980.  Reference range for adult (18-65 years old) and geriatric (65 years old  and above) populations are developed at [place], [place]. %    CD4 %: 7 %    CD8 %: 77 %    ABS [id number]: 69 /uL    ABS CD19: 23 /uL    ABS CD3: 509 /uL    ABS CD4: 42 /uL    ABS CD8: 465 /uL    [id number] %: 11 %      Culture - Sputum . (10.23.19 @ 00:29)    Gram Stain:   Few polymorphonuclear leukocytes per low power field  Moderate Squamous epithelial cells per low power field  Few Yeast like cells per oil power field  Few Gram Positive Cocci in Clusters per oil power field    Specimen Source: .Sputum Sputum    Culture Results:   Normal Respiratory Madelyn present      Culture - Acid Fast - Sputum w/Smear . (10.23.19 @ 00:29)    Specimen Source: .Sputum Sputum    Acid Fast Bacilli Smear:   No acid fast bacilli seen by fluorochrome stain          [PATHOLOGY]  Cytopathology - Non Gyn Report:   ACCESSION No:  [id number]  Cytopathology Report  Specimen(s) Submitted: Bronchial washings  Clinical History: 69 year old female with newly diagnosed AIDS, hypoxemic respiratory failure and ground glass opacities.  PCP pneumonia, rule out MAC/TB  Gross Description: 20 cc cloudy whitish fluid received in 50% alcohol  Final Diagnosis: NEGATIVE FOR MALIGNANT CELLS.  Specimen consists of  benign bronchial epithelial cells, macrophages, lymphocytes, leukocytes and foamy material.   AFB stain for mycobacteria is negative.  GMS stain is positive for pneumocystis.    Screened by: Clayton Duckworth  Verified by: Clayton Duckworth (Electronic Signature)  Reported on: 10/24/19 14:15 EDT, [address] [place] [id number]  _________________________________________________________________  Comment: Dede Zimmerman was informed of the results on 10/24/19 at 1:35PM. (10-23 @ 14:20)       [BLOOD SMEAR]  RBC: normocytic, normochromic, no schistocytes, no polychromasia, no NRBC  WBC markedly decreased, rare seg. No blasts,   Plts adequate, no large plts, no giant plts.       [RADIOLOGY & ADDITIONAL STUDIES:]    < from: CT Angio Chest w/ IV Cont (10.19.19 @ 20:48) >  EXAM:  CT ANGIO CHEST (W)AW IC                        PROCEDURE DATE:  10/19/2019    INTERPRETATION:  CLINICAL INFORMATION: Shortness of breath, cough, dyspnea, fever, elevated dimer, assess PE.   PROCEDURE: CT angiography of the chest was performed with intravenous contrast utilizing dedicated PE protocol. 53 mls of Omnipaque-350 administered without complication. 41 ml discarded. Coronal and sagittal reconstruction images were obtained. Axial MIP images were obtained from a separate workstation.    COMPARISON: Same day chest radiograph.  FINDINGS: The patient's respiratory motion degrades images.    LUNGS AND AIRWAYS: Patent central airways. Nonspecific groundglass opacities in both lungs. Nodular opacities in the left upper lobe and lingula.   PLEURA: No pleural effusion or pneumothorax.  HEART: Normal size. No pericardial effusion.  VESSELS: Suboptimal contrast opacification of the subsegmental pulmonary arteries. No obvious embolus to the level of the segmental pulmonary arteries. Atherosclerotic change of the thoracic aorta and great vessel origin.  CHEST WALL AND LOWER NECK: Nonspecific calcification in the left breast soft tissue.  MEDIASTINUM AND DARCIE: Enlarged lymph nodes, 1.9 x 1.0 cmAP window lymph node, 1.2 x 1.1 cm left hilar lymph node and 1.8 x 1.1 cm right hilar lymph node.  UPPER ABDOMEN: Cholecystectomy.  BONES: Degenerative changes of the spine.     IMPRESSION:  Suboptimal evaluation of the subsegmental pulmonary arteries. No obvious embolus to the level of the segmental pulmonary arteries.  Nonspecific pulmonary groundglass and nodular opacities as described, which may represent infection/inflammation although neoplasm cannot be excluded. Recommend follow-up to assess resolution  Nonspecific mediastinal and hilar lymphadenopathy.  Additional findings as described.  < end of copied text >      < from: Xray Chest 1 View-PORTABLE IMMEDIATE (10.25.19 @ 01:35) >  EXAM:  XR CHEST PORTABLE IMMED 1V                        PROCEDURE DATE:  10/25/2019    INTERPRETATION:  Follow-up.  AP chest. Prior 10/24/2019.  Low lung volumes. No change heart mediastinum. Extensive bilateral airspace disease grossly unchanged. No pleural collection pneumothorax or other new abnormality  Impression: No gross interval change  < end of copied text >

## 2019-10-25 NOTE — PHYSICAL THERAPY INITIAL EVALUATION ADULT - ORIENTATION, REHAB EVAL
oriented to person, place, time and situation/Pt speaks limited English but able to make needs known.  Family present to translate as needed.

## 2019-10-25 NOTE — PROGRESS NOTE ADULT - SUBJECTIVE AND OBJECTIVE BOX
Chief Complaint: Shortness of breath, fever    Interval Events: Short of breath. Placed on BIPAP.    Review of Systems:  General: No fevers, chills, weight loss or gain  Skin: No rashes, color changes  Cardiovascular: No chest pain, orthopnea  Respiratory: No shortness of breath, cough  Gastrointestinal: No nausea, abdominal pain  Genitourinary: No incontinence, pain with urination  Musculoskeletal: No pain, swelling, decreased range of motion  Neurological: No headache, weakness  Psychiatric: No depression, anxiety  Endocrine: No weight loss or gain, increased thirst  All other systems are comprehensively negative.    Physical Exam:  Vital Signs Last 24 Hrs  T(C): 36.6 (25 Oct 2019 05:00), Max: 37.1 (25 Oct 2019 01:35)  T(F): 97.9 (25 Oct 2019 05:00), Max: 98.8 (25 Oct 2019 01:35)  HR: 102 (25 Oct 2019 07:38) (85 - 117)  BP: 122/81 (25 Oct 2019 05:00) (100/59 - 136/91)  BP(mean): 85 (24 Oct 2019 17:00) (74 - 85)  RR: 18 (25 Oct 2019 05:00) (18 - 42)  SpO2: 98% (25 Oct 2019 07:38) (76% - 100%)  General: In mild respiratory distress  HEENT: MMM  Neck: No JVD, no carotid bruit  Lungs: Coarse bilaterally  CV: RRR, nl S1/S2, no M/R/G  Abdomen: S/NT/ND, +BS  Extremities: No LE edema, no cyanosis  Neuro: AAOx3, non-focal  Skin: No rash    Labs:    10-24    140  |  112<H>  |  30<H>  ----------------------------<  282<H>  4.0   |  20<L>  |  1.20    Ca    8.2<L>      24 Oct 2019 08:18  Phos  2.0     10-24  Mg     2.0     10-24    TPro  5.4<L>  /  Alb  1.5<L>  /  TBili  <0.1<L>  /  DBili  x   /  AST  42<H>  /  ALT  22  /  AlkPhos  58  10-24                        8.3    1.29  )-----------( 187      ( 24 Oct 2019 08:18 )             25.0       Telemetry: Sinus rhythm, tachycardia

## 2019-10-25 NOTE — CONSULT NOTE ADULT - ASSESSMENT
[ASSESSMENT and  PLAN]  68yo [place]ien F with HIV AIDS.  Admitted with PCP/PJP.   oral thrush and may have candidal esophagitis, dsyphagia.     Leukopenia once started treatment.   At risk for systemic fungal infx.     Leukopenia likely related to acute infection and or medications.   ARVT not yet started, pending genotype and resistance panel    B12, Folate, Iron studies without treatable deficiencies.     RECOMMENDATIONS  IV abx and antifungal tx per infectious diseases.   ID to defer ARVT for 2wks.     Follow CBC    DVT Prophyalxis    Thank you for consulting us. [ASSESSMENT and  PLAN]  70yo [place] F with HIV AIDS.  Admitted with PCP/PJP.   oral thrush and may have candidal esophagitis, dsyphagia.     Leukopenia once started treatment.   At risk for systemic fungal infx.     Leukopenia likely related to acute infection and or medications.   ARVT not yet started, pending genotype and resistance panel  As WBC declined and now rising, favor infection rather than medicaitons.     B12, Folate, Iron studies without treatable deficiencies.     RECOMMENDATIONS  IV abx and antifungal tx per infectious diseases.   ID to defer ARVT for 2wks.     Follow CBC    DVT Prophyalxis    Thank you for consulting us.

## 2019-10-25 NOTE — PROGRESS NOTE ADULT - SUBJECTIVE AND OBJECTIVE BOX
ID progress note     Name: PATO HICKMAN  Age: 69y  Gender: Female  MRN: [id number]    Interval History-- Events noted, had episodes of desaturation , placed on BiPAP , now she is on HF since this am . Afebrile .  Notes reviewed    Past Medical History--  Hypothyroidism, unspecified type  No significant past surgical history      For details regarding the patient's social history, family history, and other miscellaneous elements, please refer the initial infectious diseases consultation and/or the admitting history and physical examination for this admission.    Allergies--  Allergies    No Known Allergies    Intolerances        Medications--  Antibiotics:  fluconAZOLE   Tablet 200 milliGRAM(s) Oral daily  trimethoprim / sulfamethoxazole IVPB 200 milliGRAM(s) IV Intermittent every 6 hours    Immunologic:    Other:  acetaminophen   Tablet .. PRN  ALBUTerol    0.083% PRN  amiodarone    Tablet  apixaban  dextrose 10% Bolus  dextrose 10% Bolus  dextrose 40% Gel PRN  dextrose 5%.  glucagon  Injectable PRN  insulin lispro (HumaLOG) corrective regimen sliding scale  insulin lispro (HumaLOG) corrective regimen sliding scale  lactobacillus acidophilus  levothyroxine  multivitamin  predniSONE   Tablet      Review of Systems--  A 10-point review of systems was obtained.     Pertinent positives and negatives--  Constitutional: No fevers. No Chills. No Rigors.   Cardiovascular: No chest pain. No palpitations.  Respiratory: Pos for  shortness of breath. No cough.  Gastrointestinal: No nausea or vomiting. No diarrhea or constipation.   Psychiatric: Pleasant. Appropriate affect.    Review of systems otherwise negative except as previously noted.    Physical Examination--  Vital Signs: T(F): 97.9 (10-25-19 @ 05:00), Max: 98.8 (10-25-19 @ 01:35)  HR: 88 (10-25-19 @ 10:30)  BP: 122/81 (10-25-19 @ 05:00)  RR: 18 (10-25-19 @ 05:00)  SpO2: 95% (10-25-19 @ 10:30)  Wt(kg): --  General: Nontoxic-appearing Female in mild distress .  HEENT: AT/NC. PERRL. EOMI. Anicteric. Conjunctiva pink and moist. Oropharynx clear. Dentition fair.  Neck: Not rigid. No sense of mass.  Nodes: None palpable.  Lungs: Coarse breath sounds  bilaterally without rales, wheezing or rhonchi  Heart: Regular rate and rhythm. No Murmur. No rub. No gallop. No palpable thrill.  Abdomen: Bowel sounds present and normoactive. Soft. Nondistended. Nontender. No sense of mass. No organomegaly.  Back: No spinal tenderness. No costovertebral angle tenderness.   Extremities: No cyanosis or clubbing. No edema.   Skin: Warm. Dry. Good turgor. No rash. No vasculitic stigmata.  Psychiatric: Appropriate affect and mood for situation.         Laboratory Studies--  CBC                        8.9    2.10  )-----------( 191      ( 25 Oct 2019 08:36 )             26.9       Chemistries  10-25    137  |  108  |  29<H>  ----------------------------<  245<H>  4.5   |  21<L>  |  1.40<H>    Ca    8.1<L>      25 Oct 2019 08:36  Phos  2.7     10-25  Mg     1.8     10-25    TPro  5.4<L>  /  Alb  1.5<L>  /  TBili  <0.1<L>  /  DBili  x   /  AST  42<H>  /  ALT  22  /  AlkPhos  58  10-24    CAPILLARY BLOOD GLUCOSE      POCT Blood Glucose.: 244 mg/dL (25 Oct 2019 07:35)  POCT Blood Glucose.: 229 mg/dL (24 Oct 2019 22:00)  POCT Blood Glucose.: 268 mg/dL (24 Oct 2019 16:54)  POCT Blood Glucose.: 339 mg/dL (24 Oct 2019 12:02)    HIV-1 RNA Quantitative, Viral Load (10.22.19 @ 08:11)    HIV-1 RNA Quantitative, Viral Load:   6,186,564    Full T Cell Subset (10.22.19 @ 09:21)    CD8 %: 77 %    ABS [id number]: 69 /uL    ABS CD19: 23 /uL    ABS CD3: 509 /uL    ABS CD4: 42 /uL    ABS CD8: 465 /uL    RU0214 %: 11 %    CD19 %: 4 %    CD3 %: 84    CD4 %: 7 %    HIV 1/2 AB Confirmation (10.20.19 @ 19:08)    HIV Result: HIV-1 Pos    HIV-1/HIV-2 Interpretation:        Cryptococcal Antigen - Serum (10.23.19 @ 08:21)    Cryptococcal Antigen - Serum: Negative    Toxoplasma IgG Antibody (10.23.19 @ 08:39)    Toxoplasma IgG Screen: 3.3 IU/mL    Toxoplasma IgG Interpretation: Negative: TOXOPLASMA IGG ANTIBODY    Culture Data    Culture - Acid Fast - Bronchial w/Smear (collected 23 Oct 2019 20:55)  Source: BAL Bronchoalveolar Washings    Culture - Fungal, Bronchial (collected 23 Oct 2019 20:55)  Source: .Bronchial Bronchoalveolar Washings  Preliminary Report (24 Oct 2019 06:50):    Testing in progress    Culture - Bronchial (collected 23 Oct 2019 20:55)  Source: Bronch Wash Bronchoalveolar Washings  Gram Stain (24 Oct 2019 02:38):    Few polymorphonuclear leukocytes seen per low power field    Few Squamous epithelial cells seen per low power field    Rare Yeast like cells seen per oil power field  Preliminary Report (24 Oct 2019 21:34):    Normal Respiratory Madelyn present    Culture - Sputum (collected 23 Oct 2019 00:29)  Source: .Sputum Sputum  Gram Stain (23 Oct 2019 02:53):    Few polymorphonuclear leukocytes per low power field    Moderate Squamous epithelial cells per low power field    Few Yeast like cells per oil power field    Few Gram Positive Cocci in Clusters per oil power field  Final Report (24 Oct 2019 19:03):    Normal Respiratory Madelyn present    Culture - Acid Fast - Sputum w/Smear (collected 23 Oct 2019 00:29)  Source: .Sputum Sputum    Culture - Acid Fast - Sputum w/Smear (collected 21 Oct 2019 20:34)  Source: .Sputum Sputum  Preliminary Report (23 Oct 2019 15:04):    Culture is being performed.    Culture - Acid Fast - Sputum w/Smear (collected 21 Oct 2019 08:32)  Source: .Sputum Sputum.CONICAL  Preliminary Report (23 Oct 2019 15:04):    Culture is being performed.    Culture - Blood (collected 19 Oct 2019 23:13)  Source: .Blood Blood-Peripheral  Final Report (25 Oct 2019 01:00):    No growth at 5 days.    Culture - Blood (collected 19 Oct 2019 23:13)  Source: .Blood Blood-Peripheral  Final Report (25 Oct 2019 01:00):    No growth at 5 days.    Cytopathology - Non Gyn Report (10.23.19 @ 14:20)    Cytopathology - Non Gyn Report:   ACCESSION No:  [id number]    PATO HICKMAN                          1  Cytopathology Report    Specimen(s) Submitted  Bronchial washings      Clinical History  69 year old female with newly diagnosed AIDS, hypoxemic  respiratory failure and ground glass opacities.  PCP pneumonia,  rule out MAC/TB    Final Diagnosis  NEGATIVE FOR MALIGNANT CELLS.  Specimen consists of  benign bronchial epithelial cells,  macrophages, lymphocytes, leukocytes and foamy material.  AFB stain for mycobacteria is negative.  GMS stain is positive for pneumocystis.    Screened by: Clayton Duckworth  Verified by: Clayton Duckworth    RADIOLOGY:  Xray Chest 1 View- PORTABLE-Routine (10.24.19 @ 06:53) >    COMPARISON: 10/21/2019    AP view of the chest demonstrates improvement inbilateral diffuse   airspace infiltrates. There is no pleural effusion. There is no   pneumothorax.    The heart is enlarged. There is no mediastinal or hilar mass.     The pulmonary vasculature is normal.     Mild thoracic degenerative changes are present.    IMPRESSION:    Diffuse bilateral airspace infiltrates improved but not resolved.      Assessment--  70 y/o F with PMHx of hypothyroidism presents with cough and congestion for over 3-4 weeks  in duration associated with intermittent fevers and cough . She has difficulty swallowing and increased cough with liquids. She has progressive in creased sob on minimal exertion . Has weight loss form poor appetite . has not been on any abx PTA. CXR and CTA chest dhows diffuse bilateral infiltrates with hilar and mediastinal adenopathy . As per speech therapy she has posiitve aspiration with thin liquids .    Her HIV test is positive, she denies any HIV risk factors . She has very low CD 4 count and very high HIV Viral load , so she most likely has PCP pneumonia and she was started on IV Bactrim with steroids , she just had  bronchoscopy to confirm it. Clinically he looks better . Sputum afb x 3 negative     She also has severe oral thrush and may have candidal esophagitis which may be the cause of her difficulty swallowing     Plan:  - will continue with IV bactrim and steroids , changed to po prednisone as per protocol   - if creatinine worsens may need to adjust dose of bactrim  - add gentle hydration   - continue  Diflucan 200 mg daily x 5 days   - monitor CBC and  renal function closely   - send HIV genosure to see if has any resistant virus , Hold starting ATV for at least 2-3 weeks   - add Zithromax 1200 once a week for MAC prophylaxis as CD4 is < 100   - social service consult to help obtain insurance    - monitor respiratory status may need telemetry   - add laxative as no BM in 3 days     Continue with present regime .  Appropriate use of antibiotics and adverse effects reviewed.    I have discussed the above plan of care with patient and family in detail. They expressed understanding of the treatment plan . Risks, benefits and alternatives discussed in detail. I have asked if they have any questions or concerns and appropriately addressed them to the best of my ability .      > 35 minutes spent in direct patient care reviewing  the notes, lab data/ imaging , discussion with multidisciplinary team. All questions were addressed and answered to the best of my capacity .    I will be away from 10.26 thru 10/27/19  , Dr. Dakota Donahue is covering me .      Thank you for allowing me to participate in the care of your patient .        Solo Owens MD  [phone]

## 2019-10-25 NOTE — CHART NOTE - NSCHARTNOTEFT_GEN_A_CORE
Called by RN for pt seeming anxious, having tachycardia, tachypneic, and labored breathing. RN stated that pt had desaturated to 80s on 5L NC and placed her on 10L NC, which helped a little. Pt admits to a sensation of "suffocation" that started after being transferred to the floor from ICU this morning. Pt states event was sudden and was laying in bed when it occurred. Denies chest pain, dizziness, or palpitations.       T(C): 37.1 (10-25-19 @ 01:35), Max: 37.1 (10-25-19 @ 01:35)  HR: 85 (10-25-19 @ 02:05) (85 - 117)  BP: 136/91 (10-25-19 @ 01:30) (91/52 - 136/91)  RR: 20 (10-25-19 @ 01:35) (19 - 42)  SpO2: 99% (10-25-19 @ 02:05) (76% - 100%)      Physical:  Gen- Appearing anxious, in acute respiratory distress, labored breathing.  HEENT- NCAT  Cardio - s+1,s+2, rrr, no murmur  Lung - cta b/l, no wheeze, no rhonchi, no rales   Abdomen- +BS, NT/ND, no guarding, no rebound  Ext- no edema, b/l DP 2+, 5/5 strength UE and LE   Neuro- No focal neurological deficits, sensation intact all 4 extremities         Assessment/Plan  68 y/o F PMH hypothyroidism admitted with sepsis and acute respiratory failure 2/2 multifocal pneumonia. Suspected PCP found to have +HIV status s/p bronchoscopy. Continue Bactrim 200mg IV Q6h, Prednisone 40mg PO BID, and Albuterol neb tx Q6h PRN.   - Ordered CXR in setting of respiratory distress  - Ordered EKG in setting of tachycardia and distress  - Switched to BIPAP in setting of respiratory distress.

## 2019-10-25 NOTE — PROGRESS NOTE ADULT - SUBJECTIVE AND OBJECTIVE BOX
CHIEF COMPLAINT/INTERVAL HISTORY:  Pt. seen and evaluated for acute respiratory failure and sepsis 2/2 multifocal pneumonia.  Events from over the night noted.  Was placed on BiPAP for hypoxia.  Pt. was not comfortable with BiPAP mask.  Switched to high flow nasal canula and reports feeling better.  Tolerating IV antibiotic.    REVIEW OF SYSTEMS:  No fever, CP, or abdominal pain.     Vital Signs Last 24 Hrs  T(C): 36.6 (25 Oct 2019 05:00), Max: 37.1 (25 Oct 2019 01:35)  T(F): 97.9 (25 Oct 2019 05:00), Max: 98.8 (25 Oct 2019 01:35)  HR: 88 (25 Oct 2019 10:30) (85 - 117)  BP: 122/81 (25 Oct 2019 05:00) (102/68 - 136/91)  BP(mean): 85 (24 Oct 2019 17:00) (78 - 85)  RR: 18 (25 Oct 2019 05:00) (18 - 42)  SpO2: 95% (25 Oct 2019 10:30) (76% - 100%)    PHYSICAL EXAM:  GENERAL: NAD  HEENT: EOMI, hearing normal, conjunctiva and sclera clear  Chest: diminished BS at bases, no wheezing  CV: S1S2, RRR,   GI: soft, +BS, NT/ND  Musculoskeletal: no edema  Psychiatric: affect nL, mood nL  Skin: warm and dry    LABS:                        8.9    2.10  )-----------( 191      ( 25 Oct 2019 08:36 )             26.9     10-25    137  |  108  |  29<H>  ----------------------------<  245<H>  4.5   |  21<L>  |  1.40<H>    Ca    8.1<L>      25 Oct 2019 08:36  Phos  2.7     10-25  Mg     1.8     10-25    TPro  5.4<L>  /  Alb  1.5<L>  /  TBili  <0.1<L>  /  DBili  x   /  AST  42<H>  /  ALT  22  /  AlkPhos  58  10-24    PT/INR - ( 25 Oct 2019 08:36 )   PT: 17.4 sec;   INR: 1.52 ratio         PTT - ( 25 Oct 2019 08:36 )  PTT:26.5 sec      Assessment and Plan:  -Sepsis and acute respiratory failure 2/2 multifocal pneumonia:  Suspected PCP with +HIV status.  s/p bronchoscopy.   Continue Bactrim 200mg IV Q6h, Prednisone 40mg PO BID, and Albuterol neb tx Q6h PRN.  O2 support as needed.  Bronchoscopy culture with normal respiratory jasbir.   Check cytology from bronchoscopy.  ID and Pulmonary f/u  -Dysphagia:  Tolerating dysphagia 2 nectar consistency diet  -Possible candidal esophagitis:  continue Fluconazole 200mg PO daily  -Atrial fibrillation:  continue Amiodarone 400mg PO BID and Eliquis 5mg PO BID.  Cardiology f/u  -NICOLASA: Renal function appears stable.  Continue to monitor.   -Hypothyroidism:  continue Synthroid 75mcg PO daily  -VTE ppx:  On Eliquis

## 2019-10-25 NOTE — PROGRESS NOTE ADULT - SUBJECTIVE AND OBJECTIVE BOX
Date/Time Patient Seen:  		  Referring MD:   Data Reviewed	       Patient is a 69y old  Female who presents with a chief complaint of pneumonia (24 Oct 2019 17:15)      Subjective/HPI     PAST MEDICAL & SURGICAL HISTORY:  Hypothyroidism, unspecified type  No significant past surgical history: denies surgical history but CT shows cholecystectomy        Medication list         MEDICATIONS  (STANDING):  amiodarone    Tablet 400 milliGRAM(s) Oral two times a day  apixaban 5 milliGRAM(s) Oral two times a day  dextrose 10% Bolus 125 milliLiter(s) IV Bolus once  dextrose 10% Bolus 250 milliLiter(s) IV Bolus once  dextrose 5%. 1000 milliLiter(s) (50 mL/Hr) IV Continuous <Continuous>  fluconAZOLE   Tablet 200 milliGRAM(s) Oral daily  insulin lispro (HumaLOG) corrective regimen sliding scale   SubCutaneous three times a day before meals  insulin lispro (HumaLOG) corrective regimen sliding scale   SubCutaneous at bedtime  lactobacillus acidophilus 1 Tablet(s) Oral three times a day  levothyroxine 75 MICROGram(s) Oral daily  multivitamin 1 Tablet(s) Oral daily  predniSONE   Tablet 40 milliGRAM(s) Oral two times a day  trimethoprim / sulfamethoxazole IVPB 200 milliGRAM(s) IV Intermittent every 6 hours    MEDICATIONS  (PRN):  acetaminophen   Tablet .. 650 milliGRAM(s) Oral every 6 hours PRN Temp greater or equal to 38C (100.4F), Mild Pain (1 - 3)  ALBUTerol    0.083% 2.5 milliGRAM(s) Nebulizer every 6 hours PRN Shortness of Breath and/or Wheezing  dextrose 40% Gel 15 Gram(s) Oral once PRN Blood Glucose LESS THAN 70 milliGRAM(s)/deciLiter  glucagon  Injectable 1 milliGRAM(s) IntraMuscular once PRN Glucose <70 milliGRAM(s)/deciLiter         Vitals log        ICU Vital Signs Last 24 Hrs  T(C): 36.6 (25 Oct 2019 05:00), Max: 37.1 (25 Oct 2019 01:35)  T(F): 97.9 (25 Oct 2019 05:00), Max: 98.8 (25 Oct 2019 01:35)  HR: 88 (25 Oct 2019 05:50) (85 - 117)  BP: 122/81 (25 Oct 2019 05:00) (100/59 - 136/91)  BP(mean): 85 (24 Oct 2019 17:00) (74 - 85)  ABP: --  ABP(mean): --  RR: 18 (25 Oct 2019 05:00) (18 - 42)  SpO2: 99% (25 Oct 2019 05:50) (76% - 100%)           Input and Output:  I&O's Detail    23 Oct 2019 07:01  -  24 Oct 2019 07:00  --------------------------------------------------------  IN:    lactated ringers.: 1800 mL    Oral Fluid: 50 mL    Solution: 1020 mL  Total IN: 2870 mL    OUT:    Voided: 1300 mL  Total OUT: 1300 mL    Total NET: 1570 mL      24 Oct 2019 07:01  -  25 Oct 2019 05:57  --------------------------------------------------------  IN:    lactated ringers.: 450 mL    Oral Fluid: 440 mL    Solution: 500 mL  Total IN: 1390 mL    OUT:    Voided: 800 mL  Total OUT: 800 mL    Total NET: 590 mL          Lab Data                        8.3    1.29  )-----------( 187      ( 24 Oct 2019 08:18 )             25.0     10-24    140  |  112<H>  |  30<H>  ----------------------------<  282<H>  4.0   |  20<L>  |  1.20    Ca    8.2<L>      24 Oct 2019 08:18  Phos  2.0     10-24  Mg     2.0     10-24    TPro  5.4<L>  /  Alb  1.5<L>  /  TBili  <0.1<L>  /  DBili  x   /  AST  42<H>  /  ALT  22  /  AlkPhos  58  10-24            Review of Systems	      Objective     Physical Examination    heart s1s2  lung dec BS      Pertinent Lab findings & Imaging      Gisela:  NO   Adequate UO     I&O's Detail    23 Oct 2019 07:01  -  24 Oct 2019 07:00  --------------------------------------------------------  IN:    lactated ringers.: 1800 mL    Oral Fluid: 50 mL    Solution: 1020 mL  Total IN: 2870 mL    OUT:    Voided: 1300 mL  Total OUT: 1300 mL    Total NET: 1570 mL      24 Oct 2019 07:01  -  25 Oct 2019 05:57  --------------------------------------------------------  IN:    lactated ringers.: 450 mL    Oral Fluid: 440 mL    Solution: 500 mL  Total IN: 1390 mL    OUT:    Voided: 800 mL  Total OUT: 800 mL    Total NET: 590 mL               Discussed with:     Cultures:	        Radiology

## 2019-10-25 NOTE — PROGRESS NOTE ADULT - ASSESSMENT
The patient is a 69 year old female with a history of hypothyroidism who is admitted with PNA.    Plan:  - Transition to amiodarone 400 mg bid for 3 more days and likely transition to 200 mg daily after  - Continue apixaban 5 mg bid for thromboprophylaxis  - Echocardiogram with normal LV systolic function, mild pulm HTN  - AFB negative  - HIV positive  - Possible PCP - on bactrim and steroids  - ID follow-up  - Bronchoscopy results pending  - Pulm follow-up

## 2019-10-25 NOTE — PROVIDER CONTACT NOTE (OTHER) - SITUATION
called in room by NA   o2 sat 76 % on 5L NC  RR 42  bp 136/91  hr 117 sinus tach per tele room  appears anxious  son at bedside

## 2019-10-25 NOTE — PHYSICAL THERAPY INITIAL EVALUATION ADULT - PERTINENT HX OF CURRENT PROBLEM, REHAB EVAL
68 y/o F with PMHx of hypothyroidism presents with cough and congestion for 2 weeks in duration. Patient states that for the last 10 days she has been having difficulty swallowing, solids more than liquids. Niece at bedside states that patient has been eating and drinking less but more so noticed patient coughing with water but not with regular food. She now has more of a cough and dyspnea.

## 2019-10-25 NOTE — PROVIDER CONTACT NOTE (OTHER) - RECOMMENDATIONS
MD ordered ekg and chest xray. both completed. supplemental o2 increased and pt now appears calm with o2 sat of 96%

## 2019-10-26 DIAGNOSIS — B20 HUMAN IMMUNODEFICIENCY VIRUS [HIV] DISEASE: ICD-10-CM

## 2019-10-26 DIAGNOSIS — B37.81 CANDIDAL ESOPHAGITIS: ICD-10-CM

## 2019-10-26 LAB
ANION GAP SERPL CALC-SCNC: 7 MMOL/L — SIGNIFICANT CHANGE UP (ref 5–17)
BASOPHILS # BLD AUTO: 0 K/UL — SIGNIFICANT CHANGE UP (ref 0–0.2)
BASOPHILS NFR BLD AUTO: 0 % — SIGNIFICANT CHANGE UP (ref 0–2)
BUN SERPL-MCNC: 28 MG/DL — HIGH (ref 7–23)
CALCIUM SERPL-MCNC: 7.6 MG/DL — LOW (ref 8.5–10.1)
CHLORIDE SERPL-SCNC: 109 MMOL/L — HIGH (ref 96–108)
CO2 SERPL-SCNC: 20 MMOL/L — LOW (ref 22–31)
CREAT SERPL-MCNC: 1.1 MG/DL — SIGNIFICANT CHANGE UP (ref 0.5–1.3)
EOSINOPHIL # BLD AUTO: 0 K/UL — SIGNIFICANT CHANGE UP (ref 0–0.5)
EOSINOPHIL NFR BLD AUTO: 0 % — SIGNIFICANT CHANGE UP (ref 0–6)
GLUCOSE SERPL-MCNC: 218 MG/DL — HIGH (ref 70–99)
HCT VFR BLD CALC: 24.1 % — LOW (ref 34.5–45)
HGB BLD-MCNC: 8 G/DL — LOW (ref 11.5–15.5)
HYPOCHROMIA BLD QL: SLIGHT — SIGNIFICANT CHANGE UP
LG PLATELETS BLD QL AUTO: SLIGHT — SIGNIFICANT CHANGE UP
LYMPHOCYTES # BLD AUTO: 0.09 K/UL — LOW (ref 1–3.3)
LYMPHOCYTES # BLD AUTO: 5 % — LOW (ref 13–44)
MAGNESIUM SERPL-MCNC: 2 MG/DL — SIGNIFICANT CHANGE UP (ref 1.6–2.6)
MANUAL SMEAR VERIFICATION: SIGNIFICANT CHANGE UP
MCHC RBC-ENTMCNC: 29.5 PG — SIGNIFICANT CHANGE UP (ref 27–34)
MCHC RBC-ENTMCNC: 33.2 GM/DL — SIGNIFICANT CHANGE UP (ref 32–36)
MCV RBC AUTO: 88.9 FL — SIGNIFICANT CHANGE UP (ref 80–100)
MICROCYTES BLD QL: SLIGHT — SIGNIFICANT CHANGE UP
MONOCYTES # BLD AUTO: 0.05 K/UL — SIGNIFICANT CHANGE UP (ref 0–0.9)
MONOCYTES NFR BLD AUTO: 3 % — SIGNIFICANT CHANGE UP (ref 2–14)
NEUTROPHILS # BLD AUTO: 1.5 K/UL — LOW (ref 1.8–7.4)
NEUTROPHILS NFR BLD AUTO: 81 % — HIGH (ref 43–77)
NEUTS BAND # BLD: 7 % — SIGNIFICANT CHANGE UP (ref 0–8)
NRBC # BLD: 0 — SIGNIFICANT CHANGE UP
NRBC # BLD: SIGNIFICANT CHANGE UP /100 WBCS (ref 0–0)
NT-PROBNP SERPL-SCNC: 6310 PG/ML — HIGH (ref 0–125)
PLAT MORPH BLD: NORMAL — SIGNIFICANT CHANGE UP
PLATELET # BLD AUTO: 171 K/UL — SIGNIFICANT CHANGE UP (ref 150–400)
POTASSIUM SERPL-MCNC: 5.2 MMOL/L — SIGNIFICANT CHANGE UP (ref 3.5–5.3)
POTASSIUM SERPL-SCNC: 5.2 MMOL/L — SIGNIFICANT CHANGE UP (ref 3.5–5.3)
RBC # BLD: 2.71 M/UL — LOW (ref 3.8–5.2)
RBC # FLD: 13.7 % — SIGNIFICANT CHANGE UP (ref 10.3–14.5)
RBC BLD AUTO: SIGNIFICANT CHANGE UP
SODIUM SERPL-SCNC: 136 MMOL/L — SIGNIFICANT CHANGE UP (ref 135–145)
VARIANT LYMPHS # BLD: 4 % — SIGNIFICANT CHANGE UP (ref 0–6)
WBC # BLD: 1.71 K/UL — LOW (ref 3.8–10.5)
WBC # FLD AUTO: 1.71 K/UL — LOW (ref 3.8–10.5)

## 2019-10-26 PROCEDURE — 99232 SBSQ HOSP IP/OBS MODERATE 35: CPT

## 2019-10-26 RX ADMIN — Medication 175 MILLIGRAM(S): at 09:25

## 2019-10-26 RX ADMIN — Medication 75 MICROGRAM(S): at 05:46

## 2019-10-26 RX ADMIN — Medication 4: at 12:23

## 2019-10-26 RX ADMIN — APIXABAN 5 MILLIGRAM(S): 2.5 TABLET, FILM COATED ORAL at 17:00

## 2019-10-26 RX ADMIN — AMIODARONE HYDROCHLORIDE 400 MILLIGRAM(S): 400 TABLET ORAL at 17:00

## 2019-10-26 RX ADMIN — Medication 1 TABLET(S): at 05:46

## 2019-10-26 RX ADMIN — Medication 4: at 08:17

## 2019-10-26 RX ADMIN — SODIUM CHLORIDE 75 MILLILITER(S): 9 INJECTION, SOLUTION INTRAVENOUS at 00:10

## 2019-10-26 RX ADMIN — Medication 175 MILLIGRAM(S): at 15:51

## 2019-10-26 RX ADMIN — Medication 2: at 22:33

## 2019-10-26 RX ADMIN — Medication 40 MILLIGRAM(S): at 05:46

## 2019-10-26 RX ADMIN — FLUCONAZOLE 200 MILLIGRAM(S): 150 TABLET ORAL at 12:23

## 2019-10-26 RX ADMIN — APIXABAN 5 MILLIGRAM(S): 2.5 TABLET, FILM COATED ORAL at 05:46

## 2019-10-26 RX ADMIN — Medication 1 TABLET(S): at 21:11

## 2019-10-26 RX ADMIN — Medication 175 MILLIGRAM(S): at 21:11

## 2019-10-26 RX ADMIN — Medication 1 TABLET(S): at 12:42

## 2019-10-26 RX ADMIN — AMIODARONE HYDROCHLORIDE 400 MILLIGRAM(S): 400 TABLET ORAL at 05:46

## 2019-10-26 RX ADMIN — Medication 4: at 17:00

## 2019-10-26 RX ADMIN — Medication 175 MILLIGRAM(S): at 04:01

## 2019-10-26 RX ADMIN — Medication 1 TABLET(S): at 12:23

## 2019-10-26 NOTE — PROGRESS NOTE ADULT - SUBJECTIVE AND OBJECTIVE BOX
infectious diseases progress note:    PATO HICKMAN is a 69y y. o. Female patient    Patient with no new concerns    Allergies    No Known Allergies    Intolerances        ANTIBIOTICS/RELEVANT:  antimicrobials  fluconAZOLE   Tablet 200 milliGRAM(s) Oral daily  trimethoprim / sulfamethoxazole IVPB 200 milliGRAM(s) IV Intermittent every 6 hours    immunologic:    OTHER:  acetaminophen   Tablet .. 650 milliGRAM(s) Oral every 6 hours PRN  ALBUTerol    0.083% 2.5 milliGRAM(s) Nebulizer every 6 hours PRN  amiodarone    Tablet 400 milliGRAM(s) Oral two times a day  apixaban 5 milliGRAM(s) Oral two times a day  dextrose 10% Bolus 125 milliLiter(s) IV Bolus once  dextrose 10% Bolus 250 milliLiter(s) IV Bolus once  dextrose 40% Gel 15 Gram(s) Oral once PRN  dextrose 5%. 1000 milliLiter(s) IV Continuous <Continuous>  glucagon  Injectable 1 milliGRAM(s) IntraMuscular once PRN  insulin lispro (HumaLOG) corrective regimen sliding scale   SubCutaneous three times a day before meals  insulin lispro (HumaLOG) corrective regimen sliding scale   SubCutaneous at bedtime  lactobacillus acidophilus 1 Tablet(s) Oral three times a day  levothyroxine 75 MICROGram(s) Oral daily  magnesium hydroxide Suspension 30 milliLiter(s) Oral daily PRN  multivitamin 1 Tablet(s) Oral daily  predniSONE   Tablet 40 milliGRAM(s) Oral daily  sodium chloride 0.45%. 1000 milliLiter(s) IV Continuous <Continuous>      Objective:  Last 24-Vital Signs Last 24 Hrs  T(C): 36.8 (26 Oct 2019 04:38), Max: 36.8 (26 Oct 2019 04:38)  T(F): 98.2 (26 Oct 2019 04:38), Max: 98.2 (26 Oct 2019 04:38)  HR: 77 (26 Oct 2019 07:45) (77 - 97)  BP: 119/78 (26 Oct 2019 04:38) (116/78 - 119/78)  BP(mean): --  RR: 19 (26 Oct 2019 04:38) (18 - 19)  SpO2: 95% (26 Oct 2019 07:45) (95% - 97%)    T(C): 36.8 (10-26-19 @ 04:38), Max: 37.1 (10-25-19 @ 01:35)  T(F): 98.2 (10-26-19 @ 04:38), Max: 98.8 (10-25-19 @ 01:35)  T(C): 36.8 (10-26-19 @ 04:38), Max: 37.1 (10-25-19 @ 01:35)  T(F): 98.2 (10-26-19 @ 04:38), Max: 98.8 (10-25-19 @ 01:35)  T(C): 36.8 (10-26-19 @ 04:38), Max: 37.2 (10-22-19 @ 12:00)  T(F): 98.2 (10-26-19 @ 04:38), Max: 98.9 (10-22-19 @ 12:00)    PHYSICAL EXAM:  Constitutional: Well-developed, well nourished  Eyes: PERRLA, EOMI  Ear/Nose/Throat: oropharynx w thrush  Neck: no JVD, no lymphadenopathy, supple  Respiratory: no accessory muscle use, lung fields bilaterally clear  Cardiovascular: RRR, normal S1, S2 no m/r/g  Gastrointestinal: soft, NT, no HSM, BS-normal  Extremities: no clubbing, no cyanosis, edema absent  Neuro: patient alert, oriented and appropriate  Skin: no sig lesions      LABS:                        8.0    1.71  )-----------( 171      ( 26 Oct 2019 08:17 )             24.1       WBC 1.71  10-26 @ 08:17  WBC 2.10  10-25 @ 08:36  WBC 1.29  10-24 @ 08:18  WBC 1.15  10-24 @ 05:33  WBC 3.60  10-23 @ 05:57  WBC 6.45  10-22 @ 05:52  WBC 10.91  10-21 @ 05:01  WBC 12.81  10-20 @ 07:36  WBC 14.34  10-19 @ 19:04      10-26    136  |  109<H>  |  28<H>  ----------------------------<  218<H>  5.2   |  20<L>  |  1.10    Ca    7.6<L>      26 Oct 2019 08:17  Phos  2.7     10-25  Mg     2.0     10-26        Creatinine, Serum: 1.10 mg/dL (10-26-19 @ 08:17)  Creatinine, Serum: 1.40 mg/dL (10-25-19 @ 08:36)  Creatinine, Serum: 1.20 mg/dL (10-24-19 @ 08:18)  Creatinine, Serum: 1.30 mg/dL (10-24-19 @ 05:33)  Creatinine, Serum: 1.20 mg/dL (10-23-19 @ 05:57)  Creatinine, Serum: 1.40 mg/dL (10-22-19 @ 05:52)  Creatinine, Serum: 1.10 mg/dL (10-21-19 @ 05:01)  Creatinine, Serum: 1.30 mg/dL (10-20-19 @ 07:36)  Creatinine, Serum: 1.40 mg/dL (10-19-19 @ 19:04)      PT/INR - ( 25 Oct 2019 08:36 )   PT: 17.4 sec;   INR: 1.52 ratio         PTT - ( 25 Oct 2019 08:36 )  PTT:26.5 sec          MICROBIOLOGY:    CD4 %: 7 % (10.22.19 @ 09:21)  ABS CD4: 42 /uL (10.22.19 @ 09:21)    HIV-1 RNA Quantitative, Viral Load:   6,186,564 (10.22.19 @ 08:11)      RADIOLOGY & ADDITIONAL STUDIES:    < from: Xray Chest 1 View-PORTABLE IMMEDIATE (10.25.19 @ 01:35) >  EXAM:  XR CHEST PORTABLE IMMED 1V                            PROCEDURE DATE:  10/25/2019          INTERPRETATION:  Follow-up.    AP chest. Prior 10/24/2019.    Low lung volumes. No change heart mediastinum. Extensive bilateral   airspace disease grossly unchanged. No pleural collection pneumothorax or   other new abnormality

## 2019-10-26 NOTE — PROGRESS NOTE ADULT - ASSESSMENT
70 y/o Dilan speaking F with PMHx of hypothyroidism admitted with cytology confirmed PCP PNA, newly diagnosed HIV with viral load greater than 6 million and CD4 count <50 and candidal esophagitis

## 2019-10-26 NOTE — PROGRESS NOTE ADULT - SUBJECTIVE AND OBJECTIVE BOX
Patient seen and examined;  Chart reviewed and events noted;   continues on high flow oxygen; niece who speaks english fluently at bedside      MEDICATIONS  (STANDING):  amiodarone    Tablet 400 milliGRAM(s) Oral two times a day  apixaban 5 milliGRAM(s) Oral two times a day  dextrose 10% Bolus 125 milliLiter(s) IV Bolus once  dextrose 10% Bolus 250 milliLiter(s) IV Bolus once  dextrose 5%. 1000 milliLiter(s) (50 mL/Hr) IV Continuous <Continuous>  fluconAZOLE   Tablet 200 milliGRAM(s) Oral daily  insulin lispro (HumaLOG) corrective regimen sliding scale   SubCutaneous three times a day before meals  insulin lispro (HumaLOG) corrective regimen sliding scale   SubCutaneous at bedtime  lactobacillus acidophilus 1 Tablet(s) Oral three times a day  levothyroxine 75 MICROGram(s) Oral daily  multivitamin 1 Tablet(s) Oral daily  predniSONE   Tablet 40 milliGRAM(s) Oral daily  trimethoprim / sulfamethoxazole IVPB 200 milliGRAM(s) IV Intermittent every 6 hours    MEDICATIONS  (PRN):  acetaminophen   Tablet .. 650 milliGRAM(s) Oral every 6 hours PRN Temp greater or equal to 38C (100.4F), Mild Pain (1 - 3)  ALBUTerol    0.083% 2.5 milliGRAM(s) Nebulizer every 6 hours PRN Shortness of Breath and/or Wheezing  dextrose 40% Gel 15 Gram(s) Oral once PRN Blood Glucose LESS THAN 70 milliGRAM(s)/deciLiter  glucagon  Injectable 1 milliGRAM(s) IntraMuscular once PRN Glucose <70 milliGRAM(s)/deciLiter  magnesium hydroxide Suspension 30 milliLiter(s) Oral daily PRN Constipation      Vital Signs Last 24 Hrs  T(C): 36.8 (26 Oct 2019 04:38), Max: 36.8 (26 Oct 2019 04:38)  T(F): 98.2 (26 Oct 2019 04:38), Max: 98.2 (26 Oct 2019 04:38)  HR: 77 (26 Oct 2019 07:45) (77 - 97)  BP: 119/78 (26 Oct 2019 04:38) (116/78 - 119/78)  RR: 19 (26 Oct 2019 04:38) (18 - 19)  SpO2: 95% (26 Oct 2019 07:45) (95% - 97%) on high flow O2    PHYSICAL EXAM  General: adult thin  woman  HEENT: anicteric sclera, pink conjunctivae; + oral thrush with white covering tongue  Neck: supple  CV: normal S1S2; tachycardia after being moved   Lungs: + weehzing mostly at bases  Abdomen: soft non-tender non-distended, no hepato/splenomegaly  Ext: no clubbing cyanosis or edema  Skin: no rashes and no petichiae  Neuro: alert and oriented X3 no focal deficits      LABS:                        8.0    1.71  )-----------( 171      ( 26 Oct 2019 08:17 )             24.1     WBC Count: 1.71 K/uL (10-26 @ 08:17)  WBC Count: 2.10 K/uL (10-25 @ 08:36)  WBC Count: 1.29 K/uL (10-24 @ 08:18)  WBC Count: 1.15 K/uL (10-24 @ 05:33)  WBC Count: 3.60 K/uL (10-23 @ 05:57)    Hemoglobin: 8.0 g/dL (10-26 @ 08:17)  Hemoglobin: 8.9 g/dL (10-25 @ 08:36)  Hemoglobin: 8.3 g/dL (10-24 @ 08:18)  Hemoglobin: 7.3 g/dL (10-24 @ 05:33)  Hemoglobin: 8.7 g/dL (10-23 @ 05:57)    10-26    136  |  109<H>  |  28<H>  ----------------------------<  218<H>  5.2   |  20<L>  |  1.10    Ca    7.6<L>      26 Oct 2019 08:17  Phos  2.7     10-25  Mg     2.0     10-26      PT/INR - ( 25 Oct 2019 08:36 )   PT: 17.4 sec;   INR: 1.52 ratio    PTT - ( 25 Oct 2019 08:36 )  PTT:26.5 sec

## 2019-10-26 NOTE — PROGRESS NOTE ADULT - SUBJECTIVE AND OBJECTIVE BOX
Date/Time Patient Seen:  		  Referring MD:   Data Reviewed	       Patient is a 69y old  Female who presents with a chief complaint of pneumonia (25 Oct 2019 14:17)      Subjective/HPI     PAST MEDICAL & SURGICAL HISTORY:  Hypothyroidism, unspecified type  No significant past surgical history: denies surgical history but CT shows cholecystectomy        Medication list         MEDICATIONS  (STANDING):  amiodarone    Tablet 400 milliGRAM(s) Oral two times a day  apixaban 5 milliGRAM(s) Oral two times a day  dextrose 10% Bolus 125 milliLiter(s) IV Bolus once  dextrose 10% Bolus 250 milliLiter(s) IV Bolus once  dextrose 5%. 1000 milliLiter(s) (50 mL/Hr) IV Continuous <Continuous>  fluconAZOLE   Tablet 200 milliGRAM(s) Oral daily  insulin lispro (HumaLOG) corrective regimen sliding scale   SubCutaneous three times a day before meals  insulin lispro (HumaLOG) corrective regimen sliding scale   SubCutaneous at bedtime  lactobacillus acidophilus 1 Tablet(s) Oral three times a day  levothyroxine 75 MICROGram(s) Oral daily  multivitamin 1 Tablet(s) Oral daily  predniSONE   Tablet 40 milliGRAM(s) Oral daily  sodium chloride 0.45%. 1000 milliLiter(s) (75 mL/Hr) IV Continuous <Continuous>  trimethoprim / sulfamethoxazole IVPB 200 milliGRAM(s) IV Intermittent every 6 hours    MEDICATIONS  (PRN):  acetaminophen   Tablet .. 650 milliGRAM(s) Oral every 6 hours PRN Temp greater or equal to 38C (100.4F), Mild Pain (1 - 3)  ALBUTerol    0.083% 2.5 milliGRAM(s) Nebulizer every 6 hours PRN Shortness of Breath and/or Wheezing  dextrose 40% Gel 15 Gram(s) Oral once PRN Blood Glucose LESS THAN 70 milliGRAM(s)/deciLiter  glucagon  Injectable 1 milliGRAM(s) IntraMuscular once PRN Glucose <70 milliGRAM(s)/deciLiter  magnesium hydroxide Suspension 30 milliLiter(s) Oral daily PRN Constipation         Vitals log        ICU Vital Signs Last 24 Hrs  T(C): 36.8 (26 Oct 2019 04:38), Max: 36.8 (26 Oct 2019 04:38)  T(F): 98.2 (26 Oct 2019 04:38), Max: 98.2 (26 Oct 2019 04:38)  HR: 81 (26 Oct 2019 04:38) (81 - 102)  BP: 119/78 (26 Oct 2019 04:38) (116/78 - 119/78)  BP(mean): --  ABP: --  ABP(mean): --  RR: 19 (26 Oct 2019 04:38) (18 - 19)  SpO2: 96% (26 Oct 2019 04:38) (95% - 98%)           Input and Output:  I&O's Detail    24 Oct 2019 07:01  -  25 Oct 2019 07:00  --------------------------------------------------------  IN:    lactated ringers.: 450 mL    Oral Fluid: 440 mL    Solution: 500 mL  Total IN: 1390 mL    OUT:    Voided: 800 mL  Total OUT: 800 mL    Total NET: 590 mL      25 Oct 2019 07:01  -  26 Oct 2019 05:57  --------------------------------------------------------  IN:    sodium chloride 0.45%.: 675 mL  Total IN: 675 mL    OUT:  Total OUT: 0 mL    Total NET: 675 mL          Lab Data                        8.9    2.10  )-----------( 191      ( 25 Oct 2019 08:36 )             26.9     10-25    137  |  108  |  29<H>  ----------------------------<  245<H>  4.5   |  21<L>  |  1.40<H>    Ca    8.1<L>      25 Oct 2019 08:36  Phos  2.7     10-25  Mg     1.8     10-25    TPro  5.4<L>  /  Alb  1.5<L>  /  TBili  <0.1<L>  /  DBili  x   /  AST  42<H>  /  ALT  22  /  AlkPhos  58  10-24            Review of Systems	      Objective     Physical Examination    heart s1s2  lung dc BS  abd soft      Pertinent Lab findings & Imaging      Gisela:  NO   Adequate UO     I&O's Detail    24 Oct 2019 07:01  -  25 Oct 2019 07:00  --------------------------------------------------------  IN:    lactated ringers.: 450 mL    Oral Fluid: 440 mL    Solution: 500 mL  Total IN: 1390 mL    OUT:    Voided: 800 mL  Total OUT: 800 mL    Total NET: 590 mL      25 Oct 2019 07:01  -  26 Oct 2019 05:57  --------------------------------------------------------  IN:    sodium chloride 0.45%.: 675 mL  Total IN: 675 mL    OUT:  Total OUT: 0 mL    Total NET: 675 mL               Discussed with:     Cultures:	        Radiology

## 2019-10-26 NOTE — PROGRESS NOTE ADULT - ASSESSMENT
The patient is a 69 year old female with a history of hypothyroidism who is admitted with PNA.    10/26/19  Patient sleeping, lying flat  Easily arousable   sleeping at bedside    Plan:  - Transition to amiodarone 400 mg bid for 2 more days and likely transition to 200 mg daily after  - Continue apixaban 5 mg bid for thromboprophylaxis  - Echocardiogram with normal LV systolic function, mild pulm HTN-see above  - AFB negative  - HIV positive  - Possible PCP - on bactrim and steroids  - ID and pulmonary follow-up  - Pulm follow-up

## 2019-10-26 NOTE — PROGRESS NOTE ADULT - PROBLEM SELECTOR PLAN 2
delay of start of ART as recommended with genotyping prior to therapy selection  azithro for prophylaxis with CD4 count

## 2019-10-26 NOTE — PROGRESS NOTE ADULT - SUBJECTIVE AND OBJECTIVE BOX
CHIEF COMPLAINT/INTERVAL HISTORY:  Pt. seen and evaluated for sepsis and acute respiratory failure 2/2 PCP pneumonia.  Pt. is on high flow nasal canula.  Reports respiratory status is stable.  Tolerating IV antibiotic.     REVIEW OF SYSTEMS:  No fever, CP, acute respiratory distress, or abdominal pain    Vital Signs Last 24 Hrs  T(C): 36.8 (26 Oct 2019 04:38), Max: 36.8 (26 Oct 2019 04:38)  T(F): 98.2 (26 Oct 2019 04:38), Max: 98.2 (26 Oct 2019 04:38)  HR: 77 (26 Oct 2019 07:45) (77 - 97)  BP: 119/78 (26 Oct 2019 04:38) (116/78 - 119/78)  BP(mean): --  RR: 19 (26 Oct 2019 04:38) (18 - 19)  SpO2: 95% (26 Oct 2019 07:45) (95% - 97%)    PHYSICAL EXAM:  GENERAL: NAD  HEENT: EOMI, hearing normal, conjunctiva and sclera clear, oh high flow nasal canula  Chest: Diminished BS bilaterally, no wheezing  CV: S1S2, RRR,   GI: soft, +BS, NT/ND  Musculoskeletal: no edema  Psychiatric: affect nL, mood nL  Skin: warm and dry    LABS:                        8.0    1.71  )-----------( 171      ( 26 Oct 2019 08:17 )             24.1     10-26    136  |  109<H>  |  28<H>  ----------------------------<  218<H>  5.2   |  20<L>  |  1.10    Ca    7.6<L>      26 Oct 2019 08:17  Phos  2.7     10-25  Mg     2.0     10-26      PT/INR - ( 25 Oct 2019 08:36 )   PT: 17.4 sec;   INR: 1.52 ratio         PTT - ( 25 Oct 2019 08:36 )  PTT:26.5 sec      Assessment and Plan:  -Sepsis and acute respiratory failure 2/2 multifocal pneumonia:  PCP pneumonia with +HIV status.  s/p bronchoscopy.   Continue Bactrim 200mg IV Q6h, Prednisone 40mg PO daily, and Albuterol neb tx Q6h PRN.  O2 support as needed.  Bronchoscopy culture with normal respiratory jabsir.   AFB negative x 4.  Cytology negative for malignant cells.  ID and Pulmonary f/u  -Dysphagia:  Tolerating dysphagia 2 nectar consistency diet  -Possible candidal esophagitis:  continue Fluconazole 200mg PO daily  -Atrial fibrillation:  continue Amiodarone 400mg PO BID and Eliquis 5mg PO BID.  Cardiology f/u  -NICOLASA: Renal function appears stable.  Continue to monitor.   -Hypothyroidism:  continue Synthroid 75mcg PO daily  -VTE ppx:  On Eliquis

## 2019-10-26 NOTE — PROGRESS NOTE ADULT - ASSESSMENT
[ASSESSMENT and  PLAN]  70yo [place] F with newly diagnosed HIV AIDS; admitted with pneumonia (PCP/PJP)    - heme asked to evaluated for noted leukopenia which acutely worsened after initiation of bactrim IV   - suspect this is likely multifactorial with acute infection and from bactrim;  patient had normal/elevated WBC on 10/19/19 at time of admission  - okay to continue as ANC still adequate  - ARVT not yet started, pending genotype and resistance panel; ID following  - continue supportive care from Heme/Onc standpoint;   - follow CBC

## 2019-10-26 NOTE — PROGRESS NOTE ADULT - SUBJECTIVE AND OBJECTIVE BOX
Patient is a 69y Female with a known history of :  Pneumocystis jiroveci pneumonia (B59)  Pneumonia (J18.9)  Acute respiratory failure with hypoxia (J96.01)  Acute pulmonary edema (J81.0)  Atrial fibrillation, unspecified type (I48.91)  Need for prophylactic measure (Z29.9)  Hypothyroidism, unspecified type (E03.9)  NICOLASA (acute kidney injury) (N17.9)  Hypoxia (R09.02)  Tachycardia (R00.0)  Dysphagia (R13.10)  Pneumonia of both lungs due to infectious organism, unspecified part of lung (J18.9)  Sepsis (A41.9)    HPI:  68 y/o F with PMHx of hypothyroidism presents with cough and congestion for 2 weeks in duration. Patient states that for the last 10 days she has been having difficulty swallowing, solids more than liquids. Niece at bedside states that patient has been eating and drinking less but more so noticed patient coughing with water but not with regular food. She now has more of a cough and dyspnea. She had a Tmax of 101.4 at home. Of note, she went to urgent care 1 month ago as she was feeling unwell and was found to be in SVT with HR sustaining in 170s. Was advised to go to the ER at that time but never followed up as she felt better and does not have insurance. Denies chest pain, palpitations, abdominal pain, n/v/d. Denies sick contacts or myalgias.     In the ED, patient met septic criteria with fever 101.4, . Labs significant for WBC 14.34, ddimer 765, BUN/Cr 47/1.40. CXR showed bilateral pna more prominent on right (personal read). CTA was negative for PE showed nonspecific groundglass opacities in both lungs. Nodular opacities in the left upper lobe and   lingula concerning for infection, neoplasm cannot be ruled out. EKG sinus tachycardia at 104bpm. (19 Oct 2019 23:08)      REVIEW OF SYSTEMS:    CONSTITUTIONAL: No fever, weight loss, or fatigue  EYES: No eye pain, visual disturbances, or discharge  ENMT:  No difficulty hearing, tinnitus, vertigo; No sinus or throat pain  NECK: No pain or stiffness  BREASTS: No pain, masses, or nipple discharge  RESPIRATORY: No cough, wheezing, chills or hemoptysis; No shortness of breath  CARDIOVASCULAR: No chest pain, palpitations, dizziness, or leg swelling  GASTROINTESTINAL: No abdominal or epigastric pain. No nausea, vomiting, or hematemesis; No diarrhea or constipation. No melena or hematochezia.  GENITOURINARY: No dysuria, frequency, hematuria, or incontinence  NEUROLOGICAL: No headaches, memory loss, loss of strength, numbness, or tremors  SKIN: No itching, burning, rashes, or lesions   LYMPH NODES: No enlarged glands  ENDOCRINE: No heat or cold intolerance; No hair loss  MUSCULOSKELETAL: No joint pain or swelling; No muscle, back, or extremity pain  PSYCHIATRIC: No depression, anxiety, mood swings, or difficulty sleeping  HEME/LYMPH: No easy bruising, or bleeding gums  ALLERGY AND IMMUNOLOGIC: No hives or eczema    MEDICATIONS  (STANDING):  amiodarone    Tablet 400 milliGRAM(s) Oral two times a day  apixaban 5 milliGRAM(s) Oral two times a day  dextrose 10% Bolus 125 milliLiter(s) IV Bolus once  dextrose 10% Bolus 250 milliLiter(s) IV Bolus once  dextrose 5%. 1000 milliLiter(s) (50 mL/Hr) IV Continuous <Continuous>  fluconAZOLE   Tablet 200 milliGRAM(s) Oral daily  insulin lispro (HumaLOG) corrective regimen sliding scale   SubCutaneous three times a day before meals  insulin lispro (HumaLOG) corrective regimen sliding scale   SubCutaneous at bedtime  lactobacillus acidophilus 1 Tablet(s) Oral three times a day  levothyroxine 75 MICROGram(s) Oral daily  multivitamin 1 Tablet(s) Oral daily  predniSONE   Tablet 40 milliGRAM(s) Oral daily  sodium chloride 0.45%. 1000 milliLiter(s) (75 mL/Hr) IV Continuous <Continuous>  trimethoprim / sulfamethoxazole IVPB 200 milliGRAM(s) IV Intermittent every 6 hours    MEDICATIONS  (PRN):  acetaminophen   Tablet .. 650 milliGRAM(s) Oral every 6 hours PRN Temp greater or equal to 38C (100.4F), Mild Pain (1 - 3)  ALBUTerol    0.083% 2.5 milliGRAM(s) Nebulizer every 6 hours PRN Shortness of Breath and/or Wheezing  dextrose 40% Gel 15 Gram(s) Oral once PRN Blood Glucose LESS THAN 70 milliGRAM(s)/deciLiter  glucagon  Injectable 1 milliGRAM(s) IntraMuscular once PRN Glucose <70 milliGRAM(s)/deciLiter  magnesium hydroxide Suspension 30 milliLiter(s) Oral daily PRN Constipation      ALLERGIES: No Known Allergies      FAMILY HISTORY:  FH: type 2 diabetes: in father      Social history:  Alochol:   Smoking:   Drug Use:   Marital Status:     PHYSICAL EXAMINATION:  -----------------------------  T(C): 36.8 (10-26-19 @ 04:38), Max: 36.8 (10-26-19 @ 04:38)  HR: 81 (10-26-19 @ 04:38) (81 - 102)  BP: 119/78 (10-26-19 @ 04:38) (116/78 - 119/78)  RR: 19 (10-26-19 @ 04:38) (18 - 19)  SpO2: 96% (10-26-19 @ 04:38) (95% - 98%)  Wt(kg): --    10-25 @ 07:01  -  10-26 @ 07:00  --------------------------------------------------------  IN:    sodium chloride 0.45%.: 825 mL  Total IN: 825 mL    OUT:  Total OUT: 0 mL    Total NET: 825 mL            Constitutional: well developed, normal appearance, well groomed, well nourished, no deformities and no acute distress.   Eyes: the conjunctiva exhibited no abnormalities and the eyelids demonstrated no xanthelasmas.   HEENT: normal oral mucosa, no oral pallor and no oral cyanosis.   Neck: normal jugular venous A waves present, normal jugular venous V waves present and no jugular venous chavez A waves.   Pulmonary: no respiratory distress, normal respiratory rhythm and effort, no accessory muscle use and lungs were clear to auscultation bilaterally. Anteriorly  Cardiovascular: heart rate and rhythm were normal, normal S1 and S2 and no murmur, gallop, rub, heave or thrill are present.   Musculoskeletal: the gait could not be assessed.  Extremities: no clubbing of the fingernails, no localized cyanosis, no petechial hemorrhages and no ischemic changes.   Skin: normal skin color and pigmentation, no rash, no venous stasis, no skin lesions, no skin ulcer and no xanthoma was observed.     LABS:   --------  10-25    137  |  108  |  29<H>  ----------------------------<  245<H>  4.5   |  21<L>  |  1.40<H>    Ca    8.1<L>      25 Oct 2019 08:36  Phos  2.7     10-25  Mg     1.8     10-25    TPro  5.4<L>  /  Alb  1.5<L>  /  TBili  <0.1<L>  /  DBili  x   /  AST  42<H>  /  ALT  22  /  AlkPhos  58  10-24                         8.9    2.10  )-----------( 191      ( 25 Oct 2019 08:36 )             26.9     PT/INR - ( 25 Oct 2019 08:36 )   PT: 17.4 sec;   INR: 1.52 ratio         PTT - ( 25 Oct 2019 08:36 )  PTT:26.5 sec              Radiology:    < from: TTE Echo Doppler w/o Cont (10.21.19 @ 12:47) >     EXAM:  ECHO TTE WO CON COMP W DOPPLR         PROCEDURE DATE:  10/21/2019        INTERPRETATION:  Ordering Physician: NILAY GUERRA [id number]    Indication: Atrial fibrillation    Technician: AS    Study Quality: Technically difficult   A complete echocardiographic study was performed utilizing standard   protocol including spectral and color Doppler in all echocardiographic   windows.    Height: N/A  Weight: 54 kg  BSA: 1.43 mmHg  Blood Pressure: 106/59 mmHg    MEASUREMENTS  IVS: 0.9 cm  PWT: 0.9 cm  LA: 2.5 cm  AO: 3.6 cm  LVIDd: 4.1 cm  LVIDs: 1.9 cm    LVEF: 70%  RVSP: 40 mmHg  RA Pressure: 3 mmHg    FINDINGS  Left Ventricle: The left ventricle is normal in size, wall thickness, and   systolic function. No wall motion abnormalities. Estimated EF is 70%.  Right Ventricle: The right ventricle is normal in size and function.  Left Atrium: The left atrium is normal in size.  Right Atrium: The right atrium is normal in size.  Mitral Valve: Mitral annular calcification. Trace mitral regurgitation.  Aortic Valve: The aortic valve is grossly normal. No aortic regurgitation.  Tricuspid Valve: The tricuspid valve is structurally normal. Mild   tricuspid regurgitation. Estimated pulmonary artery systolic pressure is   40 mmHg.  PulmonicValve: The pulmonic valve is not well-visualized.  Diastolic Function: Impaired relaxation secondary to age. Normal   diastolic filling pressures.  Pericardium/Pleura: No pericardial effusion visualized.  Aorta: The aortic root is normal in size.    CONCLUSIONS:  1. Normal left ventricular systolic function.  2. Mild pulmonary hypertension.                    LUIS ARMANDO RODRIGEZ   This document has been electronically signed. Oct 22 2019  7:24AM                < end of copied text >

## 2019-10-27 ENCOUNTER — TRANSCRIPTION ENCOUNTER (OUTPATIENT)
Age: 69
End: 2019-10-27

## 2019-10-27 LAB
ANION GAP SERPL CALC-SCNC: 8 MMOL/L — SIGNIFICANT CHANGE UP (ref 5–17)
BUN SERPL-MCNC: 26 MG/DL — HIGH (ref 7–23)
CALCIUM SERPL-MCNC: 8.2 MG/DL — LOW (ref 8.5–10.1)
CHLORIDE SERPL-SCNC: 106 MMOL/L — SIGNIFICANT CHANGE UP (ref 96–108)
CO2 SERPL-SCNC: 21 MMOL/L — LOW (ref 22–31)
CREAT SERPL-MCNC: 1 MG/DL — SIGNIFICANT CHANGE UP (ref 0.5–1.3)
GLUCOSE SERPL-MCNC: 137 MG/DL — HIGH (ref 70–99)
HCT VFR BLD CALC: 27.4 % — LOW (ref 34.5–45)
HGB BLD-MCNC: 9.1 G/DL — LOW (ref 11.5–15.5)
MAGNESIUM SERPL-MCNC: 1.8 MG/DL — SIGNIFICANT CHANGE UP (ref 1.6–2.6)
MCHC RBC-ENTMCNC: 29.3 PG — SIGNIFICANT CHANGE UP (ref 27–34)
MCHC RBC-ENTMCNC: 33.2 GM/DL — SIGNIFICANT CHANGE UP (ref 32–36)
MCV RBC AUTO: 88.1 FL — SIGNIFICANT CHANGE UP (ref 80–100)
NRBC # BLD: 0 /100 WBCS — SIGNIFICANT CHANGE UP (ref 0–0)
PLATELET # BLD AUTO: 211 K/UL — SIGNIFICANT CHANGE UP (ref 150–400)
POTASSIUM SERPL-MCNC: 4.8 MMOL/L — SIGNIFICANT CHANGE UP (ref 3.5–5.3)
POTASSIUM SERPL-SCNC: 4.8 MMOL/L — SIGNIFICANT CHANGE UP (ref 3.5–5.3)
RBC # BLD: 3.11 M/UL — LOW (ref 3.8–5.2)
RBC # FLD: 13.7 % — SIGNIFICANT CHANGE UP (ref 10.3–14.5)
SODIUM SERPL-SCNC: 135 MMOL/L — SIGNIFICANT CHANGE UP (ref 135–145)
WBC # BLD: 5 K/UL — SIGNIFICANT CHANGE UP (ref 3.8–10.5)
WBC # FLD AUTO: 5 K/UL — SIGNIFICANT CHANGE UP (ref 3.8–10.5)

## 2019-10-27 PROCEDURE — 99232 SBSQ HOSP IP/OBS MODERATE 35: CPT

## 2019-10-27 PROCEDURE — 71045 X-RAY EXAM CHEST 1 VIEW: CPT | Mod: 26

## 2019-10-27 RX ORDER — FUROSEMIDE 40 MG
20 TABLET ORAL ONCE
Refills: 0 | Status: COMPLETED | OUTPATIENT
Start: 2019-10-27 | End: 2019-10-27

## 2019-10-27 RX ADMIN — Medication 75 MICROGRAM(S): at 05:38

## 2019-10-27 RX ADMIN — Medication 4: at 17:12

## 2019-10-27 RX ADMIN — Medication 20 MILLIGRAM(S): at 07:03

## 2019-10-27 RX ADMIN — Medication 8: at 12:19

## 2019-10-27 RX ADMIN — Medication 1 TABLET(S): at 12:19

## 2019-10-27 RX ADMIN — Medication 175 MILLIGRAM(S): at 03:29

## 2019-10-27 RX ADMIN — APIXABAN 5 MILLIGRAM(S): 2.5 TABLET, FILM COATED ORAL at 17:13

## 2019-10-27 RX ADMIN — Medication 175 MILLIGRAM(S): at 17:16

## 2019-10-27 RX ADMIN — Medication 40 MILLIGRAM(S): at 05:38

## 2019-10-27 RX ADMIN — FLUCONAZOLE 200 MILLIGRAM(S): 150 TABLET ORAL at 12:19

## 2019-10-27 RX ADMIN — APIXABAN 5 MILLIGRAM(S): 2.5 TABLET, FILM COATED ORAL at 05:38

## 2019-10-27 RX ADMIN — AMIODARONE HYDROCHLORIDE 400 MILLIGRAM(S): 400 TABLET ORAL at 17:13

## 2019-10-27 RX ADMIN — Medication 175 MILLIGRAM(S): at 23:21

## 2019-10-27 RX ADMIN — Medication 1 TABLET(S): at 13:50

## 2019-10-27 RX ADMIN — Medication 175 MILLIGRAM(S): at 11:29

## 2019-10-27 RX ADMIN — AMIODARONE HYDROCHLORIDE 400 MILLIGRAM(S): 400 TABLET ORAL at 05:38

## 2019-10-27 RX ADMIN — Medication 1 TABLET(S): at 21:59

## 2019-10-27 RX ADMIN — Medication 1 TABLET(S): at 05:38

## 2019-10-27 NOTE — DISCHARGE NOTE PROVIDER - NSDCCPCAREPLAN_GEN_ALL_CORE_FT
PRINCIPAL DISCHARGE DIAGNOSIS  Diagnosis: Pneumocystis jiroveci pneumonia  Assessment and Plan of Treatment: c/w bactrim and follow with ID      SECONDARY DISCHARGE DIAGNOSES  Diagnosis: Hypoxia  Assessment and Plan of Treatment: use O2 nasal cannula.    Diagnosis: Candidal esophagitis  Assessment and Plan of Treatment: c/w fluconazole and follow with ID    Diagnosis: HIV (human immunodeficiency virus infection)  Assessment and Plan of Treatment: Pt will follow with HIV clinic at [facility]. has appointment.    Diagnosis: Atrial fibrillation, unspecified type  Assessment and Plan of Treatment: C/W eliquis and f/u with cardio    Diagnosis: NICOLASA (acute kidney injury)  Assessment and Plan of Treatment: Improving, follow with PCP/Renal PRINCIPAL DISCHARGE DIAGNOSIS  Diagnosis: Pneumocystis jiroveci pneumonia  Assessment and Plan of Treatment: c/w bactrim and follow with ID      SECONDARY DISCHARGE DIAGNOSES  Diagnosis: Hypoxia  Assessment and Plan of Treatment: use O2 nasal cannula.    Diagnosis: Candidal esophagitis  Assessment and Plan of Treatment: c/w fluconazole and follow with ID    Diagnosis: HIV (human immunodeficiency virus infection)  Assessment and Plan of Treatment: Pt will follow with HIV clinic at [facility]. has appointment.    Diagnosis: Atrial fibrillation, unspecified type  Assessment and Plan of Treatment: C/W eliquis and amiodarone,  f/u with cardio    Diagnosis: NICOLASA (acute kidney injury)  Assessment and Plan of Treatment: Improving, follow with PCP/Renal

## 2019-10-27 NOTE — PROGRESS NOTE ADULT - SUBJECTIVE AND OBJECTIVE BOX
CHIEF COMPLAINT/INTERVAL HISTORY:  Pt. seen and evaluated for acute respiratory failure and PCP pneumonia.  Feeling better.  Tolerating IV antibiotic and steroid.  Received IV Lasix once this morning.     REVIEW OF SYSTEMS:  No fever, CP, SOB, or abdominal pain.     Vital Signs Last 24 Hrs  T(C): 36.5 (27 Oct 2019 04:30), Max: 36.6 (26 Oct 2019 17:00)  T(F): 97.7 (27 Oct 2019 04:30), Max: 97.9 (26 Oct 2019 17:00)  HR: 87 (27 Oct 2019 07:17) (80 - 87)  BP: 110/72 (27 Oct 2019 04:30) (110/72 - 122/81)  BP(mean): --  RR: 19 (27 Oct 2019 04:30) (19 - 20)  SpO2: 97% (27 Oct 2019 07:17) (95% - 99%)    PHYSICAL EXAM:  GENERAL: NAD  HEENT: EOMI, hearing normal, conjunctiva and sclera clear, on high flow nasal canula  Chest: increase air entry, no wheezing  CV: S1S2, RRR,   GI: soft, +BS, NT/ND  Musculoskeletal: no edema  Psychiatric: affect nL, mood nL  Skin: warm and dry    LABS:                        9.1    5.00  )-----------( 211      ( 27 Oct 2019 07:32 )             27.4     10-27    135  |  106  |  26<H>  ----------------------------<  137<H>  4.8   |  21<L>  |  1.00    Ca    8.2<L>      27 Oct 2019 07:32  Mg     1.8     10-27      Assessment and Plan:  -Sepsis and acute respiratory failure 2/2 multifocal pneumonia:  Possible PCP pneumonia with +HIV status.  s/p bronchoscopy.   Continue Bactrim 200mg IV Q6h, Prednisone 40mg PO daily, and Albuterol neb tx Q6h PRN.  O2 support as needed.  Will try to titrate off high flow nasal canula.  Bronchoscopy culture with normal respiratory jasbir.   AFB negative x 4.  Cytology negative for malignant cells.  ID and Pulmonary f/u  -Dysphagia:  Tolerating dysphagia 2 nectar consistency diet  -Possible candidal esophagitis:  continue Fluconazole 200mg PO daily  -Atrial fibrillation:  continue Amiodarone 400mg PO BID and Eliquis 5mg PO BID.  Cardiology f/u  -NICOLASA:  Renal function appears stable.  Continue to monitor.   -Hypothyroidism:  continue Synthroid 75mcg PO daily  -VTE ppx:  On Eliquis

## 2019-10-27 NOTE — CHART NOTE - NSCHARTNOTEFT_GEN_A_CORE
Called By RN for recommendation regarding weaning off High-flow. However, pt unable to tolerate. As per respiratory therapist, pt satted at 95% on FiO2 40% on High flow and would not be able to tolerate NC 2L. Pt was placed back on High-flow.

## 2019-10-27 NOTE — PROGRESS NOTE ADULT - ASSESSMENT
The patient is a 69 year old female with a history of hypothyroidism who is admitted with PNA.    10/27/19  Condition essentially unchanged  Patient sleeping, lying flat  Easily arousable   sleeping at bedside    Plan:  - Transition to amiodarone 400 mg bid for 1 more days and likely transition to 200 mg daily after  - Continue apixaban 5 mg bid for thromboprophylaxis  - Echocardiogram with normal LV systolic function, mild pulm HTN  - AFB negative  - HIV positive  - Possible PCP - on bactrim and steroids  - ID, Hem/Onc and pulmonary follow-up

## 2019-10-27 NOTE — PROGRESS NOTE ADULT - SUBJECTIVE AND OBJECTIVE BOX
Patient seen and examined;  Chart reviewed and events noted;   remains on high flow O2; received lasix for fluid overload as per pulmonary  continues on IV bactrim and steroids; also on fluconazole for thrus      MEDICATIONS  (STANDING):  amiodarone    Tablet 400 milliGRAM(s) Oral two times a day  apixaban 5 milliGRAM(s) Oral two times a day  dextrose 10% Bolus 125 milliLiter(s) IV Bolus once  dextrose 10% Bolus 250 milliLiter(s) IV Bolus once  dextrose 5%. 1000 milliLiter(s) (50 mL/Hr) IV Continuous <Continuous>  fluconAZOLE   Tablet 200 milliGRAM(s) Oral daily  insulin lispro (HumaLOG) corrective regimen sliding scale   SubCutaneous three times a day before meals  insulin lispro (HumaLOG) corrective regimen sliding scale   SubCutaneous at bedtime  lactobacillus acidophilus 1 Tablet(s) Oral three times a day  levothyroxine 75 MICROGram(s) Oral daily  multivitamin 1 Tablet(s) Oral daily  predniSONE   Tablet 40 milliGRAM(s) Oral daily  trimethoprim / sulfamethoxazole IVPB 200 milliGRAM(s) IV Intermittent every 6 hours    MEDICATIONS  (PRN):  acetaminophen   Tablet .. 650 milliGRAM(s) Oral every 6 hours PRN Temp greater or equal to 38C (100.4F), Mild Pain (1 - 3)  ALBUTerol    0.083% 2.5 milliGRAM(s) Nebulizer every 6 hours PRN Shortness of Breath and/or Wheezing  dextrose 40% Gel 15 Gram(s) Oral once PRN Blood Glucose LESS THAN 70 milliGRAM(s)/deciLiter  glucagon  Injectable 1 milliGRAM(s) IntraMuscular once PRN Glucose <70 milliGRAM(s)/deciLiter  magnesium hydroxide Suspension 30 milliLiter(s) Oral daily PRN Constipation      Vital Signs Last 24 Hrs  T(C): 36.5 (27 Oct 2019 04:30), Max: 36.6 (26 Oct 2019 17:00)  T(F): 97.7 (27 Oct 2019 04:30), Max: 97.9 (26 Oct 2019 17:00)  HR: 87 (27 Oct 2019 07:17) (80 - 87)  BP: 110/72 (27 Oct 2019 04:30) (110/72 - 122/81)  RR: 19 (27 Oct 2019 04:30) (19 - 20)  SpO2: 97% (27 Oct 2019 07:17) (95% - 99%)    PHYSICAL EXAM  General: adult in NAD  HEENT: clear oropharynx, anicteric sclera, pink conjunctivae; + oral thrush improving  Neck: supple  CV: normal S1S2 with no murmur rubs or gallops  Lungs: course breath sounds in all lung fields  Abdomen: soft non-tender non-distended, no hepato/splenomegaly  Ext: no clubbing cyanosis or edema  Skin: no rashes and no petichiae  Neuro: alert and oriented X3 no focal deficits      LABS:                        9.1    5.00  )-----------( 211      ( 27 Oct 2019 07:32 )             27.4     Hemoglobin: 9.1 g/dL (10-27 @ 07:32)  Hemoglobin: 8.0 g/dL (10-26 @ 08:17)  Hemoglobin: 8.9 g/dL (10-25 @ 08:36)  Hemoglobin: 8.3 g/dL (10-24 @ 08:18)  Hemoglobin: 7.3 g/dL (10-24 @ 05:33)    WBC Count: 5.00 K/uL (10-27 @ 07:32)  WBC Count: 1.71 K/uL (10-26 @ 08:17)  WBC Count: 2.10 K/uL (10-25 @ 08:36)  WBC Count: 1.29 K/uL (10-24 @ 08:18)  WBC Count: 1.15 K/uL (10-24 @ 05:33)    10-27    135  |  106  |  26<H>  ----------------------------<  137<H>  4.8   |  21<L>  |  1.00    Ca    8.2<L>      27 Oct 2019 07:32  Mg     1.8     10-27

## 2019-10-27 NOTE — DISCHARGE NOTE PROVIDER - HOSPITAL COURSE
70 y/o F with PMHx of hypothyroidism presents with cough and congestion for 2 weeks in duration. Patient states that for the last 10 days she has been having difficulty swallowing, solids more than liquids. Niece at bedside states that patient has been eating and drinking less but more so noticed patient coughing with water but not with regular food. She now has more of a cough and dyspnea. She had a Tmax of 101.4 at home. Of note, she went to urgent care 1 month ago as she was feeling unwell and was found to be in SVT with HR sustaining in 170s. Was advised to go to the ER at that time but never followed up as she felt better and does not have insurance. Denies chest pain, palpitations, abdominal pain, n/v/d. Denies sick contacts or myalgias.         In the ED, patient met septic criteria with fever 101.4, . Labs significant for WBC 14.34, ddimer 765, BUN/Cr 47/1.40. CXR showed bilateral pna more prominent on right (personal read). CTA was negative for PE showed nonspecific groundglass opacities in both lungs. Nodular opacities in the left upper lobe and lingula concerning for infection, neoplasm cannot be ruled out. EKG sinus tachycardia at 104bpm.  LE dopplers were negative for DVT.          Pt. was admitted and given IV antibiotics.  ID consult was requested and has been following along.  She was placed on isolation to r/o TB.  Pt. had rapid response for tachycardia and hypoxia.  She was found to be in afib with RVR and in acute hypoxic respiratory failure.  Pt. was assessed by the ICU team and transferred to MICU.  She was also initiated on amiodarone and had cardiology consultation.  Flu/RSV was negative.  Blood cultures showed no growth.   Urine legionella antigen and strep antigen was negative.  AFB was negative x 4 and TB was ruled out.  Her HIV status was positive.  Her antibiotic regimen was switched to Bactrim for preseumed PCP pneumonia.  Pt. was also given fluconazole for candidal esophagitis.  Pt. had bronchoscopy with cultures showing normal respiratory jasbir, negative AFB, and cytology negative for malignant cells.  She has clinically improved and degree of oxygen support was tapered down.  She was then downgraded from ICU to medical floor where she continued on IV Bactrim and prednisone. 70 y/o F with PMHx of hypothyroidism presents with cough and congestion for 2 weeks in duration. Patient states that for the last 10 days she has been having difficulty swallowing, solids more than liquids. Niece at bedside states that patient has been eating and drinking less but more so noticed patient coughing with water but not with regular food. She now has more of a cough and dyspnea. She had a Tmax of 101.4 at home. Of note, she went to urgent care 1 month ago as she was feeling unwell and was found to be in SVT with HR sustaining in 170s. Was advised to go to the ER at that time but never followed up as she felt better and does not have insurance. Denies chest pain, palpitations, abdominal pain, n/v/d. Denies sick contacts or myalgias.         In the ED, patient met septic criteria with fever 101.4, . Labs significant for WBC 14.34, ddimer 765, BUN/Cr 47/1.40. CXR showed bilateral pna more prominent on right (personal read). CTA was negative for PE showed nonspecific groundglass opacities in both lungs. Nodular opacities in the left upper lobe and lingula concerning for infection, neoplasm cannot be ruled out. EKG sinus tachycardia at 104bpm.  LE dopplers were negative for DVT.          Pt. was admitted and given IV antibiotics.  ID consult was requested and has been following along.  She was placed on isolation to r/o TB.  Pt. had rapid response for tachycardia and hypoxia.  She was found to be in afib with RVR and in acute hypoxic respiratory failure.  Pt. was assessed by the ICU team and transferred to MICU.  She was also initiated on amiodarone and had cardiology consultation.  Echo showed EF 70%.  Flu/RSV was negative.  Blood cultures showed no growth.   Urine legionella antigen and strep antigen was negative.  AFB was negative x 4 and TB was ruled out.  Her HIV status was positive.  Her antibiotic regimen was switched to Bactrim for preseumed PCP pneumonia.  Pt. was also given fluconazole for candidal esophagitis.  Pt. had bronchoscopy with cultures showing normal respiratory jasbir, negative AFB, and cytology negative for malignant cells.  She has clinically improved and level of oxygen support was tapered down.  She was then downgraded from ICU to medical floor where she continued on IV Bactrim and prednisone.  Pt. unfortunately had developed further hypoxia and O2 support was then escalated to high flow nasal canula.  She has since been titrated off high flow to a regular nasal canula. 68 y/o F with PMHx of hypothyroidism presents with cough and congestion for 2 weeks in duration. Patient states that for the last 10 days she has been having difficulty swallowing, solids more than liquids. Niece at bedside states that patient has been eating and drinking less but more so noticed patient coughing with water but not with regular food. She now has more of a cough and dyspnea. She had a Tmax of 101.4 at home. Of note, she went to urgent care 1 month ago as she was feeling unwell and was found to be in SVT with HR sustaining in 170s. Was advised to go to the ER at that time but never followed up as she felt better and does not have insurance. Denies chest pain, palpitations, abdominal pain, n/v/d. Denies sick contacts or myalgias.         In the ED, patient met septic criteria with fever 101.4, . Labs significant for WBC 14.34, ddimer 765, BUN/Cr 47/1.40. CXR showed bilateral pna more prominent on right (personal read). CTA was negative for PE showed nonspecific groundglass opacities in both lungs. Nodular opacities in the left upper lobe and lingula concerning for infection, neoplasm cannot be ruled out. EKG sinus tachycardia at 104bpm.  LE dopplers were negative for DVT.          Pt. was admitted and given IV antibiotics.  ID consult was requested and has been following along.  She was placed on isolation to r/o TB.  Pt. had rapid response for tachycardia and hypoxia.  She was found to be in afib with RVR and in acute hypoxic respiratory failure.  Pt. was assessed by the ICU team and transferred to MICU.  She was also initiated on amiodarone and had cardiology consultation.  Echo showed EF 70%.  Flu/RSV was negative.  Blood cultures showed no growth.   Urine legionella antigen and strep antigen was negative.  AFB was negative x 4 and TB was ruled out.  Her HIV status was positive.  Her antibiotic regimen was switched to Bactrim for preseumed PCP pneumonia.  Pt. was also given fluconazole for candidal esophagitis.  Pt. had bronchoscopy with cultures showing normal respiratory jasbir, negative AFB, and cytology negative for malignant cells.  She has clinically improved and level of oxygen support was tapered down.  She was then downgraded from ICU to medical floor where she continued on IV Bactrim and prednisone.  Pt. unfortunately had developed further hypoxia and O2 support was then escalated to high flow nasal canula.  She has since been titrated off high flow to a regular nasal canula. Pt will need home O2. She 70 y/o F with PMHx of hypothyroidism presents with cough and congestion for 2 weeks in duration. Patient states that for the last 10 days she has been having difficulty swallowing, solids more than liquids. Niece at bedside states that patient has been eating and drinking less but more so noticed patient coughing with water but not with regular food. She now has more of a cough and dyspnea. She had a Tmax of 101.4 at home. Of note, she went to urgent care 1 month ago as she was feeling unwell and was found to be in SVT with HR sustaining in 170s. Was advised to go to the ER at that time but never followed up as she felt better and does not have insurance. Denies chest pain, palpitations, abdominal pain, n/v/d. Denies sick contacts or myalgias.         In the ED, patient met septic criteria with fever 101.4, . Labs significant for WBC 14.34, ddimer 765, BUN/Cr 47/1.40. CXR showed bilateral pna more prominent on right (personal read). CTA was negative for PE showed nonspecific groundglass opacities in both lungs. Nodular opacities in the left upper lobe and lingula concerning for infection, neoplasm cannot be ruled out. EKG sinus tachycardia at 104bpm.  LE dopplers were negative for DVT.          Pt. was admitted and given IV antibiotics.  ID consult was requested and has been following along.  She was placed on isolation to r/o TB.  Pt. had rapid response for tachycardia and hypoxia.  She was found to be in afib with RVR and in acute hypoxic respiratory failure.  Pt. was assessed by the ICU team and transferred to MICU.  She was also initiated on amiodarone and had cardiology consultation.  Echo showed EF 70%.  Flu/RSV was negative.  Blood cultures showed no growth.   Urine legionella antigen and strep antigen was negative.  AFB was negative x 4 and TB was ruled out.  Her HIV status was positive.  Her antibiotic regimen was switched to Bactrim for preseumed PCP pneumonia.  Pt. was also given fluconazole for candidal esophagitis.  Pt. had bronchoscopy with cultures showing normal respiratory jasbir, negative AFB, and cytology negative for malignant cells.  She has clinically improved and level of oxygen support was tapered down.  She was then downgraded from ICU to medical floor where she continued on IV Bactrim and prednisone.  Pt. unfortunately had developed further hypoxia and O2 support was then escalated to high flow nasal canula.  She has since been titrated off high flow to a regular nasal canula. Pt will need home O2. She will follow with ID at [facility]    Vital Signs Last 24 Hrs    T(C): 36.6 (01 Nov 2019 13:31), Max: 37.2 (31 Oct 2019 21:14)    T(F): 97.8 (01 Nov 2019 13:31), Max: 98.9 (31 Oct 2019 21:14)    HR: 87 (01 Nov 2019 13:31) (85 - 90)    BP: 99/64 (01 Nov 2019 13:31) (99/64 - 107/68)    BP(mean): --    RR: 20 (01 Nov 2019 13:31) (17 - 20)    SpO2: 98% (01 Nov 2019 13:31) (87% - 98%)    General: NAD,     Neurology: A&Ox3, nonfocal,  moving all extremities    Respiratory: coarse BS B/L    CV: RRR, S1S2, no murmurs, rubs or gallops    Abdominal: Soft, NT, ND +BS    Extremities: No edema, + peripheral pulses 68 y/o F with PMHx of hypothyroidism presents with cough and congestion for 2 weeks in duration. Patient states that for the last 10 days she has been having difficulty swallowing, solids more than liquids. Niece at bedside states that patient has been eating and drinking less but more so noticed patient coughing with water but not with regular food. She now has more of a cough and dyspnea. She had a Tmax of 101.4 at home. Of note, she went to urgent care 1 month ago as she was feeling unwell and was found to be in SVT with HR sustaining in 170s. Was advised to go to the ER at that time but never followed up as she felt better and does not have insurance. Denies chest pain, palpitations, abdominal pain, n/v/d. Denies sick contacts or myalgias.         In the ED, patient met septic criteria with fever 101.4, . Labs significant for WBC 14.34, ddimer 765, BUN/Cr 47/1.40. CXR showed bilateral pna more prominent on right (personal read). CTA was negative for PE showed nonspecific groundglass opacities in both lungs. Nodular opacities in the left upper lobe and lingula concerning for infection, neoplasm cannot be ruled out. EKG sinus tachycardia at 104bpm.  LE dopplers were negative for DVT.          Pt. was admitted and given IV antibiotics.  ID consult was requested and has been following along.  She was placed on isolation to r/o TB.  Pt. had rapid response for tachycardia and hypoxia.  She was found to be in afib with RVR and in acute hypoxic respiratory failure.  Pt. was assessed by the ICU team and transferred to MICU.  She was also initiated on amiodarone and had cardiology consultation.  Echo showed EF 70%.  Flu/RSV was negative.  Blood cultures showed no growth.   Urine legionella antigen and strep antigen was negative.  AFB was negative x 4 and TB was ruled out.  Her HIV status was positive.  Her antibiotic regimen was switched to Bactrim for preseumed PCP pneumonia.  Pt. was also given fluconazole for candidal esophagitis.  Pt. had bronchoscopy with cultures showing normal respiratory jasbir, negative AFB, and cytology negative for malignant cells.  She has clinically improved and level of oxygen support was tapered down.  She was then downgraded from ICU to medical floor where she continued on IV Bactrim and prednisone.  Pt. unfortunately had developed further hypoxia and O2 support was then escalated to high flow nasal canula.  She has since been titrated off high flow to a regular nasal canula. Pt will need home O2. She will follow with ID at [facility] on Nov. with Dr. medellin    Vital Signs Last 24 Hrs    T(C): 36.6 (01 Nov 2019 13:31), Max: 37.2 (31 Oct 2019 21:14)    T(F): 97.8 (01 Nov 2019 13:31), Max: 98.9 (31 Oct 2019 21:14)    HR: 87 (01 Nov 2019 13:31) (85 - 90)    BP: 99/64 (01 Nov 2019 13:31) (99/64 - 107/68)    BP(mean): --    RR: 20 (01 Nov 2019 13:31) (17 - 20)    SpO2: 98% (01 Nov 2019 13:31) (87% - 98%)    General: NAD,     Neurology: A&Ox3, nonfocal,  moving all extremities    Respiratory: coarse BS B/L    CV: RRR, S1S2, no murmurs, rubs or gallops    Abdominal: Soft, NT, ND +BS    Extremities: No edema, + peripheral pulses

## 2019-10-27 NOTE — PROGRESS NOTE ADULT - SUBJECTIVE AND OBJECTIVE BOX
Patient is a 69y Female with a known history of :  Candidal esophagitis (B37.81)  HIV (human immunodeficiency virus infection) (B20)  Pneumocystis jiroveci pneumonia (B59)  Pneumonia (J18.9)  Acute respiratory failure with hypoxia (J96.01)  Acute pulmonary edema (J81.0)  Atrial fibrillation, unspecified type (I48.91)  Need for prophylactic measure (Z29.9)  Hypothyroidism, unspecified type (E03.9)  NICOLASA (acute kidney injury) (N17.9)  Hypoxia (R09.02)  Tachycardia (R00.0)  Dysphagia (R13.10)  Pneumonia of both lungs due to infectious organism, unspecified part of lung (J18.9)  Sepsis (A41.9)    HPI:  68 y/o F with PMHx of hypothyroidism presents with cough and congestion for 2 weeks in duration. Patient states that for the last 10 days she has been having difficulty swallowing, solids more than liquids. Niece at bedside states that patient has been eating and drinking less but more so noticed patient coughing with water but not with regular food. She now has more of a cough and dyspnea. She had a Tmax of 101.4 at home. Of note, she went to urgent care 1 month ago as she was feeling unwell and was found to be in SVT with HR sustaining in 170s. Was advised to go to the ER at that time but never followed up as she felt better and does not have insurance. Denies chest pain, palpitations, abdominal pain, n/v/d. Denies sick contacts or myalgias.     In the ED, patient met septic criteria with fever 101.4, . Labs significant for WBC 14.34, ddimer 765, BUN/Cr 47/1.40. CXR showed bilateral pna more prominent on right (personal read). CTA was negative for PE showed nonspecific groundglass opacities in both lungs. Nodular opacities in the left upper lobe and   lingula concerning for infection, neoplasm cannot be ruled out. EKG sinus tachycardia at 104bpm. (19 Oct 2019 23:08)      REVIEW OF SYSTEMS:    CONSTITUTIONAL: No fever, weight loss, or fatigue  EYES: No eye pain, visual disturbances, or discharge  ENMT:  No difficulty hearing, tinnitus, vertigo; No sinus or throat pain  NECK: No pain or stiffness  BREASTS: No pain, masses, or nipple discharge  RESPIRATORY: No cough, wheezing, chills or hemoptysis; No shortness of breath  CARDIOVASCULAR: No chest pain, palpitations, dizziness, or leg swelling  GASTROINTESTINAL: No abdominal or epigastric pain. No nausea, vomiting, or hematemesis; No diarrhea or constipation. No melena or hematochezia.  GENITOURINARY: No dysuria, frequency, hematuria, or incontinence  NEUROLOGICAL: No headaches, memory loss, loss of strength, numbness, or tremors  SKIN: No itching, burning, rashes, or lesions   LYMPH NODES: No enlarged glands  ENDOCRINE: No heat or cold intolerance; No hair loss  MUSCULOSKELETAL: No joint pain or swelling; No muscle, back, or extremity pain  PSYCHIATRIC: No depression, anxiety, mood swings, or difficulty sleeping  HEME/LYMPH: No easy bruising, or bleeding gums  ALLERGY AND IMMUNOLOGIC: No hives or eczema    MEDICATIONS  (STANDING):  amiodarone    Tablet 400 milliGRAM(s) Oral two times a day  apixaban 5 milliGRAM(s) Oral two times a day  dextrose 10% Bolus 125 milliLiter(s) IV Bolus once  dextrose 10% Bolus 250 milliLiter(s) IV Bolus once  dextrose 5%. 1000 milliLiter(s) (50 mL/Hr) IV Continuous <Continuous>  fluconAZOLE   Tablet 200 milliGRAM(s) Oral daily  insulin lispro (HumaLOG) corrective regimen sliding scale   SubCutaneous three times a day before meals  insulin lispro (HumaLOG) corrective regimen sliding scale   SubCutaneous at bedtime  lactobacillus acidophilus 1 Tablet(s) Oral three times a day  levothyroxine 75 MICROGram(s) Oral daily  multivitamin 1 Tablet(s) Oral daily  predniSONE   Tablet 40 milliGRAM(s) Oral daily  trimethoprim / sulfamethoxazole IVPB 200 milliGRAM(s) IV Intermittent every 6 hours    MEDICATIONS  (PRN):  acetaminophen   Tablet .. 650 milliGRAM(s) Oral every 6 hours PRN Temp greater or equal to 38C (100.4F), Mild Pain (1 - 3)  ALBUTerol    0.083% 2.5 milliGRAM(s) Nebulizer every 6 hours PRN Shortness of Breath and/or Wheezing  dextrose 40% Gel 15 Gram(s) Oral once PRN Blood Glucose LESS THAN 70 milliGRAM(s)/deciLiter  glucagon  Injectable 1 milliGRAM(s) IntraMuscular once PRN Glucose <70 milliGRAM(s)/deciLiter  magnesium hydroxide Suspension 30 milliLiter(s) Oral daily PRN Constipation      ALLERGIES: No Known Allergies      FAMILY HISTORY:  FH: type 2 diabetes: in father      Social history:  Alochol:   Smoking:   Drug Use:   Marital Status:     PHYSICAL EXAMINATION:  -----------------------------  T(C): 36.5 (10-27-19 @ 04:30), Max: 36.6 (10-26-19 @ 17:00)  HR: 87 (10-27-19 @ 07:17) (77 - 87)  BP: 110/72 (10-27-19 @ 04:30) (110/72 - 122/81)  RR: 19 (10-27-19 @ 04:30) (19 - 20)  SpO2: 97% (10-27-19 @ 07:17) (95% - 99%)  Wt(kg): --    10-26 @ 07:01  -  10-27 @ 07:00  --------------------------------------------------------  IN:    Oral Fluid: 480 mL    sodium chloride 0.45%: 225 mL    Solution: 250 mL  Total IN: 955 mL    OUT:    Voided: 1000 mL  Total OUT: 1000 mL    Total NET: -45 mL            Constitutional: well developed, normal appearance, well groomed, well nourished, no deformities and no acute distress.   Eyes: the conjunctiva exhibited no abnormalities and the eyelids demonstrated no xanthelasmas.   HEENT: normal oral mucosa, no oral pallor and no oral cyanosis.   Neck: normal jugular venous A waves present, normal jugular venous V waves present and no jugular venous chavez A waves.   Pulmonary: no respiratory distress, normal respiratory rhythm and effort, no accessory muscle use and lungs were clear to auscultation bilaterally. Anteriorly  Cardiovascular: heart rate and rhythm were normal, normal S1 and S2 and no murmur, gallop, rub, heave or thrill are present.   Musculoskeletal: the gait could not be assessed.  Extremities: no clubbing of the fingernails, no localized cyanosis, no petechial hemorrhages and no ischemic changes.   Skin: normal skin color and pigmentation, no rash, no venous stasis, no skin lesions, no skin ulcer and no xanthoma was observed.      LABS:   --------  10-26    136  |  109<H>  |  28<H>  ----------------------------<  218<H>  5.2   |  20<L>  |  1.10    Ca    7.6<L>      26 Oct 2019 08:17  Phos  2.7     10-25  Mg     2.0     10-26                           8.0    1.71  )-----------( 171      ( 26 Oct 2019 08:17 )             24.1     PT/INR - ( 25 Oct 2019 08:36 )   PT: 17.4 sec;   INR: 1.52 ratio         PTT - ( 25 Oct 2019 08:36 )  PTT:26.5 sec  10-26 @ 08:17 BNP: 6310 pg/mL              Radiology:

## 2019-10-27 NOTE — CHART NOTE - NSCHARTNOTEFT_GEN_A_CORE
Assessment:   Pt seen as malnutrition follow-up. Transferred to [facility] from ICU 10/25. Family member present upon visit to provide Dilan translation. Reports pt with reduced appetite/intake. EMR indicates pt with 50-75% intake. Per RN, pt with low consumption of breakfast this morning. Nausea/vomiting or GI distress not reported. No recent BM documented.     Factors impacting intake: [ ] none [ ] nausea  [ ] vomiting [ ] diarrhea [ ] constipation  [ ]chewing problems [ x ] swallowing issues  [ x ] other: persistent lack of appetite    Diet Presciption: Diet, Dysphagia 2 Mechanical Soft-Nectar Consistency Fluid:   Consistent Carbohydrate {Evening Snack}  No Beef  No Pork  Supplement Feeding Modality:  Oral  Glucerna Shake Cans or Servings Per Day:  1       Frequency:  Two Times a day (10-24-19 @ 09:23)    Intake: Fair    Current Weight: (10/27) 128.3 lb    Pertinent Medications: MEDICATIONS  (STANDING):  amiodarone    Tablet 400 milliGRAM(s) Oral two times a day  apixaban 5 milliGRAM(s) Oral two times a day  dextrose 10% Bolus 125 milliLiter(s) IV Bolus once  dextrose 10% Bolus 250 milliLiter(s) IV Bolus once  dextrose 5%. 1000 milliLiter(s) (50 mL/Hr) IV Continuous <Continuous>  fluconAZOLE   Tablet 200 milliGRAM(s) Oral daily  insulin lispro (HumaLOG) corrective regimen sliding scale   SubCutaneous three times a day before meals  insulin lispro (HumaLOG) corrective regimen sliding scale   SubCutaneous at bedtime  lactobacillus acidophilus 1 Tablet(s) Oral three times a day  levothyroxine 75 MICROGram(s) Oral daily  multivitamin 1 Tablet(s) Oral daily  predniSONE   Tablet 40 milliGRAM(s) Oral daily  trimethoprim / sulfamethoxazole IVPB 200 milliGRAM(s) IV Intermittent every 6 hours    MEDICATIONS  (PRN):  acetaminophen   Tablet .. 650 milliGRAM(s) Oral every 6 hours PRN Temp greater or equal to 38C (100.4F), Mild Pain (1 - 3)  ALBUTerol    0.083% 2.5 milliGRAM(s) Nebulizer every 6 hours PRN Shortness of Breath and/or Wheezing  dextrose 40% Gel 15 Gram(s) Oral once PRN Blood Glucose LESS THAN 70 milliGRAM(s)/deciLiter  glucagon  Injectable 1 milliGRAM(s) IntraMuscular once PRN Glucose <70 milliGRAM(s)/deciLiter  magnesium hydroxide Suspension 30 milliLiter(s) Oral daily PRN Constipation    Pertinent Labs: 10-27 Na135 mmol/L Glu 137 mg/dL<H> K+ 4.8 mmol/L Cr  1.00 mg/dL BUN 26 mg/dL<H> 10-25 Phos 2.7 mg/dL 10-24 Alb 1.5 g/dL<L> 10-23 [id number] 6.8 %<H>     CAPILLARY BLOOD GLUCOSE      POCT Blood Glucose.: 141 mg/dL (27 Oct 2019 07:42)  POCT Blood Glucose.: 255 mg/dL (26 Oct 2019 22:24)  POCT Blood Glucose.: 222 mg/dL (26 Oct 2019 16:25)  POCT Blood Glucose.: 245 mg/dL (26 Oct 2019 11:59)    Skin: 1+ generalized    Estimated Needs:   [ x ] no change since previous assessment  [ ] recalculated:     Previous Nutrition Diagnosis:   [ ] Inadequate Energy Intake [ ]Inadequate Oral Intake [ ] Excessive Energy Intake   [ ] Underweight [ ] Increased Nutrient Needs [ ] Overweight/Obesity   [ ] Altered GI Function [ ] Unintended Weight Loss [ ] Food & Nutrition Related Knowledge Deficit [ x ] Malnutrition     Nutrition Diagnosis is [ x ] ongoing, being addressed with PO diet + supplement    New Nutrition Diagnosis: [ x ] not applicable       Interventions:   Recommend  [ ] Change Diet To:  [ ] Nutrition Supplement  [ ] Nutrition Support  [ x ] Other:   1) Recommend continue Consistent CHO, Dysphagia 2 mechanical soft diet with nectar thickened liquids. Defer diet texture/consistency to team.  2) Continue Glucerna two times per day.  3) Encourage adequate intake.    Monitoring and Evaluation:   [ x ] PO intake [ x ] Tolerance to diet prescription [ x ] weights [ x ] labs[ x ] follow up per protocol  [ ] other:

## 2019-10-27 NOTE — PROGRESS NOTE ADULT - ASSESSMENT
[ASSESSMENT and  PLAN]  70yo German F with newly diagnosed HIV AIDS; admitted with pneumonia (PCP/PJP); heme asked to evaluate for leukopenia    - heme asked to evaluated for noted leukopenia which acutely worsened after initiation of bactrim IV   - suspect this is likely multifactorial with acute infection and from bactrim;  patient had normal/elevated WBC on 10/19/19 at time of admission  - okay to continue Bactrim as ANC remained adequate; WBC has normalized as on 10/27/19  - ARVT not yet started, pending genotype and resistance panel; ID following  - continue supportive care from Heme/Onc standpoint; no acute heme onc interventions necessary at this time; will sign off; reconsult if needed

## 2019-10-27 NOTE — DISCHARGE NOTE PROVIDER - CARE PROVIDER_API CALL
Ghanshyam Sevilla ([facility])  Infectious Disease  [address] [place] [address]  Phone: [phone]  Fax: [phone]  Follow Up Time: Ghanshyam Sevilla ([facility])  Infectious Disease  [address] [place] [address]  Phone: [phone]  Fax: [phone]  Follow Up Time: 1-3 days

## 2019-10-27 NOTE — PROGRESS NOTE ADULT - SUBJECTIVE AND OBJECTIVE BOX
Date/Time Patient Seen:  		  Referring MD:   Data Reviewed	       Patient is a 69y old  Female who presents with a chief complaint of pneumonia (26 Oct 2019 11:51)      Subjective/HPI     PAST MEDICAL & SURGICAL HISTORY:  Hypothyroidism, unspecified type  No significant past surgical history: denies surgical history but CT shows cholecystectomy        Medication list         MEDICATIONS  (STANDING):  amiodarone    Tablet 400 milliGRAM(s) Oral two times a day  apixaban 5 milliGRAM(s) Oral two times a day  dextrose 10% Bolus 125 milliLiter(s) IV Bolus once  dextrose 10% Bolus 250 milliLiter(s) IV Bolus once  dextrose 5%. 1000 milliLiter(s) (50 mL/Hr) IV Continuous <Continuous>  fluconAZOLE   Tablet 200 milliGRAM(s) Oral daily  furosemide   Injectable 20 milliGRAM(s) IV Push once  insulin lispro (HumaLOG) corrective regimen sliding scale   SubCutaneous three times a day before meals  insulin lispro (HumaLOG) corrective regimen sliding scale   SubCutaneous at bedtime  lactobacillus acidophilus 1 Tablet(s) Oral three times a day  levothyroxine 75 MICROGram(s) Oral daily  multivitamin 1 Tablet(s) Oral daily  predniSONE   Tablet 40 milliGRAM(s) Oral daily  trimethoprim / sulfamethoxazole IVPB 200 milliGRAM(s) IV Intermittent every 6 hours    MEDICATIONS  (PRN):  acetaminophen   Tablet .. 650 milliGRAM(s) Oral every 6 hours PRN Temp greater or equal to 38C (100.4F), Mild Pain (1 - 3)  ALBUTerol    0.083% 2.5 milliGRAM(s) Nebulizer every 6 hours PRN Shortness of Breath and/or Wheezing  dextrose 40% Gel 15 Gram(s) Oral once PRN Blood Glucose LESS THAN 70 milliGRAM(s)/deciLiter  glucagon  Injectable 1 milliGRAM(s) IntraMuscular once PRN Glucose <70 milliGRAM(s)/deciLiter  magnesium hydroxide Suspension 30 milliLiter(s) Oral daily PRN Constipation         Vitals log        ICU Vital Signs Last 24 Hrs  T(C): 36.3 (26 Oct 2019 21:07), Max: 36.6 (26 Oct 2019 17:00)  T(F): 97.4 (26 Oct 2019 21:07), Max: 97.9 (26 Oct 2019 17:00)  HR: 86 (26 Oct 2019 21:07) (77 - 86)  BP: 113/79 (26 Oct 2019 21:07) (113/79 - 122/81)  BP(mean): --  ABP: --  ABP(mean): --  RR: 20 (26 Oct 2019 13:25) (20 - 20)  SpO2: 99% (26 Oct 2019 21:07) (95% - 99%)           Input and Output:  I&O's Detail    25 Oct 2019 07:01  -  26 Oct 2019 07:00  --------------------------------------------------------  IN:    sodium chloride 0.45%: 900 mL  Total IN: 900 mL    OUT:  Total OUT: 0 mL    Total NET: 900 mL      26 Oct 2019 07:01  -  27 Oct 2019 06:03  --------------------------------------------------------  IN:    Oral Fluid: 480 mL    sodium chloride 0.45%: 225 mL    Solution: 250 mL  Total IN: 955 mL    OUT:  Total OUT: 0 mL    Total NET: 955 mL          Lab Data                        8.0    1.71  )-----------( 171      ( 26 Oct 2019 08:17 )             24.1     10-26    136  |  109<H>  |  28<H>  ----------------------------<  218<H>  5.2   |  20<L>  |  1.10    Ca    7.6<L>      26 Oct 2019 08:17  Phos  2.7     10-25  Mg     2.0     10-26              Review of Systems	      Objective     Physical Examination      heart s1s2  lung dc BS  abd soft    Pertinent Lab findings & Imaging      Gisela:  NO   Adequate UO     I&O's Detail    25 Oct 2019 07:01  -  26 Oct 2019 07:00  --------------------------------------------------------  IN:    sodium chloride 0.45%: 900 mL  Total IN: 900 mL    OUT:  Total OUT: 0 mL    Total NET: 900 mL      26 Oct 2019 07:01  -  27 Oct 2019 06:03  --------------------------------------------------------  IN:    Oral Fluid: 480 mL    sodium chloride 0.45%: 225 mL    Solution: 250 mL  Total IN: 955 mL    OUT:  Total OUT: 0 mL    Total NET: 955 mL               Discussed with:     Cultures:	        Radiology

## 2019-10-28 LAB
ANION GAP SERPL CALC-SCNC: 7 MMOL/L — SIGNIFICANT CHANGE UP (ref 5–17)
BUN SERPL-MCNC: 27 MG/DL — HIGH (ref 7–23)
CALCIUM SERPL-MCNC: 8.4 MG/DL — LOW (ref 8.5–10.1)
CHLORIDE SERPL-SCNC: 105 MMOL/L — SIGNIFICANT CHANGE UP (ref 96–108)
CO2 SERPL-SCNC: 21 MMOL/L — LOW (ref 22–31)
CREAT SERPL-MCNC: 1.1 MG/DL — SIGNIFICANT CHANGE UP (ref 0.5–1.3)
GLUCOSE SERPL-MCNC: 137 MG/DL — HIGH (ref 70–99)
HCT VFR BLD CALC: 28.7 % — LOW (ref 34.5–45)
HGB BLD-MCNC: 9.5 G/DL — LOW (ref 11.5–15.5)
MAGNESIUM SERPL-MCNC: 1.8 MG/DL — SIGNIFICANT CHANGE UP (ref 1.6–2.6)
MCHC RBC-ENTMCNC: 29.1 PG — SIGNIFICANT CHANGE UP (ref 27–34)
MCHC RBC-ENTMCNC: 33.1 GM/DL — SIGNIFICANT CHANGE UP (ref 32–36)
MCV RBC AUTO: 88 FL — SIGNIFICANT CHANGE UP (ref 80–100)
NRBC # BLD: 0 /100 WBCS — SIGNIFICANT CHANGE UP (ref 0–0)
PLATELET # BLD AUTO: 246 K/UL — SIGNIFICANT CHANGE UP (ref 150–400)
POTASSIUM SERPL-MCNC: 5.3 MMOL/L — SIGNIFICANT CHANGE UP (ref 3.5–5.3)
POTASSIUM SERPL-SCNC: 5.3 MMOL/L — SIGNIFICANT CHANGE UP (ref 3.5–5.3)
RBC # BLD: 3.26 M/UL — LOW (ref 3.8–5.2)
RBC # FLD: 14 % — SIGNIFICANT CHANGE UP (ref 10.3–14.5)
SODIUM SERPL-SCNC: 133 MMOL/L — LOW (ref 135–145)
WBC # BLD: 9.87 K/UL — SIGNIFICANT CHANGE UP (ref 3.8–10.5)
WBC # FLD AUTO: 9.87 K/UL — SIGNIFICANT CHANGE UP (ref 3.8–10.5)

## 2019-10-28 PROCEDURE — 99233 SBSQ HOSP IP/OBS HIGH 50: CPT

## 2019-10-28 RX ORDER — AMIODARONE HYDROCHLORIDE 400 MG/1
200 TABLET ORAL DAILY
Refills: 0 | Status: DISCONTINUED | OUTPATIENT
Start: 2019-10-29 | End: 2019-11-01

## 2019-10-28 RX ORDER — FUROSEMIDE 40 MG
40 TABLET ORAL DAILY
Refills: 0 | Status: DISCONTINUED | OUTPATIENT
Start: 2019-10-28 | End: 2019-11-01

## 2019-10-28 RX ORDER — AZITHROMYCIN 500 MG/1
1250 TABLET, FILM COATED ORAL
Refills: 0 | Status: DISCONTINUED | OUTPATIENT
Start: 2019-10-28 | End: 2019-11-01

## 2019-10-28 RX ORDER — AZITHROMYCIN 500 MG/1
1200 TABLET, FILM COATED ORAL
Refills: 0 | Status: DISCONTINUED | OUTPATIENT
Start: 2019-10-28 | End: 2019-10-28

## 2019-10-28 RX ADMIN — Medication 175 MILLIGRAM(S): at 05:55

## 2019-10-28 RX ADMIN — Medication 1 TABLET(S): at 23:03

## 2019-10-28 RX ADMIN — Medication 2: at 08:00

## 2019-10-28 RX ADMIN — Medication 40 MILLIGRAM(S): at 08:01

## 2019-10-28 RX ADMIN — Medication 1 TABLET(S): at 13:00

## 2019-10-28 RX ADMIN — APIXABAN 5 MILLIGRAM(S): 2.5 TABLET, FILM COATED ORAL at 17:06

## 2019-10-28 RX ADMIN — Medication 4: at 12:08

## 2019-10-28 RX ADMIN — Medication 1 TABLET(S): at 05:54

## 2019-10-28 RX ADMIN — APIXABAN 5 MILLIGRAM(S): 2.5 TABLET, FILM COATED ORAL at 05:55

## 2019-10-28 RX ADMIN — Medication 40 MILLIGRAM(S): at 05:54

## 2019-10-28 RX ADMIN — Medication 175 MILLIGRAM(S): at 23:03

## 2019-10-28 RX ADMIN — AMIODARONE HYDROCHLORIDE 400 MILLIGRAM(S): 400 TABLET ORAL at 05:55

## 2019-10-28 RX ADMIN — Medication 175 MILLIGRAM(S): at 17:50

## 2019-10-28 RX ADMIN — Medication 75 MICROGRAM(S): at 05:54

## 2019-10-28 RX ADMIN — Medication 6: at 17:05

## 2019-10-28 RX ADMIN — Medication 1 TABLET(S): at 11:52

## 2019-10-28 RX ADMIN — FLUCONAZOLE 200 MILLIGRAM(S): 150 TABLET ORAL at 11:52

## 2019-10-28 RX ADMIN — Medication 175 MILLIGRAM(S): at 12:09

## 2019-10-28 NOTE — PROGRESS NOTE ADULT - ASSESSMENT
The patient is a 69 year old female with a history of hypothyroidism who is admitted with PNA.    Plan:  - Lower amiodarone to 200 mg daily  - Continue apixaban 5 mg bid for thromboprophylaxis  - Echocardiogram with normal LV systolic function, mild pulm HTN  - PCP - on bactrim and steroids  - ID follow-up  - Add furosemide 40 mg PO daily. BNP rising.  - Pulm follow-up

## 2019-10-28 NOTE — PROGRESS NOTE ADULT - SUBJECTIVE AND OBJECTIVE BOX
Date/Time Patient Seen:  		  Referring MD:   Data Reviewed	       Patient is a 69y old  Female who presents with a chief complaint of pneumonia (27 Oct 2019 13:35)      Subjective/HPI     PAST MEDICAL & SURGICAL HISTORY:  Hypothyroidism, unspecified type  No significant past surgical history: denies surgical history but CT shows cholecystectomy        Medication list         MEDICATIONS  (STANDING):  amiodarone    Tablet 400 milliGRAM(s) Oral two times a day  apixaban 5 milliGRAM(s) Oral two times a day  dextrose 10% Bolus 125 milliLiter(s) IV Bolus once  dextrose 10% Bolus 250 milliLiter(s) IV Bolus once  dextrose 5%. 1000 milliLiter(s) (50 mL/Hr) IV Continuous <Continuous>  fluconAZOLE   Tablet 200 milliGRAM(s) Oral daily  insulin lispro (HumaLOG) corrective regimen sliding scale   SubCutaneous three times a day before meals  insulin lispro (HumaLOG) corrective regimen sliding scale   SubCutaneous at bedtime  lactobacillus acidophilus 1 Tablet(s) Oral three times a day  levothyroxine 75 MICROGram(s) Oral daily  multivitamin 1 Tablet(s) Oral daily  predniSONE   Tablet 40 milliGRAM(s) Oral daily  trimethoprim / sulfamethoxazole IVPB 200 milliGRAM(s) IV Intermittent every 6 hours    MEDICATIONS  (PRN):  acetaminophen   Tablet .. 650 milliGRAM(s) Oral every 6 hours PRN Temp greater or equal to 38C (100.4F), Mild Pain (1 - 3)  ALBUTerol    0.083% 2.5 milliGRAM(s) Nebulizer every 6 hours PRN Shortness of Breath and/or Wheezing  dextrose 40% Gel 15 Gram(s) Oral once PRN Blood Glucose LESS THAN 70 milliGRAM(s)/deciLiter  glucagon  Injectable 1 milliGRAM(s) IntraMuscular once PRN Glucose <70 milliGRAM(s)/deciLiter  magnesium hydroxide Suspension 30 milliLiter(s) Oral daily PRN Constipation         Vitals log        ICU Vital Signs Last 24 Hrs  T(C): 36.7 (28 Oct 2019 05:00), Max: 37.2 (27 Oct 2019 13:56)  T(F): 98.1 (28 Oct 2019 05:00), Max: 98.9 (27 Oct 2019 13:56)  HR: 82 (28 Oct 2019 05:00) (17 - 89)  BP: 101/67 (28 Oct 2019 05:00) (101/67 - 107/71)  BP(mean): --  ABP: --  ABP(mean): --  RR: 18 (28 Oct 2019 05:00) (17 - 18)  SpO2: 97% (28 Oct 2019 05:00) (97% - 98%)           Input and Output:  I&O's Detail    26 Oct 2019 07:01  -  27 Oct 2019 07:00  --------------------------------------------------------  IN:    Oral Fluid: 480 mL    sodium chloride 0.45%: 225 mL    Solution: 250 mL  Total IN: 955 mL    OUT:    Voided: 1000 mL  Total OUT: 1000 mL    Total NET: -45 mL      27 Oct 2019 07:01  -  28 Oct 2019 06:08  --------------------------------------------------------  IN:    Oral Fluid: 360 mL  Total IN: 360 mL    OUT:    Voided: 1800 mL  Total OUT: 1800 mL    Total NET: -1440 mL          Lab Data                        9.1    5.00  )-----------( 211      ( 27 Oct 2019 07:32 )             27.4     10-27    135  |  106  |  26<H>  ----------------------------<  137<H>  4.8   |  21<L>  |  1.00    Ca    8.2<L>      27 Oct 2019 07:32  Mg     1.8     10-27              Review of Systems	      Objective     Physical Examination  heart s1s2  lung dec BS        Pertinent Lab findings & Imaging      Gisela:  NO   Adequate UO     I&O's Detail    26 Oct 2019 07:01  -  27 Oct 2019 07:00  --------------------------------------------------------  IN:    Oral Fluid: 480 mL    sodium chloride 0.45%: 225 mL    Solution: 250 mL  Total IN: 955 mL    OUT:    Voided: 1000 mL  Total OUT: 1000 mL    Total NET: -45 mL      27 Oct 2019 07:01  -  28 Oct 2019 06:08  --------------------------------------------------------  IN:    Oral Fluid: 360 mL  Total IN: 360 mL    OUT:    Voided: 1800 mL  Total OUT: 1800 mL    Total NET: -1440 mL               Discussed with:     Cultures:	        Radiology

## 2019-10-28 NOTE — PROGRESS NOTE ADULT - SUBJECTIVE AND OBJECTIVE BOX
ID Progress note     Name: PATO HICKMAN  Age: 69y  Gender: Female  MRN: [id number]    Interval History-- Events noted , feels better , less SOB, now on 2 liters nasal canula . Received call from [facility], some body to come see her in hospital .   Notes reviewed    Past Medical History--  Hypothyroidism, unspecified type  No significant past surgical history      For details regarding the patient's social history, family history, and other miscellaneous elements, please refer the initial infectious diseases consultation and/or the admitting history and physical examination for this admission.    Allergies--  Allergies    No Known Allergies    Intolerances        Medications--  Antibiotics:  fluconAZOLE   Tablet 200 milliGRAM(s) Oral daily  trimethoprim / sulfamethoxazole IVPB 200 milliGRAM(s) IV Intermittent every 6 hours    Immunologic:    Other:  acetaminophen   Tablet .. PRN  ALBUTerol    0.083% PRN  apixaban  dextrose 10% Bolus  dextrose 10% Bolus  dextrose 40% Gel PRN  dextrose 5%.  furosemide    Tablet  glucagon  Injectable PRN  insulin lispro (HumaLOG) corrective regimen sliding scale  insulin lispro (HumaLOG) corrective regimen sliding scale  lactobacillus acidophilus  levothyroxine  magnesium hydroxide Suspension PRN  multivitamin  predniSONE   Tablet      Review of Systems--  Review of systems unable to be obtained secondary to clinical condition.    Physical Examination--    Vital Signs: T(F): 98.6 (10-28-19 @ 13:38), Max: 98.6 (10-28-19 @ 13:38)  HR: 82 (10-28-19 @ 13:38)  BP: 112/74 (10-28-19 @ 13:38)  RR: 19 (10-28-19 @ 13:38)  SpO2: 97% (10-28-19 @ 13:52)  Wt(kg): --  General: Nontoxic-appearing Female in no acute distress.  HEENT: AT/NC. PERRL. EOMI. Anicteric. Conjunctiva pink and moist. Oropharynx clear. Dentition fair.  Neck: Not rigid. No sense of mass.  Nodes: None palpable.  Lungs: Clear bilaterally without rales, wheezing or rhonchi  Heart: Regular rate and rhythm. No Murmur. No rub. No gallop. No palpable thrill.  Abdomen: Bowel sounds present and normoactive. Soft. Nondistended. Nontender. No sense of mass. No organomegaly.  Back: No spinal tenderness. No costovertebral angle tenderness.   Extremities: No cyanosis or clubbing. No edema.   Skin: Warm. Dry. Good turgor. No rash. No vasculitic stigmata.  Psychiatric: Appropriate affect and mood for situation.         Laboratory Studies--  CBC                        9.5    9.87  )-----------( 246      ( 28 Oct 2019 08:43 )             28.7       Chemistries  10-28    133<L>  |  105  |  27<H>  ----------------------------<  137<H>  5.3   |  21<L>  |  1.10    Ca    8.4<L>      28 Oct 2019 08:43  Mg     1.8     10-28    CAPILLARY BLOOD GLUCOSE      POCT Blood Glucose.: 227 mg/dL (28 Oct 2019 12:02)  POCT Blood Glucose.: 151 mg/dL (28 Oct 2019 07:39)  POCT Blood Glucose.: 148 mg/dL (27 Oct 2019 21:59)  POCT Blood Glucose.: 241 mg/dL (27 Oct 2019 16:44)    Serum Pro-Brain Natriuretic Peptide (10.27.19 @ 07:32)    Serum Pro-Brain Natriuretic Peptide: 76823 pg/mL          Culture Data    Culture - Acid Fast - Bronchial w/Smear (collected 23 Oct 2019 20:55)  Source: BAL Bronchoalveolar Washings  Preliminary Report (26 Oct 2019 15:03):    Culture is being performed.    Culture - Fungal, Bronchial (collected 23 Oct 2019 20:55)  Source: .Bronchial Bronchoalveolar Washings  Preliminary Report (28 Oct 2019 09:10):    Few Yeast    Culture - Bronchial (collected 23 Oct 2019 20:55)  Source: Bronch Wash Bronchoalveolar Washings  Gram Stain (24 Oct 2019 02:38):    Few polymorphonuclear leukocytes seen per low power field    Few Squamous epithelial cells seen per low power field    Rare Yeast like cells seen per oil power field  Final Report (25 Oct 2019 17:47):    Normal Respiratory Madelyn present    Culture - Sputum (collected 23 Oct 2019 00:29)  Source: .Sputum Sputum  Gram Stain (23 Oct 2019 02:53):    Few polymorphonuclear leukocytes per low power field    Moderate Squamous epithelial cells per low power field    Few Yeast like cells per oil power field    Few Gram Positive Cocci in Clusters per oil power field  Final Report (24 Oct 2019 19:03):    Normal Respiratory Madelyn present    Culture - Acid Fast - Sputum w/Smear (collected 23 Oct 2019 00:29)  Source: .Sputum Sputum  Preliminary Report (26 Oct 2019 15:03):    Culture is being performed.    Culture - Acid Fast - Sputum w/Smear (collected 21 Oct 2019 20:34)  Source: .Sputum Sputum  Preliminary Report (23 Oct 2019 15:04):    Culture is being performed.        Radiology:  Xray Chest 1 View-PORTABLE IMMEDIATE (10.25.19 @ 01:35) >  EXAM:  XR CHEST PORTABLE IMMED 1V                            PROCEDURE DATE:  10/25/2019          INTERPRETATION:  Follow-up.    AP chest. Prior 10/24/2019.    Low lung volumes. No change heart mediastinum. Extensive bilateral   airspace disease grossly unchanged. No pleural collection pneumothorax or   other new abnormality    Assessment--  70 y/o F with PMHx of hypothyroidism presents with cough and congestion for over 3-4 weeks  in duration associated with intermittent fevers and cough . She has difficulty swallowing and increased cough with liquids. She has progressive in creased sob on minimal exertion . Has weight loss form poor appetite . has not been on any abx PTA. CXR and CTA chest dhows diffuse bilateral infiltrates with hilar and mediastinal adenopathy . As per speech therapy she has posiitve aspiration with thin liquids .    Her HIV test is positive, she denies any HIV risk factors . She has very low CD 4 count and very high HIV Viral load , so she most likely has PCP pneumonia and she was started on IV Bactrim with steroids , she just had  bronchoscopy to confirm it. Clinically he looks better . Sputum afb x 3 negative     She also has severe oral thrush and may have candidal esophagitis which may be the cause of her difficulty swallowing     Plan:  - Pneumocystis jiroveci pneumonia.  Plan: continue IV bactrim for now with further recs to follow regarding potential de-escalation but anticipate 21 days total therapy. Steroids as ordered.   - HIV (human immunodeficiency virus infection).  Plan: delay of start of ART as recommended with genotyping prior to therapy selection  azithro for prophylaxis with CD4 count.   - Candidal esophagitis.  Plan: continue fluconazole for 7 days then change to Mycelex trochees   - repeat  CXR   - DC planning   -  to assit in obtaining HIV meds and other tx       Continue with present regime .  Appropriate use of antibiotics and adverse effects reviewed.    I have discussed the above plan of care with patient/family in detail. They expressed understanding of the treatment plan . Risks, benefits and alternatives discussed in detail. I have asked if they have any questions or concerns and appropriately addressed them to the best of my ability .      > 35 minutes spent in direct patient care reviewing  the notes, lab data/ imaging , discussion with multidisciplinary team. All questions were addressed and answered to the best of my capacity .    Thank you for allowing me to participate in the care of your patient .        Solo Owens MD  [phone]

## 2019-10-28 NOTE — PROGRESS NOTE ADULT - ASSESSMENT
Assessment and Plan:  -Sepsis and acute respiratory failure 2/2 multifocal pneumonia:  Possible PCP pneumonia with +HIV status.    s/p bronchoscopy.   Continue Bactrim 200mg IV Q6h, Prednisone 40mg PO daily, and Albuterol neb tx Q6h PRN.  O2 support as needed.  Will try to titrate off high flow nasal canula.  Bronchoscopy culture with normal respiratory jasbir.   AFB negative x 4.  Cytology negative for malignant cells.  ID and Pulmonary f/u  lasix given due ot ovolume over load    -Dysphagia:  Tolerating dysphagia 2 nectar consistency diet    -Possible candidal esophagitis:  continue Fluconazole 200mg PO daily    -Atrial fibrillation:  continue Amiodarone 400mg PO BID and Eliquis 5mg PO BID.  Cardiology f/u    -NICOLASA:  Renal function appears stable.  Continue to monitor.     -Hypothyroidism:  continue Synthroid 75mcg PO daily    -VTE ppx:  On Eliquis

## 2019-10-28 NOTE — PROGRESS NOTE ADULT - SUBJECTIVE AND OBJECTIVE BOX
CHIEF COMPLAINT/INTERVAL HISTORY:  Pt. seen and evaluated for acute respiratory failure and PCP pneumonia.  Feeling better.  Tolerating IV antibiotic and steroid.  cont lasix      MEDICATIONS  (STANDING):  apixaban 5 milliGRAM(s) Oral two times a day  dextrose 10% Bolus 125 milliLiter(s) IV Bolus once  dextrose 10% Bolus 250 milliLiter(s) IV Bolus once  dextrose 5%. 1000 milliLiter(s) (50 mL/Hr) IV Continuous <Continuous>  fluconAZOLE   Tablet 200 milliGRAM(s) Oral daily  furosemide    Tablet 40 milliGRAM(s) Oral daily  insulin lispro (HumaLOG) corrective regimen sliding scale   SubCutaneous three times a day before meals  insulin lispro (HumaLOG) corrective regimen sliding scale   SubCutaneous at bedtime  lactobacillus acidophilus 1 Tablet(s) Oral three times a day  levothyroxine 75 MICROGram(s) Oral daily  multivitamin 1 Tablet(s) Oral daily  predniSONE   Tablet 40 milliGRAM(s) Oral daily  trimethoprim / sulfamethoxazole IVPB 200 milliGRAM(s) IV Intermittent every 6 hours    MEDICATIONS  (PRN):  acetaminophen   Tablet .. 650 milliGRAM(s) Oral every 6 hours PRN Temp greater or equal to 38C (100.4F), Mild Pain (1 - 3)  ALBUTerol    0.083% 2.5 milliGRAM(s) Nebulizer every 6 hours PRN Shortness of Breath and/or Wheezing  dextrose 40% Gel 15 Gram(s) Oral once PRN Blood Glucose LESS THAN 70 milliGRAM(s)/deciLiter  glucagon  Injectable 1 milliGRAM(s) IntraMuscular once PRN Glucose <70 milliGRAM(s)/deciLiter  magnesium hydroxide Suspension 30 milliLiter(s) Oral daily PRN Constipation      REVIEW OF SYSTEMS:  See HPI,  all others negative    PHYSICAL EXAM:  Vital Signs Last 24 Hrs  T(C): 37 (28 Oct 2019 13:38), Max: 37 (28 Oct 2019 13:38)  T(F): 98.6 (28 Oct 2019 13:38), Max: 98.6 (28 Oct 2019 13:38)  HR: 82 (28 Oct 2019 13:38) (17 - 82)  BP: 112/74 (28 Oct 2019 13:38) (101/67 - 112/74)  BP(mean): --  RR: 19 (28 Oct 2019 13:38) (17 - 19)  SpO2: 97% (28 Oct 2019 13:52) (97% - 98%)    GENERAL: NAD, on hi richard  HEAD:  Atraumatic, Normocephalic  EYES: EOMI, PERRLA, conjunctiva and sclera clear  ENMT: No tonsillar erythema, exudates, or enlargement; Moist mucous membranes, Good dentition, No lesions  NECK: Supple, No JVD, No Cervical LAD, No thyromegaly, No thyroid nodules felt  NERVOUS SYSTEM:  Good concentration; Moving all 4 extremities; No gross sensory deficits, No facial droop  CHEST WALL: No masses  CHEST/LUNG: dec breath sound on bases  HEART: Regular rate and rhythm; No murmurs, rubs, or gallops  ABDOMEN: Soft, Nontender, Nondistended, Bowel sounds present, No palpable masses or organomegaly, No bruits  EXTREMITIES:  2+ Peripheral Pulses, No clubbing, cyanosis, or edema  LYMPH: No lymphadenopathy  SKIN: No rashes or lesions    LABS:                        9.5    9.87  )-----------( 246      ( 28 Oct 2019 08:43 )             28.7     28 Oct 2019 08:43    133    |  105    |  27     ----------------------------<  137    5.3     |  21     |  1.10     Ca    8.4        28 Oct 2019 08:43  Mg     1.8       28 Oct 2019 08:43             RADIOLOGY & ADDITIONAL TESTS:

## 2019-10-28 NOTE — PROGRESS NOTE ADULT - SUBJECTIVE AND OBJECTIVE BOX
Chief Complaint: Shortness of breath, fever    Interval Events: Remains on HFNC.    Review of Systems:  General: No fevers, chills, weight loss or gain  Skin: No rashes, color changes  Cardiovascular: No chest pain, orthopnea  Respiratory: No shortness of breath, cough  Gastrointestinal: No nausea, abdominal pain  Genitourinary: No incontinence, pain with urination  Musculoskeletal: No pain, swelling, decreased range of motion  Neurological: No headache, weakness  Psychiatric: No depression, anxiety  Endocrine: No weight loss or gain, increased thirst  All other systems are comprehensively negative.    Physical Exam:  Vital Signs Last 24 Hrs  T(C): 36.7 (28 Oct 2019 05:00), Max: 37.2 (27 Oct 2019 13:56)  T(F): 98.1 (28 Oct 2019 05:00), Max: 98.9 (27 Oct 2019 13:56)  HR: 82 (28 Oct 2019 05:00) (17 - 89)  BP: 101/67 (28 Oct 2019 05:00) (101/67 - 107/71)  BP(mean): --  RR: 18 (28 Oct 2019 05:00) (17 - 18)  SpO2: 97% (28 Oct 2019 05:00) (97% - 98%)  Genera: NAD  HEENT: MMM  Neck: No JVD, no carotid bruit  Lungs: CTAB  CV: RRR, nl S1/S2, no M/R/G  Abdomen: S/NT/ND, +BS  Extremities: No LE edema, no cyanosis  Neuro: AAOx3, non-focal  Skin: No rash    Labs:    10-27    135  |  106  |  26<H>  ----------------------------<  137<H>  4.8   |  21<L>  |  1.00    Ca    8.2<L>      27 Oct 2019 07:32  Mg     1.8     10-27                          9.1    5.00  )-----------( 211      ( 27 Oct 2019 07:32 )             27.4

## 2019-10-29 LAB
ANION GAP SERPL CALC-SCNC: 7 MMOL/L — SIGNIFICANT CHANGE UP (ref 5–17)
BASOPHILS # BLD AUTO: 0.12 K/UL — SIGNIFICANT CHANGE UP (ref 0–0.2)
BASOPHILS NFR BLD AUTO: 1 % — SIGNIFICANT CHANGE UP (ref 0–2)
BUN SERPL-MCNC: 26 MG/DL — HIGH (ref 7–23)
CALCIUM SERPL-MCNC: 8.3 MG/DL — LOW (ref 8.5–10.1)
CHLORIDE SERPL-SCNC: 100 MMOL/L — SIGNIFICANT CHANGE UP (ref 96–108)
CO2 SERPL-SCNC: 23 MMOL/L — SIGNIFICANT CHANGE UP (ref 22–31)
CREAT SERPL-MCNC: 1.1 MG/DL — SIGNIFICANT CHANGE UP (ref 0.5–1.3)
EOSINOPHIL # BLD AUTO: 0.35 K/UL — SIGNIFICANT CHANGE UP (ref 0–0.5)
EOSINOPHIL NFR BLD AUTO: 3 % — SIGNIFICANT CHANGE UP (ref 0–6)
GLUCOSE SERPL-MCNC: 169 MG/DL — HIGH (ref 70–99)
HCT VFR BLD CALC: 29 % — LOW (ref 34.5–45)
HGB BLD-MCNC: 9.8 G/DL — LOW (ref 11.5–15.5)
LYMPHOCYTES # BLD AUTO: 0.12 K/UL — LOW (ref 1–3.3)
LYMPHOCYTES # BLD AUTO: 1 % — LOW (ref 13–44)
MCHC RBC-ENTMCNC: 29.9 PG — SIGNIFICANT CHANGE UP (ref 27–34)
MCHC RBC-ENTMCNC: 33.8 GM/DL — SIGNIFICANT CHANGE UP (ref 32–36)
MCV RBC AUTO: 88.4 FL — SIGNIFICANT CHANGE UP (ref 80–100)
MONOCYTES # BLD AUTO: 0 K/UL — SIGNIFICANT CHANGE UP (ref 0–0.9)
MONOCYTES NFR BLD AUTO: 0 % — LOW (ref 2–14)
NEUTROPHILS # BLD AUTO: 10.87 K/UL — HIGH (ref 1.8–7.4)
NEUTROPHILS NFR BLD AUTO: 84 % — HIGH (ref 43–77)
NRBC # BLD: SIGNIFICANT CHANGE UP /100 WBCS (ref 0–0)
PLATELET # BLD AUTO: 288 K/UL — SIGNIFICANT CHANGE UP (ref 150–400)
POTASSIUM SERPL-MCNC: 5.3 MMOL/L — SIGNIFICANT CHANGE UP (ref 3.5–5.3)
POTASSIUM SERPL-SCNC: 5.3 MMOL/L — SIGNIFICANT CHANGE UP (ref 3.5–5.3)
RBC # BLD: 3.28 M/UL — LOW (ref 3.8–5.2)
RBC # FLD: 13.9 % — SIGNIFICANT CHANGE UP (ref 10.3–14.5)
SODIUM SERPL-SCNC: 130 MMOL/L — LOW (ref 135–145)
WBC # BLD: 11.81 K/UL — HIGH (ref 3.8–10.5)
WBC # FLD AUTO: 11.81 K/UL — HIGH (ref 3.8–10.5)

## 2019-10-29 PROCEDURE — 71046 X-RAY EXAM CHEST 2 VIEWS: CPT | Mod: 26

## 2019-10-29 PROCEDURE — 99232 SBSQ HOSP IP/OBS MODERATE 35: CPT

## 2019-10-29 RX ADMIN — Medication 175 MILLIGRAM(S): at 17:12

## 2019-10-29 RX ADMIN — Medication 6: at 17:07

## 2019-10-29 RX ADMIN — Medication 1 TABLET(S): at 12:08

## 2019-10-29 RX ADMIN — Medication 75 MICROGRAM(S): at 06:13

## 2019-10-29 RX ADMIN — Medication 2: at 08:09

## 2019-10-29 RX ADMIN — FLUCONAZOLE 200 MILLIGRAM(S): 150 TABLET ORAL at 12:07

## 2019-10-29 RX ADMIN — Medication 1 TABLET(S): at 06:13

## 2019-10-29 RX ADMIN — Medication 175 MILLIGRAM(S): at 12:06

## 2019-10-29 RX ADMIN — Medication 40 MILLIGRAM(S): at 08:10

## 2019-10-29 RX ADMIN — Medication 175 MILLIGRAM(S): at 22:20

## 2019-10-29 RX ADMIN — Medication 1 TABLET(S): at 14:34

## 2019-10-29 RX ADMIN — APIXABAN 5 MILLIGRAM(S): 2.5 TABLET, FILM COATED ORAL at 06:13

## 2019-10-29 RX ADMIN — Medication 1 TABLET(S): at 22:20

## 2019-10-29 RX ADMIN — Medication 175 MILLIGRAM(S): at 06:13

## 2019-10-29 RX ADMIN — APIXABAN 5 MILLIGRAM(S): 2.5 TABLET, FILM COATED ORAL at 17:08

## 2019-10-29 RX ADMIN — AMIODARONE HYDROCHLORIDE 200 MILLIGRAM(S): 400 TABLET ORAL at 06:13

## 2019-10-29 RX ADMIN — Medication 6: at 12:06

## 2019-10-29 RX ADMIN — Medication 40 MILLIGRAM(S): at 06:13

## 2019-10-29 NOTE — PROGRESS NOTE ADULT - SUBJECTIVE AND OBJECTIVE BOX
Date/Time Patient Seen:  		  Referring MD:   Data Reviewed	       Patient is a 69y old  Female who presents with a chief complaint of pneumonia (28 Oct 2019 16:19)      Subjective/HPI     PAST MEDICAL & SURGICAL HISTORY:  Hypothyroidism, unspecified type  No significant past surgical history: denies surgical history but CT shows cholecystectomy        Medication list         MEDICATIONS  (STANDING):  aMIOdarone    Tablet 200 milliGRAM(s) Oral daily  apixaban 5 milliGRAM(s) Oral two times a day  azithromycin   Tablet 1250 milliGRAM(s) Oral <User Schedule>  dextrose 10% Bolus 125 milliLiter(s) IV Bolus once  dextrose 10% Bolus 250 milliLiter(s) IV Bolus once  dextrose 5%. 1000 milliLiter(s) (50 mL/Hr) IV Continuous <Continuous>  fluconAZOLE   Tablet 200 milliGRAM(s) Oral daily  furosemide    Tablet 40 milliGRAM(s) Oral daily  insulin lispro (HumaLOG) corrective regimen sliding scale   SubCutaneous three times a day before meals  insulin lispro (HumaLOG) corrective regimen sliding scale   SubCutaneous at bedtime  lactobacillus acidophilus 1 Tablet(s) Oral three times a day  levothyroxine 75 MICROGram(s) Oral daily  multivitamin 1 Tablet(s) Oral daily  predniSONE   Tablet 40 milliGRAM(s) Oral daily  trimethoprim / sulfamethoxazole IVPB 200 milliGRAM(s) IV Intermittent every 6 hours    MEDICATIONS  (PRN):  acetaminophen   Tablet .. 650 milliGRAM(s) Oral every 6 hours PRN Temp greater or equal to 38C (100.4F), Mild Pain (1 - 3)  ALBUTerol    0.083% 2.5 milliGRAM(s) Nebulizer every 6 hours PRN Shortness of Breath and/or Wheezing  dextrose 40% Gel 15 Gram(s) Oral once PRN Blood Glucose LESS THAN 70 milliGRAM(s)/deciLiter  glucagon  Injectable 1 milliGRAM(s) IntraMuscular once PRN Glucose <70 milliGRAM(s)/deciLiter  magnesium hydroxide Suspension 30 milliLiter(s) Oral daily PRN Constipation         Vitals log        ICU Vital Signs Last 24 Hrs  T(C): 36.6 (29 Oct 2019 04:54), Max: 37 (28 Oct 2019 13:38)  T(F): 97.8 (29 Oct 2019 04:54), Max: 98.6 (28 Oct 2019 13:38)  HR: 93 (29 Oct 2019 04:54) (82 - 93)  BP: 104/74 (29 Oct 2019 04:54) (104/74 - 125/82)  BP(mean): --  ABP: --  ABP(mean): --  RR: 17 (29 Oct 2019 04:54) (17 - 19)  SpO2: 90% (29 Oct 2019 04:54) (90% - 98%)           Input and Output:  I&O's Detail    27 Oct 2019 07:01  -  28 Oct 2019 07:00  --------------------------------------------------------  IN:    Oral Fluid: 360 mL  Total IN: 360 mL    OUT:    Voided: 1800 mL  Total OUT: 1800 mL    Total NET: -1440 mL      28 Oct 2019 07:01  -  29 Oct 2019 05:56  --------------------------------------------------------  IN:  Total IN: 0 mL    OUT:    Voided: 1000 mL  Total OUT: 1000 mL    Total NET: -1000 mL          Lab Data                        9.5    9.87  )-----------( 246      ( 28 Oct 2019 08:43 )             28.7     10-28    133<L>  |  105  |  27<H>  ----------------------------<  137<H>  5.3   |  21<L>  |  1.10    Ca    8.4<L>      28 Oct 2019 08:43  Mg     1.8     10-28              Review of Systems	      Objective     Physical Examination    heart s1s2  lung dec BS      Pertinent Lab findings & Imaging      Gisela:  NO   Adequate UO     I&O's Detail    27 Oct 2019 07:01  -  28 Oct 2019 07:00  --------------------------------------------------------  IN:    Oral Fluid: 360 mL  Total IN: 360 mL    OUT:    Voided: 1800 mL  Total OUT: 1800 mL    Total NET: -1440 mL      28 Oct 2019 07:01  -  29 Oct 2019 05:56  --------------------------------------------------------  IN:  Total IN: 0 mL    OUT:    Voided: 1000 mL  Total OUT: 1000 mL    Total NET: -1000 mL               Discussed with:     Cultures:	        Radiology

## 2019-10-29 NOTE — PROGRESS NOTE ADULT - SUBJECTIVE AND OBJECTIVE BOX
CHIEF COMPLAINT/INTERVAL HISTORY:  Pt. seen and evaluated for acute respiratory failure and sepsis 2/2 PCP pneumonia.  Pt. is now off high flow nasal canula.  Tolerating regular nasal canula and denies SOB.  Tolerating IV antibiotic and steroids.     REVIEW OF SYSTEMS:  No fever, CP, acute respiratory distress, or abdominal pain.     Vital Signs Last 24 Hrs  T(C): 36.6 (29 Oct 2019 04:54), Max: 37 (28 Oct 2019 13:38)  T(F): 97.8 (29 Oct 2019 04:54), Max: 98.6 (28 Oct 2019 13:38)  HR: 93 (29 Oct 2019 04:54) (82 - 93)  BP: 104/74 (29 Oct 2019 04:54) (104/74 - 125/82)  BP(mean): --  RR: 17 (29 Oct 2019 04:54) (17 - 19)  SpO2: 90% (29 Oct 2019 04:54) (90% - 98%)    PHYSICAL EXAM:  GENERAL: NAD  HEENT: EOMI, hearing normal, conjunctiva and sclera clear  Chest: Diminished BS bilaterally, no wheezing  CV: S1S2, RRR,   GI: soft, +BS, NT/ND  Musculoskeletal: no edema  Psychiatric: affect nL, mood nL  Skin: warm and dry    LABS:                        9.8    11.81 )-----------( 288      ( 29 Oct 2019 08:37 )             29.0     10-29    130<L>  |  100  |  26<H>  ----------------------------<  169<H>  5.3   |  23  |  1.10    Ca    8.3<L>      29 Oct 2019 08:37  Mg     1.8     10-28      Assessment and Plan:  -Sepsis and acute respiratory failure 2/2 multifocal pneumonia:  Possible PCP pneumonia with +HIV status.  s/p bronchoscopy.   Continue Bactrim DS 2 tabs PO Q8h, Prednisone 40mg PO daily, and Albuterol neb tx Q6h PRN.  O2 support as needed.  Bronchoscopy culture with normal respiratory jasbir.   AFB negative x 4.  Cytology negative for malignant cells.  ID and Pulmonary f/u  -Dysphagia:  Tolerating dysphagia 2 nectar consistency diet  -Possible candidal esophagitis:  continue Fluconazole 200mg PO daily  -Atrial fibrillation:  continue Amiodarone 200mg PO daily and Eliquis 5mg PO BID.  Cardiology f/u  -NICOLASA:  Renal function appears stable.  Continue to monitor.   -Hypothyroidism:  continue Synthroid 75mcg PO daily  -VTE ppx:  On Eliquis

## 2019-10-29 NOTE — PROGRESS NOTE ADULT - SUBJECTIVE AND OBJECTIVE BOX
ID Progress note     Name: PATO HICKMAN  Age: 69y  Gender: Female  MRN: [id number]    Interval History-- Events noted, doing well, feels more weak , remains on oxygen . Afebrile   Notes reviewed    Past Medical History--  Hypothyroidism, unspecified type  No significant past surgical history      For details regarding the patient's social history, family history, and other miscellaneous elements, please refer the initial infectious diseases consultation and/or the admitting history and physical examination for this admission.    Allergies--  Allergies    No Known Allergies    Intolerances        Medications--  Antibiotics:  azithromycin   Tablet 1250 milliGRAM(s) Oral <User Schedule>  fluconAZOLE   Tablet 200 milliGRAM(s) Oral daily  trimethoprim / sulfamethoxazole IVPB 200 milliGRAM(s) IV Intermittent every 6 hours    Immunologic:    Other:  acetaminophen   Tablet .. PRN  ALBUTerol    0.083% PRN  aMIOdarone    Tablet  apixaban  dextrose 10% Bolus  dextrose 10% Bolus  dextrose 40% Gel PRN  dextrose 5%.  furosemide    Tablet  glucagon  Injectable PRN  insulin lispro (HumaLOG) corrective regimen sliding scale  insulin lispro (HumaLOG) corrective regimen sliding scale  lactobacillus acidophilus  levothyroxine  magnesium hydroxide Suspension PRN  multivitamin  predniSONE   Tablet      Review of Systems--  Review of systems unable to be obtained secondary to clinical condition.    Physical Examination--    Vital Signs: T(F): 97.8 (10-29-19 @ 04:54), Max: 98.6 (10-28-19 @ 13:38)  HR: 93 (10-29-19 @ 04:54)  BP: 104/74 (10-29-19 @ 04:54)  RR: 17 (10-29-19 @ 04:54)  SpO2: 90% (10-29-19 @ 04:54)  Wt(kg): --  General: Frail appearing Female in no acute distress.  HEENT: AT/NC. PERRL. EOMI. Anicteric. Conjunctiva pink and moist. Oropharynx clear. Dentition fair.  Neck: Not rigid. No sense of mass.  Nodes: None palpable.  Lungs: Clear bilaterally without rales, wheezing or rhonchi  Heart: Regular rate and rhythm. No Murmur. No rub. No gallop. No palpable thrill.  Abdomen: Bowel sounds present and normoactive. Soft. Nondistended. Nontender. No sense of mass. No organomegaly.  Back: No spinal tenderness. No costovertebral angle tenderness.   Extremities: No cyanosis or clubbing. No edema.   Skin: Warm. Dry. Good turgor. No rash. No vasculitic stigmata.  Psychiatric: Appropriate affect and mood for situation.         Laboratory Studies--  CBC                        9.8    11.81 )-----------( 288      ( 29 Oct 2019 08:37 )             29.0       Chemistries  10-29    130<L>  |  100  |  26<H>  ----------------------------<  169<H>  5.3   |  23  |  1.10    Ca    8.3<L>      29 Oct 2019 08:37  Mg     1.8     10-28    CAPILLARY BLOOD GLUCOSE      POCT Blood Glucose.: 168 mg/dL (29 Oct 2019 07:48)  POCT Blood Glucose.: 149 mg/dL (28 Oct 2019 22:23)  POCT Blood Glucose.: 277 mg/dL (28 Oct 2019 16:41)  POCT Blood Glucose.: 227 mg/dL (28 Oct 2019 12:02)      Culture Data    Culture - Acid Fast - Bronchial w/Smear (collected 23 Oct 2019 20:55)  Source: BAL Bronchoalveolar Washings  Preliminary Report (26 Oct 2019 15:03):    Culture is being performed.    Culture - Fungal, Bronchial (collected 23 Oct 2019 20:55)  Source: .Bronchial Bronchoalveolar Washings  Preliminary Report (28 Oct 2019 09:10):    Few Yeast    Culture - Bronchial (collected 23 Oct 2019 20:55)  Source: Bronch Wash Bronchoalveolar Washings  Gram Stain (24 Oct 2019 02:38):    Few polymorphonuclear leukocytes seen per low power field    Few Squamous epithelial cells seen per low power field    Rare Yeast like cells seen per oil power field  Final Report (25 Oct 2019 17:47):    Normal Respiratory Madelyn present    Culture - Sputum (collected 23 Oct 2019 00:29)  Source: .Sputum Sputum  Gram Stain (23 Oct 2019 02:53):    Few polymorphonuclear leukocytes per low power field    Moderate Squamous epithelial cells per low power field    Few Yeast like cells per oil power field    Few Gram Positive Cocci in Clusters per oil power field  Final Report (24 Oct 2019 19:03):    Normal Respiratory Madelyn present    Culture - Acid Fast - Sputum w/Smear (collected 23 Oct 2019 00:29)  Source: .Sputum Sputum  Preliminary Report (26 Oct 2019 15:03):    Culture is being performed.        Radiology:    Xray Chest 1 View-PORTABLE IMMEDIATE (10.25.19 @ 01:35) >  EXAM:  XR CHEST PORTABLE IMMED 1V                            PROCEDURE DATE:  10/25/2019          INTERPRETATION:  Follow-up.    AP chest. Prior 10/24/2019.    Low lung volumes. No change heart mediastinum. Extensive bilateral   airspace disease grossly unchanged. No pleural collection pneumothorax or   other new abnormality    Assessment--  70 y/o F with PMHx of hypothyroidism presents with cough and congestion for over 3-4 weeks  in duration associated with intermittent fevers and cough . She has difficulty swallowing and increased cough with liquids. She has progressive in creased sob on minimal exertion . Has weight loss form poor appetite . has not been on any abx PTA. CXR and CTA chest dhows diffuse bilateral infiltrates with hilar and mediastinal adenopathy . As per speech therapy she has posiitve aspiration with thin liquids .    Her HIV test is positive, she denies any HIV risk factors . She has very low CD 4 count and very high HIV Viral load , so she most likely has PCP pneumonia and she was started on IV Bactrim with steroids , she just had  bronchoscopy to confirm it. Clinically he looks better . Sputum afb x 3 negative     She also has severe oral thrush and may have candidal esophagitis which may be the cause of her difficulty swallowing     Plan:  - Pneumocystis jiroveci pneumonia.  Plan: continue IV bactrim (Day # 9/21)  for now with further recs to follow regarding potential de-escalation but anticipate change to po bactrim by tomorrow for 21 days total therapy. Steroids as ordered.   - HIV (human immunodeficiency virus infection).  Plan: delay of start of ART as recommended with genotyping prior to therapy selection  - started azithro for MAC prophylaxis with CD4 count.   - Candidal esophagitis.  Plan: continue fluconazole for 7 days then change to Mycelex trochees   - repeat  CXR   - DC planning   -  to assist in obtaining HIV meds and other tx       Continue with present regime .  Appropriate use of antibiotics and adverse effects reviewed.    I have discussed the above plan of care with patient/family in detail. They expressed understanding of the treatment plan . Risks, benefits and alternatives discussed in detail. I have asked if they have any questions or concerns and appropriately addressed them to the best of my ability .      > 35 minutes spent in direct patient care reviewing  the notes, lab data/ imaging , discussion with multidisciplinary team. All questions were addressed and answered to the best of my capacity .    Thank you for allowing me to participate in the care of your patient .        Solo Owens MD  [phone]

## 2019-10-29 NOTE — PROGRESS NOTE ADULT - SUBJECTIVE AND OBJECTIVE BOX
Chief Complaint: Shortness of breath, fever    Interval Events: Back down to [place]. Overall improved.    Review of Systems:  General: No fevers, chills, weight loss or gain  Skin: No rashes, color changes  Cardiovascular: No chest pain, orthopnea  Respiratory: No shortness of breath, cough  Gastrointestinal: No nausea, abdominal pain  Genitourinary: No incontinence, pain with urination  Musculoskeletal: No pain, swelling, decreased range of motion  Neurological: No headache, weakness  Psychiatric: No depression, anxiety  Endocrine: No weight loss or gain, increased thirst  All other systems are comprehensively negative.    Physical Exam:  Vital Signs Last 24 Hrs  T(C): 36.6 (29 Oct 2019 04:54), Max: 37 (28 Oct 2019 13:38)  T(F): 97.8 (29 Oct 2019 04:54), Max: 98.6 (28 Oct 2019 13:38)  HR: 93 (29 Oct 2019 04:54) (82 - 93)  BP: 104/74 (29 Oct 2019 04:54) (104/74 - 125/82)  BP(mean): --  RR: 17 (29 Oct 2019 04:54) (17 - 19)  SpO2: 90% (29 Oct 2019 04:54) (90% - 98%)  Genera: NAD  HEENT: MMM  Neck: No JVD, no carotid bruit  Lungs: CTAB  CV: RRR, nl S1/S2, no M/R/G  Abdomen: S/NT/ND, +BS  Extremities: No LE edema, no cyanosis  Neuro: AAOx3, non-focal  Skin: No rash    Labs:    10-28    133<L>  |  105  |  27<H>  ----------------------------<  137<H>  5.3   |  21<L>  |  1.10    Ca    8.4<L>      28 Oct 2019 08:43  Mg     1.8     10-28                          9.5    9.87  )-----------( 246      ( 28 Oct 2019 08:43 )             28.7

## 2019-10-29 NOTE — PROGRESS NOTE ADULT - ASSESSMENT
The patient is a 69 year old female with a history of hypothyroidism who is admitted with PNA.    Plan:  - Continue amiodarone 200 mg daily  - Continue apixaban 5 mg bid for thromboprophylaxis  - Continue furosemide 40 mg PO daily for now  - Echocardiogram with normal LV systolic function, mild pulm HTN  - PCP - on bactrim and steroids  - ID and pulm follow-up

## 2019-10-30 LAB
ANION GAP SERPL CALC-SCNC: 8 MMOL/L — SIGNIFICANT CHANGE UP (ref 5–17)
BASE EXCESS BLDA CALC-SCNC: -2.1 MMOL/L — LOW (ref -2–2)
BASOPHILS # BLD AUTO: 0.04 K/UL — SIGNIFICANT CHANGE UP (ref 0–0.2)
BASOPHILS NFR BLD AUTO: 0.3 % — SIGNIFICANT CHANGE UP (ref 0–2)
BLOOD GAS COMMENTS ARTERIAL: SIGNIFICANT CHANGE UP
BLOOD GAS COMMENTS ARTERIAL: SIGNIFICANT CHANGE UP
BUN SERPL-MCNC: 34 MG/DL — HIGH (ref 7–23)
CALCIUM SERPL-MCNC: 9.3 MG/DL — SIGNIFICANT CHANGE UP (ref 8.5–10.1)
CHLORIDE SERPL-SCNC: 98 MMOL/L — SIGNIFICANT CHANGE UP (ref 96–108)
CO2 SERPL-SCNC: 24 MMOL/L — SIGNIFICANT CHANGE UP (ref 22–31)
CREAT SERPL-MCNC: 1.4 MG/DL — HIGH (ref 0.5–1.3)
EOSINOPHIL # BLD AUTO: 0.69 K/UL — HIGH (ref 0–0.5)
EOSINOPHIL NFR BLD AUTO: 4.9 % — SIGNIFICANT CHANGE UP (ref 0–6)
GLUCOSE SERPL-MCNC: 116 MG/DL — HIGH (ref 70–99)
HCO3 BLDA-SCNC: 23 MMOL/L — SIGNIFICANT CHANGE UP (ref 23–27)
HCT VFR BLD CALC: 30.2 % — LOW (ref 34.5–45)
HGB BLD-MCNC: 10.1 G/DL — LOW (ref 11.5–15.5)
IMM GRANULOCYTES NFR BLD AUTO: 4.9 % — HIGH (ref 0–1.5)
LYMPHOCYTES # BLD AUTO: 0.41 K/UL — LOW (ref 1–3.3)
LYMPHOCYTES # BLD AUTO: 2.9 % — LOW (ref 13–44)
MAGNESIUM SERPL-MCNC: 1.6 MG/DL — SIGNIFICANT CHANGE UP (ref 1.6–2.6)
MCHC RBC-ENTMCNC: 29.5 PG — SIGNIFICANT CHANGE UP (ref 27–34)
MCHC RBC-ENTMCNC: 33.4 GM/DL — SIGNIFICANT CHANGE UP (ref 32–36)
MCV RBC AUTO: 88.3 FL — SIGNIFICANT CHANGE UP (ref 80–100)
MONOCYTES # BLD AUTO: 0.19 K/UL — SIGNIFICANT CHANGE UP (ref 0–0.9)
MONOCYTES NFR BLD AUTO: 1.4 % — LOW (ref 2–14)
NEUTROPHILS # BLD AUTO: 11.94 K/UL — HIGH (ref 1.8–7.4)
NEUTROPHILS NFR BLD AUTO: 85.6 % — HIGH (ref 43–77)
NRBC # BLD: 0 /100 WBCS — SIGNIFICANT CHANGE UP (ref 0–0)
PCO2 BLDA: 34 MMHG — SIGNIFICANT CHANGE UP (ref 32–46)
PH BLDA: 7.42 — SIGNIFICANT CHANGE UP (ref 7.35–7.45)
PHOSPHATE SERPL-MCNC: 4.3 MG/DL — SIGNIFICANT CHANGE UP (ref 2.5–4.5)
PLATELET # BLD AUTO: 320 K/UL — SIGNIFICANT CHANGE UP (ref 150–400)
PO2 BLDA: 100 MMHG — SIGNIFICANT CHANGE UP (ref 74–108)
POTASSIUM SERPL-MCNC: 5.9 MMOL/L — HIGH (ref 3.5–5.3)
POTASSIUM SERPL-MCNC: 6.2 MMOL/L — CRITICAL HIGH (ref 3.5–5.3)
POTASSIUM SERPL-SCNC: 5.9 MMOL/L — HIGH (ref 3.5–5.3)
POTASSIUM SERPL-SCNC: 6.2 MMOL/L — CRITICAL HIGH (ref 3.5–5.3)
RBC # BLD: 3.42 M/UL — LOW (ref 3.8–5.2)
RBC # FLD: 14.2 % — SIGNIFICANT CHANGE UP (ref 10.3–14.5)
SAO2 % BLDA: 97 % — HIGH (ref 92–96)
SODIUM SERPL-SCNC: 130 MMOL/L — LOW (ref 135–145)
WBC # BLD: 13.96 K/UL — HIGH (ref 3.8–10.5)
WBC # FLD AUTO: 13.96 K/UL — HIGH (ref 3.8–10.5)

## 2019-10-30 PROCEDURE — 99233 SBSQ HOSP IP/OBS HIGH 50: CPT

## 2019-10-30 RX ORDER — SODIUM POLYSTYRENE SULFONATE 4.1 MEQ/G
30 POWDER, FOR SUSPENSION ORAL ONCE
Refills: 0 | Status: COMPLETED | OUTPATIENT
Start: 2019-10-30 | End: 2019-10-30

## 2019-10-30 RX ORDER — MAGNESIUM SULFATE 500 MG/ML
2 VIAL (ML) INJECTION ONCE
Refills: 0 | Status: COMPLETED | OUTPATIENT
Start: 2019-10-30 | End: 2019-10-30

## 2019-10-30 RX ADMIN — Medication 2 TABLET(S): at 21:53

## 2019-10-30 RX ADMIN — SODIUM POLYSTYRENE SULFONATE 30 GRAM(S): 4.1 POWDER, FOR SUSPENSION ORAL at 16:59

## 2019-10-30 RX ADMIN — AMIODARONE HYDROCHLORIDE 200 MILLIGRAM(S): 400 TABLET ORAL at 05:35

## 2019-10-30 RX ADMIN — Medication 10: at 12:06

## 2019-10-30 RX ADMIN — Medication 1 TABLET(S): at 15:00

## 2019-10-30 RX ADMIN — Medication 2 TABLET(S): at 05:35

## 2019-10-30 RX ADMIN — Medication 40 MILLIGRAM(S): at 05:35

## 2019-10-30 RX ADMIN — APIXABAN 5 MILLIGRAM(S): 2.5 TABLET, FILM COATED ORAL at 05:34

## 2019-10-30 RX ADMIN — Medication 50 GRAM(S): at 16:57

## 2019-10-30 RX ADMIN — Medication 1 TABLET(S): at 05:35

## 2019-10-30 RX ADMIN — Medication 1 TABLET(S): at 21:53

## 2019-10-30 RX ADMIN — Medication 1 TABLET(S): at 12:07

## 2019-10-30 RX ADMIN — APIXABAN 5 MILLIGRAM(S): 2.5 TABLET, FILM COATED ORAL at 17:02

## 2019-10-30 RX ADMIN — Medication 2: at 16:57

## 2019-10-30 RX ADMIN — Medication 2: at 21:59

## 2019-10-30 RX ADMIN — Medication 75 MICROGRAM(S): at 05:35

## 2019-10-30 RX ADMIN — AZITHROMYCIN 1250 MILLIGRAM(S): 500 TABLET, FILM COATED ORAL at 12:07

## 2019-10-30 RX ADMIN — Medication 2 TABLET(S): at 15:00

## 2019-10-30 NOTE — SWALLOW BEDSIDE ASSESSMENT ADULT - SLP PERTINENT HISTORY OF CURRENT PROBLEM
Upon questioning, patient's family reported that the patient has been coughing when eating and drinking
r/o dysphagia

## 2019-10-30 NOTE — SWALLOW BEDSIDE ASSESSMENT ADULT - SWALLOW EVAL: RECOMMENDED FEEDING/EATING TECHNIQUES
allow for swallow between intakes/crush medication (when feasible)/position upright (90 degrees)/small sips/bites/oral hygiene
alternate food with liquid/allow for swallow between intakes/maintain upright posture during/after eating for 30 mins/oral hygiene/crush medication (when feasible)/position upright (90 degrees)/small sips/bites

## 2019-10-30 NOTE — PROGRESS NOTE ADULT - SUBJECTIVE AND OBJECTIVE BOX
ID progress note     Name: PATO HICKMAN  Age: 69y  Gender: Female  MRN: [id number]    Interval History-- Events noted, no new complaints does not like puree food, still very weak . Remains SOB and on oxygen   Notes reviewed    Past Medical History--  Hypothyroidism, unspecified type  No significant past surgical history      For details regarding the patient's social history, family history, and other miscellaneous elements, please refer the initial infectious diseases consultation and/or the admitting history and physical examination for this admission.    Allergies--  Allergies    No Known Allergies    Intolerances        Medications--  Antibiotics:  azithromycin   Tablet 1250 milliGRAM(s) Oral <User Schedule>  trimethoprim  160 mG/sulfamethoxazole 800 mG 2 Tablet(s) Oral every 8 hours    Immunologic:    Other:  acetaminophen   Tablet .. PRN  ALBUTerol    0.083% PRN  aMIOdarone    Tablet  apixaban  dextrose 10% Bolus  dextrose 10% Bolus  dextrose 40% Gel PRN  dextrose 5%.  furosemide    Tablet  glucagon  Injectable PRN  insulin lispro (HumaLOG) corrective regimen sliding scale  insulin lispro (HumaLOG) corrective regimen sliding scale  lactobacillus acidophilus  levothyroxine  magnesium hydroxide Suspension PRN  multivitamin      Review of Systems--  A 10-point review of systems was obtained.     Pertinent positives and negatives--  Constitutional: No fevers. No Chills. No Rigors.   Cardiovascular: No chest pain. No palpitations.  Respiratory: Pos for  shortness of breath. No cough.  Gastrointestinal: No nausea or vomiting. No diarrhea or constipation.   Psychiatric: Pleasant. Appropriate affect.    Review of systems otherwise negative except as previously noted.    Physical Examination--  Vital Signs: T(F): 97.9 (10-30-19 @ 04:58), Max: 98.5 (10-29-19 @ 20:37)  HR: 94 (10-30-19 @ 04:58)  BP: 110/73 (10-30-19 @ 04:58)  RR: 19 (10-30-19 @ 04:58)  SpO2: 97% (10-30-19 @ 04:58)  Wt(kg): --  General: Nontoxic-appearing frail Female in no acute distress.  HEENT: AT/NC. PERRL. EOMI. Anicteric. Conjunctiva pink and moist. Oropharynx clear. Dentition fair.  Neck: Not rigid. No sense of mass.  Nodes: None palpable.  Lungs: Clear bilaterally without rales, wheezing or rhonchi  Heart: Regular rate and rhythm. No Murmur. No rub. No gallop. No palpable thrill.  Abdomen: Bowel sounds present and normoactive. Soft. Nondistended. Nontender. No sense of mass. No organomegaly.  Back: No spinal tenderness. No costovertebral angle tenderness.   Extremities: No cyanosis or clubbing. No edema.   Skin: Warm. Dry. Good turgor. No rash. No vasculitic stigmata.  Psychiatric: Appropriate affect and mood for situation.         Laboratory Studies--  CBC                        9.8    11.81 )-----------( 288      ( 29 Oct 2019 08:37 )             29.0       Chemistries  10-29    130<L>  |  100  |  26<H>  ----------------------------<  169<H>  5.3   |  23  |  1.10    Ca    8.3<L>      29 Oct 2019 08:37    CAPILLARY BLOOD GLUCOSE      POCT Blood Glucose.: 124 mg/dL (30 Oct 2019 07:45)  POCT Blood Glucose.: 219 mg/dL (29 Oct 2019 21:52)  POCT Blood Glucose.: 269 mg/dL (29 Oct 2019 16:42)  POCT Blood Glucose.: 291 mg/dL (29 Oct 2019 11:59)      Culture Data    Culture - Acid Fast - Bronchial w/Smear (collected 23 Oct 2019 20:55)  Source: BAL Bronchoalveolar Washings  Preliminary Report (26 Oct 2019 15:03):    Culture is being performed.    Culture - Fungal, Bronchial (collected 23 Oct 2019 20:55)  Source: .Bronchial Bronchoalveolar Washings  Preliminary Report (28 Oct 2019 09:10):    Few Yeast    Culture - Bronchial (collected 23 Oct 2019 20:55)  Source: Bronch Wash Bronchoalveolar Washings  Gram Stain (24 Oct 2019 02:38):    Few polymorphonuclear leukocytes seen per low power field    Few Squamous epithelial cells seen per low power field    Rare Yeast like cells seen per oil power field  Final Report (25 Oct 2019 17:47):    Normal Respiratory Madelyn present        RADIOLOGY:  Xray Chest 2 Views PA/Lat (10.29.19 @ 11:26) >    No change heart mediastinum. No gross interval change bilateral airspace   disease discounting technical variation. No pleural effusion pneumothorax   or other new abnormality.    Impression: No gross interval radiographic change.      Assessment--  70 y/o F with PMHx of hypothyroidism presents with cough and congestion for over 3-4 weeks  in duration associated with intermittent fevers and cough . She has difficulty swallowing and increased cough with liquids. She has progressive in creased sob on minimal exertion . Has weight loss form poor appetite . has not been on any abx PTA. CXR and CTA chest dhows diffuse bilateral infiltrates with hilar and mediastinal adenopathy . As per speech therapy she has posiitve aspiration with thin liquids .    Her HIV test is positive, she denies any HIV risk factors . She has very low CD 4 count and very high HIV Viral load , so she most likely has PCP pneumonia and she was started on IV Bactrim with steroids , she just had  bronchoscopy to confirm it. Clinically he looks better . Sputum afb x 3 negative     She also has severe oral thrush and may have candidal esophagitis which may be the cause of her difficulty swallowing     Plan:  - Pneumocystis jiroveci pneumonia.  Plan: continue with  bactrim (Day # 10/21)  changed to po  21 days total therapy. Steroids as ordered.   - HIV (human immunodeficiency virus infection).  Plan: delay of start of ART as recommended with genotyping prior to therapy selection  - started azithro for MAC prophylaxis with CD4 count.   - Candidal esophagitis.  Plan: continue fluconazole for 7 days then change to Mycelex trochees   - repeat  CXR   - DC planning   -  to assist in obtaining HIV meds and other tx - filled out ADAP forms   - speech therapy reevaluate to advance to regular diet       Continue with present regime .  Appropriate use of antibiotics and adverse effects reviewed.    I have discussed the above plan of care with patient and family in detail. They expressed understanding of the treatment plan . Risks, benefits and alternatives discussed in detail. I have asked if they have any questions or concerns and appropriately addressed them to the best of my ability .      > 35 minutes spent in direct patient care reviewing  the notes, lab data/ imaging , discussion with multidisciplinary team. All questions were addressed and answered to the best of my capacity .    I will be away from 10/30 pm thru 11/4/19 , Dr. Rodríguez is covering me.      Thank you for allowing me to participate in the care of your patient .        Solo Owens MD  [phone]

## 2019-10-30 NOTE — PROGRESS NOTE ADULT - SUBJECTIVE AND OBJECTIVE BOX
CHIEF COMPLAINT/INTERVAL HISTORY:  Pt. seen and evaluated for acute respiratory failure and sepsis 2/2 PCP pneumonia.  Pt. is now off high flow nasal canula.  Tolerating regular nasal canula and c/o SOB at rest.  Tolerating IV antibiotic and steroids.     REVIEW OF SYSTEMS:  No fever, CP, acute respiratory distress, or abdominal pain.     Vital Signs Last 24 Hrs  T(C): 36.7 (30 Oct 2019 13:58), Max: 36.9 (29 Oct 2019 20:37)  T(F): 98.1 (30 Oct 2019 13:58), Max: 98.5 (29 Oct 2019 20:37)  HR: 91 (30 Oct 2019 13:58) (91 - 94)  BP: 104/67 (30 Oct 2019 13:58) (104/67 - 115/77)  BP(mean): --  RR: 20 (30 Oct 2019 13:58) (17 - 20)  SpO2: 95% (30 Oct 2019 13:58) (89% - 97%)  PHYSICAL EXAM:  GENERAL: NAD  HEENT: EOMI, hearing normal, conjunctiva and sclera clear  Chest: Diminished BS bilaterally, no wheezing  CV: S1S2, RRR,   GI: soft, +BS, NT/ND  Musculoskeletal: no edema  Psychiatric: affect nL, mood nL  Skin: warm and dry    LABS:                        10.1   13.96 )-----------( 320      ( 30 Oct 2019 08:36 )             30.2     30 Oct 2019 08:36    130    |  98     |  34     ----------------------------<  116    5.9     |  24     |  1.40     Ca    9.3        30 Oct 2019 08:36  Phos  4.3       30 Oct 2019 08:36  Mg     1.6       30 Oct 2019 08:36          CAPILLARY BLOOD GLUCOSE      POCT Blood Glucose.: 384 mg/dL (30 Oct 2019 11:45)  POCT Blood Glucose.: 124 mg/dL (30 Oct 2019 07:45)  POCT Blood Glucose.: 219 mg/dL (29 Oct 2019 21:52)  POCT Blood Glucose.: 269 mg/dL (29 Oct 2019 16:42)    UCx       RADIOLOGY & ADDITIONAL TESTS:        Assessment and Plan:  -Sepsis and acute respiratory failure 2/2 multifocal pneumonia:  Possible PCP pneumonia with +HIV status.  s/p bronchoscopy.   Continue Bactrim DS 2 tabs PO Q8h, Prednisone 40mg PO daily, and Albuterol neb tx Q6h PRN.  O2 support as needed.  Bronchoscopy culture with normal respiratory jasbir.   AFB negative x 4.  Cytology negative for malignant cells.  ID and Pulmonary f/u. will need home O2  -Dysphagia: Advanced to dysphagia 3 nectar consistency diet  -Possible candidal esophagitis:  continue Fluconazole 200mg PO daily  -Atrial fibrillation:  continue Amiodarone 200mg PO daily and Eliquis 5mg PO BID.  Cardiology f/u  -NICOLASA:  Renal function appears stable.  Continue to monitor.   -Hypothyroidism:  continue Synthroid 75mcg PO daily  -VTE ppx:  On Eliquis

## 2019-10-30 NOTE — PROGRESS NOTE ADULT - SUBJECTIVE AND OBJECTIVE BOX
Chief Complaint: Shortness of breath, fever    Interval Events: No events overnight.    Review of Systems:  General: No fevers, chills, weight loss or gain  Skin: No rashes, color changes  Cardiovascular: No chest pain, orthopnea  Respiratory: No shortness of breath, cough  Gastrointestinal: No nausea, abdominal pain  Genitourinary: No incontinence, pain with urination  Musculoskeletal: No pain, swelling, decreased range of motion  Neurological: No headache, weakness  Psychiatric: No depression, anxiety  Endocrine: No weight loss or gain, increased thirst  All other systems are comprehensively negative.    Physical Exam:  Vital Signs Last 24 Hrs  T(C): 36.6 (30 Oct 2019 04:58), Max: 36.9 (29 Oct 2019 14:00)  T(F): 97.9 (30 Oct 2019 04:58), Max: 98.5 (29 Oct 2019 20:37)  HR: 94 (30 Oct 2019 04:58) (94 - 95)  BP: 110/73 (30 Oct 2019 04:58) (110/73 - 118/78)  BP(mean): --  RR: 19 (30 Oct 2019 04:58) (17 - 19)  SpO2: 97% (30 Oct 2019 04:58) (94% - 97%)  Genera: NAD  HEENT: MMM  Neck: No JVD, no carotid bruit  Lungs: CTAB  CV: RRR, nl S1/S2, no M/R/G  Abdomen: S/NT/ND, +BS  Extremities: No LE edema, no cyanosis  Neuro: AAOx3, non-focal  Skin: No rash    Labs:    10-29    130<L>  |  100  |  26<H>  ----------------------------<  169<H>  5.3   |  23  |  1.10    Ca    8.3<L>      29 Oct 2019 08:37  Mg     1.8     10-28                          9.8    11.81 )-----------( 288      ( 29 Oct 2019 08:37 )             29.0

## 2019-10-30 NOTE — CHART NOTE - NSCHARTNOTEFT_GEN_A_CORE
Assessment:  Pt seen for malnutrition follow up.  PO intake remains poor mainly due to dislike of mech altered diet and possibly also due to esophageal candidiasis. Seen for reevalulation by SLP today- diet upgraded to Dysphagia 3 soft solids with nectar thick liquids and VFSS/MBS recommended.  Last noted BM 10/27. Not on any bowel regimen (cathartic/stool softener).   Pt is HIV+ with probable PCP pna.     Factors impacting intake: [ ] none [ ] nausea  [ ] vomiting [ ] diarrhea [ ] constipation  [ ]chewing problems [x] swallowing issues  [x] other: esophageal candidiasis, poor appetite, dislike of Dysphagia diet    Diet Presciption: Diet, Dysphagia 2 Mechanical Soft-Nectar Consistency Fluid:   Consistent Carbohydrate {Evening Snack}  No Beef  No Pork  Supplement Feeding Modality:  Oral  Glucerna Shake Cans or Servings Per Day:  1       Frequency:  Two Times a day (10-24-19 @ 09:23)    Intake: poor    Current Weight:  10/25 133.8#, 10/30 124.3#.   ?9.5# wt loss x 5 days.  Noted reduction in edema from 1+ to none.   % Weight Change    Pertinent Medications: MEDICATIONS  (STANDING):  aMIOdarone    Tablet 200 milliGRAM(s) Oral daily  apixaban 5 milliGRAM(s) Oral two times a day  azithromycin   Tablet 1250 milliGRAM(s) Oral <User Schedule>  dextrose 10% Bolus 125 milliLiter(s) IV Bolus once  dextrose 10% Bolus 250 milliLiter(s) IV Bolus once  dextrose 5%. 1000 milliLiter(s) (50 mL/Hr) IV Continuous <Continuous>  furosemide    Tablet 40 milliGRAM(s) Oral daily  insulin lispro (HumaLOG) corrective regimen sliding scale   SubCutaneous three times a day before meals  insulin lispro (HumaLOG) corrective regimen sliding scale   SubCutaneous at bedtime  lactobacillus acidophilus 1 Tablet(s) Oral three times a day  levothyroxine 75 MICROGram(s) Oral daily  multivitamin 1 Tablet(s) Oral daily  trimethoprim  160 mG/sulfamethoxazole 800 mG 2 Tablet(s) Oral every 8 hours    MEDICATIONS  (PRN):  acetaminophen   Tablet .. 650 milliGRAM(s) Oral every 6 hours PRN Temp greater or equal to 38C (100.4F), Mild Pain (1 - 3)  ALBUTerol    0.083% 2.5 milliGRAM(s) Nebulizer every 6 hours PRN Shortness of Breath and/or Wheezing  dextrose 40% Gel 15 Gram(s) Oral once PRN Blood Glucose LESS THAN 70 milliGRAM(s)/deciLiter  glucagon  Injectable 1 milliGRAM(s) IntraMuscular once PRN Glucose <70 milliGRAM(s)/deciLiter  magnesium hydroxide Suspension 30 milliLiter(s) Oral daily PRN Constipation    Pertinent Labs: 10-30 Na130 mmol/L<L> Glu 116 mg/dL<H> K+ 5.9 mmol/L<H> Cr  1.40 mg/dL<H> BUN 34 mg/dL<H> 10-30 Phos 4.3 mg/dL 10-24 Alb 1.5 g/dL<L> 10-23 [id number] 6.8 %<H>     CAPILLARY BLOOD GLUCOSE      POCT Blood Glucose.: 384 mg/dL (30 Oct 2019 11:45)  POCT Blood Glucose.: 124 mg/dL (30 Oct 2019 07:45)  POCT Blood Glucose.: 219 mg/dL (29 Oct 2019 21:52)  POCT Blood Glucose.: 269 mg/dL (29 Oct 2019 16:42)    Skin: no pressure injuries  Sb: none noted    Estimated Needs:   [x] no change since previous assessment  [ ] recalculated:     Previous Nutrition Diagnosis:    [x] Malnutrition     Nutrition Diagnosis is [x] ongoing  [ ] resolved [ ] not applicable     New Nutrition Diagnosis: [x] not applicable       Interventions:   Recommend   [x] Change Diet To:  CC +snack, Dysphagia 3 soft solids with nectar thick liquids  [x] Nutrition Supplement- continue Glucerna shakes bid  [ ] Nutrition Support  [x] Other: encourage po intake, honor food preferences.  Consider bowel regimen.     Monitoring and Evaluation:   [x] PO intake [ x ] Tolerance to diet prescription [ x ] weights [ x ] labs[ x ] follow up per protocol  [x] other: s/s GI distress, bowel function, skin integrity

## 2019-10-30 NOTE — SWALLOW BEDSIDE ASSESSMENT ADULT - PHARYNGEAL PHASE
Delayed pharyngeal swallow/Decreased laryngeal elevation Delayed pharyngeal swallow/Multiple swallows/Decreased laryngeal elevation/Wet vocal quality post oral intake

## 2019-10-30 NOTE — SWALLOW BEDSIDE ASSESSMENT ADULT - ASR SWALLOW ASPIRATION MONITOR
fever/throat clearing/upper respiratory infection/gurgly voice/pneumonia/change of breathing pattern/position upright (90Y)/cough/oral hygiene
change of breathing pattern/position upright (90Y)/oral hygiene/fever/pneumonia/throat clearing/upper respiratory infection/cough/gurgly voice

## 2019-10-30 NOTE — PROGRESS NOTE ADULT - SUBJECTIVE AND OBJECTIVE BOX
Date/Time Patient Seen:  		  Referring MD:   Data Reviewed	       Patient is a 69y old  Female who presents with a chief complaint of pneumonia (29 Oct 2019 10:45)      Subjective/HPI     PAST MEDICAL & SURGICAL HISTORY:  Hypothyroidism, unspecified type  No significant past surgical history: denies surgical history but CT shows cholecystectomy        Medication list         MEDICATIONS  (STANDING):  aMIOdarone    Tablet 200 milliGRAM(s) Oral daily  apixaban 5 milliGRAM(s) Oral two times a day  azithromycin   Tablet 1250 milliGRAM(s) Oral <User Schedule>  dextrose 10% Bolus 125 milliLiter(s) IV Bolus once  dextrose 10% Bolus 250 milliLiter(s) IV Bolus once  dextrose 5%. 1000 milliLiter(s) (50 mL/Hr) IV Continuous <Continuous>  furosemide    Tablet 40 milliGRAM(s) Oral daily  insulin lispro (HumaLOG) corrective regimen sliding scale   SubCutaneous three times a day before meals  insulin lispro (HumaLOG) corrective regimen sliding scale   SubCutaneous at bedtime  lactobacillus acidophilus 1 Tablet(s) Oral three times a day  levothyroxine 75 MICROGram(s) Oral daily  multivitamin 1 Tablet(s) Oral daily  trimethoprim  160 mG/sulfamethoxazole 800 mG 2 Tablet(s) Oral every 8 hours    MEDICATIONS  (PRN):  acetaminophen   Tablet .. 650 milliGRAM(s) Oral every 6 hours PRN Temp greater or equal to 38C (100.4F), Mild Pain (1 - 3)  ALBUTerol    0.083% 2.5 milliGRAM(s) Nebulizer every 6 hours PRN Shortness of Breath and/or Wheezing  dextrose 40% Gel 15 Gram(s) Oral once PRN Blood Glucose LESS THAN 70 milliGRAM(s)/deciLiter  glucagon  Injectable 1 milliGRAM(s) IntraMuscular once PRN Glucose <70 milliGRAM(s)/deciLiter  magnesium hydroxide Suspension 30 milliLiter(s) Oral daily PRN Constipation         Vitals log        ICU Vital Signs Last 24 Hrs  T(C): 36.6 (30 Oct 2019 04:58), Max: 36.9 (29 Oct 2019 14:00)  T(F): 97.9 (30 Oct 2019 04:58), Max: 98.5 (29 Oct 2019 20:37)  HR: 94 (30 Oct 2019 04:58) (94 - 95)  BP: 110/73 (30 Oct 2019 04:58) (110/73 - 118/78)  BP(mean): --  ABP: --  ABP(mean): --  RR: 19 (30 Oct 2019 04:58) (17 - 19)  SpO2: 97% (30 Oct 2019 04:58) (94% - 97%)           Input and Output:  I&O's Detail    28 Oct 2019 07:01  -  29 Oct 2019 07:00  --------------------------------------------------------  IN:  Total IN: 0 mL    OUT:    Voided: 1600 mL  Total OUT: 1600 mL    Total NET: -1600 mL          Lab Data                        9.8    11.81 )-----------( 288      ( 29 Oct 2019 08:37 )             29.0     10-29    130<L>  |  100  |  26<H>  ----------------------------<  169<H>  5.3   |  23  |  1.10    Ca    8.3<L>      29 Oct 2019 08:37  Mg     1.8     10-28              Review of Systems	      Objective     Physical Examination      heart s1s2  lung dec BS    Pertinent Lab findings & Imaging      Gisela:  NO   Adequate UO     I&O's Detail    28 Oct 2019 07:01  -  29 Oct 2019 07:00  --------------------------------------------------------  IN:  Total IN: 0 mL    OUT:    Voided: 1600 mL  Total OUT: 1600 mL    Total NET: -1600 mL               Discussed with:     Cultures:	        Radiology

## 2019-10-30 NOTE — PROGRESS NOTE ADULT - ASSESSMENT
The patient is a 69 year old female with a history of hypothyroidism who is admitted with PNA.    Plan:  - Continue amiodarone 200 mg daily  - Continue apixaban 5 mg bid for thromboprophylaxis  - Continue furosemide 40 mg PO daily for now. Can discontinue in 1-2 weeks.  - Echocardiogram with normal LV systolic function, mild pulm HTN  - PCP - on bactrim and steroids  - ID and pulm follow-up  - Discharge planning

## 2019-10-30 NOTE — SWALLOW BEDSIDE ASSESSMENT ADULT - SWALLOW EVAL: RECOMMENDED DIET
dysphagia 2 diet with nectar thick liquids
Dysphagia 3 (soft solids) with nectar thick liquids, as tolerated

## 2019-10-30 NOTE — SWALLOW BEDSIDE ASSESSMENT ADULT - SWALLOW EVAL: DIAGNOSIS
1. Mild oral dysphagia for puree , soft solids and nectar thick liquids marked by delayed oral transit and a-p transport time. Mild lingual stasis for soft solids which reduced with nectar thick liquid wash. 2. Mild pharyngeal dysphagia for puree, nectar thick and soft solids marked by delayed swallow trigger with reduced laryngeal elevation upon palpation. No clinical evidence of airway penetration. 3. Severe pharyngeal dysphagia for thin liquids marked by delayed swallow trigger with reduced laryngeal elevation upon palpation . Wet vocal quality with reflexive cough noted subsequent to deglutition indicative of airway penetration.
1. The patient demonstrated functional oral management of puree and nectar thick liquids marked by adequate bolus collection, transfer, and posterior transport. 2. The patient demonstrated a mild oral dysphagia for solids marked by prolonged oral manipulation resulting in delayed bolus collection, transfer, and posterior transport. Trace lingual residue noted subsequent to deglutition which cleared with a liquid wash. 3. The patient demonstrated a mild-moderate oral dysphagia for thin liquids marked by delayed bolus collection and transfer with suspected posterior loss over the base of tongue. 4. The patient demonstrated a mild pharyngeal dysphagia for puree, solid, and nectar thick liquids marked by a delayed pharyngeal swallow trigger with reduced hyolaryngeal elevation upon digital palpation w/o evidence of airway penetration. 5. The patient demonstrated a moderate pharyngeal dysphagia for thin liquids marked by a delayed pharyngeal swallow trigger with reduced hyolaryngeal

## 2019-10-30 NOTE — SWALLOW BEDSIDE ASSESSMENT ADULT - SWALLOW EVAL: PROGNOSIS
fair
DX CONT: elevation upon digital palpation resulting in multiple swallows suggestive of pharyngeal stasis and/or airway penetration and change in vocal quality to a 'wet' tone suggestive of airway penetration.                                                                                                                                                                                            PROGNOSIS: Pending MBS

## 2019-10-30 NOTE — SWALLOW BEDSIDE ASSESSMENT ADULT - COMMENTS
Patient seen at bedside at which time she was alert and cooperative.   As per chart review, patient recently admitted with pneumonia, concern for aspiration
Consult received and chart reviewed. The patient was seen this AM for a re-assessment of swallow function for possible PO diet upgrade, at which time she was alert and cooperative. The patient's primary speaking language is Dusty, therefore, interpretation services via  phone were utilized (El ID#[id number]). The patient was initially seen by this department for a bedside swallow evaluation on 10/20/19 (please see full report for details), at which time a dysphagia 2 with nectar thick liquid diet was recommended.     Per charting, the patient is a "68 y/o F with PMHx of hypothyroidism presents with cough and congestion for 2 weeks in duration. Patient states that for the last 10 days she has been having difficulty swallowing, solids more than liquids. Niece at bedside states that patient has been eating and drinking less but more so noticed patient coughing with water but not with regular food. She now has more of a cough and dyspnea. She had a Tmax of 101.4 at home. Of note, she went to urgent care 1 month ago as she was feeling unwell and was found to be in SVT with HR sustaining in 170s. Was advised to go to the ER at that time but never followed up as she felt better and does not have insurance." Recent CXR revealed, "No gross interval radiographic change." Discussed results and recommendations from this evaluation with the patient, RN, and call out to MD.

## 2019-10-31 ENCOUNTER — TRANSCRIPTION ENCOUNTER (OUTPATIENT)
Age: 69
End: 2019-10-31

## 2019-10-31 DIAGNOSIS — Z29.9 ENCOUNTER FOR PROPHYLACTIC MEASURES, UNSPECIFIED: ICD-10-CM

## 2019-10-31 LAB
ANION GAP SERPL CALC-SCNC: 6 MMOL/L — SIGNIFICANT CHANGE UP (ref 5–17)
BASE EXCESS BLDA CALC-SCNC: 1.2 MMOL/L — SIGNIFICANT CHANGE UP (ref -2–2)
BLOOD GAS COMMENTS ARTERIAL: SIGNIFICANT CHANGE UP
BLOOD GAS COMMENTS ARTERIAL: SIGNIFICANT CHANGE UP
BUN SERPL-MCNC: 40 MG/DL — HIGH (ref 7–23)
CALCIUM SERPL-MCNC: 8.3 MG/DL — LOW (ref 8.5–10.1)
CHLORIDE SERPL-SCNC: 99 MMOL/L — SIGNIFICANT CHANGE UP (ref 96–108)
CO2 SERPL-SCNC: 25 MMOL/L — SIGNIFICANT CHANGE UP (ref 22–31)
CREAT SERPL-MCNC: 1.4 MG/DL — HIGH (ref 0.5–1.3)
GLUCOSE SERPL-MCNC: 107 MG/DL — HIGH (ref 70–99)
HCO3 BLDA-SCNC: 26 MMOL/L — SIGNIFICANT CHANGE UP (ref 23–27)
HCT VFR BLD CALC: 26.4 % — LOW (ref 34.5–45)
HGB BLD-MCNC: 8.8 G/DL — LOW (ref 11.5–15.5)
HOROWITZ INDEX BLDA+IHG-RTO: 21 — SIGNIFICANT CHANGE UP
MAGNESIUM SERPL-MCNC: 1.9 MG/DL — SIGNIFICANT CHANGE UP (ref 1.6–2.6)
MCHC RBC-ENTMCNC: 29.6 PG — SIGNIFICANT CHANGE UP (ref 27–34)
MCHC RBC-ENTMCNC: 33.3 GM/DL — SIGNIFICANT CHANGE UP (ref 32–36)
MCV RBC AUTO: 88.9 FL — SIGNIFICANT CHANGE UP (ref 80–100)
NRBC # BLD: 0 /100 WBCS — SIGNIFICANT CHANGE UP (ref 0–0)
PCO2 BLDA: 35 MMHG — SIGNIFICANT CHANGE UP (ref 32–46)
PH BLDA: 7.46 — HIGH (ref 7.35–7.45)
PLATELET # BLD AUTO: 274 K/UL — SIGNIFICANT CHANGE UP (ref 150–400)
PO2 BLDA: 54 MMHG — LOW (ref 74–108)
POTASSIUM SERPL-MCNC: 5.3 MMOL/L — SIGNIFICANT CHANGE UP (ref 3.5–5.3)
POTASSIUM SERPL-SCNC: 5.3 MMOL/L — SIGNIFICANT CHANGE UP (ref 3.5–5.3)
RBC # BLD: 2.97 M/UL — LOW (ref 3.8–5.2)
RBC # FLD: 14.2 % — SIGNIFICANT CHANGE UP (ref 10.3–14.5)
SAO2 % BLDA: 86 % — LOW (ref 92–96)
SODIUM SERPL-SCNC: 130 MMOL/L — LOW (ref 135–145)
WBC # BLD: 15.88 K/UL — HIGH (ref 3.8–10.5)
WBC # FLD AUTO: 15.88 K/UL — HIGH (ref 3.8–10.5)

## 2019-10-31 PROCEDURE — 74230 X-RAY XM SWLNG FUNCJ C+: CPT | Mod: 26

## 2019-10-31 PROCEDURE — 99233 SBSQ HOSP IP/OBS HIGH 50: CPT

## 2019-10-31 RX ORDER — SODIUM CHLORIDE 9 MG/ML
1000 INJECTION INTRAMUSCULAR; INTRAVENOUS; SUBCUTANEOUS
Refills: 0 | Status: DISCONTINUED | OUTPATIENT
Start: 2019-10-31 | End: 2019-11-01

## 2019-10-31 RX ADMIN — Medication 2: at 17:11

## 2019-10-31 RX ADMIN — Medication 1 TABLET(S): at 06:22

## 2019-10-31 RX ADMIN — Medication 1 TABLET(S): at 22:13

## 2019-10-31 RX ADMIN — APIXABAN 5 MILLIGRAM(S): 2.5 TABLET, FILM COATED ORAL at 17:11

## 2019-10-31 RX ADMIN — Medication 10: at 11:35

## 2019-10-31 RX ADMIN — AMIODARONE HYDROCHLORIDE 200 MILLIGRAM(S): 400 TABLET ORAL at 06:22

## 2019-10-31 RX ADMIN — Medication 2 TABLET(S): at 22:13

## 2019-10-31 RX ADMIN — Medication 2 TABLET(S): at 06:22

## 2019-10-31 RX ADMIN — Medication 75 MICROGRAM(S): at 06:22

## 2019-10-31 RX ADMIN — SODIUM CHLORIDE 100 MILLILITER(S): 9 INJECTION INTRAMUSCULAR; INTRAVENOUS; SUBCUTANEOUS at 17:12

## 2019-10-31 RX ADMIN — Medication 1 TABLET(S): at 17:11

## 2019-10-31 RX ADMIN — APIXABAN 5 MILLIGRAM(S): 2.5 TABLET, FILM COATED ORAL at 06:22

## 2019-10-31 RX ADMIN — Medication 20 MILLIGRAM(S): at 06:22

## 2019-10-31 RX ADMIN — Medication 1 TABLET(S): at 11:36

## 2019-10-31 RX ADMIN — Medication 2 TABLET(S): at 17:11

## 2019-10-31 NOTE — PROGRESS NOTE ADULT - PROBLEM SELECTOR PROBLEM 7
Hypothyroidism, unspecified type
Candidal esophagitis
Hypothyroidism, unspecified type
Hypothyroidism, unspecified type

## 2019-10-31 NOTE — PROGRESS NOTE ADULT - PROBLEM SELECTOR PLAN 6
likely prerenal due to dehydration  ?ATN, SARAH, difficult to discern  IVF  monitor renal indices, avoid nephrotoxic agents
Dysphagia: Advanced to dysphagia 3 nectar consistency diet
likely prerenal due to dehydration  also received IV contrast so may remain elevated  will give aggressive IV fluid resuscitation  monitor renal indices, avoid nephrotoxic agents
likely prerenal due to dehydration  also received IV contrast so may remain elevated  will give aggressive IV fluid resuscitation  monitor renal indices, avoid nephrotoxic agents

## 2019-10-31 NOTE — PROGRESS NOTE ADULT - PROBLEM SELECTOR PLAN 3
Sepsis 2/2 multifocal pneumonia  suspect PCP pneumonia with +HIV status.  Continue Bactrim DS 2 tabs PO Q8h, Prednisone 40mg PO daily,  O2 support as needed. Pt will need home O2 as per ABG results.
reports dysphagia over past 10 days  SLP eval appreciated, advanced to d2 diet  suspect aspiration
reports dysphagia over past 10 days  will keep NPO and have speech and swallow evaluation  suspect aspiration
continue fluconazole and may need 21 days of therapy.
reports dysphagia over past 10 days  SLP eval appreciated, advanced to d2 diet  suspect aspiration

## 2019-10-31 NOTE — SWALLOW VFSS/MBS ASSESSMENT ADULT - NS SWALLOW VFSS REC ASPIR MON
position upright (90Y)/cough/gurgly voice/fever/change of breathing pattern/oral hygiene/upper respiratory infection/pneumonia/throat clearing

## 2019-10-31 NOTE — SWALLOW VFSS/MBS ASSESSMENT ADULT - COMMENTS
Clinical swallow assessment completed 10/30, at which time pt was recommended mechanical soft solids with nectar thick liquids.

## 2019-10-31 NOTE — DISCHARGE NOTE NURSING/CASE MANAGEMENT/SOCIAL WORK - NSDCFUADDAPPT_GEN_ALL_CORE_FT
[address] 4th floor  [place]  [phone] EX 2  novemeber 6th  10:00 Am with Dr. Sevilla

## 2019-10-31 NOTE — PROGRESS NOTE ADULT - PROBLEM SELECTOR PLAN 7
continue synthroid 75mcg daily  unclear how patient follows up for refills (goes to [facility] [place] [facility])  will check TSH
Possible candidal esophagitis:    continue Fluconazole 200mg PO daily
continue synthroid 75mcg daily  unclear how patient follows up for refills (goes to [facility] [place] [facility])  will check TSH
continue synthroid 75mcg daily  unclear how patient follows up for refills (goes to [facility] [place] [facility])  will check TSH

## 2019-10-31 NOTE — SWALLOW VFSS/MBS ASSESSMENT ADULT - SLP GENERAL OBSERVATIONS
Pt received upright in chair, awake and cooperative, on supplemental O2 via NC. [employer]  ID [id number] used during study.

## 2019-10-31 NOTE — PROGRESS NOTE ADULT - PROBLEM SELECTOR PROBLEM 2
Pneumonia of both lungs due to infectious organism, unspecified part of lung
Pneumonia of both lungs due to infectious organism, unspecified part of lung
HIV (human immunodeficiency virus infection)
Pneumonia of both lungs due to infectious organism, unspecified part of lung
Atrial fibrillation, unspecified type

## 2019-10-31 NOTE — PROGRESS NOTE ADULT - NSHPATTENDINGPLANDISCUSS_GEN_ALL_CORE
Dr. Maged Mckenzie (hospitalist)
Pt, CM, RN
pt, SW, DANIEL, RN, residency team, MICU team - re: tx plan, disposition planning, above detailed plan
Pt, CM, RN
pt, SW, DANIEL, RN, residency team, MICU team - re: tx plan, disposition planning, above detailed plan

## 2019-10-31 NOTE — PROGRESS NOTE ADULT - SUBJECTIVE AND OBJECTIVE BOX
Date/Time Patient Seen:  		  Referring MD:   Data Reviewed	       Patient is a 69y old  Female who presents with a chief complaint of pneumonia (30 Oct 2019 14:57)      Subjective/HPI     PAST MEDICAL & SURGICAL HISTORY:  Hypothyroidism, unspecified type  No significant past surgical history: denies surgical history but CT shows cholecystectomy        Medication list         MEDICATIONS  (STANDING):  aMIOdarone    Tablet 200 milliGRAM(s) Oral daily  apixaban 5 milliGRAM(s) Oral two times a day  azithromycin   Tablet 1250 milliGRAM(s) Oral <User Schedule>  dextrose 10% Bolus 125 milliLiter(s) IV Bolus once  dextrose 10% Bolus 250 milliLiter(s) IV Bolus once  dextrose 5%. 1000 milliLiter(s) (50 mL/Hr) IV Continuous <Continuous>  furosemide    Tablet 40 milliGRAM(s) Oral daily  insulin lispro (HumaLOG) corrective regimen sliding scale   SubCutaneous three times a day before meals  insulin lispro (HumaLOG) corrective regimen sliding scale   SubCutaneous at bedtime  lactobacillus acidophilus 1 Tablet(s) Oral three times a day  levothyroxine 75 MICROGram(s) Oral daily  multivitamin 1 Tablet(s) Oral daily  predniSONE   Tablet 20 milliGRAM(s) Oral daily  trimethoprim  160 mG/sulfamethoxazole 800 mG 2 Tablet(s) Oral every 8 hours    MEDICATIONS  (PRN):  acetaminophen   Tablet .. 650 milliGRAM(s) Oral every 6 hours PRN Temp greater or equal to 38C (100.4F), Mild Pain (1 - 3)  ALBUTerol    0.083% 2.5 milliGRAM(s) Nebulizer every 6 hours PRN Shortness of Breath and/or Wheezing  dextrose 40% Gel 15 Gram(s) Oral once PRN Blood Glucose LESS THAN 70 milliGRAM(s)/deciLiter  glucagon  Injectable 1 milliGRAM(s) IntraMuscular once PRN Glucose <70 milliGRAM(s)/deciLiter  magnesium hydroxide Suspension 30 milliLiter(s) Oral daily PRN Constipation         Vitals log        ICU Vital Signs Last 24 Hrs  T(C): 36.7 (31 Oct 2019 05:09), Max: 37.1 (30 Oct 2019 20:57)  T(F): 98.1 (31 Oct 2019 05:09), Max: 98.7 (30 Oct 2019 20:57)  HR: 88 (31 Oct 2019 05:09) (87 - 91)  BP: 95/60 (31 Oct 2019 05:09) (95/60 - 110/72)  BP(mean): --  ABP: --  ABP(mean): --  RR: 17 (31 Oct 2019 05:09) (17 - 20)  SpO2: 96% (31 Oct 2019 05:09) (89% - 96%)           Input and Output:  I&O's Detail    29 Oct 2019 07:01  -  30 Oct 2019 07:00  --------------------------------------------------------  IN:  Total IN: 0 mL    OUT:    Voided: 550 mL  Total OUT: 550 mL    Total NET: -550 mL      30 Oct 2019 07:01  -  31 Oct 2019 05:53  --------------------------------------------------------  IN:  Total IN: 0 mL    OUT:    Voided: 500 mL  Total OUT: 500 mL    Total NET: -500 mL          Lab Data                        10.1   13.96 )-----------( 320      ( 30 Oct 2019 08:36 )             30.2     10-30    x   |  x   |  x   ----------------------------<  x   6.2<HH>   |  x   |  x     Ca    9.3      30 Oct 2019 08:36  Phos  4.3     10-30  Mg     1.6     10-30      ABG - ( 30 Oct 2019 17:29 )  pH, Arterial: 7.42  pH, Blood: x     /  pCO2: 34    /  pO2: 100   / HCO3: 23    / Base Excess: -2.1  /  SaO2: 97                      Review of Systems	      Objective     Physical Examination  heart s1s2  lung dec BS        Pertinent Lab findings & Imaging      Gisela:  NO   Adequate UO     I&O's Detail    29 Oct 2019 07:01  -  30 Oct 2019 07:00  --------------------------------------------------------  IN:  Total IN: 0 mL    OUT:    Voided: 550 mL  Total OUT: 550 mL    Total NET: -550 mL      30 Oct 2019 07:01  -  31 Oct 2019 05:53  --------------------------------------------------------  IN:  Total IN: 0 mL    OUT:    Voided: 500 mL  Total OUT: 500 mL    Total NET: -500 mL               Discussed with:     Cultures:	        Radiology

## 2019-10-31 NOTE — PROGRESS NOTE ADULT - PROBLEM SELECTOR PROBLEM 8
Need for prophylactic measure
Hypothyroidism, unspecified type
Need for prophylactic measure
Need for prophylactic measure

## 2019-10-31 NOTE — SWALLOW VFSS/MBS ASSESSMENT ADULT - RECOMMENDED FEEDING/EATING TECHNIQUES
allow for swallow between intakes/maintain upright posture during/after eating for 30 mins/small sips/bites/position upright (90 degrees)/provide rest periods between swallows/no straws/oral hygiene

## 2019-10-31 NOTE — SWALLOW VFSS/MBS ASSESSMENT ADULT - SLP PERTINENT HISTORY OF CURRENT PROBLEM
Per charting, "68 y/o F with PMHx of hypothyroidism presents with cough and congestion for 2 weeks in duration. Patient states that for the last 10 days she has been having difficulty swallowing, solids more than liquids. Niece at bedside states that patient has been eating and drinking less but more so noticed patient coughing with water but not with regular food. She now has more of a cough and dyspnea. She had a Tmax of 101.4 at home. Of note, she went to urgent care 1 month ago as she was feeling unwell and was found to be in SVT with HR sustaining in 170s. Was advised to go to the ER at that time but never followed up as she felt better and does not have insurance."

## 2019-10-31 NOTE — DISCHARGE NOTE NURSING/CASE MANAGEMENT/SOCIAL WORK - PATIENT PORTAL LINK FT
You can access the FollowMyHealth Patient Portal offered by [facility] by registering at the following website: [email or web address]. By joining [employer]’s FollowMyHealth portal, you will also be able to view your health information using other applications (apps) compatible with our system.

## 2019-10-31 NOTE — PROGRESS NOTE ADULT - PROBLEM SELECTOR PROBLEM 3
Pneumonia of both lungs due to infectious organism, unspecified part of lung
Dysphagia
Dysphagia
Candidal esophagitis
Dysphagia

## 2019-10-31 NOTE — SWALLOW VFSS/MBS ASSESSMENT ADULT - DIAGNOSTIC IMPRESSIONS
Pt was administered trials of puree, regular solids, nectar thick liquids, and thin liquids. 1. Functional oral phase marked by adequate bolus retrieval and containment, timely mastication, adequate bolus cohesion, timely oral transit, and adequate oral clearance post swallow. 2. Functional pharyngeal phase marked by Pt p/w 1. Functional oral phase when given puree, regular solids, nectar thick liquids, and thin liquids marked by adequate bolus retrieval and containment, timely mastication, adequate bolus cohesion, timely oral transit, and adequate oral clearance post swallow. 2. Mild pharyngeal phase when given thin liquids marked by adequate BOT retraction, adequate pharyngeal constriction, adequate hyolaryngeal elevation/excursion, intermittent delayed epiglottic retroflexion. There was intermittent flash laryngeal penetration with complete retrieval upon completion of swallow. There was adequate pharyngeal clearance post swallow. 3. Functional pharyngeal phase when given puree, regular solids, and nectar thick liquids marked by adequate BOT retraction, adequate pharyngeal contractility, adequate hyolaryngeal elevation/excursion, and complete epiglottic retoflexion. There was no penetration and/or aspiration. There was adequate pharyngeal clearance post swallow.

## 2019-10-31 NOTE — PROGRESS NOTE ADULT - SUBJECTIVE AND OBJECTIVE BOX
CHIEF COMPLAINT/INTERVAL HISTORY:  Pt. seen and evaluated for acute respiratory failure and sepsis 2/2 PCP pneumonia.  Pt. is now off high flow nasal canula. Tolerating regular nasal canula and c/o SOB at rest.Tolerating IV antibiotic and steroids.     REVIEW OF SYSTEMS:  No fever, CP, acute respiratory distress, or abdominal pain.   Vital Signs Last 24 Hrs  T(C): 36.7 (31 Oct 2019 05:09), Max: 37.1 (30 Oct 2019 20:57)  T(F): 98.1 (31 Oct 2019 05:09), Max: 98.7 (30 Oct 2019 20:57)  HR: 88 (31 Oct 2019 05:09) (87 - 91)  BP: 95/60 (31 Oct 2019 05:09) (95/60 - 110/72)  BP(mean): --  RR: 17 (31 Oct 2019 05:09) (17 - 20)  SpO2: 96% (31 Oct 2019 05:09) (89% - 96%)    PHYSICAL EXAM:  GENERAL: NAD, frail  HEENT: EOMI, hearing normal, conjunctiva and sclera clear  Chest: Diminished BS bilaterally, no wheezing  CV: S1S2, RRR,   GI: soft, +BS, NT/ND  Musculoskeletal: no edema  Psychiatric: affect nL, mood nL  Skin: warm and dry    LABS:                        8.8    15.88 )-----------( 274      ( 31 Oct 2019 08:55 )             26.4     31 Oct 2019 08:55    130    |  99     |  40     ----------------------------<  107    5.3     |  25     |  1.40     Ca    8.3        31 Oct 2019 08:55  Mg     1.9       31 Oct 2019 08:55          CAPILLARY BLOOD GLUCOSE      POCT Blood Glucose.: 110 mg/dL (31 Oct 2019 07:38)  POCT Blood Glucose.: 261 mg/dL (30 Oct 2019 21:53)  POCT Blood Glucose.: 169 mg/dL (30 Oct 2019 16:34)  POCT Blood Glucose.: 384 mg/dL (30 Oct 2019 11:45)    UCx       RADIOLOGY & ADDITIONAL TESTS:      MEDICATIONS  (STANDING):  aMIOdarone    Tablet 200 milliGRAM(s) Oral daily  apixaban 5 milliGRAM(s) Oral two times a day  azithromycin   Tablet 1250 milliGRAM(s) Oral <User Schedule>  dextrose 10% Bolus 125 milliLiter(s) IV Bolus once  dextrose 10% Bolus 250 milliLiter(s) IV Bolus once  dextrose 5%. 1000 milliLiter(s) (50 mL/Hr) IV Continuous <Continuous>  furosemide    Tablet 40 milliGRAM(s) Oral daily  insulin lispro (HumaLOG) corrective regimen sliding scale   SubCutaneous three times a day before meals  insulin lispro (HumaLOG) corrective regimen sliding scale   SubCutaneous at bedtime  lactobacillus acidophilus 1 Tablet(s) Oral three times a day  levothyroxine 75 MICROGram(s) Oral daily  multivitamin 1 Tablet(s) Oral daily  predniSONE   Tablet 20 milliGRAM(s) Oral daily  sodium chloride 0.9%. 1000 milliLiter(s) (100 mL/Hr) IV Continuous <Continuous>  trimethoprim  160 mG/sulfamethoxazole 800 mG 2 Tablet(s) Oral every 8 hours    MEDICATIONS  (PRN):  acetaminophen   Tablet .. 650 milliGRAM(s) Oral every 6 hours PRN Temp greater or equal to 38C (100.4F), Mild Pain (1 - 3)  ALBUTerol    0.083% 2.5 milliGRAM(s) Nebulizer every 6 hours PRN Shortness of Breath and/or Wheezing  dextrose 40% Gel 15 Gram(s) Oral once PRN Blood Glucose LESS THAN 70 milliGRAM(s)/deciLiter  glucagon  Injectable 1 milliGRAM(s) IntraMuscular once PRN Glucose <70 milliGRAM(s)/deciLiter  magnesium hydroxide Suspension 30 milliLiter(s) Oral daily PRN Constipation

## 2019-10-31 NOTE — PROGRESS NOTE ADULT - PROBLEM SELECTOR PLAN 5
likely secondary to pneumonia  on NC  venous doppler is negative
continue Synthroid 75mcg PO daily
likely secondary to pneumonia  on NC  venous doppler is negative
likely secondary to pneumonia  on NC  will get venous doppler

## 2019-10-31 NOTE — PROGRESS NOTE ADULT - PROBLEM/PLAN-2
DISPLAY PLAN FREE TEXT
verbal cues

## 2019-10-31 NOTE — PROGRESS NOTE ADULT - PROBLEM SELECTOR PLAN 8
DVT prophylaxis: heparin subQ  fall precuations, aspiration precautions    IMPROVE VTE Individual Risk Assessment          RISK                                                          Points  [  ] Previous VTE                                                3  [  ] Thrombophilia                                             2  [  ] Lower limb paralysis                                   2        (unable to hold up >15 seconds)    [  ] Current Cancer                                             2         (within 6 months)  [ x ] Immobilization > 24 hrs                              1  [  ] ICU/CCU stay > 24 hours                             1  [ x ] Age > 60                                                         1    IMPROVE VTE Score: 2
C/W synthroid 75mcg
DVT prophylaxis: heparin subQ  fall precuations, aspiration precautions    IMPROVE VTE Individual Risk Assessment          RISK                                                          Points  [  ] Previous VTE                                                3  [  ] Thrombophilia                                             2  [  ] Lower limb paralysis                                   2        (unable to hold up >15 seconds)    [  ] Current Cancer                                             2         (within 6 months)  [ x ] Immobilization > 24 hrs                              1  [  ] ICU/CCU stay > 24 hours                             1  [ x ] Age > 60                                                         1    IMPROVE VTE Score: 2
DVT prophylaxis: heparin subQ  fall precuations, aspiration precautions    IMPROVE VTE Individual Risk Assessment          RISK                                                          Points  [  ] Previous VTE                                                3  [  ] Thrombophilia                                             2  [  ] Lower limb paralysis                                   2        (unable to hold up >15 seconds)    [  ] Current Cancer                                             2         (within 6 months)  [ x ] Immobilization > 24 hrs                              1  [  ] ICU/CCU stay > 24 hours                             1  [ x ] Age > 60                                                         1    IMPROVE VTE Score: 2

## 2019-10-31 NOTE — PROGRESS NOTE ADULT - PROBLEM SELECTOR PROBLEM 6
NICOLASA (acute kidney injury)
Dysphagia
NICOLASA (acute kidney injury)
NICOLASA (acute kidney injury)

## 2019-10-31 NOTE — PROGRESS NOTE ADULT - SUBJECTIVE AND OBJECTIVE BOX
Chief Complaint: Shortness of breath, fever    Interval Events: No events overnight.    Review of Systems:  General: No fevers, chills, weight loss or gain  Skin: No rashes, color changes  Cardiovascular: No chest pain, orthopnea  Respiratory: No shortness of breath, cough  Gastrointestinal: No nausea, abdominal pain  Genitourinary: No incontinence, pain with urination  Musculoskeletal: No pain, swelling, decreased range of motion  Neurological: No headache, weakness  Psychiatric: No depression, anxiety  Endocrine: No weight loss or gain, increased thirst  All other systems are comprehensively negative.    Physical Exam:  Vitals:        Vital Signs Last 24 Hrs  T(C): 36.7 (31 Oct 2019 05:09), Max: 37.1 (30 Oct 2019 20:57)  T(F): 98.1 (31 Oct 2019 05:09), Max: 98.7 (30 Oct 2019 20:57)  HR: 88 (31 Oct 2019 05:09) (87 - 91)  BP: 95/60 (31 Oct 2019 05:09) (95/60 - 110/72)  BP(mean): --  RR: 17 (31 Oct 2019 05:09) (17 - 20)  SpO2: 96% (31 Oct 2019 05:09) (89% - 96%)  General: NAD  HEENT: MMM  Neck: No JVD, no carotid bruit  Lungs: CTAB  CV: RRR, nl S1/S2, no M/R/G  Abdomen: S/NT/ND, +BS  Extremities: No LE edema, no cyanosis  Neuro: AAOx3, non-focal  Skin: No rash    Labs:                        10.1   13.96 )-----------( 320      ( 30 Oct 2019 08:36 )             30.2     10-30    x   |  x   |  x   ----------------------------<  x   6.2<HH>   |  x   |  x     Ca    9.3      30 Oct 2019 08:36  Phos  4.3     10-30  Mg     1.6     10-30

## 2019-10-31 NOTE — SWALLOW VFSS/MBS ASSESSMENT ADULT - RECOMMENDED CONSISTENCY
1. Regular solids with thin liquids via small cup sips.  2. Aspiration precautions.  3. Maintain good oral hygiene.

## 2019-11-01 VITALS
HEART RATE: 87 BPM | TEMPERATURE: 98 F | DIASTOLIC BLOOD PRESSURE: 64 MMHG | SYSTOLIC BLOOD PRESSURE: 99 MMHG | RESPIRATION RATE: 20 BRPM | OXYGEN SATURATION: 98 %

## 2019-11-01 LAB
ANION GAP SERPL CALC-SCNC: 7 MMOL/L — SIGNIFICANT CHANGE UP (ref 5–17)
BUN SERPL-MCNC: 33 MG/DL — HIGH (ref 7–23)
CALCIUM SERPL-MCNC: 8.1 MG/DL — LOW (ref 8.5–10.1)
CHLORIDE SERPL-SCNC: 104 MMOL/L — SIGNIFICANT CHANGE UP (ref 96–108)
CO2 SERPL-SCNC: 23 MMOL/L — SIGNIFICANT CHANGE UP (ref 22–31)
CREAT SERPL-MCNC: 1.1 MG/DL — SIGNIFICANT CHANGE UP (ref 0.5–1.3)
GLUCOSE SERPL-MCNC: 81 MG/DL — SIGNIFICANT CHANGE UP (ref 70–99)
HCT VFR BLD CALC: 24.6 % — LOW (ref 34.5–45)
HGB BLD-MCNC: 8.3 G/DL — LOW (ref 11.5–15.5)
MCHC RBC-ENTMCNC: 30.6 PG — SIGNIFICANT CHANGE UP (ref 27–34)
MCHC RBC-ENTMCNC: 33.7 GM/DL — SIGNIFICANT CHANGE UP (ref 32–36)
MCV RBC AUTO: 90.8 FL — SIGNIFICANT CHANGE UP (ref 80–100)
NRBC # BLD: 0 /100 WBCS — SIGNIFICANT CHANGE UP (ref 0–0)
PLATELET # BLD AUTO: 228 K/UL — SIGNIFICANT CHANGE UP (ref 150–400)
POTASSIUM SERPL-MCNC: 4.4 MMOL/L — SIGNIFICANT CHANGE UP (ref 3.5–5.3)
POTASSIUM SERPL-SCNC: 4.4 MMOL/L — SIGNIFICANT CHANGE UP (ref 3.5–5.3)
RBC # BLD: 2.71 M/UL — LOW (ref 3.8–5.2)
RBC # FLD: 14.6 % — HIGH (ref 10.3–14.5)
SODIUM SERPL-SCNC: 134 MMOL/L — LOW (ref 135–145)
WBC # BLD: 11.57 K/UL — HIGH (ref 3.8–10.5)
WBC # FLD AUTO: 11.57 K/UL — HIGH (ref 3.8–10.5)

## 2019-11-01 PROCEDURE — 82803 BLOOD GASES ANY COMBINATION: CPT

## 2019-11-01 PROCEDURE — A9698: CPT

## 2019-11-01 PROCEDURE — 85027 COMPLETE CBC AUTOMATED: CPT

## 2019-11-01 PROCEDURE — 80048 BASIC METABOLIC PNL TOTAL CA: CPT

## 2019-11-01 PROCEDURE — 84443 ASSAY THYROID STIM HORMONE: CPT

## 2019-11-01 PROCEDURE — 86702 HIV-2 ANTIBODY: CPT

## 2019-11-01 PROCEDURE — 97530 THERAPEUTIC ACTIVITIES: CPT

## 2019-11-01 PROCEDURE — 99285 EMERGENCY DEPT VISIT HI MDM: CPT | Mod: 25

## 2019-11-01 PROCEDURE — 86701 HIV-1ANTIBODY: CPT

## 2019-11-01 PROCEDURE — 82550 ASSAY OF CK (CPK): CPT

## 2019-11-01 PROCEDURE — 87206 SMEAR FLUORESCENT/ACID STAI: CPT

## 2019-11-01 PROCEDURE — 36600 WITHDRAWAL OF ARTERIAL BLOOD: CPT

## 2019-11-01 PROCEDURE — 87899 AGENT NOS ASSAY W/OPTIC: CPT

## 2019-11-01 PROCEDURE — 80053 COMPREHEN METABOLIC PANEL: CPT

## 2019-11-01 PROCEDURE — 71275 CT ANGIOGRAPHY CHEST: CPT

## 2019-11-01 PROCEDURE — 97116 GAIT TRAINING THERAPY: CPT

## 2019-11-01 PROCEDURE — 87538 HIV-2 PROBE&REVRSE TRNSCRIPJ: CPT

## 2019-11-01 PROCEDURE — 80074 ACUTE HEPATITIS PANEL: CPT

## 2019-11-01 PROCEDURE — 87102 FUNGUS ISOLATION CULTURE: CPT

## 2019-11-01 PROCEDURE — 87640 STAPH A DNA AMP PROBE: CPT

## 2019-11-01 PROCEDURE — 88305 TISSUE EXAM BY PATHOLOGIST: CPT

## 2019-11-01 PROCEDURE — 92611 MOTION FLUOROSCOPY/SWALLOW: CPT

## 2019-11-01 PROCEDURE — 74230 X-RAY XM SWLNG FUNCJ C+: CPT

## 2019-11-01 PROCEDURE — 82553 CREATINE MB FRACTION: CPT

## 2019-11-01 PROCEDURE — 87900 PHENOTYPE INFECT AGENT DRUG: CPT

## 2019-11-01 PROCEDURE — 83540 ASSAY OF IRON: CPT

## 2019-11-01 PROCEDURE — 93005 ELECTROCARDIOGRAM TRACING: CPT

## 2019-11-01 PROCEDURE — 93306 TTE W/DOPPLER COMPLETE: CPT

## 2019-11-01 PROCEDURE — 85379 FIBRIN DEGRADATION QUANT: CPT

## 2019-11-01 PROCEDURE — 87641 MR-STAPH DNA AMP PROBE: CPT

## 2019-11-01 PROCEDURE — 87631 RESP VIRUS 3-5 TARGETS: CPT

## 2019-11-01 PROCEDURE — 82728 ASSAY OF FERRITIN: CPT

## 2019-11-01 PROCEDURE — 94640 AIRWAY INHALATION TREATMENT: CPT

## 2019-11-01 PROCEDURE — 83880 ASSAY OF NATRIURETIC PEPTIDE: CPT

## 2019-11-01 PROCEDURE — 86480 TB TEST CELL IMMUN MEASURE: CPT

## 2019-11-01 PROCEDURE — 87070 CULTURE OTHR SPECIMN AEROBIC: CPT

## 2019-11-01 PROCEDURE — 94760 N-INVAS EAR/PLS OXIMETRY 1: CPT

## 2019-11-01 PROCEDURE — C8929: CPT

## 2019-11-01 PROCEDURE — 86140 C-REACTIVE PROTEIN: CPT

## 2019-11-01 PROCEDURE — 85730 THROMBOPLASTIN TIME PARTIAL: CPT

## 2019-11-01 PROCEDURE — 86777 TOXOPLASMA ANTIBODY: CPT

## 2019-11-01 PROCEDURE — 88108 CYTOPATH CONCENTRATE TECH: CPT

## 2019-11-01 PROCEDURE — 36415 COLL VENOUS BLD VENIPUNCTURE: CPT

## 2019-11-01 PROCEDURE — 86357 NK CELLS TOTAL COUNT: CPT

## 2019-11-01 PROCEDURE — 71045 X-RAY EXAM CHEST 1 VIEW: CPT

## 2019-11-01 PROCEDURE — 85610 PROTHROMBIN TIME: CPT

## 2019-11-01 PROCEDURE — 88312 SPECIAL STAINS GROUP 1: CPT

## 2019-11-01 PROCEDURE — 87015 SPECIMEN INFECT AGNT CONCNTJ: CPT

## 2019-11-01 PROCEDURE — 82607 VITAMIN B-12: CPT

## 2019-11-01 PROCEDURE — 84145 PROCALCITONIN (PCT): CPT

## 2019-11-01 PROCEDURE — 93970 EXTREMITY STUDY: CPT

## 2019-11-01 PROCEDURE — 86803 HEPATITIS C AB TEST: CPT

## 2019-11-01 PROCEDURE — 97162 PT EVAL MOD COMPLEX 30 MIN: CPT

## 2019-11-01 PROCEDURE — 82746 ASSAY OF FOLIC ACID SERUM: CPT

## 2019-11-01 PROCEDURE — 87449 NOS EACH ORGANISM AG IA: CPT

## 2019-11-01 PROCEDURE — 87116 MYCOBACTERIA CULTURE: CPT

## 2019-11-01 PROCEDURE — 84132 ASSAY OF SERUM POTASSIUM: CPT

## 2019-11-01 PROCEDURE — 94660 CPAP INITIATION&MGMT: CPT

## 2019-11-01 PROCEDURE — 86780 TREPONEMA PALLIDUM: CPT

## 2019-11-01 PROCEDURE — 82962 GLUCOSE BLOOD TEST: CPT

## 2019-11-01 PROCEDURE — 83036 HEMOGLOBIN GLYCOSYLATED A1C: CPT

## 2019-11-01 PROCEDURE — 86403 PARTICLE AGGLUT ANTBDY SCRN: CPT

## 2019-11-01 PROCEDURE — 87040 BLOOD CULTURE FOR BACTERIA: CPT

## 2019-11-01 PROCEDURE — 83550 IRON BINDING TEST: CPT

## 2019-11-01 PROCEDURE — 71046 X-RAY EXAM CHEST 2 VIEWS: CPT

## 2019-11-01 PROCEDURE — 96367 TX/PROPH/DG ADDL SEQ IV INF: CPT

## 2019-11-01 PROCEDURE — 96365 THER/PROPH/DIAG IV INF INIT: CPT

## 2019-11-01 PROCEDURE — 85652 RBC SED RATE AUTOMATED: CPT

## 2019-11-01 PROCEDURE — 84484 ASSAY OF TROPONIN QUANT: CPT

## 2019-11-01 PROCEDURE — 83735 ASSAY OF MAGNESIUM: CPT

## 2019-11-01 PROCEDURE — 84100 ASSAY OF PHOSPHORUS: CPT

## 2019-11-01 PROCEDURE — 87389 HIV-1 AG W/HIV-1&-2 AB AG IA: CPT

## 2019-11-01 PROCEDURE — 87536 HIV-1 QUANT&REVRSE TRNSCRPJ: CPT

## 2019-11-01 PROCEDURE — 99239 HOSP IP/OBS DSCHRG MGMT >30: CPT

## 2019-11-01 PROCEDURE — 86355 B CELLS TOTAL COUNT: CPT

## 2019-11-01 PROCEDURE — 83605 ASSAY OF LACTIC ACID: CPT

## 2019-11-01 PROCEDURE — 92610 EVALUATE SWALLOWING FUNCTION: CPT

## 2019-11-01 RX ORDER — AMIODARONE HYDROCHLORIDE 400 MG/1
1 TABLET ORAL
Qty: 30 | Refills: 0
Start: 2019-11-01 | End: 2019-11-30

## 2019-11-01 RX ORDER — FUROSEMIDE 40 MG
1 TABLET ORAL
Qty: 30 | Refills: 0
Start: 2019-11-01 | End: 2019-11-30

## 2019-11-01 RX ORDER — LACTOBACILLUS ACIDOPHILUS 100MM CELL
1 CAPSULE ORAL
Qty: 60 | Refills: 0
Start: 2019-11-01 | End: 2019-11-30

## 2019-11-01 RX ORDER — APIXABAN 2.5 MG/1
1 TABLET, FILM COATED ORAL
Qty: 60 | Refills: 0
Start: 2019-11-01 | End: 2019-11-30

## 2019-11-01 RX ORDER — MAGNESIUM HYDROXIDE 400 MG/1
30 TABLET, CHEWABLE ORAL
Qty: 300 | Refills: 0
Start: 2019-11-01 | End: 2019-11-10

## 2019-11-01 RX ORDER — GLYBURIDE 5 MG
1 TABLET ORAL
Qty: 30 | Refills: 0
Start: 2019-11-01 | End: 2019-11-30

## 2019-11-01 RX ORDER — ALBUTEROL 90 UG/1
1 AEROSOL, METERED ORAL
Qty: 30 | Refills: 0
Start: 2019-11-01 | End: 2019-11-30

## 2019-11-01 RX ORDER — LEVOTHYROXINE SODIUM 125 MCG
1 TABLET ORAL
Qty: 0 | Refills: 0 | DISCHARGE

## 2019-11-01 RX ORDER — ACETAMINOPHEN 500 MG
2 TABLET ORAL
Qty: 0 | Refills: 0 | DISCHARGE
Start: 2019-11-01

## 2019-11-01 RX ORDER — METFORMIN HYDROCHLORIDE 850 MG/1
1 TABLET ORAL
Qty: 60 | Refills: 0
Start: 2019-11-01 | End: 2019-11-30

## 2019-11-01 RX ORDER — AZITHROMYCIN 500 MG/1
5 TABLET, FILM COATED ORAL
Qty: 21 | Refills: 0
Start: 2019-11-01 | End: 2019-11-30

## 2019-11-01 RX ORDER — LEVOTHYROXINE SODIUM 125 MCG
1 TABLET ORAL
Qty: 30 | Refills: 0
Start: 2019-11-01 | End: 2019-11-30

## 2019-11-01 RX ADMIN — SODIUM CHLORIDE 100 MILLILITER(S): 9 INJECTION INTRAMUSCULAR; INTRAVENOUS; SUBCUTANEOUS at 06:05

## 2019-11-01 RX ADMIN — SODIUM CHLORIDE 100 MILLILITER(S): 9 INJECTION INTRAMUSCULAR; INTRAVENOUS; SUBCUTANEOUS at 13:31

## 2019-11-01 RX ADMIN — Medication 2 TABLET(S): at 06:05

## 2019-11-01 RX ADMIN — Medication 40 MILLIGRAM(S): at 09:03

## 2019-11-01 RX ADMIN — Medication 1 TABLET(S): at 06:05

## 2019-11-01 RX ADMIN — Medication 1 TABLET(S): at 13:29

## 2019-11-01 RX ADMIN — Medication 2: at 17:21

## 2019-11-01 RX ADMIN — Medication 1 TABLET(S): at 12:12

## 2019-11-01 RX ADMIN — APIXABAN 5 MILLIGRAM(S): 2.5 TABLET, FILM COATED ORAL at 06:05

## 2019-11-01 RX ADMIN — APIXABAN 5 MILLIGRAM(S): 2.5 TABLET, FILM COATED ORAL at 17:21

## 2019-11-01 RX ADMIN — Medication 2 TABLET(S): at 13:29

## 2019-11-01 RX ADMIN — Medication 20 MILLIGRAM(S): at 06:05

## 2019-11-01 RX ADMIN — Medication 6: at 12:12

## 2019-11-01 RX ADMIN — Medication 75 MICROGRAM(S): at 06:05

## 2019-11-01 RX ADMIN — AMIODARONE HYDROCHLORIDE 200 MILLIGRAM(S): 400 TABLET ORAL at 06:05

## 2019-11-01 NOTE — PROGRESS NOTE ADULT - SUBJECTIVE AND OBJECTIVE BOX
Date/Time Patient Seen:  		  Referring MD:   Data Reviewed	       Patient is a 69y old  Female who presents with a chief complaint of pneumonia (31 Oct 2019 11:21)      Subjective/HPI     PAST MEDICAL & SURGICAL HISTORY:  Hypothyroidism, unspecified type  No significant past surgical history: denies surgical history but CT shows cholecystectomy        Medication list         MEDICATIONS  (STANDING):  aMIOdarone    Tablet 200 milliGRAM(s) Oral daily  apixaban 5 milliGRAM(s) Oral two times a day  azithromycin   Tablet 1250 milliGRAM(s) Oral <User Schedule>  dextrose 10% Bolus 125 milliLiter(s) IV Bolus once  dextrose 10% Bolus 250 milliLiter(s) IV Bolus once  dextrose 5%. 1000 milliLiter(s) (50 mL/Hr) IV Continuous <Continuous>  furosemide    Tablet 40 milliGRAM(s) Oral daily  insulin lispro (HumaLOG) corrective regimen sliding scale   SubCutaneous three times a day before meals  insulin lispro (HumaLOG) corrective regimen sliding scale   SubCutaneous at bedtime  lactobacillus acidophilus 1 Tablet(s) Oral three times a day  levothyroxine 75 MICROGram(s) Oral daily  multivitamin 1 Tablet(s) Oral daily  predniSONE   Tablet 20 milliGRAM(s) Oral daily  sodium chloride 0.9%. 1000 milliLiter(s) (100 mL/Hr) IV Continuous <Continuous>  trimethoprim  160 mG/sulfamethoxazole 800 mG 2 Tablet(s) Oral every 8 hours    MEDICATIONS  (PRN):  acetaminophen   Tablet .. 650 milliGRAM(s) Oral every 6 hours PRN Temp greater or equal to 38C (100.4F), Mild Pain (1 - 3)  ALBUTerol    0.083% 2.5 milliGRAM(s) Nebulizer every 6 hours PRN Shortness of Breath and/or Wheezing  dextrose 40% Gel 15 Gram(s) Oral once PRN Blood Glucose LESS THAN 70 milliGRAM(s)/deciLiter  glucagon  Injectable 1 milliGRAM(s) IntraMuscular once PRN Glucose <70 milliGRAM(s)/deciLiter  magnesium hydroxide Suspension 30 milliLiter(s) Oral daily PRN Constipation         Vitals log        ICU Vital Signs Last 24 Hrs  T(C): 36.7 (01 Nov 2019 05:46), Max: 37.2 (31 Oct 2019 21:14)  T(F): 98 (01 Nov 2019 05:46), Max: 98.9 (31 Oct 2019 21:14)  HR: 87 (01 Nov 2019 05:46) (85 - 102)  BP: 107/68 (01 Nov 2019 05:46) (104/66 - 107/68)  BP(mean): --  ABP: --  ABP(mean): --  RR: 17 (01 Nov 2019 05:46) (16 - 17)  SpO2: 98% (31 Oct 2019 21:14) (87% - 98%)           Input and Output:  I&O's Detail    30 Oct 2019 07:01  -  31 Oct 2019 07:00  --------------------------------------------------------  IN:  Total IN: 0 mL    OUT:    Voided: 500 mL  Total OUT: 500 mL    Total NET: -500 mL      31 Oct 2019 07:01  -  01 Nov 2019 06:01  --------------------------------------------------------  IN:    sodium chloride 0.9%.: 800 mL  Total IN: 800 mL    OUT:  Total OUT: 0 mL    Total NET: 800 mL          Lab Data                        8.8    15.88 )-----------( 274      ( 31 Oct 2019 08:55 )             26.4     10-31    130<L>  |  99  |  40<H>  ----------------------------<  107<H>  5.3   |  25  |  1.40<H>    Ca    8.3<L>      31 Oct 2019 08:55  Phos  4.3     10-30  Mg     1.9     10-31      ABG - ( 31 Oct 2019 06:26 )  pH, Arterial: 7.46  pH, Blood: x     /  pCO2: 35    /  pO2: 54    / HCO3: 26    / Base Excess: 1.2   /  SaO2: 86                      Review of Systems	      Objective     Physical Examination    heart s1s2  lung dec BS      Pertinent Lab findings & Imaging      Gisela:  NO   Adequate UO     I&O's Detail    30 Oct 2019 07:01  -  31 Oct 2019 07:00  --------------------------------------------------------  IN:  Total IN: 0 mL    OUT:    Voided: 500 mL  Total OUT: 500 mL    Total NET: -500 mL      31 Oct 2019 07:01  -  01 Nov 2019 06:01  --------------------------------------------------------  IN:    sodium chloride 0.9%.: 800 mL  Total IN: 800 mL    OUT:  Total OUT: 0 mL    Total NET: 800 mL               Discussed with:     Cultures:	        Radiology

## 2019-11-01 NOTE — PROGRESS NOTE ADULT - PROBLEM SELECTOR PROBLEM 1
Acute respiratory failure with hypoxia
Pneumocystis jiroveci pneumonia
Sepsis
Sepsis
Pneumocystis jiroveci pneumonia
Sepsis
Acute respiratory failure with hypoxia

## 2019-11-01 NOTE — PROVIDER CONTACT NOTE (OTHER) - ACTION/TREATMENT ORDERED:
MD aware. MD to assess pt. will continue to monitor. no new orders at this time.
MD aware. Continue to monitor and report to MD if temp or heart rate continue to be elevated. Will recheck in 1 hour
MD ordered STAT EKG and will assess pt bedside
MD aware and at bedside. bipap ordered and in place. pt now appears more comfortable with a respiratory rate of 20 and o2 sat of 95%.
fluconazole crossed off d/c instructions per dr ASHTYN Govea, reviewed with pt & her niece, both verbalize understanding

## 2019-11-01 NOTE — PROVIDER CONTACT NOTE (OTHER) - BACKGROUND
pt being d/c to home, fluconazole noted on written portion of d/c instructions, not noted on med list

## 2019-11-01 NOTE — PROGRESS NOTE ADULT - PROVIDER SPECIALTY LIST ADULT
Cardiology
Critical Care
Heme/Onc
Heme/Onc
Hospitalist
Infectious Disease
Pulmonology
Critical Care
Cardiology
Infectious Disease
Infectious Disease
Cardiology
Pulmonology
Hospitalist
Hospitalist

## 2019-11-01 NOTE — PROGRESS NOTE ADULT - REASON FOR ADMISSION
pneumonia

## 2019-11-01 NOTE — PROGRESS NOTE ADULT - PROBLEM SELECTOR PLAN 1
PCP  resp distress  HIV  AIDS  on anti microbial regimen  bactrim IV has a lot of volume - needs diuretic - I and O noted - at the moment net is 45 cc  on steroids and abx  unable to wean from high flow at the moment  cont supportive care  supportive care and regimen  CXR repeat reviewed  will follow  will start on ART at later date - ID follow up -
PCP pna  AIDS  resp distress  on BIPAP  on Bactrim and Steroids -   Bactrim for 21 days  NEBS  BIPAP  wean off bipap as tolerated  supportive care and measures  nutrition  ART - ID follow up - to decide when to start ART -   will follow  high risk for complication and RRT -
PCP pna  HIV   AIDS  on PO bactrim  on steroids  ART to start at later time  on PO LASIX  looks and feels better  dc planning  will need Insurance and follow up plans - CM notes reviewed -
PCP pna  HIV  AIDS  checking Room air ABG to determine home o2 need  supportive care  abx   steroids  [facility] rx regimen  wean o2 as tolerated  will follow
PCP pna  HIV AIDS  Art therapy - as per ID - to start in near future  on Bactrim and Steroids for PCP -   high flow, o2 support - NC - NIPPV alternate - for o2 support  keep sat > 88 pct  heme onc eval noted  prognosis guarded  will follow  s/p Bronch and ICU stay
chen resolved  PCP pna - on bactrim and steroids  resp distress - on High Flow - NC  suspect volume overload - due to Bactrim IV - proBNP very high - got lasix on 25th, will repeat IV lasix now -   cxr this am   alert  awake  prognosis guarded  NEBS prn  supportive measures  ID follow up  not on ART at present  will follow  at risk for sudden Decompensation
cm notes reviewed  home o2 -   abx and steroids  overall better  HIV AIDS  follow up as outpatient for ART  on Macrolide proph  ID follow up noted  supportive measures  nutrition  increase activity as tolerated  follow up notes reviewed
hiv  aids  pcp pna  volume overload  resp status better  cont with LASIX  on steroids and bactrim - high volume IVPB  tolerating NC  off High Flow  out of bed  nutrition  HIV genotype pending  ART when all work up available
on high flow NC  on isolation precs -   on steroids and bactrim for Poss PCP PNA  TB eval in progress - caution with Steroids in pt with Suspected TB -   Bronchoscopy without Intubation at present is unrealistic -   HIV AIDS - ID eval noted - supportive management  prognosis guarded  ICU management  isolation precs  nutrition  proph for AIDS related opportunistic infections -   will follow  [facility] rx regimen / TTE reviewed -
resp status better  TB ruled out  on NC o2 support  s/p bronchoscopy - PCP stain pending -   cx and labs and imaging reviewed  AIDS -   ID following - on Antifungal and ABX cov - PCP suspected -   overall appears better - work up in progress - supportive care and management  nutrition  keep sat > 88 pct  out of bed as tolerated  off isolation
acute resp failure w/ hypoxia 2/2 multifocal pna - upgraded to MICU  cont zosyn, bactrim added  follow up blood/urine cx, legionella ag, strep pna ag, procalcitonin  monitor for fevers, tylenol PRN  ID consulted Dr. Ray Owens  will get quantiferon gold and put pt on droplet precautions  - doubt Tb. pt is critically ill. to remain in MICU for now.
cont zosyn for now.  follow up blood/urine cx, legionella ag, strep pna ag, procalcitonin  monitor for fevers, tylenol PRN  ID consult Dr. Ray Owens  will get quantiferon gold and put pt on droplet
continue IV bactrim for now with further recs to follow regarding potential de-escalation but anticipate 21 days total therapy. Steroids as ordered
acute resp failure w/ hypoxia 2/2 multifocal pna - upgraded to MICU  bactrim continued, HIV positive, suspect PCP pneumonia, needs bronch  steroids  supportive care  ID consulted Dr. Ray Owens  emotional support provided  - still ruling out Tb
2/2 multifocal pneumonia:  Possible PCP pneumonia with +HIV status.    S/p bronchoscopy.   Continue Bactrim DS 2 tabs PO Q8h, Prednisone 40mg PO daily, and Albuterol neb tx Q6h PRN.    O2 support as needed.    Bronchoscopy culture with normal respiratory jasbir. AFB negative x 4.    Cytology negative for malignant cells.  F/U with Pul.

## 2019-11-01 NOTE — PROGRESS NOTE ADULT - SUBJECTIVE AND OBJECTIVE BOX
PATO HICKMAN is a 69yFemale , patient examined and chart reviewed.     INTERVAL HPI/ OVERNIGHT EVENTS:   Feeling better. Sitting in chair.  No events,  On 3L NC.    PAST MEDICAL & SURGICAL HISTORY:  Hypothyroidism, unspecified type  No significant past surgical history: denies surgical history but CT shows cholecystectomy      For details regarding the patient's social history, family history, and other miscellaneous elements, please refer the initial infectious diseases consultation and/or the admitting history and physical examination for this admission.    ROS:  CONSTITUTIONAL:  Negative fever or chills  EYES:  Negative  blurry vision or double vision  CARDIOVASCULAR:  Negative for chest pain or palpitations  RESPIRATORY:  Negative for cough, wheezing, or SOB   GASTROINTESTINAL:  Negative for nausea, vomiting, diarrhea, constipation, or abdominal pain  GENITOURINARY:  Negative frequency, urgency or dysuria  NEUROLOGIC:  No headache, confusion, dizziness, lightheadedness  All other systems were reviewed and are negative     Current inpatient medications :    ANTIBIOTICS/RELEVANT:  azithromycin   Tablet 1250 milliGRAM(s) Oral <User Schedule>  lactobacillus acidophilus 1 Tablet(s) Oral three times a day  trimethoprim  160 mG/sulfamethoxazole 800 mG 2 Tablet(s) Oral every 8 hours      acetaminophen   Tablet .. 650 milliGRAM(s) Oral every 6 hours PRN  ALBUTerol    0.083% 2.5 milliGRAM(s) Nebulizer every 6 hours PRN  aMIOdarone    Tablet 200 milliGRAM(s) Oral daily  apixaban 5 milliGRAM(s) Oral two times a day  dextrose 10% Bolus 125 milliLiter(s) IV Bolus once  dextrose 10% Bolus 250 milliLiter(s) IV Bolus once  dextrose 40% Gel 15 Gram(s) Oral once PRN  dextrose 5%. 1000 milliLiter(s) IV Continuous <Continuous>  furosemide    Tablet 40 milliGRAM(s) Oral daily  glucagon  Injectable 1 milliGRAM(s) IntraMuscular once PRN  insulin lispro (HumaLOG) corrective regimen sliding scale   SubCutaneous three times a day before meals  insulin lispro (HumaLOG) corrective regimen sliding scale   SubCutaneous at bedtime  levothyroxine 75 MICROGram(s) Oral daily  magnesium hydroxide Suspension 30 milliLiter(s) Oral daily PRN  multivitamin 1 Tablet(s) Oral daily  predniSONE   Tablet 20 milliGRAM(s) Oral daily  sodium chloride 0.9%. 1000 milliLiter(s) IV Continuous <Continuous>      Objective:    10-31 @ 07:01 - 11-01 @ 07:00  --------------------------------------------------------  IN: 1100 mL / OUT: 0 mL / NET: 1100 mL    11-01 @ 07:01  -  11-01 @ 18:05  --------------------------------------------------------  IN: 480 mL / OUT: 1000 mL / NET: -520 mL      T(C): 36.6 (11-01-19 @ 13:31), Max: 37.2 (10-31-19 @ 21:14)  HR: 87 (11-01-19 @ 13:31) (85 - 90)  BP: 99/64 (11-01-19 @ 13:31) (99/64 - 107/68)  RR: 20 (11-01-19 @ 13:31) (17 - 20)  SpO2: 98% (11-01-19 @ 13:31) (95% - 98%)    Physical Exam:  General: no acute distress  Eyes: sclera anicteric, pupils equal and reactive to light  ENMT: buccal mucosa moist, pharynx not injected  Neck: supple, trachea midline  Lungs: Decreased, no wheeze/rhonchi  Cardiovascular: regular rate and rhythm, S1 S2  Abdomen: soft, nontender, no organomegaly present, bowel sounds normal  Neurological: alert and oriented x3, Cranial Nerves II-XII grossly intact  Skin: no increased ecchymosis/petechiae/purpura  Lymph Nodes: no palpable cervical/supraclavicular lymph nodes enlargements  Extremities: no cyanosis/clubbing/edema      LABS:                          8.3    11.57 )-----------( 228      ( 01 Nov 2019 08:37 )             24.6       11-01    134<L>  |  104  |  33<H>  ----------------------------<  81  4.4   |  23  |  1.10    Ca    8.1<L>      01 Nov 2019 08:37  Mg     1.9     10-31      ABG - ( 31 Oct 2019 06:26 )  pH, Arterial: 7.46  pH, Blood: x     /  pCO2: 35    /  pO2: 54    / HCO3: 26    / Base Excess: 1.2   /  SaO2: 86        MICROBIOLOGY:  Culture - Acid Fast - Sputum w/Smear . (10.23.19 @ 00:29)    Specimen Source: .Sputum Sputum    Acid Fast Bacilli Smear:   No acid fast bacilli seen by fluorochrome stain    Culture Results:   No growth at 1 week.    Culture - Fungal, Bronchial (collected 23 Oct 2019 20:55)  Source: .Bronchial Bronchoalveolar Washings  Preliminary Report (28 Oct 2019 09:10):    Few Yeast    Culture - Bronchial (collected 23 Oct 2019 20:55)  Source: Bronch Wash Bronchoalveolar Washings  Gram Stain (24 Oct 2019 02:38):    Few polymorphonuclear leukocytes seen per low power field    Few Squamous epithelial cells seen per low power field    Rare Yeast like cells seen per oil power field  Final Report (25 Oct 2019 17:47):    Normal Respiratory Madelyn present    Cytopathology - Non Gyn Report (10.23.19 @ 14:20)    Cytopathology - Non Gyn Report:   ACCESSION No:  [id number]    PATO HICKMAN                          1        Cytopathology Report            Specimen(s) Submitted  Bronchial washings      Clinical History  69 year old female with newly diagnosed AIDS, hypoxemic  respiratory failure and ground glass opacities.  PCP pneumonia,  rule out MAC/TB  _      Gross Description  20 cc cloudy whitish fluid received in 50% alcohol      Final Diagnosis  NEGATIVE FOR MALIGNANT CELLS.  Specimen consists of  benign bronchial epithelial cells,  macrophages, lymphocytes, leukocytes and foamy material.  AFB stain for mycobacteria is negative.  GMS stain is positive for pneumocystis.    RADIOLOGY:  Xray Chest 2 Views PA/Lat (10.29.19 @ 11:26) >    No change heart mediastinum. No gross interval change bilateral airspace   disease discounting technical variation. No pleural effusion pneumothorax   or other new abnormality.    Impression: No gross interval radiographic change.      Assessment--  68 y/o F with PMHx of hypothyroidism presents with cough and congestion for over 3-4 weeks  in duration associated with intermittent fevers and cough . She has difficulty swallowing and increased cough with liquids. She has progressive in creased sob on minimal exertion . Has weight loss form poor appetite . has not been on any abx PTA. CXR and CTA chest dhows diffuse bilateral infiltrates with hilar and mediastinal adenopathy . As per speech therapy she has posiitve aspiration with thin liquids .    Her HIV test is positive, she denies any HIV risk factors . She has very low CD 4 count and very high HIV Viral load , so she most likely has PCP pneumonia and she was started on IV Bactrim with steroids , she had  bronchoscopy to confirm it. Clinically he looks better . Sputum afb x 3 negative     She also has severe oral thrush and may have candidal esophagitis which may be the cause of her difficulty swallowing     Plan:  - Pneumocystis jiroveci pneumonia.  Plan: continue with  bactrim (Day # 112/21)  changed to po  21 days total therapy. Steroids as ordered.   - HIV (human immunodeficiency virus infection).  Plan: delay of start of ART as recommended with genotyping prior to therapy selection  - started azithro for MAC prophylaxis with CD4 count.   - Candidal esophagitis.  Plan: continue fluconazole for 7 days then change to Mycelex trochees   - repeat  CXR   - DC planning   -  to assist in obtaining HIV meds and other tx - filled out ADAP forms       Continue with present regiment.  Appropriate use of antibiotics and adverse effects reviewed.      I have discussed the above plan of care with patient/ family in detail. They expressed understanding of the  treatment plan . Risks, benefits and alternatives discussed in detail. I have asked if they have any questions or concerns and appropriately addressed them to the best of my ability .    > 35 minutes were spent in direct patient care reviewing notes, medications ,labs data/ imaging , discussion with multidisciplinary team.    Thank you for allowing me to participate in care of your patient .    Gladys Rodríguez MD  Infectious Disease  [phone]

## 2019-11-05 PROBLEM — E03.9 HYPOTHYROIDISM, UNSPECIFIED: Chronic | Status: ACTIVE | Noted: 2019-10-19

## 2019-11-06 ENCOUNTER — LABORATORY RESULT (OUTPATIENT)
Age: 69
End: 2019-11-06

## 2019-11-06 ENCOUNTER — APPOINTMENT (OUTPATIENT)
Dept: INFECTIOUS DISEASE | Facility: CLINIC | Age: 69
End: 2019-11-06

## 2019-11-06 ENCOUNTER — OUTPATIENT (OUTPATIENT)
Dept: OUTPATIENT SERVICES | Facility: HOSPITAL | Age: 69
LOS: 1 days | End: 2019-11-06
Payer: COMMERCIAL

## 2019-11-06 ENCOUNTER — INPATIENT (INPATIENT)
Facility: HOSPITAL | Age: 69
LOS: 5 days | Discharge: HOME | DRG: 975 | End: 2019-11-12
Attending: HOSPITALIST | Admitting: HOSPITALIST
Payer: SELF-PAY

## 2019-11-06 VITALS
RESPIRATION RATE: 20 BRPM | TEMPERATURE: 98 F | HEART RATE: 73 BPM | OXYGEN SATURATION: 96 % | SYSTOLIC BLOOD PRESSURE: 113 MMHG | DIASTOLIC BLOOD PRESSURE: 55 MMHG

## 2019-11-06 VITALS
DIASTOLIC BLOOD PRESSURE: 64 MMHG | RESPIRATION RATE: 20 BRPM | SYSTOLIC BLOOD PRESSURE: 110 MMHG | OXYGEN SATURATION: 100 % | HEART RATE: 83 BPM | TEMPERATURE: 96.7 F

## 2019-11-06 DIAGNOSIS — R53.1 WEAKNESS: ICD-10-CM

## 2019-11-06 DIAGNOSIS — B59 PNEUMOCYSTOSIS: ICD-10-CM

## 2019-11-06 DIAGNOSIS — R26.2 DIFFICULTY IN WALKING, NOT ELSEWHERE CLASSIFIED: ICD-10-CM

## 2019-11-06 DIAGNOSIS — B20 HUMAN IMMUNODEFICIENCY VIRUS [HIV] DISEASE: ICD-10-CM

## 2019-11-06 DIAGNOSIS — Z86.39 PERSONAL HISTORY OF OTHER ENDOCRINE, NUTRITIONAL AND METABOLIC DISEASE: ICD-10-CM

## 2019-11-06 LAB
ALBUMIN SERPL ELPH-MCNC: 3.2 G/DL — LOW (ref 3.3–5)
ALBUMIN SERPL ELPH-MCNC: 3.2 G/DL — LOW (ref 3.3–5)
ALP SERPL-CCNC: 77 U/L — SIGNIFICANT CHANGE UP (ref 40–120)
ALP SERPL-CCNC: 81 U/L — SIGNIFICANT CHANGE UP (ref 40–120)
ALT FLD-CCNC: 48 U/L — HIGH (ref 10–45)
ALT FLD-CCNC: 52 U/L — HIGH (ref 10–45)
ANION GAP SERPL CALC-SCNC: 11 MMOL/L — SIGNIFICANT CHANGE UP (ref 5–17)
ANION GAP SERPL CALC-SCNC: 12 MMOL/L — SIGNIFICANT CHANGE UP (ref 5–17)
APPEARANCE UR: CLEAR — SIGNIFICANT CHANGE UP
APTT BLD: 27.2 SEC — LOW (ref 27.5–36.3)
AST SERPL-CCNC: 17 U/L — SIGNIFICANT CHANGE UP (ref 10–40)
AST SERPL-CCNC: 31 U/L — SIGNIFICANT CHANGE UP (ref 10–40)
BILIRUB SERPL-MCNC: 0.1 MG/DL — LOW (ref 0.2–1.2)
BILIRUB SERPL-MCNC: 0.1 MG/DL — LOW (ref 0.2–1.2)
BILIRUB UR-MCNC: NEGATIVE — SIGNIFICANT CHANGE UP
BUN SERPL-MCNC: 50 MG/DL — HIGH (ref 7–23)
BUN SERPL-MCNC: 52 MG/DL — HIGH (ref 7–23)
CALCIUM SERPL-MCNC: 11.9 MG/DL — HIGH (ref 8.4–10.5)
CALCIUM SERPL-MCNC: 12.4 MG/DL — HIGH (ref 8.4–10.5)
CHLORIDE SERPL-SCNC: 93 MMOL/L — LOW (ref 96–108)
CHLORIDE SERPL-SCNC: 93 MMOL/L — LOW (ref 96–108)
CO2 SERPL-SCNC: 19 MMOL/L — LOW (ref 22–31)
CO2 SERPL-SCNC: 20 MMOL/L — LOW (ref 22–31)
COLOR SPEC: COLORLESS — SIGNIFICANT CHANGE UP
CREAT SERPL-MCNC: 1.87 MG/DL — HIGH (ref 0.5–1.3)
CREAT SERPL-MCNC: 1.94 MG/DL — HIGH (ref 0.5–1.3)
DIFF PNL FLD: NEGATIVE — SIGNIFICANT CHANGE UP
GAS PNL BLDV: SIGNIFICANT CHANGE UP
GLUCOSE SERPL-MCNC: 218 MG/DL — HIGH (ref 70–99)
GLUCOSE SERPL-MCNC: 220 MG/DL — HIGH (ref 70–99)
GLUCOSE UR QL: NEGATIVE — SIGNIFICANT CHANGE UP
HCT VFR BLD CALC: 30.9 % — LOW (ref 34.5–45)
HGB BLD-MCNC: 9.9 G/DL — LOW (ref 11.5–15.5)
HIV 1 RNA IN SERPLBLD-SEQ: SIGNIFICANT CHANGE UP
HIV GENOSURE ARCHIVE 1: SIGNIFICANT CHANGE UP
HIV GENOSURE ARCHIVE 2: SIGNIFICANT CHANGE UP
HIV GENOSURE ARCHIVE INTERP: SIGNIFICANT CHANGE UP
INR BLD: 1.13 RATIO — SIGNIFICANT CHANGE UP (ref 0.88–1.16)
KETONES UR-MCNC: NEGATIVE — SIGNIFICANT CHANGE UP
LEUKOCYTE ESTERASE UR-ACNC: NEGATIVE — SIGNIFICANT CHANGE UP
LIDOCAIN IGE QN: 130 U/L — HIGH (ref 7–60)
MAGNESIUM SERPL-MCNC: 1.8 MG/DL — SIGNIFICANT CHANGE UP (ref 1.6–2.6)
MCHC RBC-ENTMCNC: 30 PG — SIGNIFICANT CHANGE UP (ref 27–34)
MCHC RBC-ENTMCNC: 32 GM/DL — SIGNIFICANT CHANGE UP (ref 32–36)
MCV RBC AUTO: 93.6 FL — SIGNIFICANT CHANGE UP (ref 80–100)
NITRITE UR-MCNC: NEGATIVE — SIGNIFICANT CHANGE UP
NRBC # BLD: 0 /100 WBCS — SIGNIFICANT CHANGE UP (ref 0–0)
PH UR: 6.5 — SIGNIFICANT CHANGE UP (ref 5–8)
PLATELET # BLD AUTO: 195 K/UL — SIGNIFICANT CHANGE UP (ref 150–400)
POTASSIUM SERPL-MCNC: 7.7 MMOL/L — CRITICAL HIGH (ref 3.5–5.3)
POTASSIUM SERPL-MCNC: 8.4 MMOL/L — CRITICAL HIGH (ref 3.5–5.3)
POTASSIUM SERPL-SCNC: 7.7 MMOL/L — CRITICAL HIGH (ref 3.5–5.3)
POTASSIUM SERPL-SCNC: 8.4 MMOL/L — CRITICAL HIGH (ref 3.5–5.3)
PROT SERPL-MCNC: 5.7 G/DL — LOW (ref 6–8.3)
PROT SERPL-MCNC: 6 G/DL — SIGNIFICANT CHANGE UP (ref 6–8.3)
PROT UR-MCNC: NEGATIVE — SIGNIFICANT CHANGE UP
PROTHROM AB SERPL-ACNC: 13.1 SEC — HIGH (ref 10–12.9)
RBC # BLD: 3.3 M/UL — LOW (ref 3.8–5.2)
RBC # FLD: 15.2 % — HIGH (ref 10.3–14.5)
SODIUM SERPL-SCNC: 124 MMOL/L — LOW (ref 135–145)
SODIUM SERPL-SCNC: 124 MMOL/L — LOW (ref 135–145)
SP GR SPEC: 1.01 — SIGNIFICANT CHANGE UP (ref 1.01–1.02)
TROPONIN T, HIGH SENSITIVITY RESULT: 25 NG/L — SIGNIFICANT CHANGE UP (ref 0–51)
TROPONIN T, HIGH SENSITIVITY RESULT: 27 NG/L — SIGNIFICANT CHANGE UP (ref 0–51)
UROBILINOGEN FLD QL: NEGATIVE — SIGNIFICANT CHANGE UP
WBC # BLD: 12.42 K/UL — HIGH (ref 3.8–10.5)
WBC # FLD AUTO: 12.42 K/UL — HIGH (ref 3.8–10.5)

## 2019-11-06 PROCEDURE — 71045 X-RAY EXAM CHEST 1 VIEW: CPT | Mod: 26

## 2019-11-06 PROCEDURE — 99285 EMERGENCY DEPT VISIT HI MDM: CPT

## 2019-11-06 PROCEDURE — G0463: CPT

## 2019-11-06 PROCEDURE — 93010 ELECTROCARDIOGRAM REPORT: CPT

## 2019-11-06 RX ORDER — CEFTRIAXONE 500 MG/1
1000 INJECTION, POWDER, FOR SOLUTION INTRAMUSCULAR; INTRAVENOUS ONCE
Refills: 0 | Status: COMPLETED | OUTPATIENT
Start: 2019-11-06 | End: 2019-11-06

## 2019-11-06 RX ORDER — SODIUM CHLORIDE 9 MG/ML
1000 INJECTION INTRAMUSCULAR; INTRAVENOUS; SUBCUTANEOUS ONCE
Refills: 0 | Status: COMPLETED | OUTPATIENT
Start: 2019-11-06 | End: 2019-11-06

## 2019-11-06 RX ORDER — FUROSEMIDE 40 MG
40 TABLET ORAL ONCE
Refills: 0 | Status: COMPLETED | OUTPATIENT
Start: 2019-11-06 | End: 2019-11-06

## 2019-11-06 RX ORDER — SODIUM CHLORIDE 9 MG/ML
500 INJECTION INTRAMUSCULAR; INTRAVENOUS; SUBCUTANEOUS ONCE
Refills: 0 | Status: COMPLETED | OUTPATIENT
Start: 2019-11-06 | End: 2019-11-06

## 2019-11-06 RX ORDER — ASPIRIN/CALCIUM CARB/MAGNESIUM 324 MG
162 TABLET ORAL ONCE
Refills: 0 | Status: COMPLETED | OUTPATIENT
Start: 2019-11-06 | End: 2019-11-06

## 2019-11-06 RX ORDER — ALBUTEROL 90 UG/1
10 AEROSOL, METERED ORAL ONCE
Refills: 0 | Status: COMPLETED | OUTPATIENT
Start: 2019-11-06 | End: 2019-11-06

## 2019-11-06 RX ORDER — DEXTROSE 50 % IN WATER 50 %
50 SYRINGE (ML) INTRAVENOUS ONCE
Refills: 0 | Status: COMPLETED | OUTPATIENT
Start: 2019-11-06 | End: 2019-11-06

## 2019-11-06 RX ORDER — CALCIUM GLUCONATE 100 MG/ML
1 VIAL (ML) INTRAVENOUS ONCE
Refills: 0 | Status: COMPLETED | OUTPATIENT
Start: 2019-11-06 | End: 2019-11-06

## 2019-11-06 RX ORDER — INSULIN HUMAN 100 [IU]/ML
5 INJECTION, SOLUTION SUBCUTANEOUS ONCE
Refills: 0 | Status: COMPLETED | OUTPATIENT
Start: 2019-11-06 | End: 2019-11-06

## 2019-11-06 RX ADMIN — Medication 40 MILLIGRAM(S): at 22:29

## 2019-11-06 RX ADMIN — INSULIN HUMAN 5 UNIT(S): 100 INJECTION, SOLUTION SUBCUTANEOUS at 22:26

## 2019-11-06 RX ADMIN — ALBUTEROL 2.5 MILLIGRAM(S): 90 AEROSOL, METERED ORAL at 22:28

## 2019-11-06 RX ADMIN — Medication 162 MILLIGRAM(S): at 18:39

## 2019-11-06 RX ADMIN — Medication 50 MILLILITER(S): at 22:30

## 2019-11-06 RX ADMIN — SODIUM CHLORIDE 500 MILLILITER(S): 9 INJECTION INTRAMUSCULAR; INTRAVENOUS; SUBCUTANEOUS at 17:45

## 2019-11-06 RX ADMIN — Medication 200 GRAM(S): at 22:29

## 2019-11-06 RX ADMIN — CEFTRIAXONE 100 MILLIGRAM(S): 500 INJECTION, POWDER, FOR SOLUTION INTRAMUSCULAR; INTRAVENOUS at 18:39

## 2019-11-06 RX ADMIN — SODIUM CHLORIDE 1000 MILLILITER(S): 9 INJECTION INTRAMUSCULAR; INTRAVENOUS; SUBCUTANEOUS at 17:45

## 2019-11-06 NOTE — ED PROVIDER NOTE - ATTENDING CONTRIBUTION TO CARE
69 F w/ PMH of AIDS, on HAART therapy, hx of afib on eliquis, presents to the ED w/ generalized weakness.   pt was just seen at the infectious disease clinic and was sent to the ER.   Will obtain  as pt is primarily daniel speaking  I have reviewed the triage vital signs. Const: chronically ill appearing, Eyes: no conjunctival injection and mild scleral icterus ENMT: dry mucus membranes, CVS: +S1/S2, radial/DP pulse 2+ bilaterally  RESP: Unlabored respiratory effort, Clear to auscultation bilaterally GI: Nontender/Nondistended soft abdomen, no CVA tenderness MSK: Extremities w/o deformity or ttp, Psych: Awake, Alert, & Orientedx3;  Appropriate mood and affect, cooperative      Pt has ekg that reveals a rbbb w/ twi in V1-V2, which is new, pt has no ST segment changes, and q wave in avl.   Plan for admission.   niece is coming as well who will be able to provide more hx.

## 2019-11-06 NOTE — ED ADULT NURSE NOTE - OBJECTIVE STATEMENT
68 y/o female patient presents ambulatory to ED, brought in by EMS, sent from infectious disease MD for generalized weakness. Patient primarily Dilan speaking, minimal English - declined  phone, requests niece to translate as needed. Patient states she has been vomiting, feels weak- difficulty with ambulating. Patient denies SOB and CP, diarrhea; afebrile in ED.

## 2019-11-06 NOTE — ED PROVIDER NOTE - NS ED ROS FT
GENERAL: +weakness No fever or chills, EYES: no change in vision, HEENT: no trouble swallowing or speaking, CARDIAC: no chest pain, PULMONARY: no cough or SOB, GI: no abdominal pain, +nausea, +vomiting, no diarrhea or constipation, : No changes in urination, SKIN: no rashes, NEURO: no headache,  MSK: No joint pain ~Anselmo Dunlap M.D. Resident

## 2019-11-06 NOTE — ED PROVIDER NOTE - OBJECTIVE STATEMENT
69yF with h/o hy 69yF with h/o HTN, diabetes, hypothyroidism.... presents due to one day of nausea, NBNB emesis and generalized weakness in which she has been unable to ambulate. No fever, chest pain, sob, abd pain, urinary or bowel irregularities 69yF with h/o HTN, diabetes, hypothyroidism, afib (eliquis), HIV/AIDS (HAART) presents due to one day of nausea, NBNB emesis and generalized weakness in which she has been unable to ambulate. No fever, chest pain, sob, abd pain, urinary or bowel irregularities

## 2019-11-06 NOTE — ED PROVIDER NOTE - CLINICAL SUMMARY MEDICAL DECISION MAKING FREE TEXT BOX
69yF with h/o HTN, diabetes, hypothyroidism.... presents due to one day of nausea, NBNB emesis and generalized weakness in which she has been unable to ambulate. Will evaluate with ekg, labs, coags, lipase, trop, UA and reassess

## 2019-11-06 NOTE — ED ADULT NURSE NOTE - ED STAT RN HANDOFF DETAILS
Bedside report given to on coming nurse Virginia . Understands pmh, medications given and plan of care for patient. Patient in stable condition, vital signs updated, has no complaints at this time and has been updated on care plan. Explained to patient and family that it is change of shift and new nurse is taking over, verbalized understanding.

## 2019-11-06 NOTE — ED ADULT NURSE NOTE - NSIMPLEMENTINTERV_GEN_ALL_ED
Implemented All Fall Risk Interventions:  [place] to call system. Call bell, personal items and telephone within reach. Instruct patient to call for assistance. Room bathroom lighting operational. Non-slip footwear when patient is off stretcher. Physically safe environment: no spills, clutter or unnecessary equipment. Stretcher in lowest position, wheels locked, appropriate side rails in place. Provide visual cue, wrist band, yellow gown, etc. Monitor gait and stability. Monitor for mental status changes and reorient to person, place, and time. Review medications for side effects contributing to fall risk. Reinforce activity limits and safety measures with patient and family.

## 2019-11-06 NOTE — ED PROVIDER NOTE - PHYSICAL EXAMINATION
Gen: AAOx3, non-toxic  Head: NCAT  HEENT: EOMI, oral mucosa moist, normal conjunctiva  Lung: CTAB, no respiratory distress, speaking in full sentences  CV: RRR, no murmurs, rubs or gallops  Abd: soft, NTND, no guarding, no CVA tenderness  MSK: no visible deformities           Strength:  Neuro: No focal sensory or motor deficits  Skin: Warm, well perfused, no rash  Psych: normal affect.   ~Anselmo Dunlap M.D. Resident

## 2019-11-06 NOTE — ED ADULT NURSE NOTE - PMH
Afib    AIDS    DM (diabetes mellitus)    HIV (human immunodeficiency virus infection)    HTN (hypertension)    Hypothyroidism, unspecified type

## 2019-11-07 ENCOUNTER — TRANSCRIPTION ENCOUNTER (OUTPATIENT)
Age: 69
End: 2019-11-07

## 2019-11-07 DIAGNOSIS — R53.1 WEAKNESS: ICD-10-CM

## 2019-11-07 DIAGNOSIS — E87.5 HYPERKALEMIA: ICD-10-CM

## 2019-11-07 DIAGNOSIS — B59 PNEUMOCYSTOSIS: ICD-10-CM

## 2019-11-07 DIAGNOSIS — E87.1 HYPO-OSMOLALITY AND HYPONATREMIA: ICD-10-CM

## 2019-11-07 DIAGNOSIS — D64.9 ANEMIA, UNSPECIFIED: ICD-10-CM

## 2019-11-07 DIAGNOSIS — N17.9 ACUTE KIDNEY FAILURE, UNSPECIFIED: ICD-10-CM

## 2019-11-07 DIAGNOSIS — E83.52 HYPERCALCEMIA: ICD-10-CM

## 2019-11-07 DIAGNOSIS — Z29.9 ENCOUNTER FOR PROPHYLACTIC MEASURES, UNSPECIFIED: ICD-10-CM

## 2019-11-07 DIAGNOSIS — E11.9 TYPE 2 DIABETES MELLITUS WITHOUT COMPLICATIONS: ICD-10-CM

## 2019-11-07 DIAGNOSIS — B20 HUMAN IMMUNODEFICIENCY VIRUS [HIV] DISEASE: ICD-10-CM

## 2019-11-07 DIAGNOSIS — I48.91 UNSPECIFIED ATRIAL FIBRILLATION: ICD-10-CM

## 2019-11-07 LAB
ALBUMIN SERPL ELPH-MCNC: 3.1 G/DL — LOW (ref 3.3–5)
ALBUMIN SERPL ELPH-MCNC: 3.1 G/DL — LOW (ref 3.3–5)
ALP SERPL-CCNC: 62 U/L — SIGNIFICANT CHANGE UP (ref 40–120)
ALP SERPL-CCNC: 67 U/L — SIGNIFICANT CHANGE UP (ref 40–120)
ALT FLD-CCNC: 42 U/L — SIGNIFICANT CHANGE UP (ref 10–45)
ALT FLD-CCNC: 46 U/L — HIGH (ref 10–45)
ANION GAP SERPL CALC-SCNC: 10 MMOL/L — SIGNIFICANT CHANGE UP (ref 5–17)
ANION GAP SERPL CALC-SCNC: 11 MMOL/L — SIGNIFICANT CHANGE UP (ref 5–17)
AST SERPL-CCNC: 15 U/L — SIGNIFICANT CHANGE UP (ref 10–40)
AST SERPL-CCNC: 15 U/L — SIGNIFICANT CHANGE UP (ref 10–40)
BILIRUB DIRECT SERPL-MCNC: <0.1 MG/DL — SIGNIFICANT CHANGE UP (ref 0–0.2)
BILIRUB INDIRECT FLD-MCNC: >0 MG/DL — LOW (ref 0.2–1)
BILIRUB SERPL-MCNC: 0.1 MG/DL — LOW (ref 0.2–1.2)
BILIRUB SERPL-MCNC: 0.1 MG/DL — LOW (ref 0.2–1.2)
BUN SERPL-MCNC: 40 MG/DL — HIGH (ref 7–23)
BUN SERPL-MCNC: 41 MG/DL — HIGH (ref 7–23)
BUN SERPL-MCNC: 42 MG/DL — HIGH (ref 7–23)
BUN SERPL-MCNC: 42 MG/DL — HIGH (ref 7–23)
CALCIUM SERPL-MCNC: 10.5 MG/DL — SIGNIFICANT CHANGE UP (ref 8.4–10.5)
CALCIUM SERPL-MCNC: 11.2 MG/DL — HIGH (ref 8.4–10.5)
CALCIUM SERPL-MCNC: 11.5 MG/DL — HIGH (ref 8.4–10.5)
CALCIUM SERPL-MCNC: 9.9 MG/DL — SIGNIFICANT CHANGE UP (ref 8.4–10.5)
CHLORIDE SERPL-SCNC: 101 MMOL/L — SIGNIFICANT CHANGE UP (ref 96–108)
CHLORIDE SERPL-SCNC: 101 MMOL/L — SIGNIFICANT CHANGE UP (ref 96–108)
CHLORIDE SERPL-SCNC: 97 MMOL/L — SIGNIFICANT CHANGE UP (ref 96–108)
CHLORIDE SERPL-SCNC: 99 MMOL/L — SIGNIFICANT CHANGE UP (ref 96–108)
CO2 SERPL-SCNC: 20 MMOL/L — LOW (ref 22–31)
CO2 SERPL-SCNC: 22 MMOL/L — SIGNIFICANT CHANGE UP (ref 22–31)
CREAT ?TM UR-MCNC: 54 MG/DL — SIGNIFICANT CHANGE UP
CREAT SERPL-MCNC: 1.56 MG/DL — HIGH (ref 0.5–1.3)
CREAT SERPL-MCNC: 1.77 MG/DL — HIGH (ref 0.5–1.3)
CREAT SERPL-MCNC: 1.77 MG/DL — HIGH (ref 0.5–1.3)
CREAT SERPL-MCNC: 1.97 MG/DL — HIGH (ref 0.5–1.3)
GAS PNL BLDV: SIGNIFICANT CHANGE UP
GLUCOSE BLDC GLUCOMTR-MCNC: 117 MG/DL — HIGH (ref 70–99)
GLUCOSE BLDC GLUCOMTR-MCNC: 147 MG/DL — HIGH (ref 70–99)
GLUCOSE BLDC GLUCOMTR-MCNC: 194 MG/DL — HIGH (ref 70–99)
GLUCOSE BLDC GLUCOMTR-MCNC: 247 MG/DL — HIGH (ref 70–99)
GLUCOSE BLDC GLUCOMTR-MCNC: 350 MG/DL — HIGH (ref 70–99)
GLUCOSE BLDC GLUCOMTR-MCNC: 71 MG/DL — SIGNIFICANT CHANGE UP (ref 70–99)
GLUCOSE BLDC GLUCOMTR-MCNC: 90 MG/DL — SIGNIFICANT CHANGE UP (ref 70–99)
GLUCOSE BLDC GLUCOMTR-MCNC: 99 MG/DL — SIGNIFICANT CHANGE UP (ref 70–99)
GLUCOSE SERPL-MCNC: 108 MG/DL — HIGH (ref 70–99)
GLUCOSE SERPL-MCNC: 141 MG/DL — HIGH (ref 70–99)
GLUCOSE SERPL-MCNC: 161 MG/DL — HIGH (ref 70–99)
GLUCOSE SERPL-MCNC: 86 MG/DL — SIGNIFICANT CHANGE UP (ref 70–99)
HCT VFR BLD CALC: 26.3 % — LOW (ref 34.5–45)
HGB BLD-MCNC: 8.5 G/DL — LOW (ref 11.5–15.5)
MAGNESIUM SERPL-MCNC: 1.6 MG/DL — SIGNIFICANT CHANGE UP (ref 1.6–2.6)
MCHC RBC-ENTMCNC: 29.9 PG — SIGNIFICANT CHANGE UP (ref 27–34)
MCHC RBC-ENTMCNC: 32.3 GM/DL — SIGNIFICANT CHANGE UP (ref 32–36)
MCV RBC AUTO: 92.6 FL — SIGNIFICANT CHANGE UP (ref 80–100)
PHOSPHATE SERPL-MCNC: 3.6 MG/DL — SIGNIFICANT CHANGE UP (ref 2.5–4.5)
PLATELET # BLD AUTO: 197 K/UL — SIGNIFICANT CHANGE UP (ref 150–400)
POTASSIUM SERPL-MCNC: 5.4 MMOL/L — HIGH (ref 3.5–5.3)
POTASSIUM SERPL-MCNC: 6 MMOL/L — HIGH (ref 3.5–5.3)
POTASSIUM SERPL-MCNC: 6.1 MMOL/L — HIGH (ref 3.5–5.3)
POTASSIUM SERPL-MCNC: 6.6 MMOL/L — CRITICAL HIGH (ref 3.5–5.3)
POTASSIUM SERPL-SCNC: 5.4 MMOL/L — HIGH (ref 3.5–5.3)
POTASSIUM SERPL-SCNC: 6 MMOL/L — HIGH (ref 3.5–5.3)
POTASSIUM SERPL-SCNC: 6.1 MMOL/L — HIGH (ref 3.5–5.3)
POTASSIUM SERPL-SCNC: 6.6 MMOL/L — CRITICAL HIGH (ref 3.5–5.3)
PROT SERPL-MCNC: 5.6 G/DL — LOW (ref 6–8.3)
PROT SERPL-MCNC: 5.7 G/DL — LOW (ref 6–8.3)
RBC # BLD: 2.84 M/UL — LOW (ref 3.8–5.2)
RBC # FLD: 15 % — HIGH (ref 10.3–14.5)
SODIUM SERPL-SCNC: 129 MMOL/L — LOW (ref 135–145)
SODIUM SERPL-SCNC: 131 MMOL/L — LOW (ref 135–145)
SODIUM SERPL-SCNC: 132 MMOL/L — LOW (ref 135–145)
SODIUM SERPL-SCNC: 133 MMOL/L — LOW (ref 135–145)
SODIUM UR-SCNC: 87 MMOL/L — SIGNIFICANT CHANGE UP
TSH SERPL-MCNC: 8.1 UIU/ML — HIGH (ref 0.27–4.2)
VIT D25+D1,25 OH+D1,25 PNL SERPL-MCNC: 14.3 PG/ML — LOW (ref 19.9–79.3)
WBC # BLD: 6.79 K/UL — SIGNIFICANT CHANGE UP (ref 3.8–10.5)
WBC # FLD AUTO: 6.79 K/UL — SIGNIFICANT CHANGE UP (ref 3.8–10.5)

## 2019-11-07 PROCEDURE — 76770 US EXAM ABDO BACK WALL COMP: CPT | Mod: 26

## 2019-11-07 PROCEDURE — 99223 1ST HOSP IP/OBS HIGH 75: CPT | Mod: GC

## 2019-11-07 RX ORDER — DEXTROSE 50 % IN WATER 50 %
12.5 SYRINGE (ML) INTRAVENOUS ONCE
Refills: 0 | Status: DISCONTINUED | OUTPATIENT
Start: 2019-11-07 | End: 2019-11-12

## 2019-11-07 RX ORDER — INSULIN HUMAN 100 [IU]/ML
5 INJECTION, SOLUTION SUBCUTANEOUS ONCE
Refills: 0 | Status: COMPLETED | OUTPATIENT
Start: 2019-11-07 | End: 2019-11-07

## 2019-11-07 RX ORDER — DEXTROSE 50 % IN WATER 50 %
25 SYRINGE (ML) INTRAVENOUS ONCE
Refills: 0 | Status: DISCONTINUED | OUTPATIENT
Start: 2019-11-07 | End: 2019-11-12

## 2019-11-07 RX ORDER — DEXTROSE 50 % IN WATER 50 %
15 SYRINGE (ML) INTRAVENOUS ONCE
Refills: 0 | Status: DISCONTINUED | OUTPATIENT
Start: 2019-11-07 | End: 2019-11-12

## 2019-11-07 RX ORDER — APIXABAN 2.5 MG/1
2.5 TABLET, FILM COATED ORAL EVERY 12 HOURS
Refills: 0 | Status: DISCONTINUED | OUTPATIENT
Start: 2019-11-07 | End: 2019-11-12

## 2019-11-07 RX ORDER — INSULIN LISPRO 100/ML
VIAL (ML) SUBCUTANEOUS
Refills: 0 | Status: DISCONTINUED | OUTPATIENT
Start: 2019-11-07 | End: 2019-11-12

## 2019-11-07 RX ORDER — DEXTROSE 50 % IN WATER 50 %
50 SYRINGE (ML) INTRAVENOUS ONCE
Refills: 0 | Status: COMPLETED | OUTPATIENT
Start: 2019-11-07 | End: 2019-11-07

## 2019-11-07 RX ORDER — SODIUM POLYSTYRENE SULFONATE 4.1 MEQ/G
15 POWDER, FOR SUSPENSION ORAL ONCE
Refills: 0 | Status: COMPLETED | OUTPATIENT
Start: 2019-11-07 | End: 2019-11-07

## 2019-11-07 RX ORDER — SODIUM CHLORIDE 9 MG/ML
1000 INJECTION INTRAMUSCULAR; INTRAVENOUS; SUBCUTANEOUS
Refills: 0 | Status: DISCONTINUED | OUTPATIENT
Start: 2019-11-07 | End: 2019-11-08

## 2019-11-07 RX ORDER — FUROSEMIDE 40 MG
40 TABLET ORAL DAILY
Refills: 0 | Status: DISCONTINUED | OUTPATIENT
Start: 2019-11-07 | End: 2019-11-07

## 2019-11-07 RX ORDER — ACETAMINOPHEN 500 MG
650 TABLET ORAL EVERY 6 HOURS
Refills: 0 | Status: DISCONTINUED | OUTPATIENT
Start: 2019-11-07 | End: 2019-11-12

## 2019-11-07 RX ORDER — GLUCAGON INJECTION, SOLUTION 0.5 MG/.1ML
1 INJECTION, SOLUTION SUBCUTANEOUS ONCE
Refills: 0 | Status: DISCONTINUED | OUTPATIENT
Start: 2019-11-07 | End: 2019-11-12

## 2019-11-07 RX ORDER — MICAFUNGIN SODIUM 100 MG/1
100 INJECTION, POWDER, LYOPHILIZED, FOR SOLUTION INTRAVENOUS ONCE
Refills: 0 | Status: COMPLETED | OUTPATIENT
Start: 2019-11-07 | End: 2019-11-07

## 2019-11-07 RX ORDER — ALBUTEROL 90 UG/1
10 AEROSOL, METERED ORAL ONCE
Refills: 0 | Status: COMPLETED | OUTPATIENT
Start: 2019-11-07 | End: 2019-11-07

## 2019-11-07 RX ORDER — MICAFUNGIN SODIUM 100 MG/1
INJECTION, POWDER, LYOPHILIZED, FOR SOLUTION INTRAVENOUS
Refills: 0 | Status: DISCONTINUED | OUTPATIENT
Start: 2019-11-07 | End: 2019-11-11

## 2019-11-07 RX ORDER — CLOTRIMAZOLE 10 MG
1 TROCHE MUCOUS MEMBRANE
Refills: 0 | Status: DISCONTINUED | OUTPATIENT
Start: 2019-11-07 | End: 2019-11-11

## 2019-11-07 RX ORDER — MAGNESIUM HYDROXIDE 400 MG/1
30 TABLET, CHEWABLE ORAL DAILY
Refills: 0 | Status: DISCONTINUED | OUTPATIENT
Start: 2019-11-07 | End: 2019-11-12

## 2019-11-07 RX ORDER — SODIUM ZIRCONIUM CYCLOSILICATE 10 G/10G
5 POWDER, FOR SUSPENSION ORAL
Refills: 0 | Status: DISCONTINUED | OUTPATIENT
Start: 2019-11-07 | End: 2019-11-08

## 2019-11-07 RX ORDER — AZITHROMYCIN 500 MG/1
1250 TABLET, FILM COATED ORAL
Refills: 0 | Status: DISCONTINUED | OUTPATIENT
Start: 2019-11-07 | End: 2019-11-09

## 2019-11-07 RX ORDER — BICTEGRAVIR SODIUM, EMTRICITABINE, AND TENOFOVIR ALAFENAMIDE FUMARATE 30; 120; 15 MG/1; MG/1; MG/1
1 TABLET ORAL DAILY
Refills: 0 | Status: DISCONTINUED | OUTPATIENT
Start: 2019-11-07 | End: 2019-11-12

## 2019-11-07 RX ORDER — SODIUM CHLORIDE 9 MG/ML
1000 INJECTION, SOLUTION INTRAVENOUS
Refills: 0 | Status: DISCONTINUED | OUTPATIENT
Start: 2019-11-07 | End: 2019-11-12

## 2019-11-07 RX ORDER — MICAFUNGIN SODIUM 100 MG/1
100 INJECTION, POWDER, LYOPHILIZED, FOR SOLUTION INTRAVENOUS EVERY 24 HOURS
Refills: 0 | Status: DISCONTINUED | OUTPATIENT
Start: 2019-11-08 | End: 2019-11-11

## 2019-11-07 RX ORDER — LEVOTHYROXINE SODIUM 125 MCG
75 TABLET ORAL DAILY
Refills: 0 | Status: DISCONTINUED | OUTPATIENT
Start: 2019-11-07 | End: 2019-11-12

## 2019-11-07 RX ORDER — AMIODARONE HYDROCHLORIDE 400 MG/1
200 TABLET ORAL DAILY
Refills: 0 | Status: DISCONTINUED | OUTPATIENT
Start: 2019-11-07 | End: 2019-11-07

## 2019-11-07 RX ORDER — LACTOBACILLUS ACIDOPHILUS 100MM CELL
1 CAPSULE ORAL DAILY
Refills: 0 | Status: DISCONTINUED | OUTPATIENT
Start: 2019-11-07 | End: 2019-11-12

## 2019-11-07 RX ORDER — METOPROLOL TARTRATE 50 MG
12.5 TABLET ORAL
Refills: 0 | Status: DISCONTINUED | OUTPATIENT
Start: 2019-11-07 | End: 2019-11-12

## 2019-11-07 RX ADMIN — Medication 50 MILLILITER(S): at 07:26

## 2019-11-07 RX ADMIN — Medication 1 LOZENGE: at 12:44

## 2019-11-07 RX ADMIN — Medication 1 TABLET(S): at 12:45

## 2019-11-07 RX ADMIN — Medication 40 MILLIGRAM(S): at 05:21

## 2019-11-07 RX ADMIN — APIXABAN 2.5 MILLIGRAM(S): 2.5 TABLET, FILM COATED ORAL at 17:16

## 2019-11-07 RX ADMIN — Medication 20 MILLIGRAM(S): at 05:21

## 2019-11-07 RX ADMIN — Medication 50 MILLILITER(S): at 02:03

## 2019-11-07 RX ADMIN — MICAFUNGIN SODIUM 105 MILLIGRAM(S): 100 INJECTION, POWDER, LYOPHILIZED, FOR SOLUTION INTRAVENOUS at 20:25

## 2019-11-07 RX ADMIN — SODIUM POLYSTYRENE SULFONATE 15 GRAM(S): 4.1 POWDER, FOR SUSPENSION ORAL at 02:50

## 2019-11-07 RX ADMIN — Medication 1 LOZENGE: at 20:24

## 2019-11-07 RX ADMIN — INSULIN HUMAN 5 UNIT(S): 100 INJECTION, SOLUTION SUBCUTANEOUS at 07:25

## 2019-11-07 RX ADMIN — ALBUTEROL 10 MILLIGRAM(S): 90 AEROSOL, METERED ORAL at 15:22

## 2019-11-07 RX ADMIN — Medication 1 LOZENGE: at 17:16

## 2019-11-07 RX ADMIN — Medication 75 MICROGRAM(S): at 05:21

## 2019-11-07 RX ADMIN — APIXABAN 2.5 MILLIGRAM(S): 2.5 TABLET, FILM COATED ORAL at 05:21

## 2019-11-07 RX ADMIN — AMIODARONE HYDROCHLORIDE 200 MILLIGRAM(S): 400 TABLET ORAL at 05:21

## 2019-11-07 RX ADMIN — ALBUTEROL 10 MILLIGRAM(S): 90 AEROSOL, METERED ORAL at 02:04

## 2019-11-07 RX ADMIN — Medication 50 MILLILITER(S): at 15:21

## 2019-11-07 RX ADMIN — Medication 1 LOZENGE: at 10:46

## 2019-11-07 RX ADMIN — INSULIN HUMAN 5 UNIT(S): 100 INJECTION, SOLUTION SUBCUTANEOUS at 15:21

## 2019-11-07 RX ADMIN — SODIUM POLYSTYRENE SULFONATE 15 GRAM(S): 4.1 POWDER, FOR SUSPENSION ORAL at 17:18

## 2019-11-07 RX ADMIN — SODIUM ZIRCONIUM CYCLOSILICATE 5 GRAM(S): 10 POWDER, FOR SUSPENSION ORAL at 20:24

## 2019-11-07 RX ADMIN — INSULIN HUMAN 5 UNIT(S): 100 INJECTION, SOLUTION SUBCUTANEOUS at 02:04

## 2019-11-07 RX ADMIN — SODIUM CHLORIDE 100 MILLILITER(S): 9 INJECTION INTRAMUSCULAR; INTRAVENOUS; SUBCUTANEOUS at 02:43

## 2019-11-07 RX ADMIN — Medication 4: at 08:00

## 2019-11-07 NOTE — REVIEW OF SYSTEMS
[Eye Pain] : no eye pain [Fever] : no fever [Chest Pain] : no chest pain [Earache] : no earache [Palpitations] : no palpitations [Shortness Of Breath] : no shortness of breath [Dysuria] : no dysuria [Abdominal Pain] : no abdominal pain [Confused] : no confusion [Limb Weakness] : no limb weakness [Dizziness] : no dizziness [de-identified] : b/l leg weakness- non focal  [FreeTextEntry9] : Inability to walk

## 2019-11-07 NOTE — CONSULT NOTE ADULT - SUBJECTIVE AND OBJECTIVE BOX
Patient is a 69y old  Female who presents with a chief complaint of Leg weakness (2019 09:07)    HPI:  69yF with h/o HTN, diabetes, hypothyroidism, afib (eliquis), HIV/AIDS not on ART and recent admission at [facility] for PCP PNA coming in with leg weakness of 1-2 day duration. Patient denies any fall or trauma.  Denies any urinary or bowel incontinence.  Patient was at the ID office this AM when she was not able to stand due to feeling weakness. She had to be helped to the car en route to the hospital. She is able to walk normally at baseline. Also had nausea and 4 episodes of emesis. Denies feeling short of breath or having chest pain. All other ROS negative.  Of note, patient was recently admitted to [facility] for PCP pneumonia, requiring ICU stay.  She had bronchiscopy, negative for AFB and malignant cells.  She was being treated with bactrim and prednsione.  She was also being treated for candida esophagitis.  She has decreased appetite, and has not been eating/drinking well.  She had swallow evaluation during last admission, was recommended dysphagia 3 diet, but patient was apparently eating solid food at home.  Denies any dysphagia.      Vitals-  97.8 F, HR 83, /55, RR 20, SpO2 96% on RA  Given- Lasix 40mg IVP x1, regular insulin 5U x1, 1L of NS, 500 cc NS x1, asa 162mg, calcium gluconate 1g, ceftriaxone 1g x1 (2019 01:32)     prior hospital charts reviewed [  ]  primary team notes reviewed [  ]  other consultant notes reviewed [  ]  PAST MEDICAL & SURGICAL HISTORY:  HIV (human immunodeficiency virus infection)  AIDS  Afib  DM (diabetes mellitus)  HTN (hypertension)  Hypothyroidism, unspecified type  No significant past surgical history: denies surgical history but CT shows cholecystectomy    Allergies  No Known Allergies    ANTIMICROBIALS (past 90 days)  MEDICATIONS  (STANDING):    cefTRIAXone   IVPB   100 mL/Hr IV Intermittent (19 @ 18:39)      ANTIMICROBIALS:    azithromycin   Tablet 1250 <User Schedule>  clotrimazole Lozenge 1 five times a day    OTHER MEDS: MEDICATIONS  (STANDING):  acetaminophen   Tablet .. 650 every 6 hours PRN  aMIOdarone    Tablet 200 daily  apixaban 2.5 every 12 hours  dextrose 40% Gel 15 once PRN  dextrose 50% Injectable 12.5 once  dextrose 50% Injectable 25 once  dextrose 50% Injectable 25 once  furosemide    Tablet 40 daily  glucagon  Injectable 1 once PRN  insulin lispro (HumaLOG) corrective regimen sliding scale  three times a day before meals  levothyroxine 75 daily  magnesium hydroxide Suspension 30 daily PRN  predniSONE   Tablet 20 daily    SOCIAL HISTORY:   hx smoking  non-smoker    FAMILY HISTORY:  FH: type 2 diabetes: in father    REVIEW OF SYSTEMS  [  ] ROS unobtainable because:    [  ] All other systems negative except as noted below:	    Constitutional:  [ ] fever [ ] chills  [ ] weight loss  [ ] weakness  Skin:  [ ] rash [ ] phlebitis	  Eyes: [ ] icterus [ ] pain  [ ] discharge	  ENMT: [ ] sore throat  [ ] thrush [ ] ulcers [ ] exudates  Respiratory: [ ] dyspnea [ ] hemoptysis [ ] cough [ ] sputum	  Cardiovascular:  [ ] chest pain [ ] palpitations [ ] edema	  Gastrointestinal:  [ ] nausea [ ] vomiting [ ] diarrhea [ ] constipation [ ] pain	  Genitourinary:  [ ] dysuria [ ] frequency [ ] hematuria [ ] discharge [ ] flank pain  [ ] incontinence  Musculoskeletal:  [ ] myalgias [ ] arthralgias [ ] arthritis  [ ] back pain  Neurological:  [ ] headache [ ] seizures  [ ] confusion/altered mental status  Psychiatric:  [ ] anxiety [ ] depression	  Hematology/Lymphatics:  [ ] lymphadenopathy  Endocrine:  [ ] adrenal [ ] thyroid  Allergic/Immunologic:	 [ ] transplant [ ] seasonal    Vital Signs Last 24 Hrs  T(F): 97.9 (19 @ 04:10), Max: 98.9 (10-31-19 @ 21:14)  Vital Signs Last 24 Hrs  HR: 89 (19 @ 05:20) (68 - 94)  BP: 108/66 (19 @ 05:20) (100/63 - 123/70)  RR: 18 (19 @ 04:10)  SpO2: 99% (19 @ 04:10) (96% - 100%)  Wt(kg): --    EXAM:  Constitutional: Not in acute distress  Eyes: pupils bilaterally reactive to light. No icterus.  Oral cavity: Clear, no lesions  Neck: No neck vein distension noted  RS: Chest clear to auscultation bilaterally. No wheeze/rhonchi/crepitations.  CVS: S1, S2 heard. Regular rate and rhythm. No murmurs/rubs/gallops.  Abdomen: Soft. No guarding/rigidity/tenderness.  : No acute abnormalities  Extremities: Warm. No pedal edema  Skin: No lesions noted  Vascular: No evidence of phlebitis  Neuro: Alert, oriented to time/place/person                          9.9    12.42 )-----------( 195      ( 2019 16:29 )             30.9     11-07    132<L>  |  99  |  42<H>  ----------------------------<  86  6.1<H>   |  22  |  1.77<H>    Ca    11.2<H>      2019 06:04  Phos  3.6       Mg     1.6         TPro  5.6<L>  /  Alb  3.1<L>  /  TBili  0.1<L>  /  DBili  x   /  AST  15  /  ALT  42  /  AlkPhos  62  11-07    Urinalysis Basic - ( 2019 20:28 )    Color: Colorless / Appearance: Clear / S.010 / pH: x  Gluc: x / Ketone: Negative  / Bili: Negative / Urobili: Negative   Blood: x / Protein: Negative / Nitrite: Negative   Leuk Esterase: Negative / RBC: x / WBC x   Sq Epi: x / Non Sq Epi: x / Bacteria: x    MICROBIOLOGY:        Toxoplasma IgG Screen: 3.3 IU/mL (10-23-19 @ 08:39)  HIV-1 RNA Quantitative, Viral Load Lo.79 (10-22-19 @ 08:11)          RADIOLOGY:  imaging below personally reviewed    OTHER TESTS: Patient speaks Dusty and Dilan, Fellow speaks Dusty,  refused  =============  Patient is a 69y old  Female who presents with a chief complaint of Leg weakness (2019 09:07)    HPI:  69yF with h/o HTN, diabetes, hypothyroidism, afib (eliquis), HIV/AIDS not on ART and recent admission at [facility] for PCP PNA coming in with leg weakness of 1-2 day duration. Patient denies any fall or trauma.  Denies any urinary or bowel incontinence.  Patient was at the ID office this AM when she was not able to stand due to feeling weakness. She had to be helped to the car en route to the hospital. She is able to walk normally at baseline. Also had nausea and 4 episodes of emesis. Denies feeling short of breath or having chest pain. All other ROS negative.  Of note, patient was recently admitted to [facility] for PCP pneumonia, requiring ICU stay.  She had bronchiscopy, negative for AFB and malignant cells.  She was being treated with bactrim and prednsione.  She was also being treated for candida esophagitis.  She has decreased appetite, and has not been eating/drinking well.  She had swallow evaluation during last admission, was recommended dysphagia 3 diet, but patient was apparently eating solid food at home.  Denies any dysphagia.      Vitals-  97.8 F, HR 83, /55, RR 20, SpO2 96% on RA  Given- Lasix 40mg IVP x1, regular insulin 5U x1, 1L of NS, 500 cc NS x1, asa 162mg, calcium gluconate 1g, ceftriaxone 1g x1 (2019 01:32)  ----------  - Above HPI reviewed and reconciled with patient  - reports resolution of nausea and no more syncopal episodes  - unclear route of acquisition of HIV. Claimed monogamous sexual relationship with amador (who passed away 4 months back d/t stroke). Denied h/o blood transfusions  - social history reviewed with patient below    prior hospital charts reviewed [x]  primary team notes reviewed [x]  other consultant notes reviewed [x]    PAST MEDICAL & SURGICAL HISTORY:  HIV (human immunodeficiency virus infection)  AIDS  Afib  DM (diabetes mellitus)  HTN (hypertension)  Hypothyroidism, unspecified type  No significant past surgical history: denies surgical history but CT shows cholecystectomy    Allergies  No Known Allergies    ANTIMICROBIALS (past 90 days)  MEDICATIONS  (STANDING):    cefTRIAXone   IVPB   100 mL/Hr IV Intermittent (19 @ 18:39)      ANTIMICROBIALS:    azithromycin   Tablet 1250 <User Schedule>  clotrimazole Lozenge 1 five times a day    OTHER MEDS: MEDICATIONS  (STANDING):  acetaminophen   Tablet .. 650 every 6 hours PRN  aMIOdarone    Tablet 200 daily  apixaban 2.5 every 12 hours  dextrose 40% Gel 15 once PRN  dextrose 50% Injectable 12.5 once  dextrose 50% Injectable 25 once  dextrose 50% Injectable 25 once  furosemide    Tablet 40 daily  glucagon  Injectable 1 once PRN  insulin lispro (HumaLOG) corrective regimen sliding scale  three times a day before meals  levothyroxine 75 daily  magnesium hydroxide Suspension 30 daily PRN  predniSONE   Tablet 20 daily    SOCIAL HISTORY:  - migrated from [facility] 4 months back  - lives alone, no pets  - 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 [facility]  - denied smoking/alcohol/recreational drug use    FAMILY HISTORY:  FH: type 2 diabetes: in father    REVIEW OF SYSTEMS  [  ] ROS unobtainable because:    [x] All other systems negative except as noted below:	  Constitutional:  [ ] fever [ ] chills  [ ] weight loss  [x] weakness  Skin:  [ ] rash [ ] phlebitis	  Eyes: [ ] icterus [ ] pain  [ ] discharge	  ENMT: [ ] sore throat  [ ] thrush [ ] ulcers [ ] exudates  Respiratory: [ ] dyspnea [ ] hemoptysis [ ] cough [ ] sputum	  Cardiovascular:  [ ] chest pain [ ] palpitations [ ] edema	  Gastrointestinal:  [x] nausea [x] vomiting [ ] diarrhea [ ] constipation [ ] pain	  Genitourinary:  [ ] dysuria [ ] frequency [ ] hematuria [ ] discharge [ ] flank pain  [ ] incontinence  Musculoskeletal:  [ ] myalgias [ ] arthralgias [ ] arthritis  [ ] back pain  Neurological:  [ ] headache [ ] seizures  [ ] confusion/altered mental status  Psychiatric:  [ ] anxiety [ ] depression	  Hematology/Lymphatics:  [ ] lymphadenopathy  Endocrine:  [ ] adrenal [ ] thyroid  Allergic/Immunologic:	 [ ] transplant [ ] seasonal    Vital Signs Last 24 Hrs  T(F): 97.9 (19 @ 04:10), Max: 98.9 (10-31-19 @ 21:14)  Vital Signs Last 24 Hrs  HR: 89 (19 @ 05:20) (68 - 94)  BP: 108/66 (19 @ 05:20) (100/63 - 123/70)  RR: 18 (19 @ 04:10)  SpO2: 99% (19 @ 04:10) (96% - 100%)  Wt(kg): --    EXAM:  Constitutional: Not in acute distress  Eyes: pupils bilaterally reactive to light. No icterus.  Oral cavity: Thrush noted on posterior soft palate  Neck: No neck vein distension noted  RS: Chest clear to auscultation bilaterally. No wheeze/rhonchi/crepitations.  CVS: S1, S2 heard. Regular rate and rhythm. No murmurs/rubs/gallops.  Abdomen: Soft. No guarding/rigidity/tenderness.  : No acute abnormalities  Extremities: Warm. No pedal edema  Skin: No lesions noted  Vascular: No evidence of phlebitis  Neuro: Alert, oriented to time/place/person                          9.9    12.42 )-----------( 195      ( 2019 16:29 )             30.9         132<L>  |  99  |  42<H>  ----------------------------<  86  6.1<H>   |  22  |  1.77<H>    Ca    11.2<H>      2019 06:04  Phos  3.6       Mg     1.6         TPro  5.6<L>  /  Alb  3.1<L>  /  TBili  0.1<L>  /  DBili  x   /  AST  15  /  ALT  42  /  AlkPhos  62      Urinalysis Basic - ( 2019 20:28 )    Color: Colorless / Appearance: Clear / S.010 / pH: x  Gluc: x / Ketone: Negative  / Bili: Negative / Urobili: Negative   Blood: x / Protein: Negative / Nitrite: Negative   Leuk Esterase: Negative / RBC: x / WBC x   Sq Epi: x / Non Sq Epi: x / Bacteria: x    MICROBIOLOGY:    Toxoplasma IgG Screen: 3.3 IU/mL (10-23-19 @ 08:39)  HIV-1 RNA Quantitative, Viral Load Lo.79 (10-22-19 @ 08:11)    Cytopathology - Non Gyn Report (10.23.19 @ 14:20)  NEGATIVE FOR MALIGNANT CELLS.  Specimen consists of  benign bronchial epithelial cells,  macrophages, lymphocytes, leukocytes and foamy material.  AFB stain for mycobacteria is negative.  GMS stain is positive for pneumocystis.    RADIOLOGY:  imaging below personally reviewed  < from: Xray Chest 1 View AP/PA (19 @ 16:58) >  Clear lungs.  < end of copied text >    < from: CT Angio Chest w/ IV Cont (10.19.19 @ 20:48) >  Suboptimal evaluation of the subsegmental pulmonary arteries. No obvious   embolus to the level of the segmental pulmonary arteries.    Nonspecific pulmonary groundglass and nodular opacities as described,   which may represent infection/inflammation although neoplasm cannot be   excluded. Recommend follow-up to assess resolution    Nonspecific mediastinal and hilar lymphadenopathy.    Additional findings as described.    < end of copied text >      OTHER TESTS:

## 2019-11-07 NOTE — H&P ADULT - HISTORY OF PRESENT ILLNESS
69yF with h/o HTN, diabetes, hypothyroidism, afib (eliquis), HIV/AIDS not on ART and recent admission at [facility] for PCP PNA coming in with leg weakness of 1-2 day duration. Patient was at the ID office this AM when she was not able to stand due to feeling weakness. She had to be helped to the car en route to the hospital. She is able to walk normally at baseline. Also had nausea and 4 episodes of emesis. Denies feeling short of breath or having chest pain. All other ROS negative. Denies any changes in appetite.     Vitals-  97.8 F, HR 83, /55, RR 20, SpO2 96% on RA  Given- Lasix 40mg IVP x1, regular insulin 5U x1, 1L of NS, 500 cc NS x1, asa 162mg, calcium gluconate 1g, ceftriaxone 1g x1 69yF with h/o HTN, diabetes, hypothyroidism, afib (eliquis), HIV/AIDS not on ART and recent admission at [facility] for PCP PNA coming in with leg weakness of 1-2 day duration. Patient denies any fall or trauma.  Denies any urinary or bowel incontinence.  Patient was at the ID office this AM when she was not able to stand due to feeling weakness. She had to be helped to the car en route to the hospital. She is able to walk normally at baseline. Also had nausea and 4 episodes of emesis. Denies feeling short of breath or having chest pain. All other ROS negative.  Of note, patient was recently admitted to [facility] for PCP pneumonia, requiring ICU stay.  She had bronchiscopy, negative for AFB and malignant cells.  She was being treated with bactrim and prednsione.  She was also being treated for candida esophagitis.  She has decreased appetite, and has not been eating/drinking well.  She had swallow evaluation during last admission, was recommended dysphagia 3 diet, but patient was apparently eating solid food at home.  Denies any dysphagia.      Vitals-  97.8 F, HR 83, /55, RR 20, SpO2 96% on RA  Given- Lasix 40mg IVP x1, regular insulin 5U x1, 1L of NS, 500 cc NS x1, asa 162mg, calcium gluconate 1g, ceftriaxone 1g x1

## 2019-11-07 NOTE — CONSULT NOTE ADULT - ASSESSMENT
ASSESSMENT:    RECOMMENDATIONS:    OXANA Schilling, MD  Fellow, Infectious Diseases  Pager: [phone]  After 5pm and on Weekends: Call [phone] ASSESSMENT:  69/F with PMH HTN, DM, Hypothyroidism, Afib (eliquis), HIV/AIDS (newly diagnosed, unknown route, CD4 24, VL >6 million, not on ART).  Recent admission at [facility] Oct 19 to Nov 1 for PCP PNA (s/p Bronch), on Bactrim, prednisone Azithromycin, Fluconazole.  Went to ID office 11/6 to establish care, had an episode of weakness, nausea, NBNB emesis. At [facility], noted to have hyponatremia, hypochloremia, hyperkalemia with new NICOLASA.  ID consulted for recs  -----------  - Concern for adrenal insufficiency given electrolyte abnormalities with NICOLASA, alongwith recent steroid use    RECOMMENDATIONS:  1. HIV  ------------  2. Prophylactic medications    OXANA Schilling, MD  Fellow, Infectious Diseases  Pager: [phone]  After 5pm and on Weekends: Call [phone] ASSESSMENT:  69/F with PMH HTN, DM, Hypothyroidism, Afib (eliquis), HIV/AIDS (newly diagnosed, unknown route, CD4 24, VL >6 million, not on ART).  Recent admission at [facility] Oct 19 to Nov 1 for PCP PNA (s/p Bronch), on Bactrim, prednisone Azithromycin, Fluconazole.  Went to ID office 11/6 to establish care, had an episode of weakness, nausea, NBNB emesis. At [facility], noted to have hyponatremia, hypochloremia, hyperkalemia with new NICOLASA.  ID consulted for recs  -----------  - Concern for adrenal insufficiency given electrolyte abnormalities with NICOLASA, alongwith recent steroid use. Jac    RECOMMENDATIONS:  1. HIV  -   ------------  2. Prophylactic medications    OXANA Schilling MD  Fellow, Infectious Diseases  Pager: [phone]  After 5pm and on Weekends: Call [phone] ASSESSMENT:  69/F with PMH HTN, DM, Hypothyroidism, Afib (eliquis), HIV/AIDS (newly diagnosed, unknown route, CD4 24, VL >6 million, not on ART).  Recent admission at [facility] Oct 19 to Nov 1 for PCP PNA (s/p Bronch), on Bactrim, prednisone Azithromycin, Fluconazole.  Went to ID office 11/6 to establish care, had an episode of weakness, nausea, NBNB emesis. At [facility], noted to have hyponatremia, hypochloremia, hyperkalemia with new NICOLASA.  ID consulted for recs  -----------  HIV with advanced AIDS with recent PJP pneumonia and concern for candidal esophagitis  - Concern for adrenal insufficiency given electrolyte abnormalities with NICOLASA, alongwith recent steroid use  - However, given low CD4 count, there could be concern for disseminated oppportunistic infection causing adrenal insufficiency  - CD4 count 31, VL >6 million  - given recent NICOLASA, will hold off ART initiation for now  - given high viral load, patient may need stronger ART medications like Symtuza. There is potential for interaction of Symtuza with Amiodarone  - Additionally, there is concern for QTc prolongation with Fluconazole, Azithromycin and Amiodarone    RECOMMENDATIONS:  - continue weekly Azithromycin 1200mg PO  - start Micafungin 150mg/d IV  - discontinue Prednisone  - start Hydrocortisone 50mg IV q8h  - check serum Cryptococcal Ag, urine Histoplasma Ag, CMV PCR, blood AFB cultures  - consider Cardiology consult - for assistance in switching from Amiodarone to beta blockers  Recs conveyed to primary team    OXANA Schilling, MD  Fellow, Infectious Diseases  Pager: [phone]  After 5pm and on Weekends: Call [phone]

## 2019-11-07 NOTE — H&P ADULT - NSHPLABSRESULTS_GEN_ALL_CORE
9.9    12.42 )-----------( 195      ( 2019 16:29 )             30.9     11-07    129<L>  |  97  |  41<H>  ----------------------------<  141<H>  x    |  22  |  1.56<H>    Ca    11.5<H>      2019 01:03  Mg     1.8         TPro  5.7<L>  /  Alb  3.1<L>  /  TBili  0.1<L>  /  DBili  x   /  AST  15  /  ALT  46<H>  /  AlkPhos  67  11-    Urinalysis Basic - ( 2019 20:28 )    Color: Colorless / Appearance: Clear / S.010 / pH: x  Gluc: x / Ketone: Negative  / Bili: Negative / Urobili: Negative   Blood: x / Protein: Negative / Nitrite: Negative   Leuk Esterase: Negative / RBC: x / WBC x   Sq Epi: x / Non Sq Epi: x / Bacteria: x    Blood Gas Profile - Venous (.19 @ 01:13)    pH, Venous: 7.35    pCO2, Venous: 42 mmHg    pO2, Venous: 34 mmHg    HCO3, Venous: 23 mmol/L    Base Excess, Venous: -2.1 mmol/L    Oxygen Saturation, Venous: 52 %    Total CO2, Venous: 24 mmol/L    chest x-ray- no emergent findings    EKG- NSR, RBBB 9.9    12.42 )-----------( 195      ( 2019 16:29 )             30.9     11-07    129<L>  |  97  |  41<H>  ----------------------------<  141<H>  x    |  22  |  1.56<H>    Ca    11.5<H>      2019 01:03  Mg     1.8         TPro  5.7<L>  /  Alb  3.1<L>  /  TBili  0.1<L>  /  DBili  x   /  AST  15  /  ALT  46<H>  /  AlkPhos  67  11-    Urinalysis Basic - ( 2019 20:28 )    Color: Colorless / Appearance: Clear / S.010 / pH: x  Gluc: x / Ketone: Negative  / Bili: Negative / Urobili: Negative   Blood: x / Protein: Negative / Nitrite: Negative   Leuk Esterase: Negative / RBC: x / WBC x   Sq Epi: x / Non Sq Epi: x / Bacteria: x    Blood Gas Profile - Venous (.19 @ 01:13)    pH, Venous: 7.35    pCO2, Venous: 42 mmHg    pO2, Venous: 34 mmHg    HCO3, Venous: 23 mmol/L    Base Excess, Venous: -2.1 mmol/L    Oxygen Saturation, Venous: 52 %    Total CO2, Venous: 24 mmol/L    chest x-ray- no emergent findings    EKG- NSR, RBBB, no peaked T waves Labs reviewed   9.9    12.42 )-----------( 195      ( 2019 16:29 )             30.9     11-07    129<L>  |  97  |  41<H>  ----------------------------<  141<H>  x    |  22  |  1.56<H>    Ca    11.5<H>      2019 01:03  Mg     1.8         TPro  5.7<L>  /  Alb  3.1<L>  /  TBili  0.1<L>  /  DBili  x   /  AST  15  /  ALT  46<H>  /  AlkPhos  67  11-    Urinalysis Basic - ( 2019 20:28 )    Color: Colorless / Appearance: Clear / S.010 / pH: x  Gluc: x / Ketone: Negative  / Bili: Negative / Urobili: Negative   Blood: x / Protein: Negative / Nitrite: Negative   Leuk Esterase: Negative / RBC: x / WBC x   Sq Epi: x / Non Sq Epi: x / Bacteria: x    Blood Gas Profile - Venous (.19 @ 01:13)    pH, Venous: 7.35    pCO2, Venous: 42 mmHg    pO2, Venous: 34 mmHg    HCO3, Venous: 23 mmol/L    Base Excess, Venous: -2.1 mmol/L    Oxygen Saturation, Venous: 52 %    Total CO2, Venous: 24 mmol/L    chest x-ray reviewed- no emergent findings    EKG reviewed- NSR, ?RBBB, no peaked T waves    Reviewed consults and test results from previous hospitalizations

## 2019-11-07 NOTE — H&P ADULT - PROBLEM SELECTOR PLAN 7
Hb 9.9, MCV 93 likely 2/2 AOCD in the setting of HIV/AIDS  - trend CBC daily Found to be HIV positive at [facility] with low CD4 and high viral load. Low albumin of 3. Follows with ID outpatient. Pending initiation of ART pending genotype studies.  - Prescribed Bactrim x21 days for PCP  - Azithromycin 250mg 5 tabs weekly for MAC prophylaxis  - completed fluconazole; start mycelex prophylaxis as recommended by ID  - ID consult in the AM

## 2019-11-07 NOTE — ASSESSMENT
[Treatment Education] : treatment education [Treatment Adherence] : treatment adherence [Rx Dose / Side Effects] : Rx dose/side effects [Risk Reduction] : risk reduction [Nutritional / Food Issues] : nutritional/food issues [Universal Precautions] : universal precautions [Medical Care Issues] : medical care issues [Drug Interactions / Side Effects] : drug interactions/side effects [HIV Education] : HIV Education [Disclosure of Diagnosis] : disclosure of diagnosis [Anticipatory Guidance] : anticipatory guidance [Sexuality / Safer Sex] : sexuality/safer sex [FreeTextEntry1] : 70 y/o F with PMH of Hypothyroidism, DM recently diagnosed HIV and PCP pneumonia with Hypoxia and AfIb on presentation at [facility], comes to establish care for HIV. \par \par Patient was discharged 11/1 from [facility] with Bactrim, Prednisone and Oxygen for PCP pneumonia. \par Patient and family report that patient is no longer short of breath is weak and has been unable to walk. \par  passed away 1 year ago. Has not been sexually active since then. \par  \par \par HIV \par Viral load > 6 million\par Cd4  - 24 \par f/u labs , STD screen, quantiferon, hepatitis labs \par \par \par Inability to walk \par started this morning \par patient able to move extremities and neuroglial function appears intact\par patient unable to get into the car, family unable to help \par Will send patient to ER.\par \par

## 2019-11-07 NOTE — H&P ADULT - ASSESSMENT
69 F with h/o HTN, diabetes, hypothyroidism, afib (on eliquis), HIV/AIDS not on ART coming in with LE weakness found to have K of 8 and hypercalcemia

## 2019-11-07 NOTE — PROGRESS NOTE ADULT - SUBJECTIVE AND OBJECTIVE BOX
Rachel Edge MD (PGY 1, Internal Medicine)  Pager: [phone]                  ____________________  SUBJ:    MEDICATIONS  (STANDING):  aMIOdarone    Tablet 200 milliGRAM(s) Oral daily  apixaban 2.5 milliGRAM(s) Oral every 12 hours  azithromycin   Tablet 1250 milliGRAM(s) Oral <User Schedule>  clotrimazole Lozenge 1 Lozenge Oral five times a day  dextrose 5%. 1000 milliLiter(s) (50 mL/Hr) IV Continuous <Continuous>  dextrose 50% Injectable 12.5 Gram(s) IV Push once  dextrose 50% Injectable 25 Gram(s) IV Push once  dextrose 50% Injectable 25 Gram(s) IV Push once  furosemide    Tablet 40 milliGRAM(s) Oral daily  insulin lispro (HumaLOG) corrective regimen sliding scale   SubCutaneous three times a day before meals  lactobacillus acidophilus 1 Tablet(s) Oral daily  levothyroxine 75 MICROGram(s) Oral daily  multivitamin 1 Tablet(s) Oral daily  predniSONE   Tablet 20 milliGRAM(s) Oral daily  sodium chloride 0.9%. 1000 milliLiter(s) (100 mL/Hr) IV Continuous <Continuous>    MEDICATIONS  (PRN):  acetaminophen   Tablet .. 650 milliGRAM(s) Oral every 6 hours PRN Temp greater or equal to 38C (100.4F), Moderate Pain (4 - 6)  dextrose 40% Gel 15 Gram(s) Oral once PRN Blood Glucose LESS THAN 70 milliGRAM(s)/deciliter  glucagon  Injectable 1 milliGRAM(s) IntraMuscular once PRN Glucose LESS THAN 70 milligrams/deciliter  magnesium hydroxide Suspension 30 milliLiter(s) Oral daily PRN Constipation    ____________________  VITALS:  Vital Signs Last 24 Hrs  T(C): 36.6 (07 Nov 2019 04:10), Max: 36.8 (06 Nov 2019 20:00)  T(F): 97.9 (07 Nov 2019 04:10), Max: 98.2 (06 Nov 2019 20:00)  HR: 89 (07 Nov 2019 05:20) (68 - 94)  BP: 108/66 (07 Nov 2019 05:20) (100/63 - 123/70)  BP(mean): --  RR: 18 (07 Nov 2019 04:10) (17 - 20)  SpO2: 99% (07 Nov 2019 04:10) (96% - 100%)    ____________________  PHYSICAL EXAM:  · CONSTITUTIONAL:	Well-developed, well nourished  · NECK:	No bruits; no thyromegaly. No JVD  ·RESPIRATORY:   Clear to auscultation. Good air moveement.  no rales,rhonchi or wheeze  · CARDIOVASCULAR	RRR  no m/r/g  . GASTROINTESTINAL:  Soft, non tender. No organomegaly. No guarding.  · EXTREMITIES: No cyanosis, clubbing or edema. DP/PT pulses intact.  ·NEUROLOGICAL:   alert and oriented x 3; Moves all four extremities on command. No sensory deficits.   · SKIN:	No lesions; no rash  . LYMPH NODES:	No lymphadedenopathy  · MUSCULOSKELETAL:   No calf tenderness  no joint swelling	    ____________________  LABS:                        9.9    12.42 )-----------( 195      ( 06 Nov 2019 16:29 )             30.9     11-07    132<L>  |  99  |  42<H>  ----------------------------<  86  6.1<H>   |  22  |  1.77<H>    Ca    11.2<H>      07 Nov 2019 06:04  Phos  3.6     11-07  Mg     1.6     11-07    TPro  5.6<L>  /  Alb  3.1<L>  /  TBili  0.1<L>  /  DBili  x   /  AST  15  /  ALT  42  /  AlkPhos  62  11-07        PT/INR - ( 06 Nov 2019 16:29 )   PT: 13.1 sec;   INR: 1.13 ratio         PTT - ( 06 Nov 2019 16:29 )  PTT:27.2 sec  I&O's Summary    06 Nov 2019 07:01  -  07 Nov 2019 07:00  --------------------------------------------------------  IN: 300 mL / OUT: 0 mL / NET: 300 mL    07 Nov 2019 07:01  -  07 Nov 2019 09:07  --------------------------------------------------------  IN: 240 mL / OUT: 0 mL / NET: 240 mL      Creatinine Trend: 1.77<--, 1.56<--, 1.94<--, 1.87<--, 1.10<--, 1.40<--             9.9    12.42 )-----------( 195      ( 11-06 @ 16:29 )             30.9       Hemoglobin Trend:  Hemoglobin: 9.9 g/dL (11-06 @ 16:29) Rachel Edge MD (PGY 1, Internal Medicine)  Pager: [phone]                  ____________________  SUBJ:  SHILPAO. Denies fevers, chills, cough, nausea, vomiting, diarrhea, cough, abdominal pain, dysuria, chest pain, palpitations.     MEDICATIONS  (STANDING):  MEDICATIONS  (STANDING):  aMIOdarone    Tablet 200 milliGRAM(s) Oral daily  apixaban 2.5 milliGRAM(s) Oral every 12 hours  azithromycin   Tablet 1250 milliGRAM(s) Oral <User Schedule>  clotrimazole Lozenge 1 Lozenge Oral five times a day  dextrose 5%. 1000 milliLiter(s) (50 mL/Hr) IV Continuous <Continuous>  dextrose 50% Injectable 12.5 Gram(s) IV Push once  dextrose 50% Injectable 25 Gram(s) IV Push once  dextrose 50% Injectable 25 Gram(s) IV Push once  insulin lispro (HumaLOG) corrective regimen sliding scale   SubCutaneous three times a day before meals  lactobacillus acidophilus 1 Tablet(s) Oral daily  levothyroxine 75 MICROGram(s) Oral daily  multivitamin 1 Tablet(s) Oral daily  predniSONE   Tablet 20 milliGRAM(s) Oral daily  sodium chloride 0.9%. 1000 milliLiter(s) (100 mL/Hr) IV Continuous <Continuous>  sodium polystyrene sulfonate Suspension 15 Gram(s) Oral once    MEDICATIONS  (PRN):  acetaminophen   Tablet .. 650 milliGRAM(s) Oral every 6 hours PRN Temp greater or equal to 38C (100.4F), Moderate Pain (4 - 6)  dextrose 40% Gel 15 Gram(s) Oral once PRN Blood Glucose LESS THAN 70 milliGRAM(s)/deciliter  glucagon  Injectable 1 milliGRAM(s) IntraMuscular once PRN Glucose LESS THAN 70 milligrams/deciliter  magnesium hydroxide Suspension 30 milliLiter(s) Oral daily PRN Constipation    ____________________  VITALS:  Vital Signs Last 24 Hrs  T(C): 36.6 (07 Nov 2019 04:10), Max: 36.8 (06 Nov 2019 20:00)  T(F): 97.9 (07 Nov 2019 04:10), Max: 98.2 (06 Nov 2019 20:00)  HR: 89 (07 Nov 2019 05:20) (68 - 94)  BP: 108/66 (07 Nov 2019 05:20) (100/63 - 123/70)  BP(mean): --  RR: 18 (07 Nov 2019 04:10) (17 - 20)  SpO2: 99% (07 Nov 2019 04:10) (96% - 100%)    ____________________  PHYSICAL EXAM:  · CONSTITUTIONAL:	Well-developed, well nourished  · NECK:	No bruits; no thyromegaly. No JVD  ·RESPIRATORY:   Decreased bibasilar breath sounds. No rales, rhonchi or wheeze  · CARDIOVASCULAR	RRR  no m/r/g  . GASTROINTESTINAL:  Soft, non tender. No organomegaly. No guarding.  · EXTREMITIES: No cyanosis, clubbing or edema. DP/PT pulses intact.  ·NEUROLOGICAL:   alert and oriented x 3; Moves all four extremities on command. No sensory deficits.   · SKIN:	No lesions; no rash  . LYMPH NODES:	No lymphoadenopathy  · MUSCULOSKELETAL:   No calf tenderness  no joint swelling	  ____________________  LABS:                        9.9    12.42 )-----------( 195      ( 06 Nov 2019 16:29 )             30.9     11-07    132<L>  |  99  |  42<H>  ----------------------------<  86  6.1<H>   |  22  |  1.77<H>    Ca    11.2<H>      07 Nov 2019 06:04  Phos  3.6     11-07  Mg     1.6     11-07    TPro  5.6<L>  /  Alb  3.1<L>  /  TBili  0.1<L>  /  DBili  x   /  AST  15  /  ALT  42  /  AlkPhos  62  11-07        PT/INR - ( 06 Nov 2019 16:29 )   PT: 13.1 sec;   INR: 1.13 ratio         PTT - ( 06 Nov 2019 16:29 )  PTT:27.2 sec  I&O's Summary    06 Nov 2019 07:01  -  07 Nov 2019 07:00  --------------------------------------------------------  IN: 300 mL / OUT: 0 mL / NET: 300 mL    07 Nov 2019 07:01  -  07 Nov 2019 09:07  --------------------------------------------------------  IN: 240 mL / OUT: 0 mL / NET: 240 mL      Creatinine Trend: 1.77<--, 1.56<--, 1.94<--, 1.87<--, 1.10<--, 1.40<--             9.9    12.42 )-----------( 195      ( 11-06 @ 16:29 )             30.9

## 2019-11-07 NOTE — PROGRESS NOTE ADULT - PROBLEM SELECTOR PLAN 4
- s/p MICU hospitalization for PCP PNA  -DC bactrim course (10/21-11/10)  - ID following: will c/w atovaquone to complete course pending their recs  - discontinue Prednisone  - start Hydrocortisone 50mg IV q8h

## 2019-11-07 NOTE — DISCHARGE NOTE NURSING/CASE MANAGEMENT/SOCIAL WORK - NSDCFUADDAPPT_GEN_ALL_CORE_FT
you were recommended for home with outpatient physical therapy (at a facility of your choice)    You can purchase a rolling walker out of pocket at [facility]

## 2019-11-07 NOTE — DISCHARGE NOTE NURSING/CASE MANAGEMENT/SOCIAL WORK - NSDCPEELIQUIS_GEN_ALL_CORE
Apixaban/Eliquis - Potential for adverse drug reactions and interactions/Apixaban/Eliquis - Dietary Advice/Apixaban/Eliquis - Compliance/Apixaban/Eliquis - Follow up monitoring

## 2019-11-07 NOTE — H&P ADULT - PROBLEM SELECTOR PLAN 1
Pt with Pt with LE weakness in the setting of hyperkalemia and hypercalcemia. Will likely improve with electrolyte improvements. 2 days ago was walking with assistance. This AM could not walk at all.   - PT consult in the AM Pt with LE weakness in the setting of hyperkalemia and hypercalcemia. Will likely improve with electrolyte improvements. 2 days ago was walking with assistance. This AM could not walk at all. Prednisone can also cause weakness.  - PT consult in the AM  - TSH level Pt with LE weakness in the setting of hyperkalemia and hypercalcemia. Will likely improve with electrolyte improvements. 2 days ago was walking with assistance. This AM could not walk at all. Prednisone can also cause weakness.  No signs of cord compression  - PT consult in the AM  - TSH level  - monitor and correct electrolytes Pt with LE weakness in the setting of hyperkalemia and hypercalcemia. Will likely improve with electrolyte improvements. 2 days ago was walking with assistance. This AM could not walk at all. Prednisone can also cause weakness.  No signs of cord compression.  - PT consult in the AM  - TSH level  - monitor and correct electrolytes

## 2019-11-07 NOTE — END OF VISIT
[FreeTextEntry3] : Patient seen and examined with Dr. Sevilla\par I agree with her history exam and plans as noted above-  Patient recently arrived from [place] and became ill while visiting family treated for PCP and diagnosed with advanced IADs and A.fib\par Remains quite ill started on no ARVs at this point as she had no way to pay for meds. will need social work assistance for ADAP, will need to beign ARVs\par In the midst of the lab visit patient developed light headedness and syncope and was transferred to the hospital for admission

## 2019-11-07 NOTE — CONSULT NOTE ADULT - ATTENDING COMMENTS
Patient seen and examined with Dr. Shipley  I agree with his history exam and plans as noted above.- Dr. Shipley obtained the patient history in [place]    Newly diagnosed advanced AIDS, active PJP pneumonia, NICOLASA, hyperkalemia, A.fib, on multiple out patient medicaitons but not on ARVs at this point    Need to r/o underlying adrenal insufficiency- check cortisol level can ask Endocrine for assistance  correct electrolytes and hydration  It is very important that we start ARVs as soon as her electrolytes are corrected- will start Biktarvy to avoid DDI  thrush on exam- start IV micafungin 100mg/day  Cardiology consult for assistance with A.fib and hopefully to find an alternative to the Amiodarone     Blood cultures  serum crypt ag  urine histo ag  blood for MAC    Jake Parks MD  [phone]  After 5pm/weekends [phone]

## 2019-11-07 NOTE — PROGRESS NOTE ADULT - PROBLEM SELECTOR PLAN 5
Found to be HIV positive at [place] with low CD4 and high viral load. Low albumin of 3. Follows with ID outpatient. Pending initiation of ART pending genotype studies.  - Management of bactrim as above  - Azithromycin 250mg 5 tabs weekly for MAC prophylaxis  - completed fluconazole;   - start Micafungin 150mg/d IV  - f/u serum Cryptococcal Ag, urine Histoplasma Ag, CMV PCR, blood AFB cultures

## 2019-11-07 NOTE — PROGRESS NOTE ADULT - PROBLEM SELECTOR PLAN 2
Ca of 12.4, corrected 13, unclear etiology, previously within normal limits. Likely contributing to weakness.  May be 2/2 dehydration, less likely malignancy.  - PTH level/vitamin D pending  - NS@100cc/hr  - monitor on telemetry

## 2019-11-07 NOTE — H&P ADULT - PROBLEM SELECTOR PLAN 5
Treated for PCP PNA at [place] after having cough, SOB, and hypoxia requiring MICU stay.   - Bactrim x21 days  - Prednisone 20mg PO daily (explains leukocytosis of 12) Treated for PCP PNA at [place] after having cough, SOB, and hypoxia requiring MICU stay.   - Bactrim 2 DS tabs x21 days (day 18/21)  - Prednisone 20mg PO daily (explains leukocytosis of 12) Treated for PCP PNA at [place] after having cough, SOB, and hypoxia requiring MICU stay.   - Bactrim 2 DS tabs x21 days (received 17/21); will hold for now; may need to change to atovaquone; will discuss with ID  - Prednisone 20mg PO daily (explains leukocytosis of 12) Treated for PCP PNA at [place] after having cough, SOB, and hypoxia requiring MICU stay.   - Bactrim 2 DS tabs x21 days (received 17/21); will hold for now; may need to change to atovaquone; will need to discuss with ID  - Prednisone 20mg PO daily (explains leukocytosis of 12) Baseline SCr appears to be 1.0, currently improved to 1.56 from 1.9; maybe 2/2 dehydration vs. bactrim causing increased creatinine  - get Urine Na, Cr  - avoid nephrotoxic agents  - check renal US if creatinine not improving  - c/w IVF for now

## 2019-11-07 NOTE — H&P ADULT - NSHPREVIEWOFSYSTEMS_GEN_ALL_CORE
REVIEW OF SYSTEMS:    CONSTITUTIONAL: weakness, no fevers or chills  EYES/ENT: No visual changes;  No vertigo or throat pain   NECK: No pain or stiffness  RESPIRATORY: No cough, wheezing, hemoptysis; No shortness of breath  CARDIOVASCULAR: No chest pain or palpitations  GASTROINTESTINAL: No abdominal or epigastric pain. Nausea and vomiting, no hematemesis; No diarrhea or constipation. No melena or hematochezia.  GENITOURINARY: No dysuria, frequency or hematuria  NEUROLOGICAL: No numbness or weakness  SKIN: No itching, rashes  MSK: no joint swelling or muscle aches  Endocrine: No dizziness, heat intolerance, hair thining REVIEW OF SYSTEMS:    CONSTITUTIONAL: weakness, no fevers or chills; decreased appetite, not eating well  EYES/ENT: No visual changes;  No vertigo or throat pain   NECK: No pain or stiffness  RESPIRATORY: No cough, wheezing, hemoptysis; No shortness of breath  CARDIOVASCULAR: No chest pain or palpitations  GASTROINTESTINAL: No abdominal or epigastric pain. Nausea and vomiting, no hematemesis; No diarrhea or constipation. No melena or hematochezia.  GENITOURINARY: No dysuria, frequency or hematuria  NEUROLOGICAL: No numbness or weakness  SKIN: No itching, rashes  MSK: no joint swelling or muscle aches  Endocrine: No dizziness, heat intolerance, hair thining

## 2019-11-07 NOTE — PROGRESS NOTE ADULT - ASSESSMENT
69 F with h/o HTN, diabetes, hypothyroidism, afib (on eliquis), HIV/AIDS not on ART, and recent ICU admission for PCP PNA  who presents with LE weakness found to have hyperkalemia, NICOLASA, and hypercalcemia likely 2/2 bactrim use.

## 2019-11-07 NOTE — HISTORY OF PRESENT ILLNESS
[FreeTextEntry1] : 68 y/o F with PMH of Hypothyroidism, DM recently diagnosed HIV and PCP pneumonia with Hypoxia and AfIb on presentation at [facility], comes to establish care for HIV. \par \par Patient was discharged 11/1 from [facility] with Bactrim, Prednisone and Oxygen for PCP pneumonia. \par Patient and family report that patient is no longer short of breath is weak and has been unable to walk. \par  passed away 1 year ago. Has not been sexually active since then. \par  \par  [de-identified] : N [Sexually Active] : The patient is not sexually active

## 2019-11-07 NOTE — DISCHARGE NOTE NURSING/CASE MANAGEMENT/SOCIAL WORK - PATIENT PORTAL LINK FT
You can access the FollowMyHealth Patient Portal offered by [facility] by registering at the following website: [email or web address]. By joining [employer]’s FollowMyHealth portal, you will also be able to view your health information using other applications (apps) compatible with our system.

## 2019-11-07 NOTE — H&P ADULT - NSICDXPASTMEDICALHX_GEN_ALL_CORE_FT
PAST MEDICAL HISTORY:  Afib     AIDS     DM (diabetes mellitus)     HIV (human immunodeficiency virus infection)     HTN (hypertension)     Hypothyroidism, unspecified type

## 2019-11-07 NOTE — PROGRESS NOTE ADULT - PROBLEM SELECTOR PLAN 1
- Hyperkalemia to 8.4 on admission, now improving. Likely cause of weakness on admission   - Unclear etiology however Bactrim is known to cause hyperkalemia.   - BMP Q6 with D50/albuterol/SSI   - Continue kayexelate    - DC bactrim  - tele monitoring  - Cortisol pending

## 2019-11-07 NOTE — PHYSICAL THERAPY INITIAL EVALUATION ADULT - PERTINENT HX OF CURRENT PROBLEM, REHAB EVAL
70 y/o F pt with below PMH presented to the ED with c/o LE weakness for the past 1-2 days was found to have K of 8 and hypercalcemia; s/p diuresis K+ this morning 6.1. Pt w/ recent admission to [facility] for PCP pneumonia, requiring ICU stay on home O2 ever since. She was also being treated for candida esophagitis.

## 2019-11-07 NOTE — H&P ADULT - NSHPPHYSICALEXAM_GEN_ALL_CORE
Vital Signs Last 24 Hrs  T(C): 36.3 (06 Nov 2019 23:22), Max: 36.8 (06 Nov 2019 20:00)  T(F): 97.3 (06 Nov 2019 23:22), Max: 98.2 (06 Nov 2019 20:00)  HR: 86 (06 Nov 2019 23:22) (68 - 86)  BP: 121/71 (06 Nov 2019 23:22) (104/69 - 123/70)  RR: 18 (06 Nov 2019 23:22) (17 - 20)  SpO2: 96% (06 Nov 2019 23:22) (96% - 100%)    PHYSICAL EXAMINATION:  General: Alert and Awake, NAD  HEENT: PERRL, EOMI, No subconjunctival hemorrhages, Oropharynx Clear, MMM  Neck: Supple, No VANESSA  Cardiac: RRR, No M/R/G  Resp: Decreased breath sounds at the bases, No Wh/Rh/Ra  Abdomen: NBS, NT/ND, No HSM, No rigidity or guarding  MSK: No LE edema. No stigmata of IE. No evidence of phlebitis. No evidence of synovitis.  : No torres  Skin: No rashes or lesions. Skin is warm and dry to the touch.   Neuro: Alert and Awake. CN 2-12 Grossly intact. 5/5 strength UE, 3/5 strength in the lower extremities.  Psych: Calm, Pleasant, Cooperative Vital Signs Last 24 Hrs  T(C): 36.3 (06 Nov 2019 23:22), Max: 36.8 (06 Nov 2019 20:00)  T(F): 97.3 (06 Nov 2019 23:22), Max: 98.2 (06 Nov 2019 20:00)  HR: 86 (06 Nov 2019 23:22) (68 - 86)  BP: 121/71 (06 Nov 2019 23:22) (104/69 - 123/70)  RR: 18 (06 Nov 2019 23:22) (17 - 20)  SpO2: 96% (06 Nov 2019 23:22) (96% - 100%)    PHYSICAL EXAMINATION:  General: Alert and Awake, NAD  HEENMT: PERRL, EOMI, No subconjunctival hemorrhages, Oropharynx Clear, MMM; no thrush  Neck: Supple, No VANESSA  Cardiac: RRR, No M/R/G  Resp: Decreased breath sounds at the bases, No Wh/Rh/Ra  Abdomen: NBS, NT/ND, No HSM, No rigidity or guarding  MSK: No LE edema. No stigmata of IE. No evidence of phlebitis. No evidence of synovitis.  : No torres  Skin: No rashes or lesions. Skin is warm and dry to the touch.   Neuro: Alert and Awake. CN 2-12 Grossly intact. 5/5 strength UE, 3/5 strength in the lower extremities.  Psych: Calm, Pleasant, Cooperative

## 2019-11-07 NOTE — H&P ADULT - PROBLEM SELECTOR PLAN 10
Diet- Dysphagia 3  DVT- Lynn Escobar MD PGY-2  Department of Internal Medicine  Pager: [phone] (NS) /[phone] (NIDA)

## 2019-11-07 NOTE — PHYSICAL THERAPY INITIAL EVALUATION ADULT - ADDITIONAL COMMENTS
Pt is undocumented uninsured. Patient resides in private home with her niece Pt is undocumented uninsured. Patient resides in private home with no steps to enter, pt independent all ADLs and functional mobility PTA.

## 2019-11-07 NOTE — H&P ADULT - PROBLEM SELECTOR PLAN 4
Baseline SCr appears to be 1.0, currently 1.56.   - get Urine Na, Cr  - avoid nephrotoxic agents Baseline SCr appears to be 1.0, currently improved to 1.56 from 1.9; maybe 2/2 dehydration vs. bactrim causing increased creatinine  - get Urine Na, Cr  - avoid nephrotoxic agents  - check renal US  - c/w IVF for now Baseline SCr appears to be 1.0, currently improved to 1.56 from 1.9; maybe 2/2 dehydration vs. bactrim causing increased creatinine  - get Urine Na, Cr  - avoid nephrotoxic agents  - check renal US if creatinine not improving  - c/w IVF for now unclear etiology; ? decreased PO intake vs. SIADH  c/w IVF for now   monitor BMP  check urine lytes  check TSH and cortisol level

## 2019-11-07 NOTE — PHYSICAL EXAM
[General Appearance - Alert] : alert [Sclera] : the sclera and conjunctiva were normal [Outer Ear] : the ears and nose were normal in appearance [Neck Appearance] : the appearance of the neck was normal [Auscultation Breath Sounds / Voice Sounds] : lungs were clear to auscultation bilaterally [Heart Sounds] : normal S1 and S2 [Edema] : there was no peripheral edema [Abdomen Soft] : soft [Nail Clubbing] : no clubbing  or cyanosis of the fingernails [] : no rash [No Focal Deficits] : no focal deficits [Oriented To Time, Place, And Person] : oriented to person, place, and time [FreeTextEntry1] : inabiltiy to bear weight

## 2019-11-07 NOTE — H&P ADULT - PROBLEM SELECTOR PLAN 6
Found to be HIV positive at [place] with low CD4 and high viral load. Low albumin of 3. Follows with ID outpatient. Pending initiation of ART pending genotype studies.  - Prescribed Bactrim x21 days for PCP  - Azithromycin 250mg 5 tabs weekly for MAC prophylaxis  - ID consult in the AM Found to be HIV positive at [place] with low CD4 and high viral load. Low albumin of 3. Follows with ID outpatient. Pending initiation of ART pending genotype studies.  - Prescribed Bactrim x21 days for PCP  - Azithromycin 250mg 5 tabs weekly for MAC prophylaxis  - completed fluconazole; start mycelex prophylaxis as recommended by ID  - ID consult in the AM Treated for PCP PNA at [facility] after having cough, SOB, and hypoxia requiring MICU stay.   - Bactrim 2 DS tabs x21 days (received 17/21); will hold for now; may need to change to atovaquone; will need to discuss with ID  - Prednisone 20mg PO daily (explains leukocytosis of 12)

## 2019-11-07 NOTE — REASON FOR VISIT
[Initial Evaluation] : an initial evaluation [FreeTextEntry1] : 70 y/o F with PMH of Hypothyroidism, DM, recent diagnosis of PCP pneumonia , HIV, atrial fibrillation presented  for new diagnosis of HIV.

## 2019-11-07 NOTE — H&P ADULT - PROBLEM SELECTOR PLAN 3
Ca of 11.5, unclear etiology Ca of 12.5, unclear etiology, previously within normal limits. Likely contributing to weakness.  - get PTH level  - maintenance fluids Ca of 12.5, unclear etiology, previously within normal limits. Likely contributing to weakness.  - get PTH level, vitamin D level  - NS@100cc/hr Ca of 12.4, corrected 13, unclear etiology, previously within normal limits. Likely contributing to weakness.  May be 2/2 dehydration  - get PTH level, vitamin D level  - NS@100cc/hr  - monitor on telemetry Ca of 12.4, corrected 13, unclear etiology, previously within normal limits. Likely contributing to weakness.  May be 2/2 dehydration, less likely malignancy.  - get PTH level, vitamin D level  - NS@100cc/hr  - monitor on telemetry

## 2019-11-07 NOTE — H&P ADULT - PROBLEM SELECTOR PLAN 2
K of 8 on admission, currently 6.6 Unclear etiology however Bactrim is known to cause hyperkalemia. s/p Lasix 40 IVP x1, albuterol nebs, calcium gluconate, regular insulin.   - trend BMP   - consider switching bactrim to atovaquone K of 8 on admission, currently 6.6 Unclear etiology however Bactrim is known to cause hyperkalemia. s/p Lasix 40 IVP x1, albuterol nebs, calcium gluconate, regular insulin. Will give another hyperkalemia cocktail now as well as Kayexalate.  - repeat EKG  - trend BMP   - Renal c/s in the AM  - consider switching bactrim to atovaquone pending ID consult K of 8.4 on admission, currently 6.6 Unclear etiology however Bactrim is known to cause hyperkalemia. s/p Lasix 40 IVP x1, albuterol nebs, calcium gluconate, regular insulin. Will give another hyperkalemia cocktail with lasix/d50/albuterol now as well as Kayexalate.  - repeat EKG  - trend BMP   - Renal c/s in the AM  - consider switching bactrim to atovaquone pending ID consult; received 17/21 days of bactrim so far; will hold for now  - monitor on telemetry K of 8.4 on admission, currently 6.6 Unclear etiology however Bactrim is known to cause hyperkalemia. s/p Lasix 40 IVP x1, albuterol nebs, calcium gluconate, regular insulin. Will give another hyperkalemia cocktail with lasix/d50/albuterol now as well as Kayexalate.  - repeat EKG  - trend BMP   - Renal c/s in the AM  - will hold bactrim received 17/21 days of bactrim so far; will hold for now given NICOLASA and hyperkalemia   - monitor on telemetry K of 8.4 on admission, currently 6.6 Unclear etiology however Bactrim is known to cause hyperkalemia. s/p Lasix 40 IVP x1, albuterol nebs, calcium gluconate, regular insulin. Will give another hyperkalemia cocktail with lasix/d50/albuterol now as well as Kayexalate.  - repeat EKG  - trend BMP   - Renal c/s in the AM  - will hold bactrim received 17/21 days of bactrim so far; will hold for now given NICOLASA and hyperkalemia   - monitor on telemetry  - check cortisol level

## 2019-11-07 NOTE — PROGRESS NOTE ADULT - PROBLEM SELECTOR PLAN 3
Baseline SCr appears to be 1.0; maybe 2/2 pre-renal from dehydration/emesis vs. bactrim  - f/u Urine lytes  - avoid nephrotoxic agents  - f/u renal US  - c/w IVF for now

## 2019-11-08 LAB
ANION GAP SERPL CALC-SCNC: 12 MMOL/L — SIGNIFICANT CHANGE UP (ref 5–17)
ANION GAP SERPL CALC-SCNC: 12 MMOL/L — SIGNIFICANT CHANGE UP (ref 5–17)
ANION GAP SERPL CALC-SCNC: 9 MMOL/L — SIGNIFICANT CHANGE UP (ref 5–17)
BUN SERPL-MCNC: 39 MG/DL — HIGH (ref 7–23)
BUN SERPL-MCNC: 44 MG/DL — HIGH (ref 7–23)
BUN SERPL-MCNC: 49 MG/DL — HIGH (ref 7–23)
CA-I BLD-SCNC: 1.38 MMOL/L — HIGH (ref 1.12–1.3)
CALCIUM SERPL-MCNC: 10 MG/DL — SIGNIFICANT CHANGE UP (ref 8.4–10.5)
CALCIUM SERPL-MCNC: 9.4 MG/DL — SIGNIFICANT CHANGE UP (ref 8.4–10.5)
CALCIUM SERPL-MCNC: 9.5 MG/DL — SIGNIFICANT CHANGE UP (ref 8.4–10.5)
CALCIUM SERPL-MCNC: 9.7 MG/DL — SIGNIFICANT CHANGE UP (ref 8.4–10.5)
CHLORIDE SERPL-SCNC: 101 MMOL/L — SIGNIFICANT CHANGE UP (ref 96–108)
CHLORIDE SERPL-SCNC: 103 MMOL/L — SIGNIFICANT CHANGE UP (ref 96–108)
CHLORIDE SERPL-SCNC: 97 MMOL/L — SIGNIFICANT CHANGE UP (ref 96–108)
CMV DNA CSF QL NAA+PROBE: 380 — SIGNIFICANT CHANGE UP
CMV DNA SPEC NAA+PROBE-LOG#: 2.58 LOGIU/ML — HIGH
CO2 SERPL-SCNC: 21 MMOL/L — LOW (ref 22–31)
CO2 SERPL-SCNC: 22 MMOL/L — SIGNIFICANT CHANGE UP (ref 22–31)
CO2 SERPL-SCNC: 23 MMOL/L — SIGNIFICANT CHANGE UP (ref 22–31)
CREAT SERPL-MCNC: 1.43 MG/DL — HIGH (ref 0.5–1.3)
CREAT SERPL-MCNC: 1.85 MG/DL — HIGH (ref 0.5–1.3)
CREAT SERPL-MCNC: 2.14 MG/DL — HIGH (ref 0.5–1.3)
CRYPTOC AG FLD QL: NEGATIVE — SIGNIFICANT CHANGE UP
GLUCOSE BLDC GLUCOMTR-MCNC: 121 MG/DL — HIGH (ref 70–99)
GLUCOSE BLDC GLUCOMTR-MCNC: 149 MG/DL — HIGH (ref 70–99)
GLUCOSE BLDC GLUCOMTR-MCNC: 309 MG/DL — HIGH (ref 70–99)
GLUCOSE BLDC GLUCOMTR-MCNC: 363 MG/DL — HIGH (ref 70–99)
GLUCOSE SERPL-MCNC: 183 MG/DL — HIGH (ref 70–99)
GLUCOSE SERPL-MCNC: 391 MG/DL — HIGH (ref 70–99)
GLUCOSE SERPL-MCNC: 99 MG/DL — SIGNIFICANT CHANGE UP (ref 70–99)
HCT VFR BLD CALC: 23.1 % — LOW (ref 34.5–45)
HGB BLD-MCNC: 7.6 G/DL — LOW (ref 11.5–15.5)
MAGNESIUM SERPL-MCNC: 1.6 MG/DL — SIGNIFICANT CHANGE UP (ref 1.6–2.6)
MCHC RBC-ENTMCNC: 30.6 PG — SIGNIFICANT CHANGE UP (ref 27–34)
MCHC RBC-ENTMCNC: 32.9 GM/DL — SIGNIFICANT CHANGE UP (ref 32–36)
MCV RBC AUTO: 93.1 FL — SIGNIFICANT CHANGE UP (ref 80–100)
PHOSPHATE SERPL-MCNC: 3.8 MG/DL — SIGNIFICANT CHANGE UP (ref 2.5–4.5)
PLATELET # BLD AUTO: 185 K/UL — SIGNIFICANT CHANGE UP (ref 150–400)
POTASSIUM SERPL-MCNC: 4.3 MMOL/L — SIGNIFICANT CHANGE UP (ref 3.5–5.3)
POTASSIUM SERPL-MCNC: 4.9 MMOL/L — SIGNIFICANT CHANGE UP (ref 3.5–5.3)
POTASSIUM SERPL-MCNC: 5.2 MMOL/L — SIGNIFICANT CHANGE UP (ref 3.5–5.3)
POTASSIUM SERPL-SCNC: 4.3 MMOL/L — SIGNIFICANT CHANGE UP (ref 3.5–5.3)
POTASSIUM SERPL-SCNC: 4.9 MMOL/L — SIGNIFICANT CHANGE UP (ref 3.5–5.3)
POTASSIUM SERPL-SCNC: 5.2 MMOL/L — SIGNIFICANT CHANGE UP (ref 3.5–5.3)
PTH-INTACT FLD-MCNC: 13 PG/ML — LOW (ref 15–65)
RBC # BLD: 2.48 M/UL — LOW (ref 3.8–5.2)
RBC # FLD: 15.4 % — HIGH (ref 10.3–14.5)
SODIUM SERPL-SCNC: 131 MMOL/L — LOW (ref 135–145)
SODIUM SERPL-SCNC: 134 MMOL/L — LOW (ref 135–145)
SODIUM SERPL-SCNC: 135 MMOL/L — SIGNIFICANT CHANGE UP (ref 135–145)
T4 FREE SERPL-MCNC: 0.9 NG/DL — SIGNIFICANT CHANGE UP (ref 0.9–1.8)
WBC # BLD: 8.06 K/UL — SIGNIFICANT CHANGE UP (ref 3.8–10.5)
WBC # FLD AUTO: 8.06 K/UL — SIGNIFICANT CHANGE UP (ref 3.8–10.5)

## 2019-11-08 PROCEDURE — 99232 SBSQ HOSP IP/OBS MODERATE 35: CPT | Mod: GC

## 2019-11-08 PROCEDURE — 99233 SBSQ HOSP IP/OBS HIGH 50: CPT | Mod: GC

## 2019-11-08 RX ORDER — SODIUM ZIRCONIUM CYCLOSILICATE 10 G/10G
5 POWDER, FOR SUSPENSION ORAL
Refills: 0 | Status: DISCONTINUED | OUTPATIENT
Start: 2019-11-08 | End: 2019-11-09

## 2019-11-08 RX ORDER — ATOVAQUONE 750 MG/5ML
1500 SUSPENSION ORAL DAILY
Refills: 0 | Status: DISCONTINUED | OUTPATIENT
Start: 2019-11-08 | End: 2019-11-11

## 2019-11-08 RX ADMIN — Medication 1 TABLET(S): at 11:32

## 2019-11-08 RX ADMIN — Medication 8: at 16:56

## 2019-11-08 RX ADMIN — Medication 75 MICROGRAM(S): at 05:12

## 2019-11-08 RX ADMIN — APIXABAN 2.5 MILLIGRAM(S): 2.5 TABLET, FILM COATED ORAL at 05:12

## 2019-11-08 RX ADMIN — SODIUM ZIRCONIUM CYCLOSILICATE 5 GRAM(S): 10 POWDER, FOR SUSPENSION ORAL at 06:57

## 2019-11-08 RX ADMIN — Medication 12.5 MILLIGRAM(S): at 05:12

## 2019-11-08 RX ADMIN — ATOVAQUONE 1500 MILLIGRAM(S): 750 SUSPENSION ORAL at 17:19

## 2019-11-08 RX ADMIN — MICAFUNGIN SODIUM 105 MILLIGRAM(S): 100 INJECTION, POWDER, LYOPHILIZED, FOR SOLUTION INTRAVENOUS at 21:36

## 2019-11-08 RX ADMIN — SODIUM ZIRCONIUM CYCLOSILICATE 5 GRAM(S): 10 POWDER, FOR SUSPENSION ORAL at 21:36

## 2019-11-08 RX ADMIN — Medication 10: at 11:56

## 2019-11-08 RX ADMIN — Medication 1 LOZENGE: at 17:01

## 2019-11-08 RX ADMIN — Medication 1 LOZENGE: at 11:32

## 2019-11-08 RX ADMIN — BICTEGRAVIR SODIUM, EMTRICITABINE, AND TENOFOVIR ALAFENAMIDE FUMARATE 1 TABLET(S): 30; 120; 15 TABLET ORAL at 11:32

## 2019-11-08 RX ADMIN — Medication 20 MILLIGRAM(S): at 05:12

## 2019-11-08 RX ADMIN — Medication 1 LOZENGE: at 09:21

## 2019-11-08 RX ADMIN — Medication 1 LOZENGE: at 00:04

## 2019-11-08 RX ADMIN — Medication 12.5 MILLIGRAM(S): at 17:01

## 2019-11-08 RX ADMIN — APIXABAN 2.5 MILLIGRAM(S): 2.5 TABLET, FILM COATED ORAL at 17:01

## 2019-11-08 NOTE — PROGRESS NOTE ADULT - PROBLEM SELECTOR PLAN 4
- s/p MICU hospitalization for PCP PNA  -DC bactrim course (10/21-11/10)  - ID following: will c/w atovaquone to complete course pending their recs  - discontinue Prednisone  - start Hydrocortisone 50mg IV q8h - s/p MICU hospitalization for PCP PNA  -DC bactrim course (10/21-11/10)  - ID following: will c/w atovaquone to complete course pending their recs  - cw Prednisone - s/p MICU hospitalization for PCP PNA  -DC bactrim course (10/21-11/10)  - CW mepron to complete 21 day PCP course (11/8 - 11/10)  - cw Prednisone - s/p MICU hospitalization for PCP PNA  -DC bactrim course (10/21-11/10)  - CW mepron to complete 21 day PCP course (11/8 - 11/10)  - cw Prednisone; given low suspicion for adrenal insufficiency. Will start stress dose steroids if hypotensive

## 2019-11-08 NOTE — PROGRESS NOTE ADULT - PROBLEM SELECTOR PLAN 2
Ca of 12.4, corrected 13, unclear etiology, previously within normal limits. Likely contributing to weakness.  May be 2/2 dehydration, less likely malignancy.  - PTH level/vitamin D pending  - NS@100cc/hr  - monitor on telemetry Ca of 12.4, corrected 13, unclear etiology, previously within normal limits. Likely contributing to weakness.  May be 2/2 dehydration, less likely malignancy, or PCP PNA related granulomatosis   - PTH level wnl  - PTHRP/vitamin D pending  - NS@100cc/hr  -  telemetry Ca of 12.4, corrected 13, unclear etiology, previously within normal limits. Likely contributing to weakness.  May be 2/2 dehydration, less likely malignancy, or PCP PNA related granulomatosis   - PTH level wnl  - PTHRP/vitamin D pending  - DC NS  - Daily iCa

## 2019-11-08 NOTE — PROGRESS NOTE ADULT - PROBLEM SELECTOR PLAN 1
- Hyperkalemia to 8.4 on admission, now improving. Likely cause of weakness on admission   - Unclear etiology however Bactrim is known to cause hyperkalemia.   - BMP Q6 with D50/albuterol/SSI   - Continue kayexelate    - DC bactrim  - tele monitoring  - Cortisol pending - Hyperkalemia to 8.4 on admission, now improving. Likely cause of weakness on admission   - Unclear etiology however Bactrim is known to cause hyperkalemia.   - BMP Q6 with D50/albuterol/SSI   - DC bactrim  - tele monitoring  - Cortisol pending - Hyperkalemia to 8.4 on admission, now improving. Likely cause of weakness on admission   - Unclear etiology however Bactrim is known to cause hyperkalemia.   - BMP Q6 with D50/albuterol/SSI   - DC bactrim  - tele monitoring  - Cortisol pending  - CW sodium zirconium cyclosilicate

## 2019-11-08 NOTE — PROGRESS NOTE ADULT - SUBJECTIVE AND OBJECTIVE BOX
Follow Up:      Interval History/ROS:Patient is a 69y old  Female who presents with a chief complaint of Leg weakness (2019 09:15)      Allergies    No Known Allergies    Intolerances        ANTIMICROBIALS:  azithromycin   Tablet 1250 <User Schedule>  bictegravir 50 mG/emtricitabine 200 mG/tenofovir alafenamide 25 mG (BIKTARVY) 1 daily  clotrimazole Lozenge 1 five times a day  micafungin IVPB    micafungin IVPB 100 every 24 hours      OTHER MEDS:  acetaminophen   Tablet .. 650 milliGRAM(s) Oral every 6 hours PRN  apixaban 2.5 milliGRAM(s) Oral every 12 hours  dextrose 40% Gel 15 Gram(s) Oral once PRN  dextrose 5%. 1000 milliLiter(s) IV Continuous <Continuous>  dextrose 50% Injectable 12.5 Gram(s) IV Push once  dextrose 50% Injectable 25 Gram(s) IV Push once  dextrose 50% Injectable 25 Gram(s) IV Push once  glucagon  Injectable 1 milliGRAM(s) IntraMuscular once PRN  insulin lispro (HumaLOG) corrective regimen sliding scale   SubCutaneous three times a day before meals  lactobacillus acidophilus 1 Tablet(s) Oral daily  levothyroxine 75 MICROGram(s) Oral daily  magnesium hydroxide Suspension 30 milliLiter(s) Oral daily PRN  metoprolol tartrate 12.5 milliGRAM(s) Oral two times a day  multivitamin 1 Tablet(s) Oral daily  predniSONE   Tablet 20 milliGRAM(s) Oral daily  sodium chloride 0.9%. 1000 milliLiter(s) IV Continuous <Continuous>  sodium zirconium cyclosilicate 5 Gram(s) Oral two times a day      Vital Signs Last 24 Hrs  T(C): 36.4 (2019 11:49), Max: 36.8 (2019 04:11)  T(F): 97.6 (2019 11:49), Max: 98.2 (2019 04:11)  HR: 86 (2019 11:49) (85 - 95)  BP: 111/73 (2019 11:49) (99/65 - 111/73)  BP(mean): --  RR: 18 (2019 11:49) (18 - 18)  SpO2: 100% (2019 11:49) (96% - 100%)    EXAM:  Constitutional: Not in acute distress  Eyes: No icterus. Pupils b/l reactive to light.  Oral cavity: Clear, no lesions  Neck: No neck vein distension noted  RS: Chest clear to auscultation bilaterally. No wheeze/rhonchi/crepitations.  CVS: S1, S2 heard. Regular rate and rhythm. No murmurs/rubs/gallops.  Abdomen: Soft. No guarding/rigidity/tenderness.  : No acute abnormalities  Extremities: Warm. No pedal edema  Skin: No lesions noted  Vascular: No evidence of phlebitis  Neuro: Alert, oriented to time/place/person                        7.6    8.06  )-----------( 185      ( 2019 08:26 )             23.1       11    131<L>  |  97  |  39<H>  ----------------------------<  391<H>  5.2   |  22  |  1.43<H>    Ca    9.4      2019 11:52  Phos  3.8       Mg     1.6         TPro  5.6<L>  /  Alb  3.1<L>  /  TBili  0.1<L>  /  DBili  x   /  AST  15  /  ALT  42  /  AlkPhos  62  11-      Urinalysis Basic - ( 2019 20:28 )    Color: Colorless / Appearance: Clear / S.010 / pH: x  Gluc: x / Ketone: Negative  / Bili: Negative / Urobili: Negative   Blood: x / Protein: Negative / Nitrite: Negative   Leuk Esterase: Negative / RBC: x / WBC x   Sq Epi: x / Non Sq Epi: x / Bacteria: x        MICROBIOLOGY:Culture Results:   No growth to date. ( @ 22:08)  Culture Results:   No growth to date. ( @ 22:05)      RADIOLOGY:    OTHER INVESTIGATIONS:    ASSESSMENT:    RECOMMENDATIONS:    OXANA Schilling, MD  Fellow, Infectious Diseases  Pager: [phone]  After 5pm and on Weekends: Call [phone] Follow Up:      Interval History/ROS:Patient is a 69y old  Female who presents with a chief complaint of Leg weakness (2019 09:15)  - no acute events reported overnight  - patient assessed at bedside. Denied any acute complaints. Reports having walked with PT yesterday      Allergies    No Known Allergies    Intolerances        ANTIMICROBIALS:  azithromycin   Tablet 1250 <User Schedule>  bictegravir 50 mG/emtricitabine 200 mG/tenofovir alafenamide 25 mG (BIKTARVY) 1 daily  clotrimazole Lozenge 1 five times a day  micafungin IVPB    micafungin IVPB 100 every 24 hours      OTHER MEDS:  acetaminophen   Tablet .. 650 milliGRAM(s) Oral every 6 hours PRN  apixaban 2.5 milliGRAM(s) Oral every 12 hours  dextrose 40% Gel 15 Gram(s) Oral once PRN  dextrose 5%. 1000 milliLiter(s) IV Continuous <Continuous>  dextrose 50% Injectable 12.5 Gram(s) IV Push once  dextrose 50% Injectable 25 Gram(s) IV Push once  dextrose 50% Injectable 25 Gram(s) IV Push once  glucagon  Injectable 1 milliGRAM(s) IntraMuscular once PRN  insulin lispro (HumaLOG) corrective regimen sliding scale   SubCutaneous three times a day before meals  lactobacillus acidophilus 1 Tablet(s) Oral daily  levothyroxine 75 MICROGram(s) Oral daily  magnesium hydroxide Suspension 30 milliLiter(s) Oral daily PRN  metoprolol tartrate 12.5 milliGRAM(s) Oral two times a day  multivitamin 1 Tablet(s) Oral daily  predniSONE   Tablet 20 milliGRAM(s) Oral daily  sodium chloride 0.9%. 1000 milliLiter(s) IV Continuous <Continuous>  sodium zirconium cyclosilicate 5 Gram(s) Oral two times a day      Vital Signs Last 24 Hrs  T(C): 36.4 (2019 11:49), Max: 36.8 (2019 04:11)  T(F): 97.6 (2019 11:49), Max: 98.2 (2019 04:11)  HR: 86 (2019 11:49) (85 - 95)  BP: 111/73 (2019 11:49) (99/65 - 111/73)  BP(mean): --  RR: 18 (2019 11:49) (18 - 18)  SpO2: 100% (2019 11:49) (96% - 100%)    EXAM:  Constitutional: Not in acute distress  Eyes: No icterus. Pupils b/l reactive to light.  Oral cavity: Clear, no lesions  Neck: No neck vein distension noted  RS: Chest clear to auscultation bilaterally. No wheeze/rhonchi/crepitations.  CVS: S1, S2 heard. Regular rate and rhythm. No murmurs/rubs/gallops.  Abdomen: Soft. No guarding/rigidity/tenderness.  : No acute abnormalities  Extremities: Warm. No pedal edema  Skin: No lesions noted  Vascular: No evidence of phlebitis  Neuro: Alert, oriented to time/place/person                        7.6    8.06  )-----------( 185      ( 2019 08:26 )             23.1           131<L>  |  97  |  39<H>  ----------------------------<  391<H>  5.2   |  22  |  1.43<H>    Ca    9.4      2019 11:52  Phos  3.8     -  Mg     1.6         TPro  5.6<L>  /  Alb  3.1<L>  /  TBili  0.1<L>  /  DBili  x   /  AST  15  /  ALT  42  /  AlkPhos  62  11-07      Urinalysis Basic - ( 2019 20:28 )    Color: Colorless / Appearance: Clear / S.010 / pH: x  Gluc: x / Ketone: Negative  / Bili: Negative / Urobili: Negative   Blood: x / Protein: Negative / Nitrite: Negative   Leuk Esterase: Negative / RBC: x / WBC x   Sq Epi: x / Non Sq Epi: x / Bacteria: x        MICROBIOLOGY:Culture Results:   No growth to date. ( @ 22:08)  Culture Results:   No growth to date. ( @ 22:05)

## 2019-11-08 NOTE — PROGRESS NOTE ADULT - ASSESSMENT
69 F with h/o HTN, diabetes, hypothyroidism, afib (on eliquis), HIV/AIDS not on ART, and recent ICU admission for PCP PNA  who presents with LE weakness found to have hyperkalemia, NICOLASA, and hypercalcemia likely 2/2 bactrim use. 69 F with h/o HTN, diabetes, hypothyroidism, afib (on eliquis), HIV/AIDS not on ART, and recent ICU admission for PCP PNA  who presents with LE weakness found to have hyperkalemia, NCIOLASA, and hypercalcemia likely 2/2 bactrim use.

## 2019-11-08 NOTE — PROGRESS NOTE ADULT - SUBJECTIVE AND OBJECTIVE BOX
Rachel Edge MD (PGY 1, Internal Medicine)  Pager: [phone]                  ____________________  SUBJ:  SHILPAO. Denies fevers, chills, cough, nausea, vomiting, diarrhea, cough, abdominal pain, dysuria, chest pain, palpitations.     MEDICATIONS  (STANDING):  apixaban 2.5 milliGRAM(s) Oral every 12 hours  azithromycin   Tablet 1250 milliGRAM(s) Oral <User Schedule>  bictegravir 50 mG/emtricitabine 200 mG/tenofovir alafenamide 25 mG (BIKTARVY) 1 Tablet(s) Oral daily  clotrimazole Lozenge 1 Lozenge Oral five times a day  dextrose 5%. 1000 milliLiter(s) (50 mL/Hr) IV Continuous <Continuous>  dextrose 50% Injectable 12.5 Gram(s) IV Push once  dextrose 50% Injectable 25 Gram(s) IV Push once  dextrose 50% Injectable 25 Gram(s) IV Push once  insulin lispro (HumaLOG) corrective regimen sliding scale   SubCutaneous three times a day before meals  lactobacillus acidophilus 1 Tablet(s) Oral daily  levothyroxine 75 MICROGram(s) Oral daily  metoprolol tartrate 12.5 milliGRAM(s) Oral two times a day  micafungin IVPB      micafungin IVPB 100 milliGRAM(s) IV Intermittent every 24 hours  multivitamin 1 Tablet(s) Oral daily  predniSONE   Tablet 20 milliGRAM(s) Oral daily  sodium chloride 0.9%. 1000 milliLiter(s) (100 mL/Hr) IV Continuous <Continuous>  sodium zirconium cyclosilicate 5 Gram(s) Oral two times a day    MEDICATIONS  (PRN):  acetaminophen   Tablet .. 650 milliGRAM(s) Oral every 6 hours PRN Temp greater or equal to 38C (100.4F), Moderate Pain (4 - 6)  dextrose 40% Gel 15 Gram(s) Oral once PRN Blood Glucose LESS THAN 70 milliGRAM(s)/deciliter  glucagon  Injectable 1 milliGRAM(s) IntraMuscular once PRN Glucose LESS THAN 70 milligrams/deciliter  magnesium hydroxide Suspension 30 milliLiter(s) Oral daily PRN Constipation    ____________________  VITALS:  ICU Vital Signs Last 24 Hrs  T(C): 36.8 (08 Nov 2019 04:11), Max: 36.8 (08 Nov 2019 04:11)  T(F): 98.2 (08 Nov 2019 04:11), Max: 98.2 (08 Nov 2019 04:11)  HR: 85 (08 Nov 2019 04:11) (85 - 95)  BP: 110/68 (08 Nov 2019 04:11) (98/63 - 110/68)  BP(mean): --  ABP: --  ABP(mean): --  RR: 18 (08 Nov 2019 04:11) (18 - 18)  SpO2: 96% (08 Nov 2019 04:11) (96% - 98%)    ____________________  PHYSICAL EXAM:  · CONSTITUTIONAL:	Well-developed, well nourished  · NECK:	No bruits; no thyromegaly. No JVD  ·RESPIRATORY:   Decreased bibasilar breath sounds. No rales, rhonchi or wheeze  · CARDIOVASCULAR	RRR  no m/r/g  . GASTROINTESTINAL:  Soft, non tender. No organomegaly. No guarding.  · EXTREMITIES: No cyanosis, clubbing or edema. DP/PT pulses intact.  ·NEUROLOGICAL:   alert and oriented x 3; Moves all four extremities on command. No sensory deficits.   · SKIN:	No lesions; no rash  . LYMPH NODES:	No lymphoadenopathy  · MUSCULOSKELETAL:   No calf tenderness  no joint swelling	  ____________________  LABS:                        9.9    12.42 )-----------( 195      ( 06 Nov 2019 16:29 )             30.9     11-07    132<L>  |  99  |  42<H>  ----------------------------<  86  6.1<H>   |  22  |  1.77<H>    Ca    11.2<H>      07 Nov 2019 06:04  Phos  3.6     11-07  Mg     1.6     11-07    TPro  5.6<L>  /  Alb  3.1<L>  /  TBili  0.1<L>  /  DBili  x   /  AST  15  /  ALT  42  /  AlkPhos  62  11-07        PT/INR - ( 06 Nov 2019 16:29 )   PT: 13.1 sec;   INR: 1.13 ratio         PTT - ( 06 Nov 2019 16:29 )  PTT:27.2 sec  I&O's Summary    06 Nov 2019 07:01  -  07 Nov 2019 07:00  --------------------------------------------------------  IN: 300 mL / OUT: 0 mL / NET: 300 mL    07 Nov 2019 07:01  -  07 Nov 2019 09:07  --------------------------------------------------------  IN: 240 mL / OUT: 0 mL / NET: 240 mL      Creatinine Trend: 1.77<--, 1.56<--, 1.94<--, 1.87<--, 1.10<--, 1.40<--             9.9    12.42 )-----------( 195      ( 11-06 @ 16:29 )             30.9 Rachel Edge MD (PGY 1, Internal Medicine)  Pager: [phone]                  ____________________  SUBJ:  SHILPAO. Denies fevers, chills, cough, nausea, vomiting, diarrhea, cough, abdominal pain, dysuria, chest pain, palpitations.     MEDICATIONS  (STANDING):  apixaban 2.5 milliGRAM(s) Oral every 12 hours  azithromycin   Tablet 1250 milliGRAM(s) Oral <User Schedule>  bictegravir 50 mG/emtricitabine 200 mG/tenofovir alafenamide 25 mG (BIKTARVY) 1 Tablet(s) Oral daily  clotrimazole Lozenge 1 Lozenge Oral five times a day  dextrose 5%. 1000 milliLiter(s) (50 mL/Hr) IV Continuous <Continuous>  dextrose 50% Injectable 12.5 Gram(s) IV Push once  dextrose 50% Injectable 25 Gram(s) IV Push once  dextrose 50% Injectable 25 Gram(s) IV Push once  insulin lispro (HumaLOG) corrective regimen sliding scale   SubCutaneous three times a day before meals  lactobacillus acidophilus 1 Tablet(s) Oral daily  levothyroxine 75 MICROGram(s) Oral daily  metoprolol tartrate 12.5 milliGRAM(s) Oral two times a day  micafungin IVPB      micafungin IVPB 100 milliGRAM(s) IV Intermittent every 24 hours  multivitamin 1 Tablet(s) Oral daily  predniSONE   Tablet 20 milliGRAM(s) Oral daily  sodium chloride 0.9%. 1000 milliLiter(s) (100 mL/Hr) IV Continuous <Continuous>  sodium zirconium cyclosilicate 5 Gram(s) Oral two times a day    MEDICATIONS  (PRN):  acetaminophen   Tablet .. 650 milliGRAM(s) Oral every 6 hours PRN Temp greater or equal to 38C (100.4F), Moderate Pain (4 - 6)  dextrose 40% Gel 15 Gram(s) Oral once PRN Blood Glucose LESS THAN 70 milliGRAM(s)/deciliter  glucagon  Injectable 1 milliGRAM(s) IntraMuscular once PRN Glucose LESS THAN 70 milligrams/deciliter  magnesium hydroxide Suspension 30 milliLiter(s) Oral daily PRN Constipation    ____________________  VITALS:  ICU Vital Signs Last 24 Hrs  T(C): 36.8 (08 Nov 2019 04:11), Max: 36.8 (08 Nov 2019 04:11)  T(F): 98.2 (08 Nov 2019 04:11), Max: 98.2 (08 Nov 2019 04:11)  HR: 85 (08 Nov 2019 04:11) (85 - 95)  BP: 110/68 (08 Nov 2019 04:11) (98/63 - 110/68)  BP(mean): --  ABP: --  ABP(mean): --  RR: 18 (08 Nov 2019 04:11) (18 - 18)  SpO2: 96% (08 Nov 2019 04:11) (96% - 98%)    ____________________  PHYSICAL EXAM:  · CONSTITUTIONAL:	Well-developed, well nourished  · NECK:	No bruits; no thyromegaly. No JVD  ·RESPIRATORY:   Decreased bibasilar breath sounds. No rales, rhonchi or wheeze  · CARDIOVASCULAR	RRR  no m/r/g. No orthostasis.  . GASTROINTESTINAL:  Soft, non tender. No organomegaly. No guarding.  · EXTREMITIES: No cyanosis, clubbing or edema. DP/PT pulses intact.  ·NEUROLOGICAL:   alert and oriented x 3; Moves all four extremities on command. No sensory deficits.   · SKIN:	No lesions; no rash  . LYMPH NODES:	No lymphoadenopathy  · MUSCULOSKELETAL:   No calf tenderness  no joint swelling	  ____________________  LABS:                        9.9    12.42 )-----------( 195      ( 06 Nov 2019 16:29 )             30.9     11-07    132<L>  |  99  |  42<H>  ----------------------------<  86  6.1<H>   |  22  |  1.77<H>    Ca    11.2<H>      07 Nov 2019 06:04  Phos  3.6     11-07  Mg     1.6     11-07    TPro  5.6<L>  /  Alb  3.1<L>  /  TBili  0.1<L>  /  DBili  x   /  AST  15  /  ALT  42  /  AlkPhos  62  11-07        PT/INR - ( 06 Nov 2019 16:29 )   PT: 13.1 sec;   INR: 1.13 ratio         PTT - ( 06 Nov 2019 16:29 )  PTT:27.2 sec  I&O's Summary    06 Nov 2019 07:01  -  07 Nov 2019 07:00  --------------------------------------------------------  IN: 300 mL / OUT: 0 mL / NET: 300 mL    07 Nov 2019 07:01  -  07 Nov 2019 09:07  --------------------------------------------------------  IN: 240 mL / OUT: 0 mL / NET: 240 mL      Creatinine Trend: 1.77<--, 1.56<--, 1.94<--, 1.87<--, 1.10<--, 1.40<--             9.9    12.42 )-----------( 195      ( 11-06 @ 16:29 )             30.9 Rachel Edge MD (PGY 1, Internal Medicine)  Pager: [phone]                  ____________________  SUBJ:  SHILPAO. Denies fevers, chills, cough, nausea, vomiting, diarrhea, cough, abdominal pain, dysuria, chest pain, palpitations.     MEDICATIONS  (STANDING):  apixaban 2.5 milliGRAM(s) Oral every 12 hours  azithromycin   Tablet 1250 milliGRAM(s) Oral <User Schedule>  bictegravir 50 mG/emtricitabine 200 mG/tenofovir alafenamide 25 mG (BIKTARVY) 1 Tablet(s) Oral daily  clotrimazole Lozenge 1 Lozenge Oral five times a day  dextrose 5%. 1000 milliLiter(s) (50 mL/Hr) IV Continuous <Continuous>  dextrose 50% Injectable 12.5 Gram(s) IV Push once  dextrose 50% Injectable 25 Gram(s) IV Push once  dextrose 50% Injectable 25 Gram(s) IV Push once  insulin lispro (HumaLOG) corrective regimen sliding scale   SubCutaneous three times a day before meals  lactobacillus acidophilus 1 Tablet(s) Oral daily  levothyroxine 75 MICROGram(s) Oral daily  metoprolol tartrate 12.5 milliGRAM(s) Oral two times a day  micafungin IVPB      micafungin IVPB 100 milliGRAM(s) IV Intermittent every 24 hours  multivitamin 1 Tablet(s) Oral daily  predniSONE   Tablet 20 milliGRAM(s) Oral daily  sodium chloride 0.9%. 1000 milliLiter(s) (100 mL/Hr) IV Continuous <Continuous>  sodium zirconium cyclosilicate 5 Gram(s) Oral two times a day    MEDICATIONS  (PRN):  acetaminophen   Tablet .. 650 milliGRAM(s) Oral every 6 hours PRN Temp greater or equal to 38C (100.4F), Moderate Pain (4 - 6)  dextrose 40% Gel 15 Gram(s) Oral once PRN Blood Glucose LESS THAN 70 milliGRAM(s)/deciliter  glucagon  Injectable 1 milliGRAM(s) IntraMuscular once PRN Glucose LESS THAN 70 milligrams/deciliter  magnesium hydroxide Suspension 30 milliLiter(s) Oral daily PRN Constipation    ____________________  VITALS:  ICU Vital Signs Last 24 Hrs  T(C): 36.8 (08 Nov 2019 04:11), Max: 36.8 (08 Nov 2019 04:11)  T(F): 98.2 (08 Nov 2019 04:11), Max: 98.2 (08 Nov 2019 04:11)  HR: 85 (08 Nov 2019 04:11) (85 - 95)  BP: 110/68 (08 Nov 2019 04:11) (98/63 - 110/68)  BP(mean): --  ABP: --  ABP(mean): --  RR: 18 (08 Nov 2019 04:11) (18 - 18)  SpO2: 96% (08 Nov 2019 04:11) (96% - 98%)    ____________________  PHYSICAL EXAM:  · CONSTITUTIONAL:	Well-developed, well nourished  · NECK:	No bruits; no thyromegaly. No JVD  ·RESPIRATORY:   Decreased bibasilar breath sounds. No rales, rhonchi or wheeze  · CARDIOVASCULAR	RRR  no m/r/g. No orthostasis.  . GASTROINTESTINAL:  Soft, non tender. No organomegaly. No guarding.  · EXTREMITIES: No cyanosis, clubbing or edema. DP/PT pulses intact.  ·NEUROLOGICAL:   alert and oriented x 3; Moves all four extremities on command. No sensory deficits.   · SKIN:	No lesions; no rash  . LYMPH NODES:	No lymphoadenopathy  · MUSCULOSKELETAL:   No calf tenderness  no joint swelling	  ____________________  LABS:                        9.9    12.42 )-----------( 195      ( 06 Nov 2019 16:29 )             30.9     11-07    132<L>  |  99  |  42<H>  ----------------------------<  86  6.1<H>   |  22  |  1.77<H>    Ca    11.2<H>      07 Nov 2019 06:04  Phos  3.6     11-07  Mg     1.6     11-07    TPro  5.6<L>  /  Alb  3.1<L>  /  TBili  0.1<L>  /  DBili  x   /  AST  15  /  ALT  42  /  AlkPhos  62  11-07        PT/INR - ( 06 Nov 2019 16:29 )   PT: 13.1 sec;   INR: 1.13 ratio         PTT - ( 06 Nov 2019 16:29 )  PTT:27.2 sec  I&O's Summary    06 Nov 2019 07:01  -  07 Nov 2019 07:00  --------------------------------------------------------  IN: 300 mL / OUT: 0 mL / NET: 300 mL    07 Nov 2019 07:01  -  07 Nov 2019 09:07  --------------------------------------------------------  IN: 240 mL / OUT: 0 mL / NET: 240 mL      Creatinine Trend: 1.77<--, 1.56<--, 1.94<--, 1.87<--, 1.10<--, 1.40<--             9.9    12.42 )-----------( 195      ( 11-06 @ 16:29 )             30.9

## 2019-11-08 NOTE — PROGRESS NOTE ADULT - ASSESSMENT
ASSESSMENT:  69/F with PMH HTN, DM, Hypothyroidism, Afib (eliquis), HIV/AIDS (newly diagnosed, unknown route, CD4 24, VL >6 million, not on ART).  Recent admission at [facility] Oct 19 to Nov 1 for PCP PNA (s/p Bronch), on Bactrim, prednisone Azithromycin, Fluconazole.  Went to ID office 11/6 to establish care, had an episode of weakness, nausea, NBNB emesis. At [facility], noted to have hyponatremia, hypochloremia, hyperkalemia with new NICOLASA.  ID consulted for recs  -----------  HIV with advanced AIDS with recent PJP pneumonia and concern for candidal esophagitis  - Concern for adrenal insufficiency given electrolyte abnormalities with NICOLASA, alongwith recent steroid use  - However, given low CD4 count, there could be concern for disseminated oppportunistic infection causing adrenal insufficiency  - CD4 count 31, VL >6 million  - given high viral load, patient may need stronger ART medications like Symtuza. There is potential for interaction of Symtuza with Amiodarone  - Additionally, there is concern for QTc prolongation with Fluconazole, Azithromycin and Amiodarone    RECOMMENDATIONS:  #PENDING RECS. PLEASE WAIT FOR FINAL RECS AFTER DISCUSSION WITH ATTENDING#  - continue weekly Azithromycin 1200mg PO  - start Micafungin 150mg/d IV  - discontinue Prednisone  - start Hydrocortisone 50mg IV q8h  - check serum Cryptococcal Ag, urine Histoplasma Ag, CMV PCR, blood AFB cultures  - consider Cardiology consult - for assistance in switching from Amiodarone to beta blockers      OXANA Schilling, MD  Fellow, Infectious Diseases  Pager: [phone]  After 5pm and on Weekends: Call [phone] ASSESSMENT:  69/F with PMH HTN, DM, Hypothyroidism, Afib (eliquis), HIV/AIDS (newly diagnosed, unknown route, CD4 24, VL >6 million, not on ART).  Recent admission at [facility] Oct 19 to Nov 1 for PCP PNA (s/p Bronch), on Bactrim, prednisone Azithromycin, Fluconazole.  Went to ID office 11/6 to establish care, had an episode of weakness, nausea, NBNB emesis. At [facility], noted to have hyponatremia, hypochloremia, hyperkalemia with new NICOLASA.  ID consulted for recs  -----------  HIV with advanced AIDS with recent PJP pneumonia and concern for candidal esophagitis  - Concern for adrenal insufficiency given electrolyte abnormalities with NICOLASA, alongwith recent steroid use  - However, given low CD4 count, there could be concern for disseminated oppportunistic infection causing adrenal insufficiency  - CD4 count 31, VL >6 million  - amiodarone discontinued by primary team  - patient has been on Bactrim and steroids since 10/21 (BAL performed 10/23)    RECOMMENDATIONS:  - start Atovaquone 750mg PO q12h  - continue Biktarvy  - continue weekly Azithromycin 1200mg PO  - continue Micafungin 100mg/d IV  - continue Prednisone 20mg/d PO for now. If patient has worsening renal function or electrolyte abnormalities, may benefit from stress dose steroids  - check serum Cryptococcal Ag, urine Histoplasma Ag, CMV PCR, blood AFB cultures    OXANA Schilling MD  Fellow, Infectious Diseases  Pager: [phone]  After 5pm and on Weekends: Call [phone]

## 2019-11-08 NOTE — PROGRESS NOTE ADULT - PROBLEM SELECTOR PLAN 3
Baseline SCr appears to be 1.0; maybe 2/2 pre-renal from dehydration/emesis vs. bactrim  - f/u Urine lytes  - avoid nephrotoxic agents  - f/u renal US  - c/w IVF for now Baseline SCr appears to be 1.0; maybe 2/2 pre-renal from dehydration/emesis vs. bactrim  - avoid nephrotoxic agents  - Renal US wnl

## 2019-11-08 NOTE — PROGRESS NOTE ADULT - PROBLEM SELECTOR PLAN 6
- Amiodarone 200mg daily; may transition to BB   - Eliquis 2.5 mg BID for AC (renally dosed) - CW metoprolol  - Eliquis 2.5 mg BID for AC (renally dosed)  - DC home amniodarone given QTC prolongation.

## 2019-11-09 DIAGNOSIS — B59 PNEUMOCYSTOSIS: ICD-10-CM

## 2019-11-09 DIAGNOSIS — R26.2 DIFFICULTY IN WALKING, NOT ELSEWHERE CLASSIFIED: ICD-10-CM

## 2019-11-09 DIAGNOSIS — E03.9 HYPOTHYROIDISM, UNSPECIFIED: ICD-10-CM

## 2019-11-09 LAB
24R-OH-CALCIDIOL SERPL-MCNC: 20.1 NG/ML — LOW (ref 30–80)
ANION GAP SERPL CALC-SCNC: 9 MMOL/L — SIGNIFICANT CHANGE UP (ref 5–17)
BUN SERPL-MCNC: 38 MG/DL — HIGH (ref 7–23)
CA-I BLD-SCNC: 1.31 MMOL/L — HIGH (ref 1.12–1.3)
CALCIUM SERPL-MCNC: 9.3 MG/DL — SIGNIFICANT CHANGE UP (ref 8.4–10.5)
CHLORIDE SERPL-SCNC: 103 MMOL/L — SIGNIFICANT CHANGE UP (ref 96–108)
CO2 SERPL-SCNC: 22 MMOL/L — SIGNIFICANT CHANGE UP (ref 22–31)
CREAT SERPL-MCNC: 1.4 MG/DL — HIGH (ref 0.5–1.3)
GLUCOSE BLDC GLUCOMTR-MCNC: 128 MG/DL — HIGH (ref 70–99)
GLUCOSE BLDC GLUCOMTR-MCNC: 244 MG/DL — HIGH (ref 70–99)
GLUCOSE BLDC GLUCOMTR-MCNC: 261 MG/DL — HIGH (ref 70–99)
GLUCOSE BLDC GLUCOMTR-MCNC: 295 MG/DL — HIGH (ref 70–99)
GLUCOSE SERPL-MCNC: 119 MG/DL — HIGH (ref 70–99)
HCT VFR BLD CALC: 25.2 % — LOW (ref 34.5–45)
HGB BLD-MCNC: 8.2 G/DL — LOW (ref 11.5–15.5)
MAGNESIUM SERPL-MCNC: 1.5 MG/DL — LOW (ref 1.6–2.6)
MCHC RBC-ENTMCNC: 29.9 PG — SIGNIFICANT CHANGE UP (ref 27–34)
MCHC RBC-ENTMCNC: 32.5 GM/DL — SIGNIFICANT CHANGE UP (ref 32–36)
MCV RBC AUTO: 92 FL — SIGNIFICANT CHANGE UP (ref 80–100)
PHOSPHATE SERPL-MCNC: 2.6 MG/DL — SIGNIFICANT CHANGE UP (ref 2.5–4.5)
PLATELET # BLD AUTO: 185 K/UL — SIGNIFICANT CHANGE UP (ref 150–400)
POTASSIUM SERPL-MCNC: 3.8 MMOL/L — SIGNIFICANT CHANGE UP (ref 3.5–5.3)
POTASSIUM SERPL-SCNC: 3.8 MMOL/L — SIGNIFICANT CHANGE UP (ref 3.5–5.3)
RBC # BLD: 2.74 M/UL — LOW (ref 3.8–5.2)
RBC # FLD: 15.6 % — HIGH (ref 10.3–14.5)
SODIUM SERPL-SCNC: 134 MMOL/L — LOW (ref 135–145)
WBC # BLD: 6.49 K/UL — SIGNIFICANT CHANGE UP (ref 3.8–10.5)
WBC # FLD AUTO: 6.49 K/UL — SIGNIFICANT CHANGE UP (ref 3.8–10.5)

## 2019-11-09 PROCEDURE — 99233 SBSQ HOSP IP/OBS HIGH 50: CPT

## 2019-11-09 RX ORDER — AZITHROMYCIN 500 MG/1
1200 TABLET, FILM COATED ORAL
Refills: 0 | Status: DISCONTINUED | OUTPATIENT
Start: 2019-11-09 | End: 2019-11-09

## 2019-11-09 RX ORDER — INSULIN GLARGINE 100 [IU]/ML
4 INJECTION, SOLUTION SUBCUTANEOUS AT BEDTIME
Refills: 0 | Status: DISCONTINUED | OUTPATIENT
Start: 2019-11-09 | End: 2019-11-11

## 2019-11-09 RX ORDER — AZITHROMYCIN 500 MG/1
1200 TABLET, FILM COATED ORAL
Refills: 0 | Status: DISCONTINUED | OUTPATIENT
Start: 2019-11-09 | End: 2019-11-12

## 2019-11-09 RX ORDER — INSULIN LISPRO 100/ML
3 VIAL (ML) SUBCUTANEOUS
Refills: 0 | Status: DISCONTINUED | OUTPATIENT
Start: 2019-11-09 | End: 2019-11-11

## 2019-11-09 RX ORDER — INSULIN LISPRO 100/ML
VIAL (ML) SUBCUTANEOUS AT BEDTIME
Refills: 0 | Status: DISCONTINUED | OUTPATIENT
Start: 2019-11-09 | End: 2019-11-12

## 2019-11-09 RX ORDER — MAGNESIUM SULFATE 500 MG/ML
2 VIAL (ML) INJECTION ONCE
Refills: 0 | Status: COMPLETED | OUTPATIENT
Start: 2019-11-09 | End: 2019-11-09

## 2019-11-09 RX ADMIN — APIXABAN 2.5 MILLIGRAM(S): 2.5 TABLET, FILM COATED ORAL at 16:44

## 2019-11-09 RX ADMIN — APIXABAN 2.5 MILLIGRAM(S): 2.5 TABLET, FILM COATED ORAL at 06:15

## 2019-11-09 RX ADMIN — BICTEGRAVIR SODIUM, EMTRICITABINE, AND TENOFOVIR ALAFENAMIDE FUMARATE 1 TABLET(S): 30; 120; 15 TABLET ORAL at 08:48

## 2019-11-09 RX ADMIN — INSULIN GLARGINE 4 UNIT(S): 100 INJECTION, SOLUTION SUBCUTANEOUS at 21:52

## 2019-11-09 RX ADMIN — Medication 1 LOZENGE: at 16:44

## 2019-11-09 RX ADMIN — Medication 1 TABLET(S): at 08:49

## 2019-11-09 RX ADMIN — ATOVAQUONE 1500 MILLIGRAM(S): 750 SUSPENSION ORAL at 08:49

## 2019-11-09 RX ADMIN — Medication 12.5 MILLIGRAM(S): at 06:15

## 2019-11-09 RX ADMIN — SODIUM ZIRCONIUM CYCLOSILICATE 5 GRAM(S): 10 POWDER, FOR SUSPENSION ORAL at 08:49

## 2019-11-09 RX ADMIN — Medication 75 MICROGRAM(S): at 06:15

## 2019-11-09 RX ADMIN — Medication 1 LOZENGE: at 21:53

## 2019-11-09 RX ADMIN — Medication 20 MILLIGRAM(S): at 06:15

## 2019-11-09 RX ADMIN — Medication 12.5 MILLIGRAM(S): at 16:43

## 2019-11-09 RX ADMIN — Medication 6: at 16:44

## 2019-11-09 RX ADMIN — Medication 1 LOZENGE: at 08:48

## 2019-11-09 RX ADMIN — Medication 6: at 12:00

## 2019-11-09 RX ADMIN — MICAFUNGIN SODIUM 105 MILLIGRAM(S): 100 INJECTION, POWDER, LYOPHILIZED, FOR SOLUTION INTRAVENOUS at 18:57

## 2019-11-09 RX ADMIN — Medication 50 GRAM(S): at 12:15

## 2019-11-09 NOTE — PROGRESS NOTE ADULT - PROBLEM SELECTOR PLAN 3
Baseline SCr appears to be 1.0; maybe 2/2 pre-renal from dehydration/emesis vs. bactrim  - avoid nephrotoxic agents  - Renal US wnl Baseline SCr appears to be 1.0; maybe 2/2 pre-renal from dehydration/emesis vs. bactrim  - avoid nephrotoxic agents  - Renal US wnl  - Cr improved today

## 2019-11-09 NOTE — PROGRESS NOTE ADULT - PROBLEM SELECTOR PLAN 1
- Hyperkalemia to 8.4 on admission, now improving. Likely cause of weakness on admission   - Unclear etiology however Bactrim is known to cause hyperkalemia.   - BMP Q6 with D50/albuterol/SSI   - DC bactrim  - tele monitoring  - Cortisol pending  - CW sodium zirconium cyclosilicate As of right now resolved. Previously Hyperkalemia to 8.4 on admission, now improving. Likely cause of weakness on admission   - Unclear etiology however Bactrim is known to cause hyperkalemia.   - BMPs QD for now  - DC bactrim  - tele monitoring; NSR in the 70s  - Cortisol pending  - d/c sodium zirconium cyclosilicate

## 2019-11-09 NOTE — PROGRESS NOTE ADULT - PROBLEM SELECTOR PLAN 6
- CW metoprolol  - Eliquis 2.5 mg BID for AC (renally dosed)  - DC home amniodarone given QTC prolongation.

## 2019-11-09 NOTE — PROGRESS NOTE ADULT - SUBJECTIVE AND OBJECTIVE BOX
Ashleywilliamandi Sneed, PGY2  Pager: [phone]    CHIEF COMPLAINT: Patient is a 69y old  Female who presents with a chief complaint of Leg weakness (08 Nov 2019 13:30)      INTERVAL HPI/OVERNIGHT EVENTS:    MEDICATIONS (STANDING):  apixaban 2.5 milliGRAM(s) Oral every 12 hours  atovaquone Suspension 1500 milliGRAM(s) Oral daily  azithromycin   Tablet 1250 milliGRAM(s) Oral <User Schedule>  bictegravir 50 mG/emtricitabine 200 mG/tenofovir alafenamide 25 mG (BIKTARVY) 1 Tablet(s) Oral daily  clotrimazole Lozenge 1 Lozenge Oral five times a day  dextrose 5%. 1000 milliLiter(s) IV Continuous <Continuous>  dextrose 50% Injectable 12.5 Gram(s) IV Push once  dextrose 50% Injectable 25 Gram(s) IV Push once  dextrose 50% Injectable 25 Gram(s) IV Push once  insulin lispro (HumaLOG) corrective regimen sliding scale   SubCutaneous three times a day before meals  lactobacillus acidophilus 1 Tablet(s) Oral daily  levothyroxine 75 MICROGram(s) Oral daily  metoprolol tartrate 12.5 milliGRAM(s) Oral two times a day  micafungin IVPB      micafungin IVPB 100 milliGRAM(s) IV Intermittent every 24 hours  multivitamin 1 Tablet(s) Oral daily  predniSONE   Tablet 20 milliGRAM(s) Oral daily  sodium zirconium cyclosilicate 5 Gram(s) Oral two times a day    MEDICATIONS  (PRN):  acetaminophen   Tablet .. 650 milliGRAM(s) Oral every 6 hours PRN  dextrose 40% Gel 15 Gram(s) Oral once PRN  glucagon  Injectable 1 milliGRAM(s) IntraMuscular once PRN  magnesium hydroxide Suspension 30 milliLiter(s) Oral daily PRN      REVIEW OF SYSTEMS:  CONSTITUTIONAL: No fever, weight loss, or fatigue  EYES: No eye pain, visual disturbances, or discharge  ENMT:  No difficulty hearing, tinnitus, vertigo; No sinus or throat pain  NECK: No pain or stiffness  RESPIRATORY: No cough, wheezing, chills or hemoptysis; No shortness of breath  CARDIOVASCULAR: No chest pain, palpitations, dizziness, or leg swelling  GASTROINTESTINAL: No abdominal or epigastric pain. No nausea, vomiting, or hematemesis; No diarrhea or constipation. No melena or hematochezia.  GENITOURINARY: No dysuria, frequency, hematuria, or incontinence  NEUROLOGICAL: No headaches, memory loss, loss of strength, numbness, or tremors  SKIN: No itching, burning, rashes, or lesions   MUSCULOSKELETAL: No joint pain or swelling; No muscle, back, or extremity pain    T(F): 98.1 (11-09-19 @ 06:12), Max: 98.1 (11-09-19 @ 06:12)  HR: 81 (11-09-19 @ 06:12) (81 - 88)  BP: 122/80 (11-09-19 @ 06:12) (107/62 - 122/80)  RR: 18 (11-09-19 @ 06:12) (18 - 18)  SpO2: 99% (11-09-19 @ 06:12) (98% - 100%)  Wt(kg): --  CAPILLARY BLOOD GLUCOSE      POCT Blood Glucose.: 149 mg/dL (08 Nov 2019 22:17)  POCT Blood Glucose.: 309 mg/dL (08 Nov 2019 16:46)  POCT Blood Glucose.: 363 mg/dL (08 Nov 2019 11:48)  POCT Blood Glucose.: 121 mg/dL (08 Nov 2019 07:47)    I&O's Summary    07 Nov 2019 07:01  -  08 Nov 2019 07:00  --------------------------------------------------------  IN: 480 mL / OUT: 1250 mL / NET: -770 mL    08 Nov 2019 07:01  -  09 Nov 2019 06:50  --------------------------------------------------------  IN: 875 mL / OUT: 1700 mL / NET: -825 mL        PHYSICAL EXAM:  GENERAL: NAD, well-groomed, well-developed  HEAD:  Atraumatic, Normocephalic  EYES: EOMI, PERRLA, conjunctiva and sclera clear  ENMT: No tonsillar erythema, exudates, or enlargement; Moist mucous membranes  NECK: Supple, No JVD, Normal thyroid  NERVOUS SYSTEM:  Alert & Oriented X3, Good concentration; Motor Strength 5/5 B/L upper and lower extremities  CHEST/LUNG: Clear to auscultation bilaterally; No rales, rhonchi, wheezing, or rubs  HEART: Regular rate and rhythm; No murmurs, rubs, or gallops  ABDOMEN: Soft, Nontender, Nondistended; Bowel sounds present  EXTREMITIES:  2+ Peripheral Pulses, No clubbing, cyanosis, or edema  LYMPH: No lymphadenopathy noted  SKIN: No rashes or lesions    LABS:                        7.6    8.06  )-----------( 185      ( 08 Nov 2019 08:26 )             23.1     11-08    134<L>  |  101  |  49<H>  ----------------------------<  183<H>  4.3   |  21<L>  |  2.14<H>    Ca    9.5      08 Nov 2019 21:40  Phos  3.8     11-08  Mg     1.6     11-08              RADIOLOGY & ADDITIONAL TESTS: Ashleywilliamandi Sneed, PGY2  Pager: [phone]    CHIEF COMPLAINT: Patient is a 69y old  Female who presents with a chief complaint of Leg weakness (08 Nov 2019 13:30)      INTERVAL HPI/OVERNIGHT EVENTS:  No acute events overnight. Patient seen at bedside. Had trouble sleeping overnight due to a lot of "background noise". Attempting to sleep during the day. No fevers/chills, headache, abdominal pain, chest pain, shortness of breath, nausea or vomiting.    MEDICATIONS (STANDING):  apixaban 2.5 milliGRAM(s) Oral every 12 hours  atovaquone Suspension 1500 milliGRAM(s) Oral daily  azithromycin   Tablet 1250 milliGRAM(s) Oral <User Schedule>  bictegravir 50 mG/emtricitabine 200 mG/tenofovir alafenamide 25 mG (BIKTARVY) 1 Tablet(s) Oral daily  clotrimazole Lozenge 1 Lozenge Oral five times a day  dextrose 5%. 1000 milliLiter(s) IV Continuous <Continuous>  dextrose 50% Injectable 12.5 Gram(s) IV Push once  dextrose 50% Injectable 25 Gram(s) IV Push once  dextrose 50% Injectable 25 Gram(s) IV Push once  insulin lispro (HumaLOG) corrective regimen sliding scale   SubCutaneous three times a day before meals  lactobacillus acidophilus 1 Tablet(s) Oral daily  levothyroxine 75 MICROGram(s) Oral daily  metoprolol tartrate 12.5 milliGRAM(s) Oral two times a day  micafungin IVPB      micafungin IVPB 100 milliGRAM(s) IV Intermittent every 24 hours  multivitamin 1 Tablet(s) Oral daily  predniSONE   Tablet 20 milliGRAM(s) Oral daily  sodium zirconium cyclosilicate 5 Gram(s) Oral two times a day    MEDICATIONS  (PRN):  acetaminophen   Tablet .. 650 milliGRAM(s) Oral every 6 hours PRN  dextrose 40% Gel 15 Gram(s) Oral once PRN  glucagon  Injectable 1 milliGRAM(s) IntraMuscular once PRN  magnesium hydroxide Suspension 30 milliLiter(s) Oral daily PRN      T(F): 98.1 (11-09-19 @ 06:12), Max: 98.1 (11-09-19 @ 06:12)  HR: 81 (11-09-19 @ 06:12) (81 - 88)  BP: 122/80 (11-09-19 @ 06:12) (107/62 - 122/80)  RR: 18 (11-09-19 @ 06:12) (18 - 18)  SpO2: 99% (11-09-19 @ 06:12) (98% - 100%)  Wt(kg): --  CAPILLARY BLOOD GLUCOSE      POCT Blood Glucose.: 149 mg/dL (08 Nov 2019 22:17)  POCT Blood Glucose.: 309 mg/dL (08 Nov 2019 16:46)  POCT Blood Glucose.: 363 mg/dL (08 Nov 2019 11:48)  POCT Blood Glucose.: 121 mg/dL (08 Nov 2019 07:47)    I&O's Summary    07 Nov 2019 07:01  -  08 Nov 2019 07:00  --------------------------------------------------------  IN: 480 mL / OUT: 1250 mL / NET: -770 mL    08 Nov 2019 07:01  -  09 Nov 2019 06:50  --------------------------------------------------------  IN: 875 mL / OUT: 1700 mL / NET: -825 mL        PHYSICAL EXAM:  · CONSTITUTIONAL:	Well-developed, well nourished  · NECK:	No bruits; no thyromegaly. No JVD  ·RESPIRATORY:   CTA bilaterally. No rales, rhonchi or wheeze  · CARDIOVASCULAR	RRR  no m/r/g. No orthostasis.  . GASTROINTESTINAL:  Soft, non tender. No organomegaly. No guarding.  · EXTREMITIES: No edema. DP/PT pulses intact.  ·NEUROLOGICAL:   alert and oriented x 3; Moves all four extremities on command. No sensory deficits.   · SKIN:	No lesions; no rash  	    LABS:                        7.6    8.06  )-----------( 185      ( 08 Nov 2019 08:26 )             23.1     11-08    134<L>  |  101  |  49<H>  ----------------------------<  183<H>  4.3   |  21<L>  |  2.14<H>    Ca    9.5      08 Nov 2019 21:40  Phos  3.8     11-08  Mg     1.6     11-08              RADIOLOGY & ADDITIONAL TESTS:

## 2019-11-09 NOTE — PROGRESS NOTE ADULT - PROBLEM SELECTOR PLAN 5
Found to be HIV positive at [place] with low CD4 and high viral load. Low albumin of 3. Follows with ID outpatient.   - Management of PCP as above  - Azithromycin 250mg 5 tabs weekly for MAC prophylaxis  - completed fluconazole;   - start Micafungin 150mg/d IV  - Start biktarvy   - f/u serum Cryptococcal Ag, urine Histoplasma Ag, CMV PCR, blood AFB cultures

## 2019-11-09 NOTE — PROGRESS NOTE ADULT - PROBLEM SELECTOR PLAN 2
Ca of 12.4, corrected 13, unclear etiology, previously within normal limits. Likely contributing to weakness.  May be 2/2 dehydration, less likely malignancy, or PCP PNA related granulomatosis   - PTH level wnl  - PTHRP/vitamin D pending  - DC NS  - Daily iCa Ca of 12.4, corrected 13, unclear etiology, previously within normal limits. Likely contributing to weakness.  May be 2/2 dehydration, less likely malignancy, or PCP PNA related granulomatosis   - PTH level wnl  - PTHRP  - Vitamin D 25 (low) and 1,25 low  - DC NS  - Daily iCa

## 2019-11-09 NOTE — PROGRESS NOTE ADULT - PROBLEM SELECTOR PLAN 4
- s/p MICU hospitalization for PCP PNA  -DC bactrim course (10/21-11/10)  - CW mepron to complete 21 day PCP course (11/8 - 11/10)  - cw Prednisone; given low suspicion for adrenal insufficiency. Will start stress dose steroids if hypotensive

## 2019-11-10 LAB
ANION GAP SERPL CALC-SCNC: 7 MMOL/L — SIGNIFICANT CHANGE UP (ref 5–17)
BASOPHILS # BLD AUTO: 0.02 K/UL — SIGNIFICANT CHANGE UP (ref 0–0.2)
BASOPHILS NFR BLD AUTO: 0.3 % — SIGNIFICANT CHANGE UP (ref 0–2)
BUN SERPL-MCNC: 36 MG/DL — HIGH (ref 7–23)
CALCIUM SERPL-MCNC: 9.3 MG/DL — SIGNIFICANT CHANGE UP (ref 8.4–10.5)
CHLORIDE SERPL-SCNC: 108 MMOL/L — SIGNIFICANT CHANGE UP (ref 96–108)
CO2 SERPL-SCNC: 22 MMOL/L — SIGNIFICANT CHANGE UP (ref 22–31)
CREAT SERPL-MCNC: 1.22 MG/DL — SIGNIFICANT CHANGE UP (ref 0.5–1.3)
EOSINOPHIL # BLD AUTO: 0.89 K/UL — HIGH (ref 0–0.5)
EOSINOPHIL NFR BLD AUTO: 14.7 % — HIGH (ref 0–6)
GLUCOSE BLDC GLUCOMTR-MCNC: 104 MG/DL — HIGH (ref 70–99)
GLUCOSE BLDC GLUCOMTR-MCNC: 104 MG/DL — HIGH (ref 70–99)
GLUCOSE BLDC GLUCOMTR-MCNC: 117 MG/DL — HIGH (ref 70–99)
GLUCOSE BLDC GLUCOMTR-MCNC: 304 MG/DL — HIGH (ref 70–99)
GLUCOSE SERPL-MCNC: 101 MG/DL — HIGH (ref 70–99)
HCT VFR BLD CALC: 27.4 % — LOW (ref 34.5–45)
HGB BLD-MCNC: 8.8 G/DL — LOW (ref 11.5–15.5)
IMM GRANULOCYTES NFR BLD AUTO: 0.7 % — SIGNIFICANT CHANGE UP (ref 0–1.5)
LYMPHOCYTES # BLD AUTO: 0.54 K/UL — LOW (ref 1–3.3)
LYMPHOCYTES # BLD AUTO: 8.9 % — LOW (ref 13–44)
MAGNESIUM SERPL-MCNC: 1.6 MG/DL — SIGNIFICANT CHANGE UP (ref 1.6–2.6)
MCHC RBC-ENTMCNC: 30.1 PG — SIGNIFICANT CHANGE UP (ref 27–34)
MCHC RBC-ENTMCNC: 32.1 GM/DL — SIGNIFICANT CHANGE UP (ref 32–36)
MCV RBC AUTO: 93.8 FL — SIGNIFICANT CHANGE UP (ref 80–100)
MONOCYTES # BLD AUTO: 0.29 K/UL — SIGNIFICANT CHANGE UP (ref 0–0.9)
MONOCYTES NFR BLD AUTO: 4.8 % — SIGNIFICANT CHANGE UP (ref 2–14)
NEUTROPHILS # BLD AUTO: 4.29 K/UL — SIGNIFICANT CHANGE UP (ref 1.8–7.4)
NEUTROPHILS NFR BLD AUTO: 70.6 % — SIGNIFICANT CHANGE UP (ref 43–77)
PHOSPHATE SERPL-MCNC: 2.3 MG/DL — LOW (ref 2.5–4.5)
PLATELET # BLD AUTO: 195 K/UL — SIGNIFICANT CHANGE UP (ref 150–400)
POTASSIUM SERPL-MCNC: 3.7 MMOL/L — SIGNIFICANT CHANGE UP (ref 3.5–5.3)
POTASSIUM SERPL-SCNC: 3.7 MMOL/L — SIGNIFICANT CHANGE UP (ref 3.5–5.3)
RBC # BLD: 2.92 M/UL — LOW (ref 3.8–5.2)
RBC # FLD: 15.3 % — HIGH (ref 10.3–14.5)
SODIUM SERPL-SCNC: 137 MMOL/L — SIGNIFICANT CHANGE UP (ref 135–145)
WBC # BLD: 6.07 K/UL — SIGNIFICANT CHANGE UP (ref 3.8–10.5)
WBC # FLD AUTO: 6.07 K/UL — SIGNIFICANT CHANGE UP (ref 3.8–10.5)

## 2019-11-10 PROCEDURE — 99233 SBSQ HOSP IP/OBS HIGH 50: CPT

## 2019-11-10 RX ORDER — DIPHENHYDRAMINE HYDROCHLORIDE AND LIDOCAINE HYDROCHLORIDE AND ALUMINUM HYDROXIDE AND MAGNESIUM HYDRO
10 KIT THREE TIMES A DAY
Refills: 0 | Status: DISCONTINUED | OUTPATIENT
Start: 2019-11-10 | End: 2019-11-12

## 2019-11-10 RX ADMIN — Medication 3 UNIT(S): at 17:28

## 2019-11-10 RX ADMIN — Medication 3 UNIT(S): at 11:36

## 2019-11-10 RX ADMIN — Medication 75 MICROGRAM(S): at 06:01

## 2019-11-10 RX ADMIN — DIPHENHYDRAMINE HYDROCHLORIDE AND LIDOCAINE HYDROCHLORIDE AND ALUMINUM HYDROXIDE AND MAGNESIUM HYDRO 10 MILLILITER(S): KIT at 22:03

## 2019-11-10 RX ADMIN — ATOVAQUONE 1500 MILLIGRAM(S): 750 SUSPENSION ORAL at 22:02

## 2019-11-10 RX ADMIN — Medication 1 TABLET(S): at 11:36

## 2019-11-10 RX ADMIN — BICTEGRAVIR SODIUM, EMTRICITABINE, AND TENOFOVIR ALAFENAMIDE FUMARATE 1 TABLET(S): 30; 120; 15 TABLET ORAL at 11:36

## 2019-11-10 RX ADMIN — INSULIN GLARGINE 4 UNIT(S): 100 INJECTION, SOLUTION SUBCUTANEOUS at 22:02

## 2019-11-10 RX ADMIN — MICAFUNGIN SODIUM 105 MILLIGRAM(S): 100 INJECTION, POWDER, LYOPHILIZED, FOR SOLUTION INTRAVENOUS at 22:03

## 2019-11-10 RX ADMIN — Medication 8: at 11:36

## 2019-11-10 RX ADMIN — Medication 20 MILLIGRAM(S): at 06:01

## 2019-11-10 RX ADMIN — Medication 12.5 MILLIGRAM(S): at 06:01

## 2019-11-10 RX ADMIN — APIXABAN 2.5 MILLIGRAM(S): 2.5 TABLET, FILM COATED ORAL at 06:01

## 2019-11-10 RX ADMIN — Medication 12.5 MILLIGRAM(S): at 17:27

## 2019-11-10 RX ADMIN — Medication 1 LOZENGE: at 22:01

## 2019-11-10 RX ADMIN — APIXABAN 2.5 MILLIGRAM(S): 2.5 TABLET, FILM COATED ORAL at 17:27

## 2019-11-10 NOTE — PROGRESS NOTE ADULT - PROBLEM SELECTOR PLAN 1
As of right now resolved. Previously Hyperkalemia to 8.4 on admission, now improving. Likely cause of weakness on admission   - Unclear etiology however Bactrim is known to cause hyperkalemia.   - BMPs QD for now  - DC bactrim  - tele monitoring; NSR in the 70s  - Cortisol pending  - d/c sodium zirconium cyclosilicate

## 2019-11-10 NOTE — PROGRESS NOTE ADULT - PROBLEM SELECTOR PLAN 3
Baseline SCr appears to be 1.0; maybe 2/2 pre-renal from dehydration/emesis vs. bactrim  - avoid nephrotoxic agents  - Renal US wnl  - Cr improved today Baseline SCr appears to be 1.0; maybe 2/2 pre-renal from dehydration/emesis vs. bactrim  - avoid nephrotoxic agents  - Renal US wnl

## 2019-11-10 NOTE — PROGRESS NOTE ADULT - PROBLEM SELECTOR PLAN 5
Found to be HIV positive at [facility] with low CD4 and high viral load. Low albumin of 3. Follows with ID outpatient.   - Management of PCP as above  - Azithromycin 250mg 5 tabs weekly for MAC prophylaxis  - completed fluconazole;   - start Micafungin 150mg/d IV  - Start biktarvy   - f/u serum Cryptococcal Ag, urine Histoplasma Ag, CMV PCR, blood AFB cultures

## 2019-11-10 NOTE — PROGRESS NOTE ADULT - SUBJECTIVE AND OBJECTIVE BOX
Rachel Edge MD (PGY 1, Internal Medicine)  Pager: [phone]                  ____________________  SUBJ:    MEDICATIONS  (STANDING):  apixaban 2.5 milliGRAM(s) Oral every 12 hours  atovaquone Suspension 1500 milliGRAM(s) Oral daily  azithromycin   Tablet 1200 milliGRAM(s) Oral <User Schedule>  bictegravir 50 mG/emtricitabine 200 mG/tenofovir alafenamide 25 mG (BIKTARVY) 1 Tablet(s) Oral daily  clotrimazole Lozenge 1 Lozenge Oral five times a day  dextrose 5%. 1000 milliLiter(s) (50 mL/Hr) IV Continuous <Continuous>  dextrose 50% Injectable 12.5 Gram(s) IV Push once  dextrose 50% Injectable 25 Gram(s) IV Push once  dextrose 50% Injectable 25 Gram(s) IV Push once  insulin glargine Injectable (LANTUS) 4 Unit(s) SubCutaneous at bedtime  insulin lispro (HumaLOG) corrective regimen sliding scale   SubCutaneous at bedtime  insulin lispro (HumaLOG) corrective regimen sliding scale   SubCutaneous three times a day before meals  insulin lispro Injectable (HumaLOG) 3 Unit(s) SubCutaneous three times a day before meals  lactobacillus acidophilus 1 Tablet(s) Oral daily  levothyroxine 75 MICROGram(s) Oral daily  metoprolol tartrate 12.5 milliGRAM(s) Oral two times a day  micafungin IVPB      micafungin IVPB 100 milliGRAM(s) IV Intermittent every 24 hours  multivitamin 1 Tablet(s) Oral daily    MEDICATIONS  (PRN):  acetaminophen   Tablet .. 650 milliGRAM(s) Oral every 6 hours PRN Temp greater or equal to 38C (100.4F), Moderate Pain (4 - 6)  dextrose 40% Gel 15 Gram(s) Oral once PRN Blood Glucose LESS THAN 70 milliGRAM(s)/deciliter  glucagon  Injectable 1 milliGRAM(s) IntraMuscular once PRN Glucose LESS THAN 70 milligrams/deciliter  magnesium hydroxide Suspension 30 milliLiter(s) Oral daily PRN Constipation    ____________________  VITALS:  Vital Signs Last 24 Hrs  T(C): 36.6 (10 Nov 2019 11:31), Max: 36.7 (09 Nov 2019 20:11)  T(F): 97.8 (10 Nov 2019 11:31), Max: 98.1 (09 Nov 2019 20:11)  HR: 76 (10 Nov 2019 11:31) (76 - 83)  BP: 123/80 (10 Nov 2019 11:31) (119/76 - 133/82)  BP(mean): --  RR: 18 (10 Nov 2019 11:31) (18 - 18)  SpO2: 100% (10 Nov 2019 11:31) (100% - 100%)    ____________________  PHYSICAL EXAM:  · CONSTITUTIONAL:	Well-developed, well nourished  · NECK:	No bruits; no thyromegaly. No JVD  ·RESPIRATORY:   Clear to auscultation. Good air moveement.  no rales,rhonchi or wheeze  · CARDIOVASCULAR	RRR  no m/r/g  . GASTROINTESTINAL:  Soft, non tender. No organomegaly. No guarding.  · EXTREMITIES: No cyanosis, clubbing or edema. DP/PT pulses intact.  ·NEUROLOGICAL:   alert and oriented x 3; Moves all four extremities on command. No sensory deficits.   · SKIN:	No lesions; no rash  . LYMPH NODES:	No lymphadedenopathy  · MUSCULOSKELETAL:   No calf tenderness  no joint swelling	    ____________________  LABS:                        8.8    6.07  )-----------( 195      ( 10 Nov 2019 09:09 )             27.4     11-10    137  |  108  |  36<H>  ----------------------------<  101<H>  3.7   |  22  |  1.22    Ca    9.3      10 Nov 2019 06:45  Phos  2.3     11-10  Mg     1.6     11-10            I&O's Summary    09 Nov 2019 07:01  -  10 Nov 2019 07:00  --------------------------------------------------------  IN: 600 mL / OUT: 600 mL / NET: 0 mL      Creatinine Trend: 1.22<--, 1.40<--, 2.14<--, 1.43<--, 1.85<--, 1.97<--             8.8    6.07  )-----------( 195      ( 11-10 @ 09:09 )             27.4                8.2    6.49  )-----------( 185      ( 11-09 @ 11:23 )             25.2                7.6    8.06  )-----------( 185      ( 11-08 @ 08:26 )             23.1 Rachel Edge MD (PGY 1, Internal Medicine)  Pager: [phone]                  ____________________  SUBJ: NAEO. Ambulated hallways yesterday w/ improving strength     MEDICATIONS  (STANDING):  apixaban 2.5 milliGRAM(s) Oral every 12 hours  atovaquone Suspension 1500 milliGRAM(s) Oral daily  azithromycin   Tablet 1200 milliGRAM(s) Oral <User Schedule>  bictegravir 50 mG/emtricitabine 200 mG/tenofovir alafenamide 25 mG (BIKTARVY) 1 Tablet(s) Oral daily  clotrimazole Lozenge 1 Lozenge Oral five times a day  dextrose 5%. 1000 milliLiter(s) (50 mL/Hr) IV Continuous <Continuous>  dextrose 50% Injectable 12.5 Gram(s) IV Push once  dextrose 50% Injectable 25 Gram(s) IV Push once  dextrose 50% Injectable 25 Gram(s) IV Push once  insulin glargine Injectable (LANTUS) 4 Unit(s) SubCutaneous at bedtime  insulin lispro (HumaLOG) corrective regimen sliding scale   SubCutaneous at bedtime  insulin lispro (HumaLOG) corrective regimen sliding scale   SubCutaneous three times a day before meals  insulin lispro Injectable (HumaLOG) 3 Unit(s) SubCutaneous three times a day before meals  lactobacillus acidophilus 1 Tablet(s) Oral daily  levothyroxine 75 MICROGram(s) Oral daily  metoprolol tartrate 12.5 milliGRAM(s) Oral two times a day  micafungin IVPB      micafungin IVPB 100 milliGRAM(s) IV Intermittent every 24 hours  multivitamin 1 Tablet(s) Oral daily    MEDICATIONS  (PRN):  acetaminophen   Tablet .. 650 milliGRAM(s) Oral every 6 hours PRN Temp greater or equal to 38C (100.4F), Moderate Pain (4 - 6)  dextrose 40% Gel 15 Gram(s) Oral once PRN Blood Glucose LESS THAN 70 milliGRAM(s)/deciliter  glucagon  Injectable 1 milliGRAM(s) IntraMuscular once PRN Glucose LESS THAN 70 milligrams/deciliter  magnesium hydroxide Suspension 30 milliLiter(s) Oral daily PRN Constipation    ____________________  VITALS:  Vital Signs Last 24 Hrs  T(C): 36.6 (10 Nov 2019 11:31), Max: 36.7 (09 Nov 2019 20:11)  T(F): 97.8 (10 Nov 2019 11:31), Max: 98.1 (09 Nov 2019 20:11)  HR: 76 (10 Nov 2019 11:31) (76 - 83)  BP: 123/80 (10 Nov 2019 11:31) (119/76 - 133/82)  BP(mean): --  RR: 18 (10 Nov 2019 11:31) (18 - 18)  SpO2: 100% (10 Nov 2019 11:31) (100% - 100%)    ____________________  PHYSICAL EXAM:  · CONSTITUTIONAL: Thin elderly lady lying in bed,   · NECK:	No bruits; no thyromegaly. No JVD  ·RESPIRATORY:   Clear to auscultation. Good air moveement.  no rales,rhonchi or wheeze  · CARDIOVASCULAR	RRR  no m/r/g  . GASTROINTESTINAL:  Soft, non tender. No organomegaly. No guarding.  · EXTREMITIES: No cyanosis, clubbing or edema. DP/PT pulses intact.  ·NEUROLOGICAL:   alert and oriented x 3; Moves all four extremities on command. No sensory deficits.   · SKIN:	No lesions; no rash  . LYMPH NODES:	No lymphadedenopathy  · MUSCULOSKELETAL:   No calf tenderness  no joint swelling	    ____________________  LABS:                        8.8    6.07  )-----------( 195      ( 10 Nov 2019 09:09 )             27.4     11-10    137  |  108  |  36<H>  ----------------------------<  101<H>  3.7   |  22  |  1.22    Ca    9.3      10 Nov 2019 06:45  Phos  2.3     11-10  Mg     1.6     11-10            I&O's Summary    09 Nov 2019 07:01  -  10 Nov 2019 07:00  --------------------------------------------------------  IN: 600 mL / OUT: 600 mL / NET: 0 mL      Creatinine Trend: 1.22<--, 1.40<--, 2.14<--, 1.43<--, 1.85<--, 1.97<--             8.8    6.07  )-----------( 195      ( 11-10 @ 09:09 )             27.4                8.2    6.49  )-----------( 185      ( 11-09 @ 11:23 )             25.2                7.6    8.06  )-----------( 185      ( 11-08 @ 08:26 )             23.1

## 2019-11-10 NOTE — PROGRESS NOTE ADULT - PROBLEM SELECTOR PLAN 2
Ca of 12.4, corrected 13, unclear etiology, previously within normal limits. Likely contributing to weakness.  May be 2/2 dehydration, less likely malignancy, or PCP PNA related granulomatosis   - PTH level wnl  - PTHRP  - Vitamin D 25 (low) and 1,25 low  - DC NS  - Daily iCa

## 2019-11-10 NOTE — PROGRESS NOTE ADULT - PROBLEM SELECTOR PLAN 4
- s/p MICU hospitalization for PCP PNA  -DC bactrim course (10/21-11/10)  - CW mepron to complete 21 day PCP course (11/8 - 11/10)  - cw Prednisone; given low suspicion for adrenal insufficiency. Will start stress dose steroids if hypotensive - s/p MICU hospitalization for PCP PNA  -DC bactrim course (10/21-11/10)  - CW mepron as PCP ppx.   - cw Prednisone taper for now. ; given low suspicion for adrenal insufficiency. Will start stress dose steroids if hypotensive

## 2019-11-10 NOTE — PROGRESS NOTE ADULT - ASSESSMENT
69 F with h/o HTN, diabetes, hypothyroidism, afib (on eliquis), HIV/AIDS not on ART, and recent ICU admission for PCP PNA  who presents with LE weakness found to have hyperkalemia, NICOLASA, and hypercalcemia likely 2/2 bactrim use.  Hyperkalemia has now resolved.

## 2019-11-11 DIAGNOSIS — B20 HUMAN IMMUNODEFICIENCY VIRUS [HIV] DISEASE: ICD-10-CM

## 2019-11-11 DIAGNOSIS — I48.19 OTHER PERSISTENT ATRIAL FIBRILLATION: ICD-10-CM

## 2019-11-11 DIAGNOSIS — B59 PNEUMOCYSTOSIS: ICD-10-CM

## 2019-11-11 DIAGNOSIS — N17.9 ACUTE KIDNEY FAILURE, UNSPECIFIED: ICD-10-CM

## 2019-11-11 DIAGNOSIS — B37.81 CANDIDAL ESOPHAGITIS: ICD-10-CM

## 2019-11-11 DIAGNOSIS — E03.9 HYPOTHYROIDISM, UNSPECIFIED: ICD-10-CM

## 2019-11-11 DIAGNOSIS — I95.9 HYPOTENSION, UNSPECIFIED: ICD-10-CM

## 2019-11-11 DIAGNOSIS — J81.0 ACUTE PULMONARY EDEMA: ICD-10-CM

## 2019-11-11 DIAGNOSIS — E86.0 DEHYDRATION: ICD-10-CM

## 2019-11-11 DIAGNOSIS — J96.01 ACUTE RESPIRATORY FAILURE WITH HYPOXIA: ICD-10-CM

## 2019-11-11 DIAGNOSIS — E43 UNSPECIFIED SEVERE PROTEIN-CALORIE MALNUTRITION: ICD-10-CM

## 2019-11-11 DIAGNOSIS — A41.9 SEPSIS, UNSPECIFIED ORGANISM: ICD-10-CM

## 2019-11-11 DIAGNOSIS — R13.10 DYSPHAGIA, UNSPECIFIED: ICD-10-CM

## 2019-11-11 LAB
ANION GAP SERPL CALC-SCNC: 12 MMOL/L — SIGNIFICANT CHANGE UP (ref 5–17)
BUN SERPL-MCNC: 49 MG/DL — HIGH (ref 7–23)
CALCIUM SERPL-MCNC: 9.1 MG/DL — SIGNIFICANT CHANGE UP (ref 8.4–10.5)
CHLORIDE SERPL-SCNC: 107 MMOL/L — SIGNIFICANT CHANGE UP (ref 96–108)
CMV DNA CSF QL NAA+PROBE: 989 — SIGNIFICANT CHANGE UP
CMV DNA SPEC NAA+PROBE-LOG#: 3 LOGIU/ML — HIGH
CO2 SERPL-SCNC: 19 MMOL/L — LOW (ref 22–31)
CREAT SERPL-MCNC: 1.3 MG/DL — SIGNIFICANT CHANGE UP (ref 0.5–1.3)
CULTURE RESULTS: SIGNIFICANT CHANGE UP
CULTURE RESULTS: SIGNIFICANT CHANGE UP
GLUCOSE BLDC GLUCOMTR-MCNC: 121 MG/DL — HIGH (ref 70–99)
GLUCOSE BLDC GLUCOMTR-MCNC: 151 MG/DL — HIGH (ref 70–99)
GLUCOSE BLDC GLUCOMTR-MCNC: 181 MG/DL — HIGH (ref 70–99)
GLUCOSE BLDC GLUCOMTR-MCNC: 286 MG/DL — HIGH (ref 70–99)
GLUCOSE SERPL-MCNC: 103 MG/DL — HIGH (ref 70–99)
POTASSIUM SERPL-MCNC: 4.2 MMOL/L — SIGNIFICANT CHANGE UP (ref 3.5–5.3)
POTASSIUM SERPL-SCNC: 4.2 MMOL/L — SIGNIFICANT CHANGE UP (ref 3.5–5.3)
SODIUM SERPL-SCNC: 138 MMOL/L — SIGNIFICANT CHANGE UP (ref 135–145)
SPECIMEN SOURCE: SIGNIFICANT CHANGE UP
SPECIMEN SOURCE: SIGNIFICANT CHANGE UP

## 2019-11-11 PROCEDURE — 99232 SBSQ HOSP IP/OBS MODERATE 35: CPT | Mod: GC

## 2019-11-11 PROCEDURE — 99233 SBSQ HOSP IP/OBS HIGH 50: CPT

## 2019-11-11 RX ORDER — ATOVAQUONE 750 MG/5ML
10 SUSPENSION ORAL
Qty: 300 | Refills: 0
Start: 2019-11-11 | End: 2019-12-10

## 2019-11-11 RX ORDER — BICTEGRAVIR SODIUM, EMTRICITABINE, AND TENOFOVIR ALAFENAMIDE FUMARATE 30; 120; 15 MG/1; MG/1; MG/1
1 TABLET ORAL
Qty: 30 | Refills: 0
Start: 2019-11-11 | End: 2019-12-10

## 2019-11-11 RX ORDER — FLUCONAZOLE 150 MG/1
100 TABLET ORAL EVERY 24 HOURS
Refills: 0 | Status: DISCONTINUED | OUTPATIENT
Start: 2019-11-11 | End: 2019-11-12

## 2019-11-11 RX ADMIN — Medication 6: at 11:59

## 2019-11-11 RX ADMIN — Medication 1 TABLET(S): at 11:18

## 2019-11-11 RX ADMIN — DIPHENHYDRAMINE HYDROCHLORIDE AND LIDOCAINE HYDROCHLORIDE AND ALUMINUM HYDROXIDE AND MAGNESIUM HYDRO 10 MILLILITER(S): KIT at 21:31

## 2019-11-11 RX ADMIN — APIXABAN 2.5 MILLIGRAM(S): 2.5 TABLET, FILM COATED ORAL at 17:02

## 2019-11-11 RX ADMIN — ATOVAQUONE 1500 MILLIGRAM(S): 750 SUSPENSION ORAL at 11:18

## 2019-11-11 RX ADMIN — FLUCONAZOLE 50 MILLIGRAM(S): 150 TABLET ORAL at 17:42

## 2019-11-11 RX ADMIN — APIXABAN 2.5 MILLIGRAM(S): 2.5 TABLET, FILM COATED ORAL at 05:13

## 2019-11-11 RX ADMIN — Medication 12.5 MILLIGRAM(S): at 17:02

## 2019-11-11 RX ADMIN — Medication 2: at 17:02

## 2019-11-11 RX ADMIN — Medication 75 MICROGRAM(S): at 05:13

## 2019-11-11 RX ADMIN — Medication 1 TABLET(S): at 14:21

## 2019-11-11 RX ADMIN — BICTEGRAVIR SODIUM, EMTRICITABINE, AND TENOFOVIR ALAFENAMIDE FUMARATE 1 TABLET(S): 30; 120; 15 TABLET ORAL at 11:18

## 2019-11-11 RX ADMIN — Medication 1 LOZENGE: at 07:59

## 2019-11-11 RX ADMIN — Medication 1 LOZENGE: at 11:18

## 2019-11-11 RX ADMIN — Medication 15 MILLIGRAM(S): at 05:13

## 2019-11-11 RX ADMIN — Medication 3 UNIT(S): at 08:00

## 2019-11-11 RX ADMIN — DIPHENHYDRAMINE HYDROCHLORIDE AND LIDOCAINE HYDROCHLORIDE AND ALUMINUM HYDROXIDE AND MAGNESIUM HYDRO 10 MILLILITER(S): KIT at 14:22

## 2019-11-11 RX ADMIN — DIPHENHYDRAMINE HYDROCHLORIDE AND LIDOCAINE HYDROCHLORIDE AND ALUMINUM HYDROXIDE AND MAGNESIUM HYDRO 10 MILLILITER(S): KIT at 05:13

## 2019-11-11 RX ADMIN — Medication 3 UNIT(S): at 11:59

## 2019-11-11 RX ADMIN — AZITHROMYCIN 1200 MILLIGRAM(S): 500 TABLET, FILM COATED ORAL at 08:00

## 2019-11-11 RX ADMIN — Medication 12.5 MILLIGRAM(S): at 05:12

## 2019-11-11 NOTE — PROGRESS NOTE ADULT - SUBJECTIVE AND OBJECTIVE BOX
Rachel Edge MD (PGY 1, Internal Medicine)  Pager: [phone]                  ____________________  SUBJ:    MEDICATIONS  (STANDING):  apixaban 2.5 milliGRAM(s) Oral every 12 hours  atovaquone Suspension 1500 milliGRAM(s) Oral daily  azithromycin   Tablet 1200 milliGRAM(s) Oral <User Schedule>  bictegravir 50 mG/emtricitabine 200 mG/tenofovir alafenamide 25 mG (BIKTARVY) 1 Tablet(s) Oral daily  clotrimazole Lozenge 1 Lozenge Oral five times a day  dextrose 5%. 1000 milliLiter(s) (50 mL/Hr) IV Continuous <Continuous>  dextrose 50% Injectable 12.5 Gram(s) IV Push once  dextrose 50% Injectable 25 Gram(s) IV Push once  dextrose 50% Injectable 25 Gram(s) IV Push once  FIRST- Mouthwash  BLM 10 milliLiter(s) Swish and Spit three times a day  insulin glargine Injectable (LANTUS) 4 Unit(s) SubCutaneous at bedtime  insulin lispro (HumaLOG) corrective regimen sliding scale   SubCutaneous at bedtime  insulin lispro (HumaLOG) corrective regimen sliding scale   SubCutaneous three times a day before meals  insulin lispro Injectable (HumaLOG) 3 Unit(s) SubCutaneous three times a day before meals  lactobacillus acidophilus 1 Tablet(s) Oral daily  levothyroxine 75 MICROGram(s) Oral daily  metoprolol tartrate 12.5 milliGRAM(s) Oral two times a day  micafungin IVPB      micafungin IVPB 100 milliGRAM(s) IV Intermittent every 24 hours  multivitamin 1 Tablet(s) Oral daily  predniSONE   Tablet 15 milliGRAM(s) Oral daily    MEDICATIONS  (PRN):  acetaminophen   Tablet .. 650 milliGRAM(s) Oral every 6 hours PRN Temp greater or equal to 38C (100.4F), Moderate Pain (4 - 6)  dextrose 40% Gel 15 Gram(s) Oral once PRN Blood Glucose LESS THAN 70 milliGRAM(s)/deciliter  glucagon  Injectable 1 milliGRAM(s) IntraMuscular once PRN Glucose LESS THAN 70 milligrams/deciliter  magnesium hydroxide Suspension 30 milliLiter(s) Oral daily PRN Constipation    ____________________  VITALS:  Vital Signs Last 24 Hrs  T(C): 36.7 (11 Nov 2019 04:01), Max: 36.7 (11 Nov 2019 04:01)  T(F): 98 (11 Nov 2019 04:01), Max: 98 (11 Nov 2019 04:01)  HR: 86 (11 Nov 2019 04:01) (76 - 86)  BP: 109/72 (11 Nov 2019 04:01) (109/72 - 123/80)  BP(mean): --  RR: 18 (11 Nov 2019 04:01) (18 - 18)  SpO2: 99% (11 Nov 2019 04:01) (99% - 100%)    ____________________  PHYSICAL EXAM:  · CONSTITUTIONAL: Thin elderly lady lying in bed,   · NECK:	No bruits; no thyromegaly. No JVD  ·RESPIRATORY:   Clear to auscultation. Good air moveement.  no rales,rhonchi or wheeze  · CARDIOVASCULAR	RRR  no m/r/g  . GASTROINTESTINAL:  Soft, non tender. No organomegaly. No guarding.  · EXTREMITIES: No cyanosis, clubbing or edema. DP/PT pulses intact.  ·NEUROLOGICAL:   alert and oriented x 3; Moves all four extremities on command. No sensory deficits.   · SKIN:	No lesions; no rash  . LYMPH NODES:	No lymphadedenopathy  · MUSCULOSKELETAL:   No calf tenderness  no joint swelling  ____________________  LABS:                        8.8    6.07  )-----------( 195      ( 10 Nov 2019 09:09 )             27.4     11-11    138  |  107  |  49<H>  ----------------------------<  103<H>  4.2   |  19<L>  |  1.30    Ca    9.1      11 Nov 2019 06:14  Phos  2.3     11-10  Mg     1.6     11-10            I&O's Summary    10 Nov 2019 07:01  -  11 Nov 2019 07:00  --------------------------------------------------------  IN: 780 mL / OUT: 850 mL / NET: -70 mL      Creatinine Trend: 1.30<--, 1.22<--, 1.40<--, 2.14<--, 1.43<--, 1.85<--             8.8    6.07  )-----------( 195      ( 11-10 @ 09:09 )             27.4                8.2    6.49  )-----------( 185      ( 11-09 @ 11:23 )             25.2                7.6    8.06  )-----------( 185      ( 11-08 @ 08:26 )             23.1 Rachel Edge MD (PGY 1, Internal Medicine)  Pager: [phone]         ____________________  SUBJ:  Ambulated hallways yesterday, feels that her strength is back to baseline.      MEDICATIONS  (STANDING):  apixaban 2.5 milliGRAM(s) Oral every 12 hours  atovaquone Suspension 1500 milliGRAM(s) Oral daily  azithromycin   Tablet 1200 milliGRAM(s) Oral <User Schedule>  bictegravir 50 mG/emtricitabine 200 mG/tenofovir alafenamide 25 mG (BIKTARVY) 1 Tablet(s) Oral daily  clotrimazole Lozenge 1 Lozenge Oral five times a day  dextrose 5%. 1000 milliLiter(s) (50 mL/Hr) IV Continuous <Continuous>  dextrose 50% Injectable 12.5 Gram(s) IV Push once  dextrose 50% Injectable 25 Gram(s) IV Push once  dextrose 50% Injectable 25 Gram(s) IV Push once  FIRST- Mouthwash  BLM 10 milliLiter(s) Swish and Spit three times a day  insulin glargine Injectable (LANTUS) 4 Unit(s) SubCutaneous at bedtime  insulin lispro (HumaLOG) corrective regimen sliding scale   SubCutaneous at bedtime  insulin lispro (HumaLOG) corrective regimen sliding scale   SubCutaneous three times a day before meals  insulin lispro Injectable (HumaLOG) 3 Unit(s) SubCutaneous three times a day before meals  lactobacillus acidophilus 1 Tablet(s) Oral daily  levothyroxine 75 MICROGram(s) Oral daily  metoprolol tartrate 12.5 milliGRAM(s) Oral two times a day  micafungin IVPB      micafungin IVPB 100 milliGRAM(s) IV Intermittent every 24 hours  multivitamin 1 Tablet(s) Oral daily  predniSONE   Tablet 15 milliGRAM(s) Oral daily    MEDICATIONS  (PRN):  acetaminophen   Tablet .. 650 milliGRAM(s) Oral every 6 hours PRN Temp greater or equal to 38C (100.4F), Moderate Pain (4 - 6)  dextrose 40% Gel 15 Gram(s) Oral once PRN Blood Glucose LESS THAN 70 milliGRAM(s)/deciliter  glucagon  Injectable 1 milliGRAM(s) IntraMuscular once PRN Glucose LESS THAN 70 milligrams/deciliter  magnesium hydroxide Suspension 30 milliLiter(s) Oral daily PRN Constipation    ____________________  VITALS:  Vital Signs Last 24 Hrs  T(C): 36.7 (11 Nov 2019 04:01), Max: 36.7 (11 Nov 2019 04:01)  T(F): 98 (11 Nov 2019 04:01), Max: 98 (11 Nov 2019 04:01)  HR: 86 (11 Nov 2019 04:01) (76 - 86)  BP: 109/72 (11 Nov 2019 04:01) (109/72 - 123/80)  BP(mean): --  RR: 18 (11 Nov 2019 04:01) (18 - 18)  SpO2: 99% (11 Nov 2019 04:01) (99% - 100%)    ____________________  PHYSICAL EXAM:  · CONSTITUTIONAL: Thin elderly lady lying in bed,   · NECK:	No bruits; no thyromegaly. No JVD  ·RESPIRATORY:   Clear to auscultation. Good air movement.  no rales,rhonchi or wheeze  · CARDIOVASCULAR	RRR  no m/r/g  . GASTROINTESTINAL:  Soft, non tender. No organomegaly. No guarding.  · EXTREMITIES: No cyanosis, clubbing or edema. DP/PT pulses intact.  ·NEUROLOGICAL:   alert and oriented x 3; Moves all four extremities on command. No sensory deficits.   · SKIN:	No lesions; no rash  . LYMPH NODES:	No [id number]  · MUSCULOSKELETAL:   No calf tenderness  no joint swelling  ____________________  LABS:                        8.8    6.07  )-----------( 195      ( 10 Nov 2019 09:09 )             27.4     11-11    138  |  107  |  49<H>  ----------------------------<  103<H>  4.2   |  19<L>  |  1.30    Ca    9.1      11 Nov 2019 06:14  Phos  2.3     11-10  Mg     1.6     11-10            I&O's Summary    10 Nov 2019 07:01  -  11 Nov 2019 07:00  --------------------------------------------------------  IN: 780 mL / OUT: 850 mL / NET: -70 mL      Creatinine Trend: 1.30<--, 1.22<--, 1.40<--, 2.14<--, 1.43<--, 1.85<--             8.8    6.07  )-----------( 195      ( 11-10 @ 09:09 )             27.4                8.2    6.49  )-----------( 185      ( 11-09 @ 11:23 )             25.2                7.6    8.06  )-----------( 185      ( 11-08 @ 08:26 )             23.1

## 2019-11-11 NOTE — PROGRESS NOTE ADULT - PROBLEM SELECTOR PLAN 6
- CW metoprolol  - Eliquis 2.5 mg BID for AC (renally dosed)  - DC home amniodarone given QTC prolongation. - CW metoprolol  - Eliquis 2.5 mg BID for AC (renally dosed)  - DC home amiodarone given QTC prolongation.

## 2019-11-11 NOTE — PROGRESS NOTE ADULT - ASSESSMENT
69 F with h/o HTN, diabetes, hypothyroidism, afib (on eliquis), HIV/AIDS not on ART, and recent ICU admission for PCP PNA  who presents with LE weakness found to have hyperkalemia, NICOLASA, and hypercalcemia likely 2/2 bactrim use.  Hyperkalemia has now resolved. 69 F with h/o HTN, diabetes, hypothyroidism, afib (on eliquis), HIV/AIDS not on ART, and recent ICU admission for PCP PNA  who presents with LE weakness found to have hyperkalemia, NICOLASA, and hypercalcemia likely 2/2 bactrim use.  Hyperkalemia has now resolved; will restart bactrim at lower ppx dose and monitor electrolytes.

## 2019-11-11 NOTE — PROGRESS NOTE ADULT - SUBJECTIVE AND OBJECTIVE BOX
69y old  Female who presents with a chief complaint of Leg weakness (11 Nov 2019 08:01)      Interval history:  Afebrile, feels better, still very weak, oral ulcers feel a little better today, walked with PT, + constipation. No chest pain.       No Known Allergies    Antimicrobials:  azithromycin   Tablet 1200 milliGRAM(s) Oral <User Schedule>  bictegravir 50 mG/emtricitabine 200 mG/tenofovir alafenamide 25 mG (BIKTARVY) 1 Tablet(s) Oral daily  clotrimazole Lozenge 1 Lozenge Oral five times a day  micafungin IVPB 100 milliGRAM(s) IV Intermittent every 24 hours  trimethoprim   80 mG/sulfamethoxazole 400 mG 1 Tablet(s) Oral daily      REVIEW OF SYSTEMS:  Oral ulcers.   No N/V/D, no abdominal pain  No dysuria   No rash.       Vital Signs Last 24 Hrs  T(C): 37.3 (11-11-19 @ 11:22), Max: 37.3 (11-11-19 @ 11:22)  T(F): 99.1 (11-11-19 @ 11:22), Max: 99.1 (11-11-19 @ 11:22)  HR: 68 (11-11-19 @ 11:22) (68 - 86)  BP: 112/77 (11-11-19 @ 11:22) (109/72 - 114/72)  BP(mean): --  RR: 18 (11-11-19 @ 11:22) (18 - 18)  SpO2: 99% (11-11-19 @ 11:22) (99% - 99%)      PHYSICAL EXAM:  Patient in no acute distress. AAOX3.  No obvious oral ulcers, no thrush.   Cardiovascular: S1S2 normal.  Lungs: + air entry B/L lung fields.  Gastrointestinal: soft, nontender, nondistended.  Extremities: no edema.  IV sites not inflamed.                           8.8    6.07  )-----------( 195      ( 10 Nov 2019 09:09 )             27.4   11-11    138  |  107  |  49<H>  ----------------------------<  103<H>  4.2   |  19<L>  |  1.30    Ca    9.1      11 Nov 2019 06:14  Phos  2.3     11-10  Mg     1.6     11-10      Culture - Blood (11.06.19 @ 22:08)    Specimen Source: .Blood Blood-Peripheral    Culture Results:   No growth to date.        Radiology:  < from: US Kidney and Bladder (11.07.19 @ 16:11) >  IMPRESSION:     No evidence of postrenal obstruction.

## 2019-11-11 NOTE — PROGRESS NOTE ADULT - PROBLEM SELECTOR PLAN 3
Baseline SCr appears to be 1.0; maybe 2/2 pre-renal from dehydration/emesis vs. bactrim  - avoid nephrotoxic agents  - Renal US wnl

## 2019-11-11 NOTE — PROGRESS NOTE ADULT - PROBLEM SELECTOR PLAN 1
As of right now resolved. Previously Hyperkalemia to 8.4 on admission, now improving. Likely cause of weakness on admission   - Unclear etiology however Bactrim is known to cause hyperkalemia.   - BMPs QD for now  - DC bactrim  - tele monitoring; NSR in the 70s  - Cortisol pending  - d/c sodium zirconium cyclosilicate As of right now resolved. Previously Hyperkalemia to 8.4 on admission, now improving. Likely cause of weakness on admission   - Unclear etiology however Bactrim is known to cause hyperkalemia.   - BMPs QD for now  - tele monitoring; NSR in the 70s  - Cortisol pending  - d/c sodium zirconium cyclosilicate  - Restart bactrim at ppx dose and monitor response

## 2019-11-11 NOTE — PROGRESS NOTE ADULT - PROBLEM SELECTOR PLAN 5
Found to be HIV positive at [place] with low CD4 and high viral load. Low albumin of 3. Follows with ID outpatient.   - Management of PCP as above  - Azithromycin 250mg 5 tabs weekly for MAC prophylaxis  - completed fluconazole;   - start Micafungin 150mg/d IV  - Start biktarvy   - f/u serum Cryptococcal Ag, urine Histoplasma Ag, CMV PCR, blood AFB cultures Found to be HIV positive at [place] with low CD4 and high viral load. Low albumin of 3. Follows with ID outpatient.   - Management of PCP as above  - Azithromycin 250mg 5 tabs weekly for MAC prophylaxis  - - Switched Micafungin to fluconazole, pt not symptomatic with esophagitis symptoms.   - Start biktarvy   - f/u serum Cryptococcal Ag, urine Histoplasma Ag, C, blood AFB cultures  - + CMV viremia; pt asymptomatic but will recheck

## 2019-11-11 NOTE — PROGRESS NOTE ADULT - PROBLEM SELECTOR PLAN 2
Ca of 12.4, corrected 13, unclear etiology, previously within normal limits. Likely contributing to weakness.  May be 2/2 dehydration, less likely malignancy, or PCP PNA related granulomatosis   - PTH level wnl  - PTHRP  - Vitamin D 25 (low) and 1,25 low  - DC NS  - Daily iCa Ca of 12.4, corrected 13, unclear etiology, previously within normal limits. Likely contributing to weakness.  May be 2/2 dehydration, less likely malignancy, or PCP PNA related granulomatosis   - PTH level wnl  - PTHRP pending  - Vitamin D 25 (low) and 1,25 low  - DC NS  - Daily iCa

## 2019-11-11 NOTE — PROGRESS NOTE ADULT - ASSESSMENT
ASSESSMENT:  69/F with PMH HTN, DM, Hypothyroidism, Afib (eliquis), HIV/AIDS (newly diagnosed, unknown route, CD4 24, VL >6 million, not on ART).  Recent admission at [facility] Oct 19 to Nov 1 for PCP PNA (s/p Bronch), on Bactrim, prednisone Azithromycin, Fluconazole.  Went to ID office 11/6 to establish care, had an episode of weakness, nausea, NBNB emesis. At [facility], noted to have hyponatremia, hypochloremia, hyperkalemia with new NICOLASA.      Overall HIV with advanced AIDS with recent PJP pneumonia, possible candidal esophagitis, CMV viremia.   Electrolyte abnormalities due to Bactrim, known to cause hyponatremia, hyperkalemia.   NICOLASA improving. Resolved hyponatremia, hyperkalemia.       RECOMMENDATIONS:  - s/p completion of therapy for PJP,   - Can start bactrim at prophylactic dose, monitor lytes closely, usually happens at high doses.   - continue Biktarvy  - continue weekly Azithromycin 1200mg PO  - Switched Micafungin to fluconazole, pt not symptomatic with esophagitis symptoms.   - taper steroids   - check serum Cryptococcal Ag, urine Histoplasma Ag, CMV PCR, blood AFB cultures ASSESSMENT:  69/F with PMH HTN, DM, Hypothyroidism, Afib (eliquis), HIV/AIDS (newly diagnosed, unknown route, CD4 24, VL >6 million, not on ART).  Recent admission at [facility] Oct 19 to Nov 1 for PCP PNA (s/p Bronch), on Bactrim, prednisone Azithromycin, Fluconazole.  Went to ID office 11/6 to establish care, had an episode of weakness, nausea, NBNB emesis. At [facility], noted to have hyponatremia, hypochloremia, hyperkalemia with new NICOLASA.      Overall HIV with advanced AIDS with recent PJP pneumonia, possible candidal esophagitis, CMV viremia.   Electrolyte abnormalities due to Bactrim, known to cause hyponatremia, hyperkalemia.   NICOLASA improving. Resolved hyponatremia, hyperkalemia.       RECOMMENDATIONS:  - s/p completion of therapy for PJP,   - Can start bactrim at prophylactic dose, monitor lytes closely, usually happens at high doses.   - continue Biktarvy  - continue weekly Azithromycin 1200mg PO  - Switched Micafungin to fluconazole, pt not symptomatic with esophagitis symptoms.   - she should have been done with her steroids as she is s/p completion of 21 days of therapy for PJP.   - urine Histoplasma Ag in lab  - + CMV viremia, pt denies any vision problems, pt symptomatically improved, consider rechecking CMV viral load.   - blood AFB cultures processing

## 2019-11-11 NOTE — PROGRESS NOTE ADULT - PROBLEM SELECTOR PLAN 4
- s/p MICU hospitalization for PCP PNA  -DC bactrim course (10/21-11/10)  - CW mepron as PCP ppx.   - cw Prednisone taper for now. ; given low suspicion for adrenal insufficiency. Will start stress dose steroids if hypotensive - s/p MICU hospitalization for PCP PNA  -DC bactrim course (10/21-11/10)  - CW bactrim at ppx dosing.  - DC prednisone

## 2019-11-12 ENCOUNTER — TRANSCRIPTION ENCOUNTER (OUTPATIENT)
Age: 69
End: 2019-11-12

## 2019-11-12 VITALS
SYSTOLIC BLOOD PRESSURE: 102 MMHG | TEMPERATURE: 98 F | DIASTOLIC BLOOD PRESSURE: 70 MMHG | HEART RATE: 84 BPM | RESPIRATION RATE: 18 BRPM | OXYGEN SATURATION: 98 %

## 2019-11-12 PROBLEM — B20 HUMAN IMMUNODEFICIENCY VIRUS [HIV] DISEASE: Chronic | Status: ACTIVE | Noted: 2019-11-06

## 2019-11-12 PROBLEM — I10 ESSENTIAL (PRIMARY) HYPERTENSION: Chronic | Status: ACTIVE | Noted: 2019-11-06

## 2019-11-12 PROBLEM — I48.91 UNSPECIFIED ATRIAL FIBRILLATION: Chronic | Status: ACTIVE | Noted: 2019-11-06

## 2019-11-12 PROBLEM — E11.9 TYPE 2 DIABETES MELLITUS WITHOUT COMPLICATIONS: Chronic | Status: ACTIVE | Noted: 2019-11-06

## 2019-11-12 LAB
ANION GAP SERPL CALC-SCNC: 8 MMOL/L — SIGNIFICANT CHANGE UP (ref 5–17)
BUN SERPL-MCNC: 40 MG/DL — HIGH (ref 7–23)
CALCIUM SERPL-MCNC: 9 MG/DL — SIGNIFICANT CHANGE UP (ref 8.4–10.5)
CHLORIDE SERPL-SCNC: 106 MMOL/L — SIGNIFICANT CHANGE UP (ref 96–108)
CO2 SERPL-SCNC: 19 MMOL/L — LOW (ref 22–31)
CREAT SERPL-MCNC: 1.29 MG/DL — SIGNIFICANT CHANGE UP (ref 0.5–1.3)
GLUCOSE BLDC GLUCOMTR-MCNC: 122 MG/DL — HIGH (ref 70–99)
GLUCOSE BLDC GLUCOMTR-MCNC: 193 MG/DL — HIGH (ref 70–99)
GLUCOSE SERPL-MCNC: 115 MG/DL — HIGH (ref 70–99)
POTASSIUM SERPL-MCNC: 4 MMOL/L — SIGNIFICANT CHANGE UP (ref 3.5–5.3)
POTASSIUM SERPL-SCNC: 4 MMOL/L — SIGNIFICANT CHANGE UP (ref 3.5–5.3)
SODIUM SERPL-SCNC: 133 MMOL/L — LOW (ref 135–145)

## 2019-11-12 PROCEDURE — 85014 HEMATOCRIT: CPT

## 2019-11-12 PROCEDURE — 87040 BLOOD CULTURE FOR BACTERIA: CPT

## 2019-11-12 PROCEDURE — 82955 ASSAY OF G6PD ENZYME: CPT

## 2019-11-12 PROCEDURE — 82652 VIT D 1 25-DIHYDROXY: CPT

## 2019-11-12 PROCEDURE — 82310 ASSAY OF CALCIUM: CPT

## 2019-11-12 PROCEDURE — 81003 URINALYSIS AUTO W/O SCOPE: CPT

## 2019-11-12 PROCEDURE — 97116 GAIT TRAINING THERAPY: CPT

## 2019-11-12 PROCEDURE — 82570 ASSAY OF URINE CREATININE: CPT

## 2019-11-12 PROCEDURE — 82330 ASSAY OF CALCIUM: CPT

## 2019-11-12 PROCEDURE — 83519 RIA NONANTIBODY: CPT

## 2019-11-12 PROCEDURE — 80048 BASIC METABOLIC PNL TOTAL CA: CPT

## 2019-11-12 PROCEDURE — 99239 HOSP IP/OBS DSCHRG MGMT >30: CPT | Mod: GC

## 2019-11-12 PROCEDURE — 83970 ASSAY OF PARATHORMONE: CPT

## 2019-11-12 PROCEDURE — 86403 PARTICLE AGGLUT ANTBDY SCRN: CPT

## 2019-11-12 PROCEDURE — 94640 AIRWAY INHALATION TREATMENT: CPT

## 2019-11-12 PROCEDURE — 84484 ASSAY OF TROPONIN QUANT: CPT

## 2019-11-12 PROCEDURE — 99232 SBSQ HOSP IP/OBS MODERATE 35: CPT

## 2019-11-12 PROCEDURE — 83690 ASSAY OF LIPASE: CPT

## 2019-11-12 PROCEDURE — 96374 THER/PROPH/DIAG INJ IV PUSH: CPT

## 2019-11-12 PROCEDURE — 85610 PROTHROMBIN TIME: CPT

## 2019-11-12 PROCEDURE — 82962 GLUCOSE BLOOD TEST: CPT

## 2019-11-12 PROCEDURE — 82947 ASSAY GLUCOSE BLOOD QUANT: CPT

## 2019-11-12 PROCEDURE — 84300 ASSAY OF URINE SODIUM: CPT

## 2019-11-12 PROCEDURE — 82803 BLOOD GASES ANY COMBINATION: CPT

## 2019-11-12 PROCEDURE — 85027 COMPLETE CBC AUTOMATED: CPT

## 2019-11-12 PROCEDURE — 76770 US EXAM ABDO BACK WALL COMP: CPT

## 2019-11-12 PROCEDURE — 99285 EMERGENCY DEPT VISIT HI MDM: CPT | Mod: 25

## 2019-11-12 PROCEDURE — 80053 COMPREHEN METABOLIC PANEL: CPT

## 2019-11-12 PROCEDURE — 84132 ASSAY OF SERUM POTASSIUM: CPT

## 2019-11-12 PROCEDURE — 97161 PT EVAL LOW COMPLEX 20 MIN: CPT

## 2019-11-12 PROCEDURE — 83605 ASSAY OF LACTIC ACID: CPT

## 2019-11-12 PROCEDURE — 93005 ELECTROCARDIOGRAM TRACING: CPT

## 2019-11-12 PROCEDURE — 82306 VITAMIN D 25 HYDROXY: CPT

## 2019-11-12 PROCEDURE — 71045 X-RAY EXAM CHEST 1 VIEW: CPT

## 2019-11-12 PROCEDURE — 84295 ASSAY OF SERUM SODIUM: CPT

## 2019-11-12 PROCEDURE — 85730 THROMBOPLASTIN TIME PARTIAL: CPT

## 2019-11-12 PROCEDURE — 87385 HISTOPLASMA CAPSUL AG IA: CPT

## 2019-11-12 PROCEDURE — 82435 ASSAY OF BLOOD CHLORIDE: CPT

## 2019-11-12 PROCEDURE — 84439 ASSAY OF FREE THYROXINE: CPT

## 2019-11-12 PROCEDURE — 80076 HEPATIC FUNCTION PANEL: CPT

## 2019-11-12 PROCEDURE — 84100 ASSAY OF PHOSPHORUS: CPT

## 2019-11-12 PROCEDURE — 84443 ASSAY THYROID STIM HORMONE: CPT

## 2019-11-12 PROCEDURE — 83735 ASSAY OF MAGNESIUM: CPT

## 2019-11-12 RX ORDER — AZITHROMYCIN 500 MG/1
5 TABLET, FILM COATED ORAL
Qty: 21 | Refills: 0
Start: 2019-11-12 | End: 2019-12-11

## 2019-11-12 RX ORDER — BICTEGRAVIR SODIUM, EMTRICITABINE, AND TENOFOVIR ALAFENAMIDE FUMARATE 30; 120; 15 MG/1; MG/1; MG/1
1 TABLET ORAL
Qty: 30 | Refills: 0
Start: 2019-11-12 | End: 2019-12-11

## 2019-11-12 RX ORDER — DIPHENHYDRAMINE HYDROCHLORIDE AND LIDOCAINE HYDROCHLORIDE AND ALUMINUM HYDROXIDE AND MAGNESIUM HYDRO
10 KIT
Qty: 1000 | Refills: 0
Start: 2019-11-12 | End: 2019-11-25

## 2019-11-12 RX ADMIN — DIPHENHYDRAMINE HYDROCHLORIDE AND LIDOCAINE HYDROCHLORIDE AND ALUMINUM HYDROXIDE AND MAGNESIUM HYDRO 10 MILLILITER(S): KIT at 13:30

## 2019-11-12 RX ADMIN — BICTEGRAVIR SODIUM, EMTRICITABINE, AND TENOFOVIR ALAFENAMIDE FUMARATE 1 TABLET(S): 30; 120; 15 TABLET ORAL at 11:44

## 2019-11-12 RX ADMIN — Medication 1 TABLET(S): at 11:44

## 2019-11-12 RX ADMIN — Medication 2: at 11:44

## 2019-11-12 RX ADMIN — DIPHENHYDRAMINE HYDROCHLORIDE AND LIDOCAINE HYDROCHLORIDE AND ALUMINUM HYDROXIDE AND MAGNESIUM HYDRO 10 MILLILITER(S): KIT at 05:11

## 2019-11-12 RX ADMIN — Medication 75 MICROGRAM(S): at 05:11

## 2019-11-12 RX ADMIN — Medication 12.5 MILLIGRAM(S): at 05:10

## 2019-11-12 RX ADMIN — APIXABAN 2.5 MILLIGRAM(S): 2.5 TABLET, FILM COATED ORAL at 05:11

## 2019-11-12 NOTE — DISCHARGE NOTE PROVIDER - NSDCMRMEDTOKEN_GEN_ALL_CORE_FT
acetaminophen 325 mg oral tablet: 2 tab(s) orally every 6 hours, As needed, Temp greater or equal to 38C (100.4F), Mild Pain (1 - 3)  Acidophilus oral capsule: 1 cap(s) orally 2 times a day   albuterol 2.5 mg/3 mL (0.083%) inhalation solution: 1 dose(s) inhaled every 6 hours, As Needed -for shortness of breath and/or wheezing   azithromycin 250 mg oral tablet: 5 tab(s) orally once a week   Bactrim 400 mg-80 mg oral tablet: 1 milligram(s) orally once a day   bictegravir/emtricitabine/tenofovir 50 mg-200 mg-25 mg oral tablet: 1 tab(s) orally once a day   Eliquis 5 mg oral tablet: 1 tab(s) orally 2 times a day  furosemide 40 mg oral tablet: 1 tab(s) orally once a day  glyBURIDE 5 mg oral tablet: 1 tab(s) orally once a day   levothyroxine 75 mcg (0.075 mg) oral tablet: 1 tab(s) orally once a day  magnesium hydroxide 8% oral suspension: 30 milliliter(s) orally once a day, As needed, Constipation  Mepron 750 mg/5 mL oral suspension: 10 milliliter(s) orally once a day   metFORMIN 500 mg oral tablet: 1 tab(s) orally 2 times a day   Multiple Vitamins oral tablet: 1 tab(s) orally once a day  Pacerone 200 mg oral tablet: 1 tab(s) orally once a day  predniSONE 20 mg oral tablet: 1 tab(s) orally once a day  sulfamethoxazole-trimethoprim 800 mg-160 mg oral tablet: 2 tab(s) orally every 8 hours acetaminophen 325 mg oral tablet: 2 tab(s) orally every 6 hours, As needed, Temp greater or equal to 38C (100.4F), Mild Pain (1 - 3)  Acidophilus oral capsule: 1 cap(s) orally 2 times a day   albuterol 2.5 mg/3 mL (0.083%) inhalation solution: 1 dose(s) inhaled every 6 hours, As Needed -for shortness of breath and/or wheezing   azithromycin 250 mg oral tablet: 5 tab(s) orally once a week   Bactrim 400 mg-80 mg oral tablet: 1 milligram(s) orally once a day   bictegravir/emtricitabine/tenofovir 50 mg-200 mg-25 mg oral tablet: 1 tab(s) orally once a day   Eliquis 5 mg oral tablet: 1 tab(s) orally 2 times a day  glyBURIDE 5 mg oral tablet: 1 tab(s) orally once a day   levothyroxine 75 mcg (0.075 mg) oral tablet: 1 tab(s) orally once a day  magnesium hydroxide 8% oral suspension: 30 milliliter(s) orally once a day, As needed, Constipation  metFORMIN 500 mg oral tablet: 1 tab(s) orally 2 times a day   Multiple Vitamins oral tablet: 1 tab(s) orally once a day

## 2019-11-12 NOTE — PROGRESS NOTE ADULT - PROBLEM SELECTOR PLAN 1
As of right now resolved. Previously Hyperkalemia to 8.4 on admission, now improving. Likely cause of weakness on admission   - Unclear etiology however Bactrim is known to cause hyperkalemia.   - BMPs QD for now  - tele monitoring; NSR in the 70s  - Cortisol pending  - d/c sodium zirconium cyclosilicate  - Restart bactrim at ppx dose and monitor response As of right now resolved. Previously Hyperkalemia to 8.4 on admission, now improving. Likely cause of weakness on admission   - Unclear etiology however Bactrim is known to cause hyperkalemia.   - tele monitoring; NSR in the 70s  - d/c sodium zirconium cyclosilicate  - Restart bactrim at ppx dose and monitor response  - Will need BMP in one week follow up

## 2019-11-12 NOTE — PROGRESS NOTE ADULT - PROBLEM SELECTOR PROBLEM 5
HIV (human immunodeficiency virus infection)

## 2019-11-12 NOTE — PROGRESS NOTE ADULT - PROBLEM SELECTOR PLAN 4
- s/p MICU hospitalization for PCP PNA  -DC bactrim course (10/21-11/10)  - CW bactrim at ppx dosing.  - DC prednisone

## 2019-11-12 NOTE — DISCHARGE NOTE PROVIDER - CARE PROVIDER_API CALL
lorenzo mccabe MD  [id number] [facility]  [place], [address]   Tel: [phone]  Phone: [phone]  Fax: (   )    -  Follow Up Time:

## 2019-11-12 NOTE — PROGRESS NOTE ADULT - REASON FOR ADMISSION
Leg weakness

## 2019-11-12 NOTE — PROGRESS NOTE ADULT - PROBLEM SELECTOR PROBLEM 3
NICOLASA (acute kidney injury)

## 2019-11-12 NOTE — PROGRESS NOTE ADULT - PROBLEM SELECTOR PLAN 5
Found to be HIV positive at [place] with low CD4 and high viral load. Low albumin of 3. Follows with ID outpatient.   - Management of PCP as above  - Azithromycin 250mg 5 tabs weekly for MAC prophylaxis  - - Switched Micafungin to fluconazole, pt not symptomatic with esophagitis symptoms.   - Start biktarvy   - f/u serum Cryptococcal Ag, urine Histoplasma Ag, C, blood AFB cultures  - + CMV viremia; pt asymptomatic but will recheck

## 2019-11-12 NOTE — PROGRESS NOTE ADULT - SUBJECTIVE AND OBJECTIVE BOX
Rachel Edge MD (PGY 1, Internal Medicine)  Pager: [phone]                  ____________________  SUBJ:    MEDICATIONS  (STANDING):  apixaban 2.5 milliGRAM(s) Oral every 12 hours  azithromycin   Tablet 1200 milliGRAM(s) Oral <User Schedule>  bictegravir 50 mG/emtricitabine 200 mG/tenofovir alafenamide 25 mG (BIKTARVY) 1 Tablet(s) Oral daily  dextrose 5%. 1000 milliLiter(s) (50 mL/Hr) IV Continuous <Continuous>  dextrose 50% Injectable 12.5 Gram(s) IV Push once  dextrose 50% Injectable 25 Gram(s) IV Push once  dextrose 50% Injectable 25 Gram(s) IV Push once  FIRST- Mouthwash  BLM 10 milliLiter(s) Swish and Spit three times a day  fluconAZOLE IVPB 100 milliGRAM(s) IV Intermittent every 24 hours  insulin lispro (HumaLOG) corrective regimen sliding scale   SubCutaneous at bedtime  insulin lispro (HumaLOG) corrective regimen sliding scale   SubCutaneous three times a day before meals  lactobacillus acidophilus 1 Tablet(s) Oral daily  levothyroxine 75 MICROGram(s) Oral daily  metoprolol tartrate 12.5 milliGRAM(s) Oral two times a day  multivitamin 1 Tablet(s) Oral daily  trimethoprim   80 mG/sulfamethoxazole 400 mG 1 Tablet(s) Oral daily    MEDICATIONS  (PRN):  acetaminophen   Tablet .. 650 milliGRAM(s) Oral every 6 hours PRN Temp greater or equal to 38C (100.4F), Moderate Pain (4 - 6)  dextrose 40% Gel 15 Gram(s) Oral once PRN Blood Glucose LESS THAN 70 milliGRAM(s)/deciliter  glucagon  Injectable 1 milliGRAM(s) IntraMuscular once PRN Glucose LESS THAN 70 milligrams/deciliter  magnesium hydroxide Suspension 30 milliLiter(s) Oral daily PRN Constipation    ____________________  VITALS:  Vital Signs Last 24 Hrs  T(C): 37.2 (12 Nov 2019 04:04), Max: 37.3 (11 Nov 2019 11:22)  T(F): 99 (12 Nov 2019 04:04), Max: 99.1 (11 Nov 2019 11:22)  HR: 86 (12 Nov 2019 04:04) (68 - 86)  BP: 120/81 (12 Nov 2019 04:04) (112/77 - 120/81)  BP(mean): --  RR: 18 (12 Nov 2019 04:04) (18 - 18)  SpO2: 97% (12 Nov 2019 04:04) (97% - 99%)    ____________________  PHYSICAL EXAM:  · CONSTITUTIONAL: Thin elderly lady lying in bed,   · NECK:	No bruits; no thyromegaly. No JVD  ·RESPIRATORY:   Clear to auscultation. Good air movement.  no rales,rhonchi or wheeze  · CARDIOVASCULAR	RRR  no m/r/g  . GASTROINTESTINAL:  Soft, non tender. No organomegaly. No guarding.  · EXTREMITIES: No cyanosis, clubbing or edema. DP/PT pulses intact.  ·NEUROLOGICAL:   alert and oriented x 3; Moves all four extremities on command. No sensory deficits.   · SKIN:	No lesions; no rash  . LYMPH NODES:	No [id number]  · MUSCULOSKELETAL:   No calf tenderness  no joint swelling    ____________________  LABS:                        8.8    6.07  )-----------( 195      ( 10 Nov 2019 09:09 )             27.4     11-12    133<L>  |  106  |  40<H>  ----------------------------<  115<H>  4.0   |  19<L>  |  1.29    Ca    9.0      12 Nov 2019 06:12            I&O's Summary    11 Nov 2019 07:01  -  12 Nov 2019 07:00  --------------------------------------------------------  IN: 650 mL / OUT: 1300 mL / NET: -650 mL      Creatinine Trend: 1.29<--, 1.30<--, 1.22<--, 1.40<--, 2.14<--, 1.43<--             8.8    6.07  )-----------( 195      ( 11-10 @ 09:09 )             27.4                8.2    6.49  )-----------( 185      ( 11-09 @ 11:23 )             25.2       Hemoglobin Trend:  Hemoglobin: 8.8 g/dL (11-10 @ 09:09)  Hemoglobin: 8.2 g/dL (11-09 @ 11:23) Rachel Edge MD (PGY 1, Internal Medicine)  Pager: [phone]                  ____________________  SUBJ:  SHARONA. Denies     MEDICATIONS  (STANDING):  apixaban 2.5 milliGRAM(s) Oral every 12 hours  azithromycin   Tablet 1200 milliGRAM(s) Oral <User Schedule>  bictegravir 50 mG/emtricitabine 200 mG/tenofovir alafenamide 25 mG (BIKTARVY) 1 Tablet(s) Oral daily  dextrose 5%. 1000 milliLiter(s) (50 mL/Hr) IV Continuous <Continuous>  dextrose 50% Injectable 12.5 Gram(s) IV Push once  dextrose 50% Injectable 25 Gram(s) IV Push once  dextrose 50% Injectable 25 Gram(s) IV Push once  FIRST- Mouthwash  BLM 10 milliLiter(s) Swish and Spit three times a day  fluconAZOLE IVPB 100 milliGRAM(s) IV Intermittent every 24 hours  insulin lispro (HumaLOG) corrective regimen sliding scale   SubCutaneous at bedtime  insulin lispro (HumaLOG) corrective regimen sliding scale   SubCutaneous three times a day before meals  lactobacillus acidophilus 1 Tablet(s) Oral daily  levothyroxine 75 MICROGram(s) Oral daily  metoprolol tartrate 12.5 milliGRAM(s) Oral two times a day  multivitamin 1 Tablet(s) Oral daily  trimethoprim   80 mG/sulfamethoxazole 400 mG 1 Tablet(s) Oral daily    MEDICATIONS  (PRN):  acetaminophen   Tablet .. 650 milliGRAM(s) Oral every 6 hours PRN Temp greater or equal to 38C (100.4F), Moderate Pain (4 - 6)  dextrose 40% Gel 15 Gram(s) Oral once PRN Blood Glucose LESS THAN 70 milliGRAM(s)/deciliter  glucagon  Injectable 1 milliGRAM(s) IntraMuscular once PRN Glucose LESS THAN 70 milligrams/deciliter  magnesium hydroxide Suspension 30 milliLiter(s) Oral daily PRN Constipation    ____________________  VITALS:  Vital Signs Last 24 Hrs  T(C): 37.2 (12 Nov 2019 04:04), Max: 37.3 (11 Nov 2019 11:22)  T(F): 99 (12 Nov 2019 04:04), Max: 99.1 (11 Nov 2019 11:22)  HR: 86 (12 Nov 2019 04:04) (68 - 86)  BP: 120/81 (12 Nov 2019 04:04) (112/77 - 120/81)  BP(mean): --  RR: 18 (12 Nov 2019 04:04) (18 - 18)  SpO2: 97% (12 Nov 2019 04:04) (97% - 99%)    ____________________  PHYSICAL EXAM:  · CONSTITUTIONAL: Thin elderly lady lying in bed,   · NECK:	No bruits; no thyromegaly. No JVD  ·RESPIRATORY:   Clear to auscultation. Good air movement.  no rales,rhonchi or wheeze  · CARDIOVASCULAR	RRR  no m/r/g  . GASTROINTESTINAL:  Soft, non tender. No organomegaly. No guarding.  · EXTREMITIES: No cyanosis, clubbing or edema. DP/PT pulses intact.  ·NEUROLOGICAL:   alert and oriented x 3; Moves all four extremities on command. No sensory deficits.   · SKIN:	No lesions; no rash  . LYMPH NODES:	No [id number]  · MUSCULOSKELETAL:   No calf tenderness  no joint swelling    ____________________  LABS:                        8.8    6.07  )-----------( 195      ( 10 Nov 2019 09:09 )             27.4     11-12    133<L>  |  106  |  40<H>  ----------------------------<  115<H>  4.0   |  19<L>  |  1.29    Ca    9.0      12 Nov 2019 06:12            I&O's Summary    11 Nov 2019 07:01  -  12 Nov 2019 07:00  --------------------------------------------------------  IN: 650 mL / OUT: 1300 mL / NET: -650 mL      Creatinine Trend: 1.29<--, 1.30<--, 1.22<--, 1.40<--, 2.14<--, 1.43<--             8.8    6.07  )-----------( 195      ( 11-10 @ 09:09 )             27.4                8.2    6.49  )-----------( 185      ( 11-09 @ 11:23 )             25.2       Hemoglobin Trend:  Hemoglobin: 8.8 g/dL (11-10 @ 09:09)  Hemoglobin: 8.2 g/dL (11-09 @ 11:23)

## 2019-11-12 NOTE — PROGRESS NOTE ADULT - PROBLEM SELECTOR PLAN 2
Ca of 12.4, corrected 13, unclear etiology, previously within normal limits. Likely contributing to weakness.  May be 2/2 dehydration, less likely malignancy, or PCP PNA related granulomatosis   - PTH level wnl  - PTHRP pending  - Vitamin D 25 (low) and 1,25 low  - DC NS  - Daily iCa

## 2019-11-12 NOTE — PROGRESS NOTE ADULT - PROBLEM SELECTOR PROBLEM 4
PCP (pneumocystis jiroveci pneumonia)

## 2019-11-12 NOTE — PROGRESS NOTE ADULT - PROBLEM SELECTOR PLAN 7
- Added 4U of Lantus QHS and 3U Humalog TID  - ISS AC/HS - DC scheduled insulin given s/p steroids   - SSI

## 2019-11-12 NOTE — DISCHARGE NOTE PROVIDER - HOSPITAL COURSE
69 F with h/o HTN, diabetes, hypothyroidism, atrial fibrillation (on eliquis), HIV/AIDS not on ART, and recent ICU admission for PCP PNA  who presented to the  ED with LE weakness and was found to have hyperkalemia (potassium of 8.2 on admission), NICOLASA, and hypercalcemia likely 2/2 bactrim use. On admission, her bactrim was discontinued, she was given fluid resuscitation, and  was given insulin, dextrose, lokema, and albuterol to normalize her potassium. Her acute kidney injury resolved and her potassium and calcium remain normalized throughout her admission. Work-up for underlying causes showed that's she had no endocrinopathy and her hypercalcemia was thought to be secondary to her PCP PNA vs. bactrim use.         Blood cultures drawn on admission showed no bacteremia. Infectious disease was consulted for management of her HIV, and she was initiated on biktarvy for anti-retroviral therapy. She completed her 21 day course of PCP pneumonia with atovaquone and prednisone. She developed hyperglycemia due to her steroids, which were managed with scheduled insulin, which was stopped after completion of her steroid course.        She was initially treated with IV mycafungin for candida esophagitis, but was transitioned to fluconazole given that she was asymptomatic. 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        After discussion with infectious disease, she was started on bactrim at a lower prophylactic dosing regimen, which is less likely to cause hyperkalemia. In addition, her home lasix were discontinued during this admission. She will need follow up with her primary care physician within one week for a repeat BMP and determination on reinitiation lasix.

## 2019-11-12 NOTE — DISCHARGE NOTE PROVIDER - PROVIDER TOKENS
FREE:[LAST:[eliot],FIRST:[lorenzo],PHONE:[phone]],FAX:[(   )    -],ADDRESS:[Lorenzo Perez MD  [address] [facility] [address], [address]   Tel: [phone]]]

## 2019-11-12 NOTE — DISCHARGE NOTE PROVIDER - NSDCCPCAREPLAN_GEN_ALL_CORE_FT
PRINCIPAL DISCHARGE DIAGNOSIS  Diagnosis: Weakness  Assessment and Plan of Treatment: You came to the hospital because you were weak. We determined that your potassium level was elevated, which can cause weakness. We gave you medications to make sure your potassium level was normal.  We stopped your bactrim antibiotic which can cause a high potassium and gave you an alternative medication to finish your course treatment of PCP pneumonia.   We stopped your home lasix during our treatment and you should follow up in one week to [facility] to discuss whether it needs to be continued.      SECONDARY DISCHARGE DIAGNOSES  Diagnosis: HIV disease  Assessment and Plan of Treatment: We started you on biktarvy for treatment of your HIV.   We also will be sending you home on bactrim (at a lower dosage) to prevent your from developing PCP pneumonia in the future.  You should follow up in one week at [facility] clinic to get blood tests to make sure your bactrim is safe to use. PRINCIPAL DISCHARGE DIAGNOSIS  Diagnosis: Weakness  Assessment and Plan of Treatment: You came to the hospital because you were weak. We determined that your potassium level was elevated, which can cause weakness. We gave you medications to make sure your potassium level was normal.  We stopped your bactrim antibiotic which can cause a high potassium and gave you an alternative medication to finish your course treatment of PCP pneumonia.   Please STOP taking Lasix and follow up in one week [place] [facility] [place] to discuss whether it needs to be continued. Please CONTINUE to take Azithromycin 1500 mg once a week, Bictarvy once a day, and Bactrim 400-80 once a day.      SECONDARY DISCHARGE DIAGNOSES  Diagnosis: HIV disease  Assessment and Plan of Treatment: We started you on biktarvy for treatment of your HIV.   We also will be sending you home on bactrim (at a lower dosage) to prevent your from developing PCP pneumonia in the future.  You should follow up in one week [place] [facility] [place] to get blood tests to make sure your bactrim is safe to use.

## 2019-11-12 NOTE — PROGRESS NOTE ADULT - SUBJECTIVE AND OBJECTIVE BOX
69y old  Female who presents with a chief complaint of Leg weakness (12 Nov 2019 11:38)      Interval history:  Afebrile, feels better, still with oral sores. Weakness improved but persists.       No Known Allergies      Antimicrobials:  azithromycin   Tablet 1200 milliGRAM(s) Oral <User Schedule>  bictegravir 50 mG/emtricitabine 200 mG/tenofovir alafenamide 25 mG (BIKTARVY) 1 Tablet(s) Oral daily  fluconAZOLE IVPB 100 milliGRAM(s) IV Intermittent every 24 hours  trimethoprim   80 mG/sulfamethoxazole 400 mG 1 Tablet(s) Oral daily      REVIEW OF SYSTEMS:  No chest pain   No N/V/D, no abdominal pain  No dysuria   No rash.       Vital Signs Last 24 Hrs  T(C): 36.9 (11-12-19 @ 11:48), Max: 37.2 (11-12-19 @ 04:04)  T(F): 98.5 (11-12-19 @ 11:48), Max: 99 (11-12-19 @ 04:04)  HR: 84 (11-12-19 @ 11:48) (78 - 86)  BP: 102/70 (11-12-19 @ 11:48) (102/70 - 120/81)  BP(mean): --  RR: 18 (11-12-19 @ 11:48) (18 - 18)  SpO2: 98% (11-12-19 @ 11:48) (97% - 99%)      PHYSICAL EXAM:  Patient in no acute distress. AAOX3.  one posterior pharynx lt sided oral ulcer, + gingival ulcer, no thrush.   Cardiovascular: S1S2 normal.  Lungs: + air entry B/L lung fields.  Gastrointestinal: soft, nontender, nondistended.  Extremities: no edema.  IV sites not inflamed.         11-12    133<L>  |  106  |  40<H>  ----------------------------<  115<H>  4.0   |  19<L>  |  1.29    Ca    9.0      12 Nov 2019 06:12

## 2019-11-12 NOTE — PROGRESS NOTE ADULT - ASSESSMENT
ASSESSMENT:  69/F with PMH HTN, DM, Hypothyroidism, Afib (eliquis), HIV/AIDS (newly diagnosed, unknown route, CD4 24, VL >6 million, not on ART).  Recent admission at [facility] Oct 19 to Nov 1 for PCP PNA (s/p Bronch), on Bactrim, prednisone Azithromycin, Fluconazole.  Went to ID office 11/6 to establish care, had an episode of weakness, nausea, NBNB emesis. At [facility], noted to have hyponatremia, hypochloremia, hyperkalemia with new NICOLASA.      Overall HIV with advanced AIDS with recent PJP pneumonia, possible candidal esophagitis, CMV viremia.   Electrolyte abnormalities due to Bactrim, known to cause hyponatremia, hyperkalemia.   NICOLASA improving. Resolved hyperkalemia.       RECOMMENDATIONS:  - s/p completion of therapy for PJP,   - C/w bactrim at prophylactic dose, monitor lytes closely, usually happens at high doses. Cr stable, but Na little lower.   - continue Biktarvy  - continue weekly Azithromycin 1200mg PO  - c/w fluconazole,  - urine Histoplasma Ag in lab  - + CMV viremia, pt denies any vision problems, pt symptomatically improved, repeat CMV viral load in lab.   - blood AFB cultures processing

## 2019-11-12 NOTE — PROGRESS NOTE ADULT - ATTENDING COMMENTS
Mandy Enamorado  Pager: [phone]. If no response or past 5 pm call [phone].
Mandy Enamorado  Pager: [phone]. If no response or past 5 pm call [phone].
Patient seen and examined- patient's neice at bedside and provided translation per pt. request  Case discussed with Dr. Shipley    She reports feeling better, no SOB or cough  She notes occasional food sticking in her throat, mild odynophagia  Started first dose of Biktarvy today    Receiving Mepron for PJP after devloping NICOLASA and electrolyte abnormalities on Bactrim  Steroids for taper over 21 days  Micafungin for esophageal candidiasis   Azithromycin for MAC prophylaxis    awaiting CMV pcr  urine histo ag  serum crypt ag  quantiferon  blood cultures for MAC    IF renal function improves will consider changing back to Bactrim  repeat HIV viral load after 1-2 weeks of treatment to look for log reductions    Jake Parks MD  [phone]  After 5pm/weekends [phone]
Clinically looks much improved.  Hold Bactrim, to start alternative agent for PCP (Mepron?).  No objection to stopping amio given concern for polypharmacy and QT prolongation.
d/c planning, total d/c time 40 minutes
d/c planning, total d/c time 45 minutes

## 2019-11-12 NOTE — PROGRESS NOTE ADULT - PROBLEM SELECTOR PLAN 6
- CW metoprolol  - Eliquis 2.5 mg BID for AC (renally dosed)  - DC home amiodarone given QTC prolongation.

## 2019-11-12 NOTE — DISCHARGE NOTE PROVIDER - NSDCFUADDAPPT_GEN_ALL_CORE_FT
Kami Perez MD  [address] [facility]  [place], [address]   Tel: [phone]    you were recommended for home with outpatient physical therapy (at a facility of your choice)    You can purchase a rolling walker out of pocket at [facility] Kami Perez MD  [address] [facility]  [place], [address]   Tel: [phone]    Please schedule an appointment within 1 week. Please have a repeat BMP in 1 week for evaluation of your electrolytes and kidney disease.     you were recommended for home with outpatient physical therapy (at a facility of your choice)    You can purchase a rolling walker out of pocket at [facility]

## 2019-11-12 NOTE — PROGRESS NOTE ADULT - ASSESSMENT
69 F with h/o HTN, diabetes, hypothyroidism, afib (on eliquis), HIV/AIDS not on ART, and recent ICU admission for PCP PNA  who presents with LE weakness found to have hyperkalemia, NICOLASA, and hypercalcemia likely 2/2 bactrim use.  Hyperkalemia has now resolved; will restart bactrim at lower ppx dose and monitor electrolytes.

## 2019-11-13 LAB
CMV DNA CSF QL NAA+PROBE: 1213 — SIGNIFICANT CHANGE UP
CMV DNA SPEC NAA+PROBE-LOG#: 3.08 LOGIU/ML — HIGH
G6PD RBC-CCNC: 15.7 U/G HGB — SIGNIFICANT CHANGE UP (ref 7–20.5)
H CAPSUL AG SPEC-ACNC: SIGNIFICANT CHANGE UP
H CAPSUL AG UR QL IA: SIGNIFICANT CHANGE UP

## 2019-11-14 LAB — PTH RELATED PROT SERPL-MCNC: <2 PMOL/L — SIGNIFICANT CHANGE UP

## 2019-11-20 ENCOUNTER — APPOINTMENT (OUTPATIENT)
Dept: INFECTIOUS DISEASE | Facility: CLINIC | Age: 69
End: 2019-11-20

## 2019-11-20 ENCOUNTER — LABORATORY RESULT (OUTPATIENT)
Age: 69
End: 2019-11-20

## 2019-11-20 ENCOUNTER — OUTPATIENT (OUTPATIENT)
Dept: OUTPATIENT SERVICES | Facility: HOSPITAL | Age: 69
LOS: 1 days | End: 2019-11-20
Payer: COMMERCIAL

## 2019-11-20 VITALS
DIASTOLIC BLOOD PRESSURE: 80 MMHG | RESPIRATION RATE: 18 BRPM | HEIGHT: 65 IN | BODY MASS INDEX: 19.66 KG/M2 | SYSTOLIC BLOOD PRESSURE: 131 MMHG | HEART RATE: 106 BPM | OXYGEN SATURATION: 98 % | WEIGHT: 118 LBS | TEMPERATURE: 97.5 F

## 2019-11-20 DIAGNOSIS — B20 HUMAN IMMUNODEFICIENCY VIRUS [HIV] DISEASE: ICD-10-CM

## 2019-11-20 PROCEDURE — G0463: CPT

## 2019-11-20 PROCEDURE — 87536 HIV-1 QUANT&REVRSE TRNSCRPJ: CPT

## 2019-11-20 PROCEDURE — 86360 T CELL ABSOLUTE COUNT/RATIO: CPT

## 2019-11-20 RX ORDER — GLYBURIDE 5 MG/1
5 TABLET ORAL DAILY
Qty: 14 | Refills: 0 | Status: COMPLETED | COMMUNITY
Start: 2019-11-20 | End: 2019-11-20

## 2019-11-20 RX ORDER — SULFAMETHOXAZOLE AND TRIMETHOPRIM 800; 160 MG/1; MG/1
800-160 TABLET ORAL
Qty: 30 | Refills: 1 | Status: COMPLETED | COMMUNITY
Start: 2019-11-06 | End: 2019-11-20

## 2019-11-20 RX ORDER — FUROSEMIDE 40 MG/1
40 TABLET ORAL
Qty: 30 | Refills: 0 | Status: COMPLETED | COMMUNITY
Start: 2019-11-06 | End: 2019-11-20

## 2019-11-20 RX ORDER — PREDNISONE 20 MG/1
20 TABLET ORAL
Qty: 14 | Refills: 0 | Status: COMPLETED | COMMUNITY
Start: 2019-11-06 | End: 2019-11-20

## 2019-11-20 RX ORDER — SULFAMETHOXAZOLE AND TRIMETHOPRIM 800; 160 MG/1; MG/1
800-160 TABLET ORAL
Qty: 84 | Refills: 0 | Status: COMPLETED | COMMUNITY
Start: 2019-11-06 | End: 2019-11-20

## 2019-11-20 RX ORDER — FLUCONAZOLE 200 MG/1
200 TABLET ORAL
Qty: 28 | Refills: 0 | Status: COMPLETED | COMMUNITY
Start: 2019-11-06 | End: 2019-11-20

## 2019-11-21 LAB
4/8 RATIO: 0.13 RATIO — LOW (ref 0.86–4.14)
ABS CD8: 1529 /UL — HIGH (ref 90–775)
CD3 BLASTS SPEC-ACNC: 1731 /UL — SIGNIFICANT CHANGE UP (ref 396–2024)
CD3 BLASTS SPEC-ACNC: 84 % — SIGNIFICANT CHANGE UP (ref 58–84)
CD4 %: 9 % — LOW (ref 30–56)
CD8 %: 74 % — HIGH (ref 11–43)
CULTURE RESULTS: SIGNIFICANT CHANGE UP
HIV-1 VIRAL LOAD RESULT: ABNORMAL
HIV1 RNA # SERPL NAA+PROBE: SIGNIFICANT CHANGE UP
HIV1 RNA SER-IMP: SIGNIFICANT CHANGE UP
HIV1 RNA SERPL NAA+PROBE-ACNC: ABNORMAL
HIV1 RNA SERPL NAA+PROBE-LOG#: 3.22 — SIGNIFICANT CHANGE UP
SPECIMEN SOURCE: SIGNIFICANT CHANGE UP
T-CELL CD4 SUBSET PNL BLD: 193 /UL — LOW (ref 325–1251)

## 2019-11-21 NOTE — REVIEW OF SYSTEMS
[Fever] : no fever [Eye Pain] : no eye pain [Earache] : no earache [Chest Pain] : no chest pain [Palpitations] : no palpitations [Shortness Of Breath] : no shortness of breath [Abdominal Pain] : no abdominal pain [Dysuria] : no dysuria [Confused] : no confusion [Dizziness] : no dizziness [Limb Weakness] : no limb weakness [FreeTextEntry9] : Inability to walk  [de-identified] : b/l leg weakness- non focal

## 2019-11-21 NOTE — ASSESSMENT
[Treatment Education] : treatment education [Treatment Adherence] : treatment adherence [Rx Dose / Side Effects] : Rx dose/side effects [Risk Reduction] : risk reduction [Nutritional / Food Issues] : nutritional/food issues [Universal Precautions] : universal precautions [Medical Care Issues] : medical care issues [Drug Interactions / Side Effects] : drug interactions/side effects [Disclosure of Diagnosis] : disclosure of diagnosis [HIV Education] : HIV Education [Sexuality / Safer Sex] : sexuality/safer sex [Anticipatory Guidance] : anticipatory guidance [FreeTextEntry1] : 68 y/o F with PMH of Hypothyroidism, DM recently diagnosed HIV and PCP pneumonia recent hospitalization in [facility] for weakness and sepsis came for follow up\par  passed away 1 year ago. Has not been sexually active since then. \par  \par \par HIV \par Viral load 11/6- 643,805\par Cd4  - 4\par c/w Biktarvy\par repeat viral load and T cell count \par \par diabetes\par c/w metformin\par Patient reported episode of hypoglycemia- unresponsiveness, better with sugar\par will d/c glyburide\par PCP referral for DM management \par \par HMT\par Will vaccinate upon CD4 count recovery \par Reports UTD with flu shot 1 month ago from [facility] \par Will recall in 1 mo\par After immune recovery, patient will need colonoscopy, mammogram and pap smear along with vaccinations \par \par

## 2019-11-21 NOTE — END OF VISIT
[] : Fellow [FreeTextEntry3] : Patient seen and examined with Dr medellin\par I agree with her itnerval history exam and plans as noted above\par Thrush resolved\par close follow up to see if able to suppress on just biktaryvy or if she needs Prezcobix added to suppress\par Patient improving tolerating biktarvy respiratory status better\par

## 2019-11-21 NOTE — REASON FOR VISIT
[Initial Evaluation] : an initial evaluation [Follow-Up: _____] : a [unfilled] follow-up visit [FreeTextEntry1] : 68 y/o F with PMH of Hypothyroidism, DM, recent diagnosis of PCP pneumonia , HIV, atrial fibrillation presented  for new diagnosis of HIV.

## 2019-11-21 NOTE — HISTORY OF PRESENT ILLNESS
[FreeTextEntry1] : 68 y/o F with PMH of Hypothyroidism, DM recently diagnosed HIV and PCP pneumonia with Hypoxia and AfIb on presentation at [place], comes for HIV follow up .\par \par Patient was recently admitted to [facility] for weakness and ?sepsis s/p ICU stay for hypotension and hypoxia. \par She has completed PCP therapy and is on ppx. \par \par Patient had an episode of hypoglymia. on friday. \par  [Sexually Active] : The patient is not sexually active

## 2019-12-06 DIAGNOSIS — E11.9 TYPE 2 DIABETES MELLITUS WITHOUT COMPLICATIONS: ICD-10-CM

## 2019-12-11 LAB
CULTURE RESULTS: SIGNIFICANT CHANGE UP
SPECIMEN SOURCE: SIGNIFICANT CHANGE UP

## 2019-12-14 LAB
CULTURE RESULTS: SIGNIFICANT CHANGE UP
SPECIMEN SOURCE: SIGNIFICANT CHANGE UP

## 2019-12-18 ENCOUNTER — LABORATORY RESULT (OUTPATIENT)
Age: 69
End: 2019-12-18

## 2019-12-18 ENCOUNTER — OUTPATIENT (OUTPATIENT)
Dept: OUTPATIENT SERVICES | Facility: HOSPITAL | Age: 69
LOS: 1 days | End: 2019-12-18
Payer: COMMERCIAL

## 2019-12-18 ENCOUNTER — APPOINTMENT (OUTPATIENT)
Dept: INFECTIOUS DISEASE | Facility: CLINIC | Age: 69
End: 2019-12-18

## 2019-12-18 ENCOUNTER — RX RENEWAL (OUTPATIENT)
Age: 69
End: 2019-12-18

## 2019-12-18 VITALS
HEIGHT: 65 IN | BODY MASS INDEX: 19.99 KG/M2 | WEIGHT: 120 LBS | OXYGEN SATURATION: 99 % | TEMPERATURE: 98.1 F | DIASTOLIC BLOOD PRESSURE: 84 MMHG | HEART RATE: 86 BPM | SYSTOLIC BLOOD PRESSURE: 141 MMHG

## 2019-12-18 DIAGNOSIS — L30.9 DERMATITIS, UNSPECIFIED: ICD-10-CM

## 2019-12-18 DIAGNOSIS — K08.9 DISORDER OF TEETH AND SUPPORTING STRUCTURES, UNSPECIFIED: ICD-10-CM

## 2019-12-18 DIAGNOSIS — B20 HUMAN IMMUNODEFICIENCY VIRUS [HIV] DISEASE: ICD-10-CM

## 2019-12-18 PROCEDURE — D0140: CPT

## 2019-12-19 DIAGNOSIS — Z01.20 ENCOUNTER FOR DENTAL EXAMINATION AND CLEANING WITHOUT ABNORMAL FINDINGS: ICD-10-CM

## 2019-12-19 LAB
4/8 RATIO: 0.16 RATIO — LOW (ref 0.86–4.14)
ABS CD8: 2019 /UL — HIGH (ref 90–775)
ALBUMIN SERPL ELPH-MCNC: 3.9 G/DL — SIGNIFICANT CHANGE UP (ref 3.3–5)
ALP SERPL-CCNC: 68 U/L — SIGNIFICANT CHANGE UP (ref 40–120)
ALT FLD-CCNC: 9 U/L — LOW (ref 10–45)
ANION GAP SERPL CALC-SCNC: 11 MMOL/L — SIGNIFICANT CHANGE UP (ref 5–17)
AST SERPL-CCNC: 11 U/L — SIGNIFICANT CHANGE UP (ref 10–40)
BILIRUB SERPL-MCNC: <0.2 MG/DL — SIGNIFICANT CHANGE UP (ref 0.2–1.2)
BUN SERPL-MCNC: 21 MG/DL — SIGNIFICANT CHANGE UP (ref 7–23)
CALCIUM SERPL-MCNC: 9.7 MG/DL — SIGNIFICANT CHANGE UP (ref 8.4–10.5)
CD16+CD56+ CELLS NFR BLD: 10 % — SIGNIFICANT CHANGE UP (ref 7–27)
CD16+CD56+ CELLS NFR SPEC: 269 /UL — SIGNIFICANT CHANGE UP (ref 80–426)
CD19 BLASTS SPEC-ACNC: 1 % — LOW (ref 4–18)
CD19 BLASTS SPEC-ACNC: 30 /UL — LOW (ref 32–326)
CD3 BLASTS SPEC-ACNC: 2397 /UL — HIGH (ref 396–2024)
CD3 BLASTS SPEC-ACNC: 86 % — HIGH (ref 58–84)
CD4 %: 12 % — LOW (ref 30–56)
CD8 %: 74 % — HIGH (ref 11–43)
CHLORIDE SERPL-SCNC: 108 MMOL/L — SIGNIFICANT CHANGE UP (ref 96–108)
CO2 SERPL-SCNC: 18 MMOL/L — LOW (ref 22–31)
CREAT SERPL-MCNC: 1.31 MG/DL — HIGH (ref 0.5–1.3)
GLUCOSE SERPL-MCNC: 100 MG/DL — HIGH (ref 70–99)
HCT VFR BLD CALC: 32.5 % — LOW (ref 34.5–45)
HGB BLD-MCNC: 9.9 G/DL — LOW (ref 11.5–15.5)
MCHC RBC-ENTMCNC: 30.5 GM/DL — LOW (ref 32–36)
MCHC RBC-ENTMCNC: 32.6 PG — SIGNIFICANT CHANGE UP (ref 27–34)
MCV RBC AUTO: 106.9 FL — HIGH (ref 80–100)
PLATELET # BLD AUTO: 255 K/UL — SIGNIFICANT CHANGE UP (ref 150–400)
POTASSIUM SERPL-MCNC: 5.7 MMOL/L — HIGH (ref 3.5–5.3)
POTASSIUM SERPL-SCNC: 5.7 MMOL/L — HIGH (ref 3.5–5.3)
PROT SERPL-MCNC: 6.6 G/DL — SIGNIFICANT CHANGE UP (ref 6–8.3)
RBC # BLD: 3.04 M/UL — LOW (ref 3.8–5.2)
RBC # FLD: 18.2 % — HIGH (ref 10.3–14.5)
SODIUM SERPL-SCNC: 137 MMOL/L — SIGNIFICANT CHANGE UP (ref 135–145)
T-CELL CD4 SUBSET PNL BLD: 327 /UL — SIGNIFICANT CHANGE UP (ref 325–1251)
WBC # BLD: 9.28 K/UL — SIGNIFICANT CHANGE UP (ref 3.8–10.5)
WBC # FLD AUTO: 9.28 K/UL — SIGNIFICANT CHANGE UP (ref 3.8–10.5)

## 2019-12-19 PROCEDURE — 80053 COMPREHEN METABOLIC PANEL: CPT

## 2019-12-19 PROCEDURE — 87536 HIV-1 QUANT&REVRSE TRNSCRPJ: CPT

## 2019-12-19 PROCEDURE — 86357 NK CELLS TOTAL COUNT: CPT

## 2019-12-19 PROCEDURE — G0463: CPT

## 2019-12-19 PROCEDURE — 85027 COMPLETE CBC AUTOMATED: CPT

## 2019-12-19 PROCEDURE — 86355 B CELLS TOTAL COUNT: CPT

## 2019-12-20 ENCOUNTER — CHART COPY (OUTPATIENT)
Age: 69
End: 2019-12-20

## 2019-12-20 ENCOUNTER — APPOINTMENT (OUTPATIENT)
Dept: INFECTIOUS DISEASE | Facility: CLINIC | Age: 69
End: 2019-12-20

## 2019-12-20 ENCOUNTER — LABORATORY RESULT (OUTPATIENT)
Age: 69
End: 2019-12-20

## 2019-12-20 ENCOUNTER — OUTPATIENT (OUTPATIENT)
Dept: OUTPATIENT SERVICES | Facility: HOSPITAL | Age: 69
LOS: 1 days | End: 2019-12-20

## 2019-12-20 DIAGNOSIS — B20 HUMAN IMMUNODEFICIENCY VIRUS [HIV] DISEASE: ICD-10-CM

## 2019-12-20 LAB
HIV-1 VIRAL LOAD RESULT: ABNORMAL
HIV1 RNA # SERPL NAA+PROBE: 400 — SIGNIFICANT CHANGE UP
HIV1 RNA SER-IMP: SIGNIFICANT CHANGE UP
HIV1 RNA SERPL NAA+PROBE-ACNC: ABNORMAL
HIV1 RNA SERPL NAA+PROBE-LOG#: 2.6 — SIGNIFICANT CHANGE UP

## 2019-12-22 NOTE — END OF VISIT
[] : Fellow [FreeTextEntry3] : Patient seen and exmained with Dr. Sevilla\par I agree with her interval history exam and plans as noted above\par Would check routine HIv albs\par Discussed importance of follow up with cardiology and her PCP for the irregular heart rate and Diabetes Mellitus

## 2019-12-22 NOTE — PHYSICAL EXAM
[Sclera] : the sclera and conjunctiva were normal [General Appearance - Alert] : alert [Outer Ear] : the ears and nose were normal in appearance [Neck Appearance] : the appearance of the neck was normal [Auscultation Breath Sounds / Voice Sounds] : lungs were clear to auscultation bilaterally [Heart Sounds] : normal S1 and S2 [Abdomen Soft] : soft [Edema] : there was no peripheral edema [Nail Clubbing] : no clubbing  or cyanosis of the fingernails [Oriented To Time, Place, And Person] : oriented to person, place, and time [No Focal Deficits] : no focal deficits [FreeTextEntry1] : rash on face, erythema below eyes, and papulonodular rash on face

## 2019-12-22 NOTE — HISTORY OF PRESENT ILLNESS
[FreeTextEntry1] : 68 y/o F with PMH of Hypothyroidism, DM recently diagnosed HIV and PCP pneumonia with Hypoxia and AfIb on presentation at [place], comes for HIV follow up .\par \par Patient was recently admitted to [facility] for weakness and ?sepsis s/p ICU stay for hypotension and hypoxia. \par She has completed PCP therapy and is on ppx. \par \par Patient had an episode of hypoglymia. on friday. \par  [Sexually Active] : The patient is not sexually active

## 2019-12-22 NOTE — ASSESSMENT
[Treatment Education] : treatment education [Rx Dose / Side Effects] : Rx dose/side effects [Treatment Adherence] : treatment adherence [Nutritional / Food Issues] : nutritional/food issues [Universal Precautions] : universal precautions [Risk Reduction] : risk reduction [Medical Care Issues] : medical care issues [Drug Interactions / Side Effects] : drug interactions/side effects [Disclosure of Diagnosis] : disclosure of diagnosis [Sexuality / Safer Sex] : sexuality/safer sex [HIV Education] : HIV Education [Anticipatory Guidance] : anticipatory guidance [FreeTextEntry1] : 70 y/o F with PMH of Hypothyroidism, DM recently diagnosed HIV and PCP pneumonia recent hospitalization in [facility] for weakness and sepsis came for follow up\par  passed away 1 year ago. Has not been sexually active since then. \par  \par \par HIV \par Viral load has trended down from 643,805  to 1600\par Cd4  - 194 today\par d/c azithromycin\par c/w Biktarvy\par repeat viral load and T cell count \par \par Oral thrush and dysphagia \par start Fluconazole \par diabetes\par c/w metformin\par Patient reported episode of hypoglycemia- unresponsiveness, better with sugar\par will d/c glyburide\par PCP referral for DM management \par \par rash on face\par ? seborrheic dermatitis \par will give steroid cream \par dermatology referral \par unlikely to be drug rash \par \par HMT\par Will vaccinate upon CD4 count recovery \par Reports UTD with flu shot 1 month ago from [facility] \par Dermatology referral \par dental referral \par UTD with flu vaccine in [facility] \par

## 2019-12-22 NOTE — REVIEW OF SYSTEMS
[Eye Pain] : no eye pain [Fever] : no fever [Chest Pain] : no chest pain [Palpitations] : no palpitations [Earache] : no earache [Abdominal Pain] : no abdominal pain [Shortness Of Breath] : no shortness of breath [Dysuria] : no dysuria [Limb Weakness] : no limb weakness [Confused] : no confusion [Dizziness] : no dizziness [FreeTextEntry9] : Inability to walk  [de-identified] : b/l leg weakness- non focal

## 2019-12-22 NOTE — REASON FOR VISIT
[Follow-Up: _____] : a [unfilled] follow-up visit [Initial Evaluation] : an initial evaluation [FreeTextEntry1] : 70 y/o F with PMH of Hypothyroidism, DM, recent diagnosis of PCP pneumonia , HIV, atrial fibrillation presented  for new diagnosis of HIV.

## 2020-01-07 ENCOUNTER — RX RENEWAL (OUTPATIENT)
Age: 70
End: 2020-01-07

## 2020-01-10 ENCOUNTER — RX RENEWAL (OUTPATIENT)
Age: 70
End: 2020-01-10

## 2020-01-15 ENCOUNTER — LABORATORY RESULT (OUTPATIENT)
Age: 70
End: 2020-01-15

## 2020-01-15 ENCOUNTER — OUTPATIENT (OUTPATIENT)
Dept: OUTPATIENT SERVICES | Facility: HOSPITAL | Age: 70
LOS: 1 days | End: 2020-01-15
Payer: COMMERCIAL

## 2020-01-15 ENCOUNTER — APPOINTMENT (OUTPATIENT)
Dept: INFECTIOUS DISEASE | Facility: CLINIC | Age: 70
End: 2020-01-15

## 2020-01-15 VITALS
BODY MASS INDEX: 23.22 KG/M2 | DIASTOLIC BLOOD PRESSURE: 84 MMHG | SYSTOLIC BLOOD PRESSURE: 145 MMHG | HEIGHT: 61 IN | TEMPERATURE: 97.1 F | OXYGEN SATURATION: 100 % | WEIGHT: 123 LBS | HEART RATE: 93 BPM

## 2020-01-15 DIAGNOSIS — B20 HUMAN IMMUNODEFICIENCY VIRUS [HIV] DISEASE: ICD-10-CM

## 2020-01-15 LAB
4/8 RATIO: 0.19 RATIO — LOW (ref 0.86–4.14)
ABS CD8: 2074 /UL — HIGH (ref 90–775)
ANION GAP SERPL CALC-SCNC: 12 MMOL/L — SIGNIFICANT CHANGE UP (ref 5–17)
BUN SERPL-MCNC: 37 MG/DL — HIGH (ref 7–23)
CALCIUM SERPL-MCNC: 10.9 MG/DL — HIGH (ref 8.4–10.5)
CD3 BLASTS SPEC-ACNC: 2492 /UL — HIGH (ref 396–2024)
CD3 BLASTS SPEC-ACNC: 81 % — SIGNIFICANT CHANGE UP (ref 58–84)
CD4 %: 13 % — LOW (ref 30–56)
CD8 %: 67 % — HIGH (ref 11–43)
CHLORIDE SERPL-SCNC: 108 MMOL/L — SIGNIFICANT CHANGE UP (ref 96–108)
CO2 SERPL-SCNC: 18 MMOL/L — LOW (ref 22–31)
CREAT SERPL-MCNC: 1.52 MG/DL — HIGH (ref 0.5–1.3)
GLUCOSE SERPL-MCNC: 93 MG/DL — SIGNIFICANT CHANGE UP (ref 70–99)
POTASSIUM SERPL-MCNC: 5.7 MMOL/L — HIGH (ref 3.5–5.3)
POTASSIUM SERPL-SCNC: 5.7 MMOL/L — HIGH (ref 3.5–5.3)
SODIUM SERPL-SCNC: 138 MMOL/L — SIGNIFICANT CHANGE UP (ref 135–145)
T-CELL CD4 SUBSET PNL BLD: 397 /UL — SIGNIFICANT CHANGE UP (ref 325–1251)

## 2020-01-15 PROCEDURE — G0463: CPT

## 2020-01-15 PROCEDURE — 80048 BASIC METABOLIC PNL TOTAL CA: CPT

## 2020-01-15 PROCEDURE — 86360 T CELL ABSOLUTE COUNT/RATIO: CPT

## 2020-01-15 PROCEDURE — 87536 HIV-1 QUANT&REVRSE TRNSCRPJ: CPT

## 2020-01-16 LAB
HIV-1 VIRAL LOAD RESULT: ABNORMAL
HIV1 RNA # SERPL NAA+PROBE: 62 — SIGNIFICANT CHANGE UP
HIV1 RNA SER-IMP: SIGNIFICANT CHANGE UP
HIV1 RNA SERPL NAA+PROBE-ACNC: ABNORMAL
HIV1 RNA SERPL NAA+PROBE-LOG#: 1.79 — SIGNIFICANT CHANGE UP

## 2020-01-16 NOTE — REASON FOR VISIT
[Follow-Up: _____] : a [unfilled] follow-up visit [Initial Evaluation] : an initial evaluation [FreeTextEntry1] : 68 y/o F with PMH of Hypothyroidism, DM, recent diagnosis of PCP pneumonia , HIV, atrial fibrillation presented  for new diagnosis of HIV.

## 2020-01-16 NOTE — ASSESSMENT
[Treatment Education] : treatment education [Treatment Adherence] : treatment adherence [Risk Reduction] : risk reduction [Rx Dose / Side Effects] : Rx dose/side effects [Universal Precautions] : universal precautions [Nutritional / Food Issues] : nutritional/food issues [Medical Care Issues] : medical care issues [Drug Interactions / Side Effects] : drug interactions/side effects [Disclosure of Diagnosis] : disclosure of diagnosis [HIV Education] : HIV Education [Sexuality / Safer Sex] : sexuality/safer sex [Anticipatory Guidance] : anticipatory guidance [FreeTextEntry1] : 70 y/o F with PMH of Hypothyroidism, DM recently diagnosed HIV and PCP pneumonia recent hospitalization in [facility] for weakness and sepsis came for follow up\par  passed away 1 year ago. Has not been sexually active since then. \par  \par \par HIV \par Viral load was 400 last visit \par Cd4  - 327\par c/w Biktarvy\par c/w PCP prophylaxis at least until 3/18. If CD 4 is still >200 then, will d/c bactrim \par repeat viral load and T cell count \par \par \par diabetes\par c/w metformin\par followed with primary care \par \par rash on face\par ? seborrheic dermatitis \par will give steroid cream \par dermatology referral \par unlikely to be drug rash \par \par HMT\par will give prevnar \par Reports UTD with flu shot 1 month ago from [facility]  \par dental referral \par UTD with flu vaccine in [facility] \par

## 2020-01-16 NOTE — PHYSICAL EXAM
[General Appearance - Alert] : alert [Sclera] : the sclera and conjunctiva were normal [Outer Ear] : the ears and nose were normal in appearance [Neck Appearance] : the appearance of the neck was normal [Heart Sounds] : normal S1 and S2 [Auscultation Breath Sounds / Voice Sounds] : lungs were clear to auscultation bilaterally [Edema] : there was no peripheral edema [Abdomen Soft] : soft [Nail Clubbing] : no clubbing  or cyanosis of the fingernails [Oriented To Time, Place, And Person] : oriented to person, place, and time [No Focal Deficits] : no focal deficits [FreeTextEntry1] : inabiltiy to bear weight

## 2020-01-16 NOTE — HISTORY OF PRESENT ILLNESS
[FreeTextEntry1] : 68 y/o F with PMH of Hypothyroidism, DM recently diagnosed HIV and PCP pneumonia with Hypoxia and AfIb on presentation at [place], comes for HIV follow up .\par She is compliant with medications. \par Reports difficulty swallowing water \par  [Sexually Active] : The patient is not sexually active

## 2020-01-16 NOTE — REVIEW OF SYSTEMS
[Negative] : Constitutional [Earache] : no earache [Eye Pain] : no eye pain [Fever] : no fever [Palpitations] : no palpitations [Chest Pain] : no chest pain [Shortness Of Breath] : no shortness of breath [Dysuria] : no dysuria [Abdominal Pain] : no abdominal pain [Dizziness] : no dizziness [Confused] : no confusion [Limb Weakness] : no limb weakness [FreeTextEntry9] : Inability to walk  [de-identified] : b/l leg weakness- non focal

## 2020-01-16 NOTE — DISCUSSION/SUMMARY
[FreeTextEntry1] : Potassium was high, son was notified and will bring patient to clinic to repeat potassium,.

## 2020-01-17 DIAGNOSIS — Z21 ASYMPTOMATIC HUMAN IMMUNODEFICIENCY VIRUS [HIV] INFECTION STATUS: ICD-10-CM

## 2020-01-31 ENCOUNTER — RX RENEWAL (OUTPATIENT)
Age: 70
End: 2020-01-31

## 2020-02-03 ENCOUNTER — RX RENEWAL (OUTPATIENT)
Age: 70
End: 2020-02-03

## 2020-02-12 ENCOUNTER — APPOINTMENT (OUTPATIENT)
Dept: INFECTIOUS DISEASE | Facility: CLINIC | Age: 70
End: 2020-02-12

## 2020-02-12 ENCOUNTER — LABORATORY RESULT (OUTPATIENT)
Age: 70
End: 2020-02-12

## 2020-02-12 ENCOUNTER — OUTPATIENT (OUTPATIENT)
Dept: OUTPATIENT SERVICES | Facility: HOSPITAL | Age: 70
LOS: 1 days | End: 2020-02-12
Payer: COMMERCIAL

## 2020-02-12 ENCOUNTER — MED ADMIN CHARGE (OUTPATIENT)
Age: 70
End: 2020-02-12

## 2020-02-12 VITALS
WEIGHT: 125 LBS | TEMPERATURE: 97.7 F | BODY MASS INDEX: 23 KG/M2 | HEIGHT: 62 IN | SYSTOLIC BLOOD PRESSURE: 131 MMHG | RESPIRATION RATE: 20 BRPM | OXYGEN SATURATION: 100 % | DIASTOLIC BLOOD PRESSURE: 86 MMHG | HEART RATE: 94 BPM

## 2020-02-12 DIAGNOSIS — B20 HUMAN IMMUNODEFICIENCY VIRUS [HIV] DISEASE: ICD-10-CM

## 2020-02-12 LAB
4/8 RATIO: 0.22 RATIO — LOW (ref 0.86–4.14)
ABS CD8: 1874 /UL — HIGH (ref 90–775)
CD3 BLASTS SPEC-ACNC: 2296 /UL — HIGH (ref 396–2024)
CD3 BLASTS SPEC-ACNC: 80 % — SIGNIFICANT CHANGE UP (ref 58–84)
CD4 %: 14 % — LOW (ref 30–56)
CD8 %: 65 % — HIGH (ref 11–43)
T-CELL CD4 SUBSET PNL BLD: 406 /UL — SIGNIFICANT CHANGE UP (ref 325–1251)

## 2020-02-12 PROCEDURE — G0463: CPT

## 2020-02-12 PROCEDURE — 86360 T CELL ABSOLUTE COUNT/RATIO: CPT

## 2020-02-12 PROCEDURE — 87536 HIV-1 QUANT&REVRSE TRNSCRPJ: CPT

## 2020-02-12 RX ORDER — FLUCONAZOLE 200 MG/1
200 TABLET ORAL
Qty: 30 | Refills: 2 | Status: DISCONTINUED | COMMUNITY
Start: 2019-12-18 | End: 2020-02-12

## 2020-02-12 RX ORDER — AZITHROMYCIN 600 MG/1
600 TABLET, FILM COATED ORAL
Qty: 8 | Refills: 0 | Status: DISCONTINUED | COMMUNITY
Start: 2019-11-06 | End: 2020-02-12

## 2020-02-15 NOTE — PHYSICAL EXAM
[General Appearance - Alert] : alert [Sclera] : the sclera and conjunctiva were normal [Outer Ear] : the ears and nose were normal in appearance [Neck Appearance] : the appearance of the neck was normal [Auscultation Breath Sounds / Voice Sounds] : lungs were clear to auscultation bilaterally [Heart Sounds] : normal S1 and S2 [Edema] : there was no peripheral edema [Abdomen Soft] : soft [Nail Clubbing] : no clubbing  or cyanosis of the fingernails [No Focal Deficits] : no focal deficits [Oriented To Time, Place, And Person] : oriented to person, place, and time [FreeTextEntry1] : inabiltiy to bear weight

## 2020-02-15 NOTE — END OF VISIT
[] : Fellow [FreeTextEntry3] : Patient seen and examined with Dr. Sevilla  I agree with her interval history exam and plans as noted above\par \par Patient adherent with her ARVs, slowly but steadily improving\par Remains with AIDS but hopefully with increasing Tcells\par Follow up Cardiology\par

## 2020-02-15 NOTE — HISTORY OF PRESENT ILLNESS
[FreeTextEntry1] : 70 y/o F with PMH of Hypothyroidism, DM recently diagnosed HIV and PCP pneumonia with Hypoxia and AfIb on presentation at [place], comes for HIV follow up .\par She is compliant with medications. \par \par  [Sexually Active] : The patient is not sexually active

## 2020-02-15 NOTE — ASSESSMENT
[Treatment Adherence] : treatment adherence [Treatment Education] : treatment education [Rx Dose / Side Effects] : Rx dose/side effects [Risk Reduction] : risk reduction [Nutritional / Food Issues] : nutritional/food issues [Universal Precautions] : universal precautions [Medical Care Issues] : medical care issues [Drug Interactions / Side Effects] : drug interactions/side effects [HIV Education] : HIV Education [Disclosure of Diagnosis] : disclosure of diagnosis [Sexuality / Safer Sex] : sexuality/safer sex [Anticipatory Guidance] : anticipatory guidance [FreeTextEntry1] : 68 y/o F with PMH of Hypothyroidism, DM recently diagnosed HIV and PCP pneumonia recent hospitalization in [facility] for weakness and sepsis came for follow up\par  passed away 1 year ago. Has not been sexually active since then. \par  \par \par HIV \par Viral load was 62 last visit \par Cd4  - 397\par c/w Biktarvy\par c/w PCP prophylaxis at least until 3/18. If CD 4 is still >200 then, will d/c bactrim \par repeat viral load and T cell count \par \par \par diabetes\par c/w metformin\par followed with primary care \par \par rash on face\par ? seborrheic dermatitis \par will give steroid cream \par dermatology referral \par unlikely to be drug rash \par \par HMT\par will give prevnar \par Reports UTD with flu shot 1 month ago from [facility]  \par dental referral \par UTD with flu vaccine in [facility] \par Will give heplisav \par

## 2020-02-15 NOTE — REVIEW OF SYSTEMS
[Negative] : Constitutional [Fever] : no fever [Eye Pain] : no eye pain [Earache] : no earache [Chest Pain] : no chest pain [Palpitations] : no palpitations [Shortness Of Breath] : no shortness of breath [Abdominal Pain] : no abdominal pain [Dysuria] : no dysuria [Confused] : no confusion [Dizziness] : no dizziness [Limb Weakness] : no limb weakness [FreeTextEntry9] : Inability to walk  [de-identified] : b/l leg weakness- non focal

## 2020-03-13 ENCOUNTER — APPOINTMENT (OUTPATIENT)
Dept: INFECTIOUS DISEASE | Facility: CLINIC | Age: 70
End: 2020-03-13

## 2020-03-13 NOTE — PHYSICAL THERAPY INITIAL EVALUATION ADULT - NAME OF DISCHARGE PLANNER
Is This A New Presentation, Or A Follow-Up?: Skin Lesion How Severe Is Your Skin Lesion?: mild Karol

## 2020-03-23 ENCOUNTER — RX RENEWAL (OUTPATIENT)
Age: 70
End: 2020-03-23

## 2020-04-08 ENCOUNTER — OUTPATIENT (OUTPATIENT)
Dept: OUTPATIENT SERVICES | Facility: HOSPITAL | Age: 70
LOS: 1 days | End: 2020-04-08
Payer: COMMERCIAL

## 2020-04-08 ENCOUNTER — APPOINTMENT (OUTPATIENT)
Dept: INFECTIOUS DISEASE | Facility: CLINIC | Age: 70
End: 2020-04-08

## 2020-04-08 VITALS
BODY MASS INDEX: 24.92 KG/M2 | HEIGHT: 61 IN | RESPIRATION RATE: 18 BRPM | DIASTOLIC BLOOD PRESSURE: 104 MMHG | TEMPERATURE: 97.7 F | HEART RATE: 100 BPM | OXYGEN SATURATION: 100 % | SYSTOLIC BLOOD PRESSURE: 147 MMHG | WEIGHT: 132 LBS

## 2020-04-08 DIAGNOSIS — B20 HUMAN IMMUNODEFICIENCY VIRUS [HIV] DISEASE: ICD-10-CM

## 2020-04-08 PROCEDURE — G0010: CPT

## 2020-04-08 PROCEDURE — G0009: CPT

## 2020-04-08 PROCEDURE — G0463: CPT | Mod: 25

## 2020-04-08 PROCEDURE — 90732 PPSV23 VACC 2 YRS+ SUBQ/IM: CPT

## 2020-04-08 PROCEDURE — 90739 HEPB VACC 2/4 DOSE ADULT IM: CPT

## 2020-04-10 NOTE — ASSESSMENT
[Treatment Education] : treatment education [Treatment Adherence] : treatment adherence [Rx Dose / Side Effects] : Rx dose/side effects [Risk Reduction] : risk reduction [Nutritional / Food Issues] : nutritional/food issues [Universal Precautions] : universal precautions [Medical Care Issues] : medical care issues [Drug Interactions / Side Effects] : drug interactions/side effects [Disclosure of Diagnosis] : disclosure of diagnosis [HIV Education] : HIV Education [Sexuality / Safer Sex] : sexuality/safer sex [Anticipatory Guidance] : anticipatory guidance [FreeTextEntry1] : 70 y/o F with PMH of Hypothyroidism, DM recently diagnosed HIV and PCP pneumonia recent hospitalization in [facility] for weakness and sepsis came for follow up\par  passed away 1 year ago. Has not been sexually active since then. \par  \par \par HIV \par Viral load 48 \par Cd4  - 408\par c/w Biktarvy\par c/w PCP prophylaxis for now. will reassess when viral load is undetectable \par repeat viral load and T cell count \par \par \par diabetes\par c/w metformin\par followed with primary care \par \par \par HMT\par UTD with prevnar. will give pneumococcal vaccine again \par Reports UTD with flu shot from [facility]  \par Will give heplisav 2nd series today \par \par \par \par RTC in 2 mo. Will check results and call earlier if needed \par

## 2020-04-10 NOTE — HISTORY OF PRESENT ILLNESS
[FreeTextEntry1] : 69 y/o F with PMH of Hypothyroidism, DM recently diagnosed HIV and PCP pneumonia with Hypoxia and AfIb on presentation at [place] in 11/2109, comes for HIV follow up .\par She is compliant with medications. \par No new complaints \par \par  [Sexually Active] : The patient is not sexually active

## 2020-04-10 NOTE — REVIEW OF SYSTEMS
[Negative] : Constitutional [Fever] : no fever [Eye Pain] : no eye pain [Earache] : no earache [Chest Pain] : no chest pain [Palpitations] : no palpitations [Shortness Of Breath] : no shortness of breath [Abdominal Pain] : no abdominal pain [Dysuria] : no dysuria [Confused] : no confusion [Dizziness] : no dizziness [Limb Weakness] : no limb weakness [FreeTextEntry9] : Inability to walk  [de-identified] : b/l leg weakness- non focal

## 2020-04-10 NOTE — END OF VISIT
[] : Fellow [FreeTextEntry3] : HIV, recently diagnosed with CD4< 200, now with very good response to ARV with Biktarvy, CD4 > 200, VL approaching full suppression, excellent adherence. Can DC PCP prophylaxis if VL < 30. Continue adherence support, complete necessary vaccinations [Time Spent: ___ minutes] : I have spent [unfilled] minutes of face to face time with the patient

## 2020-04-13 DIAGNOSIS — Z23 ENCOUNTER FOR IMMUNIZATION: ICD-10-CM

## 2020-05-08 ENCOUNTER — RX RENEWAL (OUTPATIENT)
Age: 70
End: 2020-05-08

## 2020-06-10 ENCOUNTER — MED ADMIN CHARGE (OUTPATIENT)
Age: 70
End: 2020-06-10

## 2020-06-10 ENCOUNTER — LABORATORY RESULT (OUTPATIENT)
Age: 70
End: 2020-06-10

## 2020-06-10 ENCOUNTER — APPOINTMENT (OUTPATIENT)
Dept: INFECTIOUS DISEASE | Facility: CLINIC | Age: 70
End: 2020-06-10

## 2020-06-10 ENCOUNTER — OUTPATIENT (OUTPATIENT)
Dept: OUTPATIENT SERVICES | Facility: HOSPITAL | Age: 70
LOS: 1 days | End: 2020-06-10
Payer: COMMERCIAL

## 2020-06-10 VITALS
SYSTOLIC BLOOD PRESSURE: 166 MMHG | WEIGHT: 143 LBS | TEMPERATURE: 98.7 F | HEART RATE: 108 BPM | BODY MASS INDEX: 27 KG/M2 | OXYGEN SATURATION: 99 % | DIASTOLIC BLOOD PRESSURE: 98 MMHG | HEIGHT: 61 IN | RESPIRATION RATE: 20 BRPM

## 2020-06-10 DIAGNOSIS — I10 ESSENTIAL (PRIMARY) HYPERTENSION: ICD-10-CM

## 2020-06-10 DIAGNOSIS — B20 HUMAN IMMUNODEFICIENCY VIRUS [HIV] DISEASE: ICD-10-CM

## 2020-06-10 LAB
24R-OH-CALCIDIOL SERPL-MCNC: 18 NG/ML — LOW (ref 30–80)
4/8 RATIO: 0.23 RATIO — LOW (ref 0.86–4.14)
A1C WITH ESTIMATED AVERAGE GLUCOSE RESULT: 6.2 % — HIGH (ref 4–5.6)
ABS CD8: 1476 /UL — HIGH (ref 90–775)
ALBUMIN SERPL ELPH-MCNC: 4.3 G/DL — SIGNIFICANT CHANGE UP (ref 3.3–5)
ALP SERPL-CCNC: 91 U/L — SIGNIFICANT CHANGE UP (ref 40–120)
ALT FLD-CCNC: 12 U/L — SIGNIFICANT CHANGE UP (ref 10–45)
ANION GAP SERPL CALC-SCNC: 13 MMOL/L — SIGNIFICANT CHANGE UP (ref 5–17)
AST SERPL-CCNC: 12 U/L — SIGNIFICANT CHANGE UP (ref 10–40)
BILIRUB DIRECT SERPL-MCNC: 0.1 MG/DL — SIGNIFICANT CHANGE UP (ref 0–0.2)
BILIRUB INDIRECT FLD-MCNC: 0.1 MG/DL — LOW (ref 0.2–1.2)
BILIRUB SERPL-MCNC: <0.2 MG/DL — SIGNIFICANT CHANGE UP (ref 0.2–1.2)
BUN SERPL-MCNC: 24 MG/DL — HIGH (ref 7–23)
CALCIUM SERPL-MCNC: 10.1 MG/DL — SIGNIFICANT CHANGE UP (ref 8.4–10.5)
CD3 BLASTS SPEC-ACNC: 1818 /UL — SIGNIFICANT CHANGE UP (ref 396–2024)
CD3 BLASTS SPEC-ACNC: 81 % — SIGNIFICANT CHANGE UP (ref 58–84)
CD4 %: 15 % — LOW (ref 30–56)
CD8 %: 66 % — HIGH (ref 11–43)
CHLORIDE SERPL-SCNC: 109 MMOL/L — HIGH (ref 96–108)
CO2 SERPL-SCNC: 21 MMOL/L — LOW (ref 22–31)
CREAT SERPL-MCNC: 1.51 MG/DL — HIGH (ref 0.5–1.3)
ESTIMATED AVERAGE GLUCOSE: 131 MG/DL — HIGH (ref 68–114)
GLUCOSE SERPL-MCNC: 119 MG/DL — HIGH (ref 70–99)
HCT VFR BLD CALC: 34.2 % — LOW (ref 34.5–45)
HGB BLD-MCNC: 10.5 G/DL — LOW (ref 11.5–15.5)
LACTATE SERPL-SCNC: 1 MMOL/L — SIGNIFICANT CHANGE UP (ref 0.5–2)
MCHC RBC-ENTMCNC: 30.7 GM/DL — LOW (ref 32–36)
MCHC RBC-ENTMCNC: 32.6 PG — SIGNIFICANT CHANGE UP (ref 27–34)
MCV RBC AUTO: 106.2 FL — HIGH (ref 80–100)
PLATELET # BLD AUTO: 238 K/UL — SIGNIFICANT CHANGE UP (ref 150–400)
POTASSIUM SERPL-MCNC: 5 MMOL/L — SIGNIFICANT CHANGE UP (ref 3.5–5.3)
POTASSIUM SERPL-SCNC: 5 MMOL/L — SIGNIFICANT CHANGE UP (ref 3.5–5.3)
PROT SERPL-MCNC: 7 G/DL — SIGNIFICANT CHANGE UP (ref 6–8.3)
RBC # BLD: 3.22 M/UL — LOW (ref 3.8–5.2)
RBC # FLD: 12 % — SIGNIFICANT CHANGE UP (ref 10.3–14.5)
SODIUM SERPL-SCNC: 143 MMOL/L — SIGNIFICANT CHANGE UP (ref 135–145)
T-CELL CD4 SUBSET PNL BLD: 337 /UL — SIGNIFICANT CHANGE UP (ref 325–1251)
WBC # BLD: 7.32 K/UL — SIGNIFICANT CHANGE UP (ref 3.8–10.5)
WBC # FLD AUTO: 7.32 K/UL — SIGNIFICANT CHANGE UP (ref 3.8–10.5)

## 2020-06-10 PROCEDURE — 80048 BASIC METABOLIC PNL TOTAL CA: CPT

## 2020-06-10 PROCEDURE — 85027 COMPLETE CBC AUTOMATED: CPT

## 2020-06-10 PROCEDURE — 80076 HEPATIC FUNCTION PANEL: CPT

## 2020-06-10 PROCEDURE — 82306 VITAMIN D 25 HYDROXY: CPT

## 2020-06-10 PROCEDURE — 90715 TDAP VACCINE 7 YRS/> IM: CPT

## 2020-06-10 PROCEDURE — 90471 IMMUNIZATION ADMIN: CPT

## 2020-06-10 PROCEDURE — G0463: CPT | Mod: 25

## 2020-06-10 PROCEDURE — 83036 HEMOGLOBIN GLYCOSYLATED A1C: CPT

## 2020-06-10 PROCEDURE — 83605 ASSAY OF LACTIC ACID: CPT

## 2020-06-10 PROCEDURE — 86360 T CELL ABSOLUTE COUNT/RATIO: CPT

## 2020-06-10 PROCEDURE — 87536 HIV-1 QUANT&REVRSE TRNSCRPJ: CPT

## 2020-06-10 RX ORDER — SULFAMETHOXAZOLE AND TRIMETHOPRIM 400; 80 MG/1; MG/1
400-80 TABLET ORAL DAILY
Qty: 30 | Refills: 1 | Status: COMPLETED | COMMUNITY
Start: 2019-11-20 | End: 2020-06-10

## 2020-06-11 ENCOUNTER — RX RENEWAL (OUTPATIENT)
Age: 70
End: 2020-06-11

## 2020-06-11 DIAGNOSIS — E11.9 TYPE 2 DIABETES MELLITUS WITHOUT COMPLICATIONS: ICD-10-CM

## 2020-06-11 DIAGNOSIS — I10 ESSENTIAL (PRIMARY) HYPERTENSION: ICD-10-CM

## 2020-06-11 DIAGNOSIS — Z23 ENCOUNTER FOR IMMUNIZATION: ICD-10-CM

## 2020-06-11 DIAGNOSIS — Z21 ASYMPTOMATIC HUMAN IMMUNODEFICIENCY VIRUS [HIV] INFECTION STATUS: ICD-10-CM

## 2020-06-12 LAB
HIV-1 VIRAL LOAD RESULT: ABNORMAL
HIV1 RNA # SERPL NAA+PROBE: SIGNIFICANT CHANGE UP
HIV1 RNA SER-IMP: SIGNIFICANT CHANGE UP
HIV1 RNA SERPL NAA+PROBE-ACNC: ABNORMAL
HIV1 RNA SERPL NAA+PROBE-LOG#: ABNORMAL LG COP/ML

## 2020-06-13 NOTE — REVIEW OF SYSTEMS
[Negative] : Constitutional [Fever] : no fever [Eye Pain] : no eye pain [Palpitations] : no palpitations [Chest Pain] : no chest pain [Earache] : no earache [Confused] : no confusion [Abdominal Pain] : no abdominal pain [Dysuria] : no dysuria [Shortness Of Breath] : no shortness of breath [Limb Weakness] : no limb weakness [Dizziness] : no dizziness [FreeTextEntry9] : Inability to walk  [de-identified] : b/l leg weakness- non focal

## 2020-06-13 NOTE — END OF VISIT
[] : Fellow [FreeTextEntry3] : HIV, recently diagnosed with CD4< 200, now with very good response to ARV with Biktarvy, CD4 > 200, VL approaching full suppression, excellent adherence. Can DC PCP prophylaxis if VL < 30. Continue adherence support, complete necessary vaccinations [FreeTextEntry2] : Ms Cipriano presented with PCP, very hi VL and low CD4. First VL here >600K, but reported even higher during hospitalization.  CD4 <50. VL has fallen dramatically on Biktarvy ,which she takes and tolerates. Last VL was still detectable but <50.  CD4 has risen to well above 200. She can continue Biktarvy and d/c PCP px.

## 2020-06-13 NOTE — ASSESSMENT
[Treatment Education] : treatment education [Treatment Adherence] : treatment adherence [Rx Dose / Side Effects] : Rx dose/side effects [Risk Reduction] : risk reduction [Nutritional / Food Issues] : nutritional/food issues [Universal Precautions] : universal precautions [Medical Care Issues] : medical care issues [Drug Interactions / Side Effects] : drug interactions/side effects [Disclosure of Diagnosis] : disclosure of diagnosis [HIV Education] : HIV Education [Sexuality / Safer Sex] : sexuality/safer sex [Anticipatory Guidance] : anticipatory guidance [FreeTextEntry1] : 69 y/o F with PMH of Hypothyroidism, DM, diagnosed HIV 11 /2019 with HIV and PCP pneumonia came for follow up.  passed away 1 year ago from non HIV diagnosis per patient. Has not been sexually active since then. \par  \par \par HIV \par Viral load 44\par Cd4  - >400\par c/w Biktarvy\par d/c bactrim, no longer requires pcp ppx ( CD4> 200 for >3 months)\par repeat viral load and T cell count \par \par \par diabetes\par c/w metformin\par followed with primary care \par \par \par HTN\par Started amlodipine 5 mg today \par \par HMT\par UTD with prevnar & pneumococcal vaccine \par Reports UTD with flu shot from [facility]  2019\par UTD with heplisav\par Tdap today \par Shringrix series next visit \par will discuss colonoscopy, mammo and pap next visit (most elective procedures will have started)\par \par RTC in 2 months \par \par \par \par RTC in 2 mo. Will check results and call earlier if needed \par

## 2020-06-13 NOTE — HISTORY OF PRESENT ILLNESS
[FreeTextEntry1] : 69 y/o F with PMH of Hypothyroidism, DM recently diagnosed HIV and PCP pneumonia with Hypoxia and AfIb on presentation at [place] in 11/2109, comes for HIV follow up .\par She is compliant with medications. \par No new complaints \par \par  [Sexually Active] : The patient is not sexually active

## 2020-07-21 NOTE — PROGRESS NOTE ADULT - PROBLEM SELECTOR PLAN 4
I will send in 2 g of Flagyl as a single dose since his partner had tested positive for Trichomonas  I would encourage patient to have labs completed 
MS, Pt is requesting a refill for the ABX you prescribed him last month for STD but pt never got the labs done, may we refill or does pt need an appt?
Pt notified 
likely due to sepis/infection   pt family reports sustained SVT to 170s at  visit 1 month ago  will monitor on remote telemetry - cardiology following ([facility])
Renal function are deteriorating.  Suspect 2/2 dehydration vs Drug side effect.  IVF: NS @ 100/hr.  Continue to monitor.
likely due to sepis/infection   pt family reports sustained SVT to 170s at  visit 1 month ago  will monitor on remote telemetry - cardiology following ([facility])
likely due to sepis/infection  will get venous doppler stat  pt family reports sustained SVT to 170s at  visit 1 month ago  will monitor on remote telemetry - cardiology consult with dr nuñez

## 2020-08-18 ENCOUNTER — FORM ENCOUNTER (OUTPATIENT)
Age: 70
End: 2020-08-18

## 2020-08-19 ENCOUNTER — LABORATORY RESULT (OUTPATIENT)
Age: 70
End: 2020-08-19

## 2020-08-19 ENCOUNTER — OUTPATIENT (OUTPATIENT)
Dept: OUTPATIENT SERVICES | Facility: HOSPITAL | Age: 70
LOS: 1 days | End: 2020-08-19
Payer: COMMERCIAL

## 2020-08-19 ENCOUNTER — APPOINTMENT (OUTPATIENT)
Dept: INFECTIOUS DISEASE | Facility: CLINIC | Age: 70
End: 2020-08-19

## 2020-08-19 VITALS
SYSTOLIC BLOOD PRESSURE: 156 MMHG | HEART RATE: 77 BPM | OXYGEN SATURATION: 100 % | BODY MASS INDEX: 26.81 KG/M2 | WEIGHT: 142 LBS | RESPIRATION RATE: 16 BRPM | DIASTOLIC BLOOD PRESSURE: 88 MMHG | TEMPERATURE: 98 F | HEIGHT: 61 IN

## 2020-08-19 DIAGNOSIS — B20 HUMAN IMMUNODEFICIENCY VIRUS [HIV] DISEASE: ICD-10-CM

## 2020-08-19 PROCEDURE — 86769 SARS-COV-2 COVID-19 ANTIBODY: CPT

## 2020-08-19 PROCEDURE — G0463: CPT

## 2020-08-19 PROCEDURE — 80053 COMPREHEN METABOLIC PANEL: CPT

## 2020-08-19 PROCEDURE — 82610 CYSTATIN C: CPT

## 2020-08-19 PROCEDURE — 86360 T CELL ABSOLUTE COUNT/RATIO: CPT

## 2020-08-19 PROCEDURE — 87536 HIV-1 QUANT&REVRSE TRNSCRPJ: CPT

## 2020-08-19 PROCEDURE — 86480 TB TEST CELL IMMUN MEASURE: CPT

## 2020-08-20 LAB
4/8 RATIO: 0.18 RATIO — LOW (ref 0.86–4.14)
ABS CD8: 2228 /UL — HIGH (ref 90–775)
ALBUMIN SERPL ELPH-MCNC: 4.5 G/DL — SIGNIFICANT CHANGE UP (ref 3.3–5)
ALP SERPL-CCNC: 118 U/L — SIGNIFICANT CHANGE UP (ref 40–120)
ALT FLD-CCNC: 11 U/L — SIGNIFICANT CHANGE UP (ref 10–45)
ANION GAP SERPL CALC-SCNC: 16 MMOL/L — SIGNIFICANT CHANGE UP (ref 5–17)
AST SERPL-CCNC: 13 U/L — SIGNIFICANT CHANGE UP (ref 10–40)
BILIRUB SERPL-MCNC: <0.2 MG/DL — SIGNIFICANT CHANGE UP (ref 0.2–1.2)
BUN SERPL-MCNC: 27 MG/DL — HIGH (ref 7–23)
CALCIUM SERPL-MCNC: 10.2 MG/DL — SIGNIFICANT CHANGE UP (ref 8.4–10.5)
CD3 BLASTS SPEC-ACNC: 2625 /UL — HIGH (ref 396–2024)
CD3 BLASTS SPEC-ACNC: 84 % — SIGNIFICANT CHANGE UP (ref 58–84)
CD4 %: 13 % — LOW (ref 30–56)
CD8 %: 71 % — HIGH (ref 11–43)
CHLORIDE SERPL-SCNC: 107 MMOL/L — SIGNIFICANT CHANGE UP (ref 96–108)
CO2 SERPL-SCNC: 20 MMOL/L — LOW (ref 22–31)
CREAT SERPL-MCNC: 1.46 MG/DL — HIGH (ref 0.5–1.3)
CYSTATIN C SERPL-MCNC: 1.92 MG/L — HIGH (ref 0.66–1.26)
GFR/BSA.PRED SERPLBLD CYS-BASED-ARV: 30 ML/MIN — LOW
GLUCOSE SERPL-MCNC: 122 MG/DL — HIGH (ref 70–99)
HIV-1 VIRAL LOAD RESULT: ABNORMAL
HIV1 RNA # SERPL NAA+PROBE: SIGNIFICANT CHANGE UP
HIV1 RNA SER-IMP: SIGNIFICANT CHANGE UP
HIV1 RNA SERPL NAA+PROBE-ACNC: ABNORMAL
HIV1 RNA SERPL NAA+PROBE-LOG#: ABNORMAL LG COP/ML
POTASSIUM SERPL-MCNC: 5.3 MMOL/L — SIGNIFICANT CHANGE UP (ref 3.5–5.3)
POTASSIUM SERPL-SCNC: 5.3 MMOL/L — SIGNIFICANT CHANGE UP (ref 3.5–5.3)
PROT SERPL-MCNC: 7.1 G/DL — SIGNIFICANT CHANGE UP (ref 6–8.3)
SARS-COV-2 IGG SERPL QL IA: NEGATIVE — SIGNIFICANT CHANGE UP
SARS-COV-2 IGM SERPL IA-ACNC: 0.06 INDEX — SIGNIFICANT CHANGE UP
SODIUM SERPL-SCNC: 143 MMOL/L — SIGNIFICANT CHANGE UP (ref 135–145)
T-CELL CD4 SUBSET PNL BLD: 392 /UL — SIGNIFICANT CHANGE UP (ref 325–1251)

## 2020-08-21 DIAGNOSIS — Z21 ASYMPTOMATIC HUMAN IMMUNODEFICIENCY VIRUS [HIV] INFECTION STATUS: ICD-10-CM

## 2020-08-21 LAB
GAMMA INTERFERON BACKGROUND BLD IA-ACNC: 0.14 IU/ML — SIGNIFICANT CHANGE UP
M TB IFN-G BLD-IMP: NEGATIVE — SIGNIFICANT CHANGE UP
M TB IFN-G CD4+ BCKGRND COR BLD-ACNC: -0.07 IU/ML — SIGNIFICANT CHANGE UP
M TB IFN-G CD4+CD8+ BCKGRND COR BLD-ACNC: 0 IU/ML — SIGNIFICANT CHANGE UP
QUANT TB PLUS MITOGEN MINUS NIL: 6.44 IU/ML — SIGNIFICANT CHANGE UP

## 2020-08-21 NOTE — ASSESSMENT
[Treatment Education] : treatment education [Treatment Adherence] : treatment adherence [Rx Dose / Side Effects] : Rx dose/side effects [Risk Reduction] : risk reduction [Nutritional / Food Issues] : nutritional/food issues [Universal Precautions] : universal precautions [Medical Care Issues] : medical care issues [Drug Interactions / Side Effects] : drug interactions/side effects [Disclosure of Diagnosis] : disclosure of diagnosis [HIV Education] : HIV Education [Sexuality / Safer Sex] : sexuality/safer sex [Anticipatory Guidance] : anticipatory guidance [FreeTextEntry1] : 69 y/o F with PMH of Hypothyroidism, DM, diagnosed HIV 11 /2019 with HIV and PCP pneumonia came for follow up.  passed away 1 year ago from non HIV diagnosis per patient. Has not been sexually active since then. \par  \par #HIV/AIDS\par - diagnosed HIV 11 /2019 with HIV and PCP pneumonia\par - Unclear source of HIV infection\par - CD4>200 for 3 months, not on PCP ppx anymore\par - c/w Biktarvy\par - YP9=847 (15%), down from 397 but within range\par - VL det<30\par - Last Quntiferone indet, will repeat given now has more CD4\par \par #DM:\par - A1c=6.2%, good\par - c/w metformin 500mg daily\par \par #CKD:\par - Cr at baseline=1.5\par - Cautious about Biktarvy, metformin use\par - Check cystatin C for acute GFR\par - Check urine protein/Cr ratio\par \par #HTN\par - c/w amlodipine 5mg daily\par \par #Hypothyroidism\par - Check TSH\par - c/w synthroid 75mg daily\par \par #Heartburn\par - Likely GERD\par - Pt is ok to take PPI or H2b while on Biktarvy\par - If pt is taking antacid should spare at least 2 hr with Biktarvy\par - Refer to GI for workup and colonscopy\par \par HMT\par UTD with prevnar & pneumococcal vaccine \par Reports UTD with flu shot from [facility]  2019\par UTD with heplisav\par Tdap given in 06/2020\par Shringrix series next visit when she has more immunity\par Refer to GI for colonscopy\par Refer to Gyn for Kiana and Pap\par Refer to PT for muscle strengh\par \par RTC in 3 months \par \par \par \par RTC in 2 mo. Will check results and call earlier if needed \par

## 2020-08-21 NOTE — END OF VISIT
[] : Fellow [FreeTextEntry3] : Patient seen and examiined with Dr Smith I agree with his interval history exam and plans as noted above\par Patient well known to me with AIDS but almost non detectable VL and improiving immune function on AVs, will need Cardiology follow up for her A.fib\par cotninue meds check labs\par COVID avoidance and testing discussed with her and her nephew

## 2020-08-21 NOTE — REASON FOR VISIT
[Follow-Up: _____] : a [unfilled] follow-up visit [Other: _____] : [unfilled] [Initial Evaluation] : an initial evaluation [FreeTextEntry1] : 70 y/o F with PMH of Hypothyroidism, DM, recent diagnosis of PCP pneumonia , HIV, atrial fibrillation presented  for new diagnosis of HIV.

## 2020-08-21 NOTE — HISTORY OF PRESENT ILLNESS
[FreeTextEntry1] : 69 y/o F with PMH of Hypothyroidism, DM recently diagnosed HIV and PCP pneumonia with Hypoxia and AfIb on presentation at [place] in 11/2109, comes for HIV follow up .\par She is compliant with medications. \par \par She is born in [place]. [place]oved [place] 2 [place] ago. Diagnosed HIV in 11/2019 due to PCP pneumonia. Now on Biktarvy, compliant, CD4/VL improving. 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 Today comes with cousin who speaks English, he is aware of her HIV. No toxic behavior or sexually active.\par \par Her cardiologist added her metoprolol tartrate 25mg daily (likely for Afib rate control), unclear why not metoprolol succinate.\par \par She complains of heartburn.\par  [Sexually Active] : The patient is not sexually active

## 2020-08-21 NOTE — REVIEW OF SYSTEMS
[Negative] : Constitutional [Fever] : no fever [Eye Pain] : no eye pain [Earache] : no earache [Chest Pain] : no chest pain [Palpitations] : no palpitations [Shortness Of Breath] : no shortness of breath [Abdominal Pain] : no abdominal pain [Dysuria] : no dysuria [Confused] : no confusion [Dizziness] : no dizziness [Limb Weakness] : no limb weakness [FreeTextEntry9] : Inability to walk  [de-identified] : b/l leg weakness- non focal

## 2020-09-05 ENCOUNTER — RX RENEWAL (OUTPATIENT)
Age: 70
End: 2020-09-05

## 2020-09-25 ENCOUNTER — OUTPATIENT (OUTPATIENT)
Dept: OUTPATIENT SERVICES | Facility: HOSPITAL | Age: 70
LOS: 1 days | End: 2020-09-25
Payer: COMMERCIAL

## 2020-09-25 ENCOUNTER — APPOINTMENT (OUTPATIENT)
Dept: INFECTIOUS DISEASE | Facility: CLINIC | Age: 70
End: 2020-09-25

## 2020-09-25 DIAGNOSIS — E28.39 OTHER PRIMARY OVARIAN FAILURE: ICD-10-CM

## 2020-09-25 DIAGNOSIS — Z21 ASYMPTOMATIC HUMAN IMMUNODEFICIENCY VIRUS [HIV] INFECTION STATUS: ICD-10-CM

## 2020-09-25 DIAGNOSIS — N60.12 DIFFUSE CYSTIC MASTOPATHY OF LEFT BREAST: ICD-10-CM

## 2020-09-25 DIAGNOSIS — K08.9 DISORDER OF TEETH AND SUPPORTING STRUCTURES, UNSPECIFIED: ICD-10-CM

## 2020-09-25 DIAGNOSIS — N60.11 DIFFUSE CYSTIC MASTOPATHY OF LEFT BREAST: ICD-10-CM

## 2020-09-25 DIAGNOSIS — B20 HUMAN IMMUNODEFICIENCY VIRUS [HIV] DISEASE: ICD-10-CM

## 2020-09-25 PROCEDURE — D0210: CPT

## 2020-09-25 PROCEDURE — 90682 RIV4 VACC RECOMBINANT DNA IM: CPT

## 2020-09-25 PROCEDURE — G0008: CPT

## 2020-09-25 PROCEDURE — D0150: CPT

## 2020-09-26 ENCOUNTER — RESULT REVIEW (OUTPATIENT)
Age: 70
End: 2020-09-26

## 2020-09-26 LAB
C TRACH RRNA SPEC QL NAA+PROBE: SIGNIFICANT CHANGE UP
HPV HIGH+LOW RISK DNA PNL CVX: SIGNIFICANT CHANGE UP
N GONORRHOEA RRNA SPEC QL NAA+PROBE: SIGNIFICANT CHANGE UP
SPECIMEN SOURCE: SIGNIFICANT CHANGE UP

## 2020-09-26 PROCEDURE — 87591 N.GONORRHOEAE DNA AMP PROB: CPT

## 2020-09-26 PROCEDURE — 87624 HPV HI-RISK TYP POOLED RSLT: CPT

## 2020-09-26 PROCEDURE — G0439: CPT

## 2020-09-26 PROCEDURE — 87491 CHLMYD TRACH DNA AMP PROBE: CPT

## 2020-09-29 DIAGNOSIS — Z23 ENCOUNTER FOR IMMUNIZATION: ICD-10-CM

## 2020-10-01 DIAGNOSIS — Z01.20 ENCOUNTER FOR DENTAL EXAMINATION AND CLEANING WITHOUT ABNORMAL FINDINGS: ICD-10-CM

## 2020-10-01 LAB — CYTOLOGY SPEC DOC CYTO: SIGNIFICANT CHANGE UP

## 2020-10-05 DIAGNOSIS — N60.19 DIFFUSE CYSTIC MASTOPATHY OF UNSPECIFIED BREAST: ICD-10-CM

## 2020-10-05 DIAGNOSIS — Z01.419 ENCOUNTER FOR GYNECOLOGICAL EXAMINATION (GENERAL) (ROUTINE) WITHOUT ABNORMAL FINDINGS: ICD-10-CM

## 2020-10-09 ENCOUNTER — OUTPATIENT (OUTPATIENT)
Dept: OUTPATIENT SERVICES | Facility: HOSPITAL | Age: 70
LOS: 1 days | End: 2020-10-09
Payer: COMMERCIAL

## 2020-10-09 DIAGNOSIS — K08.9 DISORDER OF TEETH AND SUPPORTING STRUCTURES, UNSPECIFIED: ICD-10-CM

## 2020-10-09 PROCEDURE — D1110: CPT

## 2020-10-09 PROCEDURE — D2391: CPT

## 2020-10-13 DIAGNOSIS — Z01.20 ENCOUNTER FOR DENTAL EXAMINATION AND CLEANING WITHOUT ABNORMAL FINDINGS: ICD-10-CM

## 2020-10-13 DIAGNOSIS — K02.9 DENTAL CARIES, UNSPECIFIED: ICD-10-CM

## 2020-10-20 NOTE — H&P ADULT - PROBLEM SELECTOR PLAN 6
Home likely prerenal due to dehydration  also received IV contrast so may remain elevated  will give aggressive IV fluid resuscitation  monitor renal indices, avoid nephrotoxic agents

## 2020-10-21 NOTE — PROGRESS NOTE ADULT - PROBLEM SELECTOR PLAN 5
Found to be HIV positive at [facility] with low CD4 and high viral load. Low albumin of 3. Follows with ID outpatient. Pending initiation of ART pending genotype studies.  - Management of bactrim as above  - Azithromycin 250mg 5 tabs weekly for MAC prophylaxis  - completed fluconazole;   - start Micafungin 150mg/d IV  - f/u serum Cryptococcal Ag, urine Histoplasma Ag, CMV PCR, blood AFB cultures [facility]  Orthpedic  [address], [place]   Phone: [phone]  Fax:   Follow Up Time: 1-3 Days     Found to be HIV positive at [facility] with low CD4 and high viral load. Low albumin of 3. Follows with ID outpatient.   - Management of PCP as above  - Azithromycin 250mg 5 tabs weekly for MAC prophylaxis  - completed fluconazole;   - start Micafungin 150mg/d IV  - Start biktarvy   - f/u serum Cryptococcal Ag, urine Histoplasma Ag, CMV PCR, blood AFB cultures

## 2020-10-26 ENCOUNTER — RX RENEWAL (OUTPATIENT)
Age: 70
End: 2020-10-26

## 2020-11-18 ENCOUNTER — OUTPATIENT (OUTPATIENT)
Dept: OUTPATIENT SERVICES | Facility: HOSPITAL | Age: 70
LOS: 1 days | End: 2020-11-18
Payer: COMMERCIAL

## 2020-11-18 ENCOUNTER — NON-APPOINTMENT (OUTPATIENT)
Age: 70
End: 2020-11-18

## 2020-11-18 ENCOUNTER — APPOINTMENT (OUTPATIENT)
Dept: INFECTIOUS DISEASE | Facility: CLINIC | Age: 70
End: 2020-11-18

## 2020-11-18 VITALS
DIASTOLIC BLOOD PRESSURE: 95 MMHG | HEART RATE: 73 BPM | BODY MASS INDEX: 27.19 KG/M2 | HEIGHT: 61 IN | WEIGHT: 144 LBS | RESPIRATION RATE: 16 BRPM | SYSTOLIC BLOOD PRESSURE: 154 MMHG | OXYGEN SATURATION: 99 % | TEMPERATURE: 98.6 F

## 2020-11-18 DIAGNOSIS — B20 HUMAN IMMUNODEFICIENCY VIRUS [HIV] DISEASE: ICD-10-CM

## 2020-11-18 DIAGNOSIS — K08.9 DISORDER OF TEETH AND SUPPORTING STRUCTURES, UNSPECIFIED: ICD-10-CM

## 2020-11-18 PROCEDURE — G0463: CPT

## 2020-11-18 PROCEDURE — 80048 BASIC METABOLIC PNL TOTAL CA: CPT

## 2020-11-18 PROCEDURE — 84443 ASSAY THYROID STIM HORMONE: CPT

## 2020-11-18 PROCEDURE — 84156 ASSAY OF PROTEIN URINE: CPT

## 2020-11-18 PROCEDURE — 82570 ASSAY OF URINE CREATININE: CPT

## 2020-11-18 PROCEDURE — 83036 HEMOGLOBIN GLYCOSYLATED A1C: CPT

## 2020-11-18 RX ORDER — CLOBETASOL PROPIONATE 0.5 MG/G
0.05 CREAM TOPICAL TWICE DAILY
Qty: 1 | Refills: 0 | Status: DISCONTINUED | COMMUNITY
Start: 2019-12-18 | End: 2020-11-18

## 2020-11-19 DIAGNOSIS — E03.9 HYPOTHYROIDISM, UNSPECIFIED: ICD-10-CM

## 2020-11-19 DIAGNOSIS — Z21 ASYMPTOMATIC HUMAN IMMUNODEFICIENCY VIRUS [HIV] INFECTION STATUS: ICD-10-CM

## 2020-11-19 DIAGNOSIS — N18.30 CHRONIC KIDNEY DISEASE, STAGE 3 UNSPECIFIED: ICD-10-CM

## 2020-11-19 LAB
A1C WITH ESTIMATED AVERAGE GLUCOSE RESULT: 6.3 % — HIGH (ref 4–5.6)
ANION GAP SERPL CALC-SCNC: 11 MMOL/L — SIGNIFICANT CHANGE UP (ref 5–17)
BUN SERPL-MCNC: 26 MG/DL — HIGH (ref 7–23)
CALCIUM SERPL-MCNC: 10.2 MG/DL — SIGNIFICANT CHANGE UP (ref 8.4–10.5)
CHLORIDE SERPL-SCNC: 107 MMOL/L — SIGNIFICANT CHANGE UP (ref 96–108)
CO2 SERPL-SCNC: 23 MMOL/L — SIGNIFICANT CHANGE UP (ref 22–31)
CREAT ?TM UR-MCNC: 53 MG/DL — SIGNIFICANT CHANGE UP
CREAT SERPL-MCNC: 1.47 MG/DL — HIGH (ref 0.5–1.3)
ESTIMATED AVERAGE GLUCOSE: 134 MG/DL — HIGH (ref 68–114)
GLUCOSE SERPL-MCNC: 138 MG/DL — HIGH (ref 70–99)
POTASSIUM SERPL-MCNC: 5.9 MMOL/L — HIGH (ref 3.5–5.3)
POTASSIUM SERPL-SCNC: 5.9 MMOL/L — HIGH (ref 3.5–5.3)
PROT ?TM UR-MCNC: 73 MG/DL — HIGH (ref 0–12)
PROT/CREAT UR-RTO: 1.4 RATIO — HIGH (ref 0–0.2)
SODIUM SERPL-SCNC: 141 MMOL/L — SIGNIFICANT CHANGE UP (ref 135–145)
T4 FREE+ TSH PNL SERPL: 1.69 UIU/ML — SIGNIFICANT CHANGE UP (ref 0.27–4.2)

## 2020-11-20 NOTE — REVIEW OF SYSTEMS
[Negative] : Constitutional [Fever] : no fever [Eye Pain] : no eye pain [Earache] : no earache [Chest Pain] : no chest pain [Palpitations] : no palpitations [Shortness Of Breath] : no shortness of breath [Abdominal Pain] : no abdominal pain [Dysuria] : no dysuria [Confused] : no confusion [Dizziness] : no dizziness [Limb Weakness] : no limb weakness [FreeTextEntry9] : Inability to walk  [de-identified] : b/l leg weakness- non focal

## 2020-11-20 NOTE — HISTORY OF PRESENT ILLNESS
[FreeTextEntry1] : 71 y/o F with PMH of Hypothyroidism, DM recently diagnosed HIV and PCP pneumonia with Hypoxia and AfIb on presentation at [place] in 11/2109, comes for HIV follow up .\par She is compliant with medications. She has no complaint to day.\par \par She is born in [place]. [place] 2 [place] ago. Diagnosed HIV in 11/2019 due to PCP pneumonia. Now on Biktarvy, compliant, CD4/VL improving. 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 Today comes with cousin who speaks English, he is aware of her HIV. No toxic behavior or sexually active.\par \par She is taking amlodipine 5mg daily, TZ=825, 142 at home, but in clinic BP>150, have pharmacy to talk to her, will increase amlodipine to 10mg and start lisinopril 10mg daily given she is DM.\par \par Unclear why she is taking metorpolol tartrate 25mg daily for Afib. Pt follows at [facility] cardiology there, will have her to discuss with them.\par \par Her eGFR=30, borderline lowest number to have Biktarvy, will keep monitor. Check urine protein, renal US and refer to renal.\par \par  [Sexually Active] : The patient is not sexually active

## 2020-11-20 NOTE — END OF VISIT
[] : Fellow [FreeTextEntry3] : Patient seen and examiined with Dr Smith I agree with his interval history exam and plans as noted above\par Patient well known to me with AIDS but almost non detectable VL and improving immune function on AVs, will need Cardiology follow up for her A.fib and PCP for her DM which she has followed at [facility]\par [facility] meds check labs\par COVID avoidance and testing discussed with her and her nephew

## 2020-11-20 NOTE — ASSESSMENT
[Treatment Education] : treatment education [Treatment Adherence] : treatment adherence [Rx Dose / Side Effects] : Rx dose/side effects [Risk Reduction] : risk reduction [Nutritional / Food Issues] : nutritional/food issues [Universal Precautions] : universal precautions [Medical Care Issues] : medical care issues [Drug Interactions / Side Effects] : drug interactions/side effects [Disclosure of Diagnosis] : disclosure of diagnosis [HIV Education] : HIV Education [Sexuality / Safer Sex] : sexuality/safer sex [Anticipatory Guidance] : anticipatory guidance [FreeTextEntry1] : 69 y/o F with PMH of Hypothyroidism, DM, diagnosed HIV 11 /2019 with HIV and PCP pneumonia came for follow up.  passed away 1 year ago from non HIV diagnosis per patient. Has not been sexually active since then. \par  \par #HIV/AIDS\par - diagnosed HIV 11 /2019 with HIV and PCP pneumonia\par - Unclear source of HIV infection\par - c/w Biktarvy, careful about GFR=30 which is the lowest GFR to take Biktarvy\par - KJ8=828,  VL det<30 in 08/2020\par \par #DM:\par - c/w metformin 500mg daily\par - Repeat A1c today\par \par #CKD:\par - Cr at baseline=1.5\par - Cautious about Biktarvy, metformin use\par - Check urine protein/Cr ratio and BMP today\par - Refer to renal\par - Get renal US\par \par #HTN\par - Increase amlodipine 5mg daily to 10mg daily\par - Start Lisinopril 10mg daily\par - Refer to renal for CKD and HTN\par \par #Hypothyroidism\par - Check TSH\par - c/w synthroid 75mg daily\par \par #Heartburn\par - Likely GERD\par - Pt is ok to take PPI or H2b while on Biktarvy\par - If pt is taking antacid should spare at least 2 hr with Biktarvy\par - Refer to GI for workup and colonscopy\par \par HMT\par UTD with prevnar & pneumococcal vaccine \par Flu shot 09/2020\par UTD with heplisav\par Tdap given in 06/2020\par Refer to GI for colonscopy\par Pap smear in 09/2020 normal\par \par Eli Smith, PGY4\par d/w Dr. Parks\par RTC in 3 months\par

## 2020-11-20 NOTE — REASON FOR VISIT
[Follow-Up: _____] : a [unfilled] follow-up visit [Other: _____] : [unfilled] [Consultation] : a consultation visit [FreeTextEntry1] : 68 y/o F with PMH of Hypothyroidism, DM, recent diagnosis of PCP pneumonia , HIV, atrial fibrillation

## 2020-12-04 ENCOUNTER — NON-APPOINTMENT (OUTPATIENT)
Age: 70
End: 2020-12-04

## 2020-12-17 ENCOUNTER — NON-APPOINTMENT (OUTPATIENT)
Age: 70
End: 2020-12-17

## 2020-12-21 ENCOUNTER — RX RENEWAL (OUTPATIENT)
Age: 70
End: 2020-12-21

## 2020-12-23 ENCOUNTER — NON-APPOINTMENT (OUTPATIENT)
Age: 70
End: 2020-12-23

## 2020-12-24 ENCOUNTER — APPOINTMENT (OUTPATIENT)
Dept: INFECTIOUS DISEASE | Facility: CLINIC | Age: 70
End: 2020-12-24

## 2020-12-29 ENCOUNTER — NON-APPOINTMENT (OUTPATIENT)
Age: 70
End: 2020-12-29

## 2021-01-07 ENCOUNTER — APPOINTMENT (OUTPATIENT)
Dept: INFECTIOUS DISEASE | Facility: CLINIC | Age: 71
End: 2021-01-07

## 2021-01-07 ENCOUNTER — LABORATORY RESULT (OUTPATIENT)
Age: 71
End: 2021-01-07

## 2021-01-08 ENCOUNTER — NON-APPOINTMENT (OUTPATIENT)
Age: 71
End: 2021-01-08

## 2021-01-08 RX ORDER — LISINOPRIL 10 MG/1
10 TABLET ORAL DAILY
Qty: 1 | Refills: 11 | Status: DISCONTINUED | COMMUNITY
Start: 2020-11-18 | End: 2021-01-08

## 2021-01-15 ENCOUNTER — NON-APPOINTMENT (OUTPATIENT)
Age: 71
End: 2021-01-15

## 2021-01-22 ENCOUNTER — NON-APPOINTMENT (OUTPATIENT)
Age: 71
End: 2021-01-22

## 2021-01-29 ENCOUNTER — NON-APPOINTMENT (OUTPATIENT)
Age: 71
End: 2021-01-29

## 2021-02-19 ENCOUNTER — RX RENEWAL (OUTPATIENT)
Age: 71
End: 2021-02-19

## 2021-02-26 ENCOUNTER — NON-APPOINTMENT (OUTPATIENT)
Age: 71
End: 2021-02-26

## 2021-03-04 ENCOUNTER — NON-APPOINTMENT (OUTPATIENT)
Age: 71
End: 2021-03-04

## 2021-03-04 RX ORDER — AMLODIPINE BESYLATE 10 MG/1
10 TABLET ORAL DAILY
Qty: 30 | Refills: 11 | Status: DISCONTINUED | COMMUNITY
Start: 2020-06-10 | End: 2021-03-04

## 2021-03-12 ENCOUNTER — NON-APPOINTMENT (OUTPATIENT)
Age: 71
End: 2021-03-12

## 2021-03-23 ENCOUNTER — FORM ENCOUNTER (OUTPATIENT)
Age: 71
End: 2021-03-23

## 2021-03-24 ENCOUNTER — OUTPATIENT (OUTPATIENT)
Dept: OUTPATIENT SERVICES | Facility: HOSPITAL | Age: 71
LOS: 1 days | End: 2021-03-24
Payer: COMMERCIAL

## 2021-03-24 ENCOUNTER — LABORATORY RESULT (OUTPATIENT)
Age: 71
End: 2021-03-24

## 2021-03-24 ENCOUNTER — APPOINTMENT (OUTPATIENT)
Dept: INFECTIOUS DISEASE | Facility: CLINIC | Age: 71
End: 2021-03-24

## 2021-03-24 VITALS
WEIGHT: 143 LBS | OXYGEN SATURATION: 100 % | HEART RATE: 87 BPM | DIASTOLIC BLOOD PRESSURE: 86 MMHG | HEIGHT: 61 IN | TEMPERATURE: 98.9 F | BODY MASS INDEX: 27 KG/M2 | SYSTOLIC BLOOD PRESSURE: 150 MMHG

## 2021-03-24 DIAGNOSIS — B20 HUMAN IMMUNODEFICIENCY VIRUS [HIV] DISEASE: ICD-10-CM

## 2021-03-24 DIAGNOSIS — Z92.89 PERSONAL HISTORY OF OTHER MEDICAL TREATMENT: ICD-10-CM

## 2021-03-24 PROCEDURE — G0463: CPT

## 2021-03-24 PROCEDURE — 84443 ASSAY THYROID STIM HORMONE: CPT

## 2021-03-24 PROCEDURE — 83036 HEMOGLOBIN GLYCOSYLATED A1C: CPT

## 2021-03-24 PROCEDURE — 86706 HEP B SURFACE ANTIBODY: CPT

## 2021-03-24 PROCEDURE — 84156 ASSAY OF PROTEIN URINE: CPT

## 2021-03-24 PROCEDURE — 85027 COMPLETE CBC AUTOMATED: CPT

## 2021-03-24 PROCEDURE — 87536 HIV-1 QUANT&REVRSE TRNSCRPJ: CPT

## 2021-03-24 PROCEDURE — 86480 TB TEST CELL IMMUN MEASURE: CPT

## 2021-03-24 PROCEDURE — 80061 LIPID PANEL: CPT

## 2021-03-24 PROCEDURE — 82570 ASSAY OF URINE CREATININE: CPT

## 2021-03-24 PROCEDURE — 80053 COMPREHEN METABOLIC PANEL: CPT

## 2021-03-24 PROCEDURE — 87340 HEPATITIS B SURFACE AG IA: CPT

## 2021-03-24 PROCEDURE — 86360 T CELL ABSOLUTE COUNT/RATIO: CPT

## 2021-03-24 PROCEDURE — 86359 T CELLS TOTAL COUNT: CPT

## 2021-03-24 NOTE — END OF VISIT
[] : Fellow [FreeTextEntry3] : Patient seen and examiined with Dr Smith I agree with his interval history exam and plans as noted above\par Patient well known to me with AIDS but almost non detectable VL and improving immune function on ARVs, will need Cardiology follow up for her A.fib\par cotninue meds check labs\par COVID avoidance and testing discussed with her and her nephew as well as her eligibility for the COVID vaccine- papaerwork provided\par She should get renal ultrasound and follow up with nephrology for CKD in the setting of dual HIV and DM\par \par RTC after seeing renal

## 2021-03-24 NOTE — HISTORY OF PRESENT ILLNESS
[FreeTextEntry1] : 69 y/o F with PMH of Hypothyroidism, DM recently diagnosed HIV and PCP pneumonia with Hypoxia and AfIb on presentation at [place] in 11/2109, comes for HIV follow up .\par She is compliant with medications. \par \par She is born in [place]. [place]oved to [place] 2 [place] ago. Diagnosed HIV in 11/2019 due to PCP pneumonia. Now on Biktarvy, compliant, CD4/VL improving. 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 Today comes with cousin who speaks English, he is aware of her HIV. No toxic behavior or sexually active.\par ----------------------------------------------------\par She always has higher BP when checked in the clinic.\par At home [id number], well controlled, will go by the home BP numbers. Previously giving her too many BP meds giving her dizziness. Right now she is on amlodipine 5, metoprolol 25 and lisinopril 5. Will continue it.\par \par Previously metformin was stopped due to elevated Cr, will check again and see.\par \par She complains of bad breath smeling. No thrush on my exam. I reinforce oral hygiene and check with dentist.\par \par She is c/o weight gain.\par \par She completed second dose of covid vacciine (Pfizer) on 3/7/2021.\par Her insurance does not cover Shingrix.\par  [Sexually Active] : The patient is not sexually active [de-identified] : Family is aware

## 2021-03-24 NOTE — REVIEW OF SYSTEMS
[Negative] : Constitutional [Fever] : no fever [Eye Pain] : no eye pain [Earache] : no earache [Chest Pain] : no chest pain [Palpitations] : no palpitations [Shortness Of Breath] : no shortness of breath [Abdominal Pain] : no abdominal pain [Dysuria] : no dysuria [Confused] : no confusion [Dizziness] : no dizziness [Limb Weakness] : no limb weakness [FreeTextEntry9] : Inability to walk  [de-identified] : b/l leg weakness- non focal

## 2021-03-24 NOTE — ASSESSMENT
[Treatment Education] : treatment education [Treatment Adherence] : treatment adherence [Rx Dose / Side Effects] : Rx dose/side effects [Risk Reduction] : risk reduction [Nutritional / Food Issues] : nutritional/food issues [Universal Precautions] : universal precautions [Medical Care Issues] : medical care issues [Drug Interactions / Side Effects] : drug interactions/side effects [Disclosure of Diagnosis] : disclosure of diagnosis [HIV Education] : HIV Education [Sexuality / Safer Sex] : sexuality/safer sex [Anticipatory Guidance] : anticipatory guidance [FreeTextEntry1] : 69 y/o F with PMH of Afib, Hypothyroidism, DM, diagnosed HIV 11 /2019 with HIV and PCP pneumonia came for follow up. Now well controlled with Biktarvy.\par  \par #HIV/AIDS\par - diagnosed HIV 11 /2019 with HIV and PCP pneumonia\par - Unclear source of HIV infection\par - CD4>200 for 3 months, not on PCP ppx anymore\par - c/w Biktarvy\par - SG0=433 (15%), down from 397 but within range\par - VL det<30\par - Routine labs today\par \par #DM:\par - Metformin 500 hold due to elevated Cr\par - Check Cr today\par \par #CKD:\par - Cr at baseline=1.5\par - Cautious about Biktarvy, metformin use\par - Check urine protein/Cr ratio\par \par #HTN\par - c/w amlodipine 5mg daily and lisinopril 5\par - metoprolol 25 per cardiology for Afib\par \par #Hypothyroidism\par - Check TSH\par - c/w synthroid 75mg daily\par \par #Heartburn\par - Likely GERD\par - Pt is ok to take PPI or H2b while on Biktarvy\par - If pt is taking antacid should spare at least 2 hr with Biktarvy\par \par HMT\par UTD with prevnar & pneumococcal vaccine \par UTD with heplisav\par Tdap given in 06/2020\par Shringrix (not covered by insurance)\par COVID Pfizer completed on 3/7/21\par \par RTC in 4 months\par

## 2021-03-25 DIAGNOSIS — N18.30 CHRONIC KIDNEY DISEASE, STAGE 3 UNSPECIFIED: ICD-10-CM

## 2021-03-25 LAB
4/8 RATIO: 0.21 RATIO — LOW (ref 0.86–4.14)
A1C WITH ESTIMATED AVERAGE GLUCOSE RESULT: 6.7 % — HIGH (ref 4–5.6)
ABS CD8: 2473 /UL — HIGH (ref 90–775)
ALBUMIN SERPL ELPH-MCNC: 4.3 G/DL — SIGNIFICANT CHANGE UP (ref 3.3–5)
ALP SERPL-CCNC: 108 U/L — SIGNIFICANT CHANGE UP (ref 40–120)
ALT FLD-CCNC: 8 U/L — LOW (ref 10–45)
ANION GAP SERPL CALC-SCNC: 14 MMOL/L — SIGNIFICANT CHANGE UP (ref 5–17)
AST SERPL-CCNC: 13 U/L — SIGNIFICANT CHANGE UP (ref 10–40)
BILIRUB SERPL-MCNC: 0.3 MG/DL — SIGNIFICANT CHANGE UP (ref 0.2–1.2)
BUN SERPL-MCNC: 27 MG/DL — HIGH (ref 7–23)
CALCIUM SERPL-MCNC: 9.5 MG/DL — SIGNIFICANT CHANGE UP (ref 8.4–10.5)
CD3 BLASTS SPEC-ACNC: 2985 /UL — HIGH (ref 396–2024)
CD3 BLASTS SPEC-ACNC: 83 % — SIGNIFICANT CHANGE UP (ref 58–84)
CD4 %: 14 % — LOW (ref 30–56)
CD8 %: 69 % — HIGH (ref 11–43)
CHLORIDE SERPL-SCNC: 107 MMOL/L — SIGNIFICANT CHANGE UP (ref 96–108)
CHOLEST SERPL-MCNC: 136 MG/DL — SIGNIFICANT CHANGE UP
CO2 SERPL-SCNC: 19 MMOL/L — LOW (ref 22–31)
CREAT ?TM UR-MCNC: 46 MG/DL — SIGNIFICANT CHANGE UP
CREAT SERPL-MCNC: 1.61 MG/DL — HIGH (ref 0.5–1.3)
ESTIMATED AVERAGE GLUCOSE: 146 MG/DL — HIGH (ref 68–114)
GLUCOSE SERPL-MCNC: 123 MG/DL — HIGH (ref 70–99)
HBV SURFACE AB SER-ACNC: REACTIVE
HBV SURFACE AG SER-ACNC: SIGNIFICANT CHANGE UP
HCT VFR BLD CALC: 33.3 % — LOW (ref 34.5–45)
HDLC SERPL-MCNC: 52 MG/DL — SIGNIFICANT CHANGE UP
HGB BLD-MCNC: 10.7 G/DL — LOW (ref 11.5–15.5)
HIV-1 VIRAL LOAD RESULT: ABNORMAL
HIV1 RNA # SERPL NAA+PROBE: SIGNIFICANT CHANGE UP
HIV1 RNA SER-IMP: SIGNIFICANT CHANGE UP
HIV1 RNA SERPL NAA+PROBE-ACNC: ABNORMAL
HIV1 RNA SERPL NAA+PROBE-LOG#: ABNORMAL LG COP/ML
LIPID PNL WITH DIRECT LDL SERPL: 51 MG/DL — SIGNIFICANT CHANGE UP
MCHC RBC-ENTMCNC: 32.1 GM/DL — SIGNIFICANT CHANGE UP (ref 32–36)
MCHC RBC-ENTMCNC: 32.7 PG — SIGNIFICANT CHANGE UP (ref 27–34)
MCV RBC AUTO: 101.8 FL — HIGH (ref 80–100)
NON HDL CHOLESTEROL: 84 MG/DL — SIGNIFICANT CHANGE UP
PLATELET # BLD AUTO: 233 K/UL — SIGNIFICANT CHANGE UP (ref 150–400)
POTASSIUM SERPL-MCNC: 4.4 MMOL/L — SIGNIFICANT CHANGE UP (ref 3.5–5.3)
POTASSIUM SERPL-SCNC: 4.4 MMOL/L — SIGNIFICANT CHANGE UP (ref 3.5–5.3)
PROT ?TM UR-MCNC: 21 MG/DL — HIGH (ref 0–12)
PROT SERPL-MCNC: 6.9 G/DL — SIGNIFICANT CHANGE UP (ref 6–8.3)
PROT/CREAT UR-RTO: 0.5 RATIO — HIGH (ref 0–0.2)
RBC # BLD: 3.27 M/UL — LOW (ref 3.8–5.2)
RBC # FLD: 12.7 % — SIGNIFICANT CHANGE UP (ref 10.3–14.5)
SODIUM SERPL-SCNC: 140 MMOL/L — SIGNIFICANT CHANGE UP (ref 135–145)
T-CELL CD4 SUBSET PNL BLD: 508 /UL — SIGNIFICANT CHANGE UP (ref 325–1251)
T4 FREE+ TSH PNL SERPL: 0.87 UIU/ML — SIGNIFICANT CHANGE UP (ref 0.27–4.2)
TRIGL SERPL-MCNC: 165 MG/DL — HIGH
WBC # BLD: 9.41 K/UL — SIGNIFICANT CHANGE UP (ref 3.8–10.5)
WBC # FLD AUTO: 9.41 K/UL — SIGNIFICANT CHANGE UP (ref 3.8–10.5)

## 2021-03-26 LAB
GAMMA INTERFERON BACKGROUND BLD IA-ACNC: 0.07 IU/ML — SIGNIFICANT CHANGE UP
M TB IFN-G BLD-IMP: NEGATIVE — SIGNIFICANT CHANGE UP
M TB IFN-G CD4+ BCKGRND COR BLD-ACNC: 0 IU/ML — SIGNIFICANT CHANGE UP
M TB IFN-G CD4+CD8+ BCKGRND COR BLD-ACNC: 0.01 IU/ML — SIGNIFICANT CHANGE UP
QUANT TB PLUS MITOGEN MINUS NIL: 7.83 IU/ML — SIGNIFICANT CHANGE UP

## 2021-03-31 ENCOUNTER — NON-APPOINTMENT (OUTPATIENT)
Age: 71
End: 2021-03-31

## 2021-04-20 ENCOUNTER — RX RENEWAL (OUTPATIENT)
Age: 71
End: 2021-04-20

## 2021-04-26 ENCOUNTER — APPOINTMENT (OUTPATIENT)
Dept: ULTRASOUND IMAGING | Facility: CLINIC | Age: 71
End: 2021-04-26

## 2021-04-28 ENCOUNTER — NON-APPOINTMENT (OUTPATIENT)
Age: 71
End: 2021-04-28

## 2021-05-06 NOTE — PHYSICAL THERAPY INITIAL EVALUATION ADULT - PRECAUTIONS/LIMITATIONS, REHAB EVAL
Spoke to Silviano Cagle by phone on 5/6/2021. Patient was notified of all results by phone 5/6/2021. Mohs' surgery to be scheduled on 5/25/2021.  0745. Patient aware. Plan to obtain consent on that date. No preop visit needed.     Specimen Site: right lateral neck    Diagnosis:  BASAL CELL CARCINOMA,  INFILTRATIVE  TYPE   Note: Neoplasm extends to base of specimen fall precautions

## 2021-05-15 ENCOUNTER — RX RENEWAL (OUTPATIENT)
Age: 71
End: 2021-05-15

## 2021-06-04 ENCOUNTER — NON-APPOINTMENT (OUTPATIENT)
Age: 71
End: 2021-06-04

## 2021-06-04 DIAGNOSIS — K13.79 OTHER LESIONS OF ORAL MUCOSA: ICD-10-CM

## 2021-06-09 ENCOUNTER — NON-APPOINTMENT (OUTPATIENT)
Age: 71
End: 2021-06-09

## 2021-06-10 RX ORDER — METOPROLOL TARTRATE 50 MG/1
50 TABLET, FILM COATED ORAL DAILY
Qty: 30 | Refills: 0 | Status: DISCONTINUED | COMMUNITY
Start: 2020-12-04 | End: 2021-06-10

## 2021-06-12 ENCOUNTER — RX RENEWAL (OUTPATIENT)
Age: 71
End: 2021-06-12

## 2021-07-06 ENCOUNTER — RX RENEWAL (OUTPATIENT)
Age: 71
End: 2021-07-06

## 2021-07-07 ENCOUNTER — NON-APPOINTMENT (OUTPATIENT)
Age: 71
End: 2021-07-07

## 2021-07-09 ENCOUNTER — APPOINTMENT (OUTPATIENT)
Dept: OPHTHALMOLOGY | Facility: CLINIC | Age: 71
End: 2021-07-09

## 2021-07-20 ENCOUNTER — FORM ENCOUNTER (OUTPATIENT)
Age: 71
End: 2021-07-20

## 2021-07-21 ENCOUNTER — LABORATORY RESULT (OUTPATIENT)
Age: 71
End: 2021-07-21

## 2021-07-21 ENCOUNTER — APPOINTMENT (OUTPATIENT)
Dept: INFECTIOUS DISEASE | Facility: CLINIC | Age: 71
End: 2021-07-21

## 2021-07-21 ENCOUNTER — OUTPATIENT (OUTPATIENT)
Dept: OUTPATIENT SERVICES | Facility: HOSPITAL | Age: 71
LOS: 1 days | End: 2021-07-21
Payer: COMMERCIAL

## 2021-07-21 VITALS
DIASTOLIC BLOOD PRESSURE: 90 MMHG | WEIGHT: 146 LBS | BODY MASS INDEX: 27.56 KG/M2 | TEMPERATURE: 98.7 F | HEIGHT: 61 IN | OXYGEN SATURATION: 100 % | SYSTOLIC BLOOD PRESSURE: 153 MMHG | HEART RATE: 86 BPM

## 2021-07-21 DIAGNOSIS — B20 HUMAN IMMUNODEFICIENCY VIRUS [HIV] DISEASE: ICD-10-CM

## 2021-07-21 DIAGNOSIS — Z23 ENCOUNTER FOR IMMUNIZATION: ICD-10-CM

## 2021-07-21 DIAGNOSIS — K21.9 GASTRO-ESOPHAGEAL REFLUX DISEASE W/OUT ESOPHAGITIS: ICD-10-CM

## 2021-07-21 NOTE — END OF VISIT
[] : Fellow [FreeTextEntry3] : Patient seen and examined by me \par HIV- continue biktarvy\par HTN- BP elevated today but readings at home are well controlled

## 2021-07-21 NOTE — HISTORY OF PRESENT ILLNESS
[FreeTextEntry1] : 72 y/o F with PMH of Hypothyroidism, DM recently diagnosed HIV and PCP pneumonia with Hypoxia and AfIb on presentation at [facility] in 11/2109, comes for HIV follow up .\par She is compliant with medications. \par \par She is born in [place]. [place]. Diagnosed HIV in 11/2019 due to PCP pneumonia. Now on Biktarvy, compliant, CD4/VL improving. 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 Today comes with cousin who speaks English, he is aware of her HIV. No toxic behavior or sexually active.\par ----------------------------------------------------\par She always has higher BP when checked in the clinic.\par At home [id number], well controlled, will go by the home BP numbers. Previously giving her too many BP meds giving her dizziness. Right now she is on amlodipine 5, metoprolol succinate 50 and lisinopril 5. Will continue it.\par \par Her insurance does not cover Shingrix.\par --------------------------------------------------\par She complained of occasional white mucus tingled with blood coming from her throat. No abd pain. No weight loss. Thinks it comes when she eats spicy food. She took bismol two times that helps.\par \par

## 2021-07-21 NOTE — ASSESSMENT
[FreeTextEntry1] : 70 y/o F with PMH of Hypothyroidism, DM recently diagnosed HIV and PCP pneumonia with Hypoxia and AfIb on presentation at [facility] in 11/2109, comes for HIV follow up .\par \par #GERD\par - Start Famotidine 20mg HS\par - Check stool H.pylori\par - If famotidine does not help, may need GI to do EGD\par \par #HIV\par - On biktarvy well controlled\par - Routine labs\par \par #HTN\par - On amlodipine, lisinopril, metoprolol 50\par - BP ok at home, at clinic always higher\par \par #DM\par - A1c=6.7%\par - Metformin hold due to NICOLASA\par - On Lipitor\par \par #NICOLASA\par - Refer to renal\par \par #HM:\par - Check mammogram\par - Last dentist visit in 11/2020\par - s/p PPSV, PCV, Tdap, COVID*2\par \par Eli Smith MD, PGY5\par d/w Dr. Jarrell\par RTC 3 months\par

## 2021-07-22 LAB
4/8 RATIO: 0.26 RATIO — LOW (ref 0.86–4.14)
ABS CD8: 2514 /UL — HIGH (ref 90–775)
ALBUMIN SERPL ELPH-MCNC: 4.5 G/DL — SIGNIFICANT CHANGE UP (ref 3.3–5)
ALP SERPL-CCNC: 118 U/L — SIGNIFICANT CHANGE UP (ref 40–120)
ALT FLD-CCNC: 12 U/L — SIGNIFICANT CHANGE UP (ref 10–45)
ANION GAP SERPL CALC-SCNC: 11 MMOL/L — SIGNIFICANT CHANGE UP (ref 5–17)
AST SERPL-CCNC: 11 U/L — SIGNIFICANT CHANGE UP (ref 10–40)
BILIRUB SERPL-MCNC: 0.3 MG/DL — SIGNIFICANT CHANGE UP (ref 0.2–1.2)
BUN SERPL-MCNC: 22 MG/DL — SIGNIFICANT CHANGE UP (ref 7–23)
CALCIUM SERPL-MCNC: 9.9 MG/DL — SIGNIFICANT CHANGE UP (ref 8.4–10.5)
CD3 BLASTS SPEC-ACNC: 3155 /UL — HIGH (ref 396–2024)
CD3 BLASTS SPEC-ACNC: 84 % — SIGNIFICANT CHANGE UP (ref 58–84)
CD4 %: 17 % — LOW (ref 30–56)
CD8 %: 67 % — HIGH (ref 11–43)
CHLORIDE SERPL-SCNC: 107 MMOL/L — SIGNIFICANT CHANGE UP (ref 96–108)
CHOLEST SERPL-MCNC: 172 MG/DL — SIGNIFICANT CHANGE UP
CO2 SERPL-SCNC: 21 MMOL/L — LOW (ref 22–31)
CREAT SERPL-MCNC: 1.43 MG/DL — HIGH (ref 0.5–1.3)
CYSTATIN C SERPL-MCNC: 1.68 MG/L — HIGH (ref 0.66–1.26)
GFR/BSA.PRED SERPLBLD CYS-BASED-ARV: 35 ML/MIN — LOW
GLUCOSE SERPL-MCNC: 153 MG/DL — HIGH (ref 70–99)
HCT VFR BLD CALC: 35 % — SIGNIFICANT CHANGE UP (ref 34.5–45)
HDLC SERPL-MCNC: 55 MG/DL — SIGNIFICANT CHANGE UP
HGB BLD-MCNC: 11.2 G/DL — LOW (ref 11.5–15.5)
HIV-1 VIRAL LOAD RESULT: ABNORMAL
HIV1 RNA # SERPL NAA+PROBE: SIGNIFICANT CHANGE UP
HIV1 RNA SER-IMP: SIGNIFICANT CHANGE UP
HIV1 RNA SERPL NAA+PROBE-ACNC: ABNORMAL
HIV1 RNA SERPL NAA+PROBE-LOG#: ABNORMAL LG COP/ML
LIPID PNL WITH DIRECT LDL SERPL: 66 MG/DL — SIGNIFICANT CHANGE UP
MCHC RBC-ENTMCNC: 32 GM/DL — SIGNIFICANT CHANGE UP (ref 32–36)
MCHC RBC-ENTMCNC: 33.8 PG — SIGNIFICANT CHANGE UP (ref 27–34)
MCV RBC AUTO: 105.7 FL — HIGH (ref 80–100)
NON HDL CHOLESTEROL: 117 MG/DL — SIGNIFICANT CHANGE UP
PLATELET # BLD AUTO: 226 K/UL — SIGNIFICANT CHANGE UP (ref 150–400)
POTASSIUM SERPL-MCNC: 5.2 MMOL/L — SIGNIFICANT CHANGE UP (ref 3.5–5.3)
POTASSIUM SERPL-SCNC: 5.2 MMOL/L — SIGNIFICANT CHANGE UP (ref 3.5–5.3)
PROT SERPL-MCNC: 7.1 G/DL — SIGNIFICANT CHANGE UP (ref 6–8.3)
RBC # BLD: 3.31 M/UL — LOW (ref 3.8–5.2)
RBC # FLD: 13.2 % — SIGNIFICANT CHANGE UP (ref 10.3–14.5)
SODIUM SERPL-SCNC: 139 MMOL/L — SIGNIFICANT CHANGE UP (ref 135–145)
T-CELL CD4 SUBSET PNL BLD: 643 /UL — SIGNIFICANT CHANGE UP (ref 325–1251)
TRIGL SERPL-MCNC: 254 MG/DL — HIGH
WBC # BLD: 10.24 K/UL — SIGNIFICANT CHANGE UP (ref 3.8–10.5)
WBC # FLD AUTO: 10.24 K/UL — SIGNIFICANT CHANGE UP (ref 3.8–10.5)

## 2021-07-24 ENCOUNTER — RX RENEWAL (OUTPATIENT)
Age: 71
End: 2021-07-24

## 2021-07-26 ENCOUNTER — LABORATORY RESULT (OUTPATIENT)
Age: 71
End: 2021-07-26

## 2021-07-26 PROCEDURE — 82610 CYSTATIN C: CPT

## 2021-07-26 PROCEDURE — 86359 T CELLS TOTAL COUNT: CPT

## 2021-07-26 PROCEDURE — 80061 LIPID PANEL: CPT

## 2021-07-26 PROCEDURE — 80053 COMPREHEN METABOLIC PANEL: CPT

## 2021-07-26 PROCEDURE — 85027 COMPLETE CBC AUTOMATED: CPT

## 2021-07-26 PROCEDURE — 87338 HPYLORI STOOL AG IA: CPT

## 2021-07-26 PROCEDURE — 87536 HIV-1 QUANT&REVRSE TRNSCRPJ: CPT

## 2021-07-26 PROCEDURE — 86360 T CELL ABSOLUTE COUNT/RATIO: CPT

## 2021-07-26 PROCEDURE — G0463: CPT

## 2021-07-28 ENCOUNTER — APPOINTMENT (OUTPATIENT)
Dept: MAMMOGRAPHY | Facility: IMAGING CENTER | Age: 71
End: 2021-07-28

## 2021-07-28 DIAGNOSIS — Z21 ASYMPTOMATIC HUMAN IMMUNODEFICIENCY VIRUS [HIV] INFECTION STATUS: ICD-10-CM

## 2021-07-28 DIAGNOSIS — N18.30 CHRONIC KIDNEY DISEASE, STAGE 3 UNSPECIFIED: ICD-10-CM

## 2021-07-30 LAB — H PYLORI AG STL QL: SIGNIFICANT CHANGE UP

## 2021-08-01 ENCOUNTER — RX RENEWAL (OUTPATIENT)
Age: 71
End: 2021-08-01

## 2021-09-08 ENCOUNTER — APPOINTMENT (OUTPATIENT)
Dept: NEPHROLOGY | Facility: CLINIC | Age: 71
End: 2021-09-08

## 2021-09-12 ENCOUNTER — RX RENEWAL (OUTPATIENT)
Age: 71
End: 2021-09-12

## 2021-09-21 ENCOUNTER — NON-APPOINTMENT (OUTPATIENT)
Age: 71
End: 2021-09-21

## 2021-09-27 ENCOUNTER — RX RENEWAL (OUTPATIENT)
Age: 71
End: 2021-09-27

## 2021-10-20 ENCOUNTER — LABORATORY RESULT (OUTPATIENT)
Age: 71
End: 2021-10-20

## 2021-10-20 ENCOUNTER — OUTPATIENT (OUTPATIENT)
Dept: OUTPATIENT SERVICES | Facility: HOSPITAL | Age: 71
LOS: 1 days | End: 2021-10-20
Payer: COMMERCIAL

## 2021-10-20 ENCOUNTER — APPOINTMENT (OUTPATIENT)
Dept: INFECTIOUS DISEASE | Facility: CLINIC | Age: 71
End: 2021-10-20

## 2021-10-20 VITALS
SYSTOLIC BLOOD PRESSURE: 136 MMHG | BODY MASS INDEX: 26.81 KG/M2 | HEIGHT: 61 IN | DIASTOLIC BLOOD PRESSURE: 80 MMHG | TEMPERATURE: 98.2 F | HEART RATE: 84 BPM | WEIGHT: 142 LBS | OXYGEN SATURATION: 100 %

## 2021-10-20 DIAGNOSIS — B20 HUMAN IMMUNODEFICIENCY VIRUS [HIV] DISEASE: ICD-10-CM

## 2021-10-20 LAB
24R-OH-CALCIDIOL SERPL-MCNC: 38.1 NG/ML — SIGNIFICANT CHANGE UP (ref 30–80)
4/8 RATIO: 0.26 RATIO — LOW (ref 0.86–4.14)
ABS CD8: 1947 /UL — HIGH (ref 90–775)
APPEARANCE UR: CLEAR — SIGNIFICANT CHANGE UP
BACTERIA # UR AUTO: NEGATIVE — SIGNIFICANT CHANGE UP
BILIRUB UR-MCNC: NEGATIVE — SIGNIFICANT CHANGE UP
CD3 BLASTS SPEC-ACNC: 2459 /UL — HIGH (ref 396–2024)
CD3 BLASTS SPEC-ACNC: 80 % — SIGNIFICANT CHANGE UP (ref 58–84)
CD4 %: 17 % — LOW (ref 30–56)
CD8 %: 64 % — HIGH (ref 11–43)
CHOLEST SERPL-MCNC: 171 MG/DL — SIGNIFICANT CHANGE UP
COLOR SPEC: SIGNIFICANT CHANGE UP
CYSTATIN C SERPL-MCNC: 1.76 MG/L — HIGH (ref 0.66–1.26)
DIFF PNL FLD: ABNORMAL
EPI CELLS # UR: 0 /HPF — SIGNIFICANT CHANGE UP (ref 0–5)
GFR/BSA.PRED SERPLBLD CYS-BASED-ARV: 33 ML/MIN — LOW
GLUCOSE UR QL: NEGATIVE — SIGNIFICANT CHANGE UP
HCT VFR BLD CALC: 34.5 % — SIGNIFICANT CHANGE UP (ref 34.5–45)
HDLC SERPL-MCNC: 52 MG/DL — SIGNIFICANT CHANGE UP
HGB BLD-MCNC: 11.2 G/DL — LOW (ref 11.5–15.5)
HYALINE CASTS # UR AUTO: 0 /LPF — SIGNIFICANT CHANGE UP (ref 0–7)
KETONES UR-MCNC: NEGATIVE — SIGNIFICANT CHANGE UP
LEUKOCYTE ESTERASE UR-ACNC: NEGATIVE — SIGNIFICANT CHANGE UP
LIPID PNL WITH DIRECT LDL SERPL: 70 MG/DL — SIGNIFICANT CHANGE UP
MCHC RBC-ENTMCNC: 32.5 GM/DL — SIGNIFICANT CHANGE UP (ref 32–36)
MCHC RBC-ENTMCNC: 32.7 PG — SIGNIFICANT CHANGE UP (ref 27–34)
MCV RBC AUTO: 100.9 FL — HIGH (ref 80–100)
NITRITE UR-MCNC: NEGATIVE — SIGNIFICANT CHANGE UP
NON HDL CHOLESTEROL: 119 MG/DL — SIGNIFICANT CHANGE UP
PH UR: 6 — SIGNIFICANT CHANGE UP (ref 5–8)
PLATELET # BLD AUTO: 249 K/UL — SIGNIFICANT CHANGE UP (ref 150–400)
PROT UR-MCNC: ABNORMAL
RBC # BLD: 3.42 M/UL — LOW (ref 3.8–5.2)
RBC # FLD: 12.9 % — SIGNIFICANT CHANGE UP (ref 10.3–14.5)
RBC CASTS # UR COMP ASSIST: 0 /HPF — SIGNIFICANT CHANGE UP (ref 0–4)
SP GR SPEC: 1.01 — LOW (ref 1.01–1.02)
T-CELL CD4 SUBSET PNL BLD: 512 /UL — SIGNIFICANT CHANGE UP (ref 325–1251)
TRIGL SERPL-MCNC: 246 MG/DL — HIGH
UROBILINOGEN FLD QL: SIGNIFICANT CHANGE UP
WBC # BLD: 7.69 K/UL — SIGNIFICANT CHANGE UP (ref 3.8–10.5)
WBC # FLD AUTO: 7.69 K/UL — SIGNIFICANT CHANGE UP (ref 3.8–10.5)
WBC UR QL: 1 /HPF — SIGNIFICANT CHANGE UP (ref 0–5)

## 2021-10-20 PROCEDURE — 80061 LIPID PANEL: CPT

## 2021-10-20 PROCEDURE — 86360 T CELL ABSOLUTE COUNT/RATIO: CPT

## 2021-10-20 PROCEDURE — 85027 COMPLETE CBC AUTOMATED: CPT

## 2021-10-20 PROCEDURE — 86359 T CELLS TOTAL COUNT: CPT

## 2021-10-20 PROCEDURE — G0463: CPT | Mod: 25

## 2021-10-20 PROCEDURE — G0008: CPT

## 2021-10-20 PROCEDURE — 90662 IIV NO PRSV INCREASED AG IM: CPT

## 2021-10-20 PROCEDURE — 82610 CYSTATIN C: CPT

## 2021-10-20 PROCEDURE — 87536 HIV-1 QUANT&REVRSE TRNSCRPJ: CPT

## 2021-10-20 PROCEDURE — 81001 URINALYSIS AUTO W/SCOPE: CPT

## 2021-10-20 PROCEDURE — 82306 VITAMIN D 25 HYDROXY: CPT

## 2021-10-20 NOTE — PROGRESS NOTE ADULT - SUBJECTIVE AND OBJECTIVE BOX
Chief Complaint: Shortness of breath, fever    Interval Events: No events overnight. No complaints.    Review of Systems:  General: No fevers, chills, weight loss or gain  Skin: No rashes, color changes  Cardiovascular: No chest pain, orthopnea  Respiratory: No shortness of breath, cough  Gastrointestinal: No nausea, abdominal pain  Genitourinary: No incontinence, pain with urination  Musculoskeletal: No pain, swelling, decreased range of motion  Neurological: No headache, weakness  Psychiatric: No depression, anxiety  Endocrine: No weight loss or gain, increased thirst  All other systems are comprehensively negative.    Physical Exam:  Vital Signs Last 24 Hrs  T(C): 36.8 (24 Oct 2019 03:49), Max: 37 (23 Oct 2019 23:53)  T(F): 98.2 (24 Oct 2019 03:49), Max: 98.6 (23 Oct 2019 23:53)  HR: 98 (24 Oct 2019 07:00) (79 - 104)  BP: 104/63 (24 Oct 2019 06:00) (91/52 - 141/71)  BP(mean): 78 (24 Oct 2019 06:00) (65 - 99)  RR: 31 (24 Oct 2019 07:00) (18 - 35)  SpO2: 95% (24 Oct 2019 07:00) (91% - 100%)  General: NAD  HEENT: MMM  Neck: No JVD, no carotid bruit  Lungs: CTAB  CV: RRR, nl S1/S2, no M/R/G  Abdomen: S/NT/ND, +BS  Extremities: No LE edema, no cyanosis  Neuro: AAOx3, non-focal  Skin: No rash    Labs:    10-24    142  |  113<H>  |  32<H>  ----------------------------<  232<H>  3.8   |  21<L>  |  1.30    Ca    7.7<L>      24 Oct 2019 05:33  Phos  2.5     10-24  Mg     2.1     10-24                          7.3    1.15  )-----------( 192      ( 24 Oct 2019 05:33 )             22.2     Telemetry: Sinus rhythm No restrictions

## 2021-10-20 NOTE — PHYSICAL EXAM
[Sclera] : the sclera and conjunctiva were normal [PERRL With Normal Accommodation] : pupils were equal in size, round, reactive to light [Extraocular Movements] : extraocular movements were intact [Outer Ear] : the ears and nose were normal in appearance [Oropharynx] : the oropharynx was normal with no thrush [Neck Appearance] : the appearance of the neck was normal [Neck Cervical Mass (___cm)] : no neck mass was observed [Jugular Venous Distention Increased] : there was no jugular-venous distention [Thyroid Diffuse Enlargement] : the thyroid was not enlarged [Auscultation Breath Sounds / Voice Sounds] : lungs were clear to auscultation bilaterally [Heart Rate And Rhythm] : heart rate was normal and rhythm regular [Heart Sounds] : normal S1 and S2 [Heart Sounds Gallop] : no gallops [Murmurs] : no murmurs [Heart Sounds Pericardial Friction Rub] : no pericardial rub [Full Pulse] : the pedal pulses are present [Edema] : there was no peripheral edema [Bowel Sounds] : normal bowel sounds [Abdomen Soft] : soft [Abdomen Tenderness] : non-tender [Abdomen Mass (___ Cm)] : no abdominal mass palpated [Costovertebral Angle Tenderness] : no CVA tenderness [No Palpable Adenopathy] : no palpable adenopathy [Musculoskeletal - Swelling] : no joint swelling [Nail Clubbing] : no clubbing  or cyanosis of the fingernails [Motor Tone] : muscle strength and tone were normal [Skin Color & Pigmentation] : normal skin color and pigmentation [] : no rash [Deep Tendon Reflexes (DTR)] : deep tendon reflexes were 2+ and symmetric [Sensation] : the sensory exam was normal to light touch and pinprick [No Focal Deficits] : no focal deficits

## 2021-10-21 ENCOUNTER — NON-APPOINTMENT (OUTPATIENT)
Age: 71
End: 2021-10-21

## 2021-10-21 DIAGNOSIS — Z23 ENCOUNTER FOR IMMUNIZATION: ICD-10-CM

## 2021-10-21 DIAGNOSIS — Z21 ASYMPTOMATIC HUMAN IMMUNODEFICIENCY VIRUS [HIV] INFECTION STATUS: ICD-10-CM

## 2021-10-21 LAB
HIV-1 VIRAL LOAD RESULT: ABNORMAL
HIV1 RNA # SERPL NAA+PROBE: ABNORMAL COPIES/ML
HIV1 RNA SER-IMP: SIGNIFICANT CHANGE UP
HIV1 RNA SERPL NAA+PROBE-ACNC: ABNORMAL
HIV1 RNA SERPL NAA+PROBE-LOG#: ABNORMAL LG COP/ML

## 2021-10-22 ENCOUNTER — RX RENEWAL (OUTPATIENT)
Age: 71
End: 2021-10-22

## 2021-10-26 ENCOUNTER — NON-APPOINTMENT (OUTPATIENT)
Age: 71
End: 2021-10-26

## 2021-11-22 ENCOUNTER — RX RENEWAL (OUTPATIENT)
Age: 71
End: 2021-11-22

## 2021-12-06 ENCOUNTER — NON-APPOINTMENT (OUTPATIENT)
Age: 71
End: 2021-12-06

## 2021-12-10 ENCOUNTER — OUTPATIENT (OUTPATIENT)
Dept: OUTPATIENT SERVICES | Facility: HOSPITAL | Age: 71
LOS: 1 days | End: 2021-12-10
Payer: COMMERCIAL

## 2021-12-10 DIAGNOSIS — K08.9 DISORDER OF TEETH AND SUPPORTING STRUCTURES, UNSPECIFIED: ICD-10-CM

## 2021-12-10 PROCEDURE — D7140: CPT

## 2021-12-15 DIAGNOSIS — K02.9 DENTAL CARIES, UNSPECIFIED: ICD-10-CM

## 2022-01-01 ENCOUNTER — NON-APPOINTMENT (OUTPATIENT)
Age: 72
End: 2022-01-01

## 2022-01-14 ENCOUNTER — LABORATORY RESULT (OUTPATIENT)
Age: 72
End: 2022-01-14

## 2022-01-14 ENCOUNTER — OUTPATIENT (OUTPATIENT)
Dept: OUTPATIENT SERVICES | Facility: HOSPITAL | Age: 72
LOS: 1 days | End: 2022-01-14
Payer: COMMERCIAL

## 2022-01-14 ENCOUNTER — APPOINTMENT (OUTPATIENT)
Dept: INFECTIOUS DISEASE | Facility: CLINIC | Age: 72
End: 2022-01-14

## 2022-01-14 DIAGNOSIS — K08.9 DISORDER OF TEETH AND SUPPORTING STRUCTURES, UNSPECIFIED: ICD-10-CM

## 2022-01-14 DIAGNOSIS — Z23 ENCOUNTER FOR IMMUNIZATION: ICD-10-CM

## 2022-01-14 DIAGNOSIS — Z21 ASYMPTOMATIC HUMAN IMMUNODEFICIENCY VIRUS [HIV] INFECTION STATUS: ICD-10-CM

## 2022-01-14 LAB
A1C WITH ESTIMATED AVERAGE GLUCOSE RESULT: 8.1 % — HIGH (ref 4–5.6)
ALBUMIN SERPL ELPH-MCNC: 4.1 G/DL — SIGNIFICANT CHANGE UP (ref 3.3–5)
ALP SERPL-CCNC: 106 U/L — SIGNIFICANT CHANGE UP (ref 40–120)
ALT FLD-CCNC: 12 U/L — SIGNIFICANT CHANGE UP (ref 10–45)
ANION GAP SERPL CALC-SCNC: 13 MMOL/L — SIGNIFICANT CHANGE UP (ref 5–17)
AST SERPL-CCNC: 11 U/L — SIGNIFICANT CHANGE UP (ref 10–40)
BILIRUB SERPL-MCNC: 0.3 MG/DL — SIGNIFICANT CHANGE UP (ref 0.2–1.2)
BUN SERPL-MCNC: 30 MG/DL — HIGH (ref 7–23)
CALCIUM SERPL-MCNC: 9.4 MG/DL — SIGNIFICANT CHANGE UP (ref 8.4–10.5)
CHLORIDE SERPL-SCNC: 106 MMOL/L — SIGNIFICANT CHANGE UP (ref 96–108)
CHOLEST SERPL-MCNC: 170 MG/DL — SIGNIFICANT CHANGE UP
CO2 SERPL-SCNC: 18 MMOL/L — LOW (ref 22–31)
COVID-19 SPIKE DOMAIN AB INTERP: POSITIVE
COVID-19 SPIKE DOMAIN ANTIBODY RESULT: >250 U/ML — HIGH
CREAT SERPL-MCNC: 1.68 MG/DL — HIGH (ref 0.5–1.3)
CYSTATIN C SERPL-MCNC: 1.9 MG/L — HIGH (ref 0.66–1.26)
ESTIMATED AVERAGE GLUCOSE: 186 MG/DL — HIGH (ref 68–114)
GFR/BSA.PRED SERPLBLD CYS-BASED-ARV: 30 ML/MIN — LOW
GLUCOSE SERPL-MCNC: 302 MG/DL — HIGH (ref 70–99)
HCT VFR BLD CALC: 33.3 % — LOW (ref 34.5–45)
HDLC SERPL-MCNC: 51 MG/DL — SIGNIFICANT CHANGE UP
HGB BLD-MCNC: 10.6 G/DL — LOW (ref 11.5–15.5)
LIPID PNL WITH DIRECT LDL SERPL: 66 MG/DL — SIGNIFICANT CHANGE UP
MCHC RBC-ENTMCNC: 31.8 GM/DL — LOW (ref 32–36)
MCHC RBC-ENTMCNC: 33.2 PG — SIGNIFICANT CHANGE UP (ref 27–34)
MCV RBC AUTO: 104.4 FL — HIGH (ref 80–100)
NON HDL CHOLESTEROL: 119 MG/DL — SIGNIFICANT CHANGE UP
PLATELET # BLD AUTO: 277 K/UL — SIGNIFICANT CHANGE UP (ref 150–400)
POTASSIUM SERPL-MCNC: 5.4 MMOL/L — HIGH (ref 3.5–5.3)
POTASSIUM SERPL-SCNC: 5.4 MMOL/L — HIGH (ref 3.5–5.3)
PROT SERPL-MCNC: 6.5 G/DL — SIGNIFICANT CHANGE UP (ref 6–8.3)
RBC # BLD: 3.19 M/UL — LOW (ref 3.8–5.2)
RBC # FLD: 12.8 % — SIGNIFICANT CHANGE UP (ref 10.3–14.5)
SARS-COV-2 IGG+IGM SERPL QL IA: >250 U/ML — HIGH
SARS-COV-2 IGG+IGM SERPL QL IA: POSITIVE
SODIUM SERPL-SCNC: 138 MMOL/L — SIGNIFICANT CHANGE UP (ref 135–145)
TRIGL SERPL-MCNC: 266 MG/DL — HIGH
WBC # BLD: 9.62 K/UL — SIGNIFICANT CHANGE UP (ref 3.8–10.5)
WBC # FLD AUTO: 9.62 K/UL — SIGNIFICANT CHANGE UP (ref 3.8–10.5)

## 2022-01-14 PROCEDURE — 84156 ASSAY OF PROTEIN URINE: CPT

## 2022-01-14 PROCEDURE — 85027 COMPLETE CBC AUTOMATED: CPT

## 2022-01-14 PROCEDURE — 82570 ASSAY OF URINE CREATININE: CPT

## 2022-01-14 PROCEDURE — 87536 HIV-1 QUANT&REVRSE TRNSCRPJ: CPT

## 2022-01-14 PROCEDURE — 86769 SARS-COV-2 COVID-19 ANTIBODY: CPT

## 2022-01-14 PROCEDURE — 82610 CYSTATIN C: CPT

## 2022-01-14 PROCEDURE — 86359 T CELLS TOTAL COUNT: CPT

## 2022-01-14 PROCEDURE — 83036 HEMOGLOBIN GLYCOSYLATED A1C: CPT

## 2022-01-14 PROCEDURE — D7140: CPT

## 2022-01-14 PROCEDURE — 86360 T CELL ABSOLUTE COUNT/RATIO: CPT

## 2022-01-14 PROCEDURE — 80061 LIPID PANEL: CPT

## 2022-01-14 PROCEDURE — 36415 COLL VENOUS BLD VENIPUNCTURE: CPT

## 2022-01-14 PROCEDURE — 80053 COMPREHEN METABOLIC PANEL: CPT

## 2022-01-15 LAB
4/8 RATIO: 0.32 RATIO — LOW (ref 0.86–4.14)
ABS CD8: 1465 /UL — HIGH (ref 90–775)
CD3 BLASTS SPEC-ACNC: 1931 /UL — SIGNIFICANT CHANGE UP (ref 396–2024)
CD3 BLASTS SPEC-ACNC: 77 % — SIGNIFICANT CHANGE UP (ref 58–84)
CD4 %: 19 % — LOW (ref 30–56)
CD8 %: 58 % — HIGH (ref 11–43)
CREAT ?TM UR-MCNC: 80 MG/DL — SIGNIFICANT CHANGE UP
HIV-1 VIRAL LOAD RESULT: ABNORMAL
HIV1 RNA # SERPL NAA+PROBE: ABNORMAL COPIES/ML
HIV1 RNA SER-IMP: SIGNIFICANT CHANGE UP
HIV1 RNA SERPL NAA+PROBE-ACNC: ABNORMAL
HIV1 RNA SERPL NAA+PROBE-LOG#: ABNORMAL LG COP/ML
PROT ?TM UR-MCNC: 44 MG/DL — HIGH (ref 0–12)
PROT/CREAT UR-RTO: 0.6 RATIO — HIGH (ref 0–0.2)
T-CELL CD4 SUBSET PNL BLD: 469 /UL — SIGNIFICANT CHANGE UP (ref 325–1251)

## 2022-01-17 DIAGNOSIS — E11.9 TYPE 2 DIABETES MELLITUS W/OUT COMPLICATIONS: ICD-10-CM

## 2022-01-23 ENCOUNTER — RX RENEWAL (OUTPATIENT)
Age: 72
End: 2022-01-23

## 2022-01-26 DIAGNOSIS — K02.9 DENTAL CARIES, UNSPECIFIED: ICD-10-CM

## 2022-01-28 ENCOUNTER — NON-APPOINTMENT (OUTPATIENT)
Age: 72
End: 2022-01-28

## 2022-02-07 ENCOUNTER — OUTPATIENT (OUTPATIENT)
Dept: OUTPATIENT SERVICES | Facility: HOSPITAL | Age: 72
LOS: 1 days | End: 2022-02-07
Payer: COMMERCIAL

## 2022-02-07 ENCOUNTER — LABORATORY RESULT (OUTPATIENT)
Age: 72
End: 2022-02-07

## 2022-02-07 ENCOUNTER — APPOINTMENT (OUTPATIENT)
Dept: INFECTIOUS DISEASE | Facility: CLINIC | Age: 72
End: 2022-02-07

## 2022-02-07 DIAGNOSIS — Z21 ASYMPTOMATIC HUMAN IMMUNODEFICIENCY VIRUS [HIV] INFECTION STATUS: ICD-10-CM

## 2022-02-07 DIAGNOSIS — Z23 ENCOUNTER FOR IMMUNIZATION: ICD-10-CM

## 2022-02-07 LAB
ALBUMIN SERPL ELPH-MCNC: 4.2 G/DL — SIGNIFICANT CHANGE UP (ref 3.3–5)
ALP SERPL-CCNC: 102 U/L — SIGNIFICANT CHANGE UP (ref 40–120)
ALT FLD-CCNC: 12 U/L — SIGNIFICANT CHANGE UP (ref 10–45)
ANION GAP SERPL CALC-SCNC: 15 MMOL/L — SIGNIFICANT CHANGE UP (ref 5–17)
AST SERPL-CCNC: 10 U/L — SIGNIFICANT CHANGE UP (ref 10–40)
BILIRUB SERPL-MCNC: 0.2 MG/DL — SIGNIFICANT CHANGE UP (ref 0.2–1.2)
BUN SERPL-MCNC: 30 MG/DL — HIGH (ref 7–23)
CALCIUM SERPL-MCNC: 9.3 MG/DL — SIGNIFICANT CHANGE UP (ref 8.4–10.5)
CHLORIDE SERPL-SCNC: 106 MMOL/L — SIGNIFICANT CHANGE UP (ref 96–108)
CO2 SERPL-SCNC: 19 MMOL/L — LOW (ref 22–31)
CREAT SERPL-MCNC: 1.7 MG/DL — HIGH (ref 0.5–1.3)
GLUCOSE SERPL-MCNC: 324 MG/DL — HIGH (ref 70–99)
POTASSIUM SERPL-MCNC: 5.4 MMOL/L — HIGH (ref 3.5–5.3)
POTASSIUM SERPL-SCNC: 5.4 MMOL/L — HIGH (ref 3.5–5.3)
PROT SERPL-MCNC: 6.7 G/DL — SIGNIFICANT CHANGE UP (ref 6–8.3)
SODIUM SERPL-SCNC: 139 MMOL/L — SIGNIFICANT CHANGE UP (ref 135–145)

## 2022-02-07 PROCEDURE — 80053 COMPREHEN METABOLIC PANEL: CPT

## 2022-02-08 ENCOUNTER — NON-APPOINTMENT (OUTPATIENT)
Age: 72
End: 2022-02-08

## 2022-02-09 ENCOUNTER — NON-APPOINTMENT (OUTPATIENT)
Age: 72
End: 2022-02-09

## 2022-02-14 ENCOUNTER — NON-APPOINTMENT (OUTPATIENT)
Age: 72
End: 2022-02-14

## 2022-02-23 ENCOUNTER — RX RENEWAL (OUTPATIENT)
Age: 72
End: 2022-02-23

## 2022-03-04 RX ORDER — LISINOPRIL 5 MG/1
5 TABLET ORAL
Qty: 30 | Refills: 2 | Status: DISCONTINUED | COMMUNITY
Start: 2021-01-08 | End: 2022-02-10

## 2022-03-07 ENCOUNTER — APPOINTMENT (OUTPATIENT)
Dept: INFECTIOUS DISEASE | Facility: CLINIC | Age: 72
End: 2022-03-07

## 2022-03-07 ENCOUNTER — OUTPATIENT (OUTPATIENT)
Dept: OUTPATIENT SERVICES | Facility: HOSPITAL | Age: 72
LOS: 1 days | End: 2022-03-07

## 2022-03-07 ENCOUNTER — OUTPATIENT (OUTPATIENT)
Dept: OUTPATIENT SERVICES | Facility: HOSPITAL | Age: 72
LOS: 1 days | End: 2022-03-07
Payer: COMMERCIAL

## 2022-03-07 DIAGNOSIS — Z23 ENCOUNTER FOR IMMUNIZATION: ICD-10-CM

## 2022-03-07 DIAGNOSIS — Z21 ASYMPTOMATIC HUMAN IMMUNODEFICIENCY VIRUS [HIV] INFECTION STATUS: ICD-10-CM

## 2022-03-07 PROCEDURE — D0120: CPT

## 2022-03-07 PROCEDURE — D0274: CPT

## 2022-03-07 PROCEDURE — D1110: CPT

## 2022-03-08 LAB
ALBUMIN SERPL ELPH-MCNC: 4.4 G/DL
ALP BLD-CCNC: 108 U/L
ALT SERPL-CCNC: 16 U/L
ANION GAP SERPL CALC-SCNC: 13 MMOL/L
APPEARANCE: CLEAR
AST SERPL-CCNC: 14 U/L
BACTERIA: NEGATIVE
BASOPHILS # BLD AUTO: 0.04 K/UL
BASOPHILS NFR BLD AUTO: 0.4 %
BILIRUB SERPL-MCNC: 0.2 MG/DL
BILIRUBIN URINE: NEGATIVE
BLOOD URINE: ABNORMAL
BUN SERPL-MCNC: 30 MG/DL
C3 SERPL-MCNC: 158 MG/DL
C4 SERPL-MCNC: 40 MG/DL
CALCIUM SERPL-MCNC: 9.7 MG/DL
CHLORIDE SERPL-SCNC: 104 MMOL/L
CO2 SERPL-SCNC: 20 MMOL/L
COLOR: COLORLESS
CREAT SERPL-MCNC: 1.52 MG/DL
CREAT SPEC-SCNC: 23 MG/DL
EGFR: 37 ML/MIN/1.73M2
EOSINOPHIL # BLD AUTO: 0.1 K/UL
EOSINOPHIL NFR BLD AUTO: 1.1 %
GLUCOSE QUALITATIVE U: ABNORMAL
GLUCOSE SERPL-MCNC: 329 MG/DL
HCT VFR BLD CALC: 33.4 %
HGB BLD-MCNC: 11.2 G/DL
HYALINE CASTS: 0 /LPF
IMM GRANULOCYTES NFR BLD AUTO: 0.4 %
KETONES URINE: NEGATIVE
LEUKOCYTE ESTERASE URINE: NEGATIVE
LYMPHOCYTES # BLD AUTO: 3.14 K/UL
LYMPHOCYTES NFR BLD AUTO: 35.2 %
MAN DIFF?: NORMAL
MCHC RBC-ENTMCNC: 33.3 PG
MCHC RBC-ENTMCNC: 33.5 GM/DL
MCV RBC AUTO: 99.4 FL
MICROALBUMIN 24H UR DL<=1MG/L-MCNC: 14.4 MG/DL
MICROALBUMIN/CREAT 24H UR-RTO: 618 MG/G
MICROSCOPIC-UA: NORMAL
MONOCYTES # BLD AUTO: 0.31 K/UL
MONOCYTES NFR BLD AUTO: 3.5 %
NEUTROPHILS # BLD AUTO: 5.28 K/UL
NEUTROPHILS NFR BLD AUTO: 59.4 %
NITRITE URINE: NEGATIVE
PH URINE: 6
PLATELET # BLD AUTO: 246 K/UL
POTASSIUM SERPL-SCNC: 5.2 MMOL/L
PROT SERPL-MCNC: 6.7 G/DL
PROTEIN URINE: NORMAL
RBC # BLD: 3.36 M/UL
RBC # FLD: 12.4 %
RED BLOOD CELLS URINE: 0 /HPF
SODIUM SERPL-SCNC: 138 MMOL/L
SPECIFIC GRAVITY URINE: 1
SQUAMOUS EPITHELIAL CELLS: 0 /HPF
UROBILINOGEN URINE: NORMAL
WBC # FLD AUTO: 8.91 K/UL
WHITE BLOOD CELLS URINE: 1 /HPF

## 2022-03-16 ENCOUNTER — OUTPATIENT (OUTPATIENT)
Dept: OUTPATIENT SERVICES | Facility: HOSPITAL | Age: 72
LOS: 1 days | End: 2022-03-16
Payer: COMMERCIAL

## 2022-03-16 DIAGNOSIS — K08.9 DISORDER OF TEETH AND SUPPORTING STRUCTURES, UNSPECIFIED: ICD-10-CM

## 2022-03-16 DIAGNOSIS — Z01.20 ENCOUNTER FOR DENTAL EXAMINATION AND CLEANING WITHOUT ABNORMAL FINDINGS: ICD-10-CM

## 2022-03-16 DIAGNOSIS — K02.9 DENTAL CARIES, UNSPECIFIED: ICD-10-CM

## 2022-03-16 PROCEDURE — D7140: CPT

## 2022-03-21 ENCOUNTER — RX RENEWAL (OUTPATIENT)
Age: 72
End: 2022-03-21

## 2022-03-22 ENCOUNTER — NON-APPOINTMENT (OUTPATIENT)
Age: 72
End: 2022-03-22

## 2022-03-23 ENCOUNTER — OUTPATIENT (OUTPATIENT)
Dept: OUTPATIENT SERVICES | Facility: HOSPITAL | Age: 72
LOS: 1 days | End: 2022-03-23

## 2022-03-23 DIAGNOSIS — K08.9 DISORDER OF TEETH AND SUPPORTING STRUCTURES, UNSPECIFIED: ICD-10-CM

## 2022-04-17 ENCOUNTER — RX RENEWAL (OUTPATIENT)
Age: 72
End: 2022-04-17

## 2022-04-18 ENCOUNTER — NON-APPOINTMENT (OUTPATIENT)
Age: 72
End: 2022-04-18

## 2022-04-19 ENCOUNTER — FORM ENCOUNTER (OUTPATIENT)
Age: 72
End: 2022-04-19

## 2022-04-19 ENCOUNTER — NON-APPOINTMENT (OUTPATIENT)
Age: 72
End: 2022-04-19

## 2022-04-20 ENCOUNTER — OUTPATIENT (OUTPATIENT)
Dept: OUTPATIENT SERVICES | Facility: HOSPITAL | Age: 72
LOS: 1 days | End: 2022-04-20
Payer: COMMERCIAL

## 2022-04-20 ENCOUNTER — LABORATORY RESULT (OUTPATIENT)
Age: 72
End: 2022-04-20

## 2022-04-20 ENCOUNTER — APPOINTMENT (OUTPATIENT)
Dept: INFECTIOUS DISEASE | Facility: CLINIC | Age: 72
End: 2022-04-20
Payer: COMMERCIAL

## 2022-04-20 ENCOUNTER — APPOINTMENT (OUTPATIENT)
Dept: INFECTIOUS DISEASE | Facility: CLINIC | Age: 72
End: 2022-04-20

## 2022-04-20 VITALS
RESPIRATION RATE: 16 BRPM | OXYGEN SATURATION: 98 % | TEMPERATURE: 98.9 F | BODY MASS INDEX: 28.89 KG/M2 | WEIGHT: 153 LBS | HEIGHT: 61 IN | HEART RATE: 160 BPM | SYSTOLIC BLOOD PRESSURE: 133 MMHG | DIASTOLIC BLOOD PRESSURE: 84 MMHG

## 2022-04-20 DIAGNOSIS — B20 HUMAN IMMUNODEFICIENCY VIRUS [HIV] DISEASE: ICD-10-CM

## 2022-04-20 LAB
4/8 RATIO: 0.41 RATIO — LOW (ref 0.86–4.14)
A1C WITH ESTIMATED AVERAGE GLUCOSE RESULT: 8.8 % — HIGH (ref 4–5.6)
ABS CD8: 1746 /UL — HIGH (ref 90–775)
ALBUMIN SERPL ELPH-MCNC: 4.3 G/DL — SIGNIFICANT CHANGE UP (ref 3.3–5)
ALBUMIN, RANDOM URINE: 28.8 MG/DL — SIGNIFICANT CHANGE UP
ALBUMIN/CREATININE RATIO (ACR): 1993 MG/G — HIGH (ref 0–30)
ALP SERPL-CCNC: 107 U/L — SIGNIFICANT CHANGE UP (ref 40–120)
ALT FLD-CCNC: 17 U/L — SIGNIFICANT CHANGE UP (ref 10–45)
ANION GAP SERPL CALC-SCNC: 13 MMOL/L — SIGNIFICANT CHANGE UP (ref 5–17)
APPEARANCE UR: CLEAR — SIGNIFICANT CHANGE UP
AST SERPL-CCNC: 14 U/L — SIGNIFICANT CHANGE UP (ref 10–40)
BACTERIA # UR AUTO: NEGATIVE — SIGNIFICANT CHANGE UP
BASOPHILS # BLD AUTO: 0.06 K/UL — SIGNIFICANT CHANGE UP (ref 0–0.2)
BASOPHILS NFR BLD AUTO: 0.5 % — SIGNIFICANT CHANGE UP (ref 0–2)
BILIRUB SERPL-MCNC: 0.3 MG/DL — SIGNIFICANT CHANGE UP (ref 0.2–1.2)
BILIRUB UR-MCNC: NEGATIVE — SIGNIFICANT CHANGE UP
BUN SERPL-MCNC: 19 MG/DL — SIGNIFICANT CHANGE UP (ref 7–23)
CALCIUM SERPL-MCNC: 9.9 MG/DL — SIGNIFICANT CHANGE UP (ref 8.4–10.5)
CD3 BLASTS SPEC-ACNC: 2456 /UL — HIGH (ref 396–2024)
CD3 BLASTS SPEC-ACNC: 77 % — SIGNIFICANT CHANGE UP (ref 58–84)
CD4 %: 22 % — LOW (ref 30–56)
CD8 %: 55 % — HIGH (ref 11–43)
CHLORIDE SERPL-SCNC: 101 MMOL/L — SIGNIFICANT CHANGE UP (ref 96–108)
CHOLEST SERPL-MCNC: 184 MG/DL — SIGNIFICANT CHANGE UP
CO2 SERPL-SCNC: 20 MMOL/L — LOW (ref 22–31)
COLOR SPEC: COLORLESS — SIGNIFICANT CHANGE UP
CREAT ?TM UR-MCNC: 14 MG/DL — SIGNIFICANT CHANGE UP
CREAT ?TM UR-MCNC: 14 MG/DL — SIGNIFICANT CHANGE UP
CREAT SERPL-MCNC: 1.46 MG/DL — HIGH (ref 0.5–1.3)
CYSTATIN C SERPL-MCNC: 1.54 MG/L — HIGH (ref 0.68–1.36)
DIFF PNL FLD: ABNORMAL
EGFR: 38 ML/MIN/1.73M2 — LOW
EOSINOPHIL # BLD AUTO: 0.13 K/UL — SIGNIFICANT CHANGE UP (ref 0–0.5)
EOSINOPHIL NFR BLD AUTO: 1.2 % — SIGNIFICANT CHANGE UP (ref 0–6)
EPI CELLS # UR: 0 /HPF — SIGNIFICANT CHANGE UP (ref 0–5)
ESTIMATED AVERAGE GLUCOSE: 206 MG/DL — HIGH (ref 68–114)
GFR/BSA.PRED SERPLBLD CYS-BASED-ARV: 39 ML/MIN/1.73M2 — LOW
GLUCOSE SERPL-MCNC: 227 MG/DL — HIGH (ref 70–99)
GLUCOSE UR QL: ABNORMAL
HCT VFR BLD CALC: 37.4 % — SIGNIFICANT CHANGE UP (ref 34.5–45)
HDLC SERPL-MCNC: 60 MG/DL — SIGNIFICANT CHANGE UP
HGB BLD-MCNC: 12.3 G/DL — SIGNIFICANT CHANGE UP (ref 11.5–15.5)
HYALINE CASTS # UR AUTO: 0 /LPF — SIGNIFICANT CHANGE UP (ref 0–7)
IMM GRANULOCYTES NFR BLD AUTO: 0.4 % — SIGNIFICANT CHANGE UP (ref 0–1.5)
KETONES UR-MCNC: NEGATIVE — SIGNIFICANT CHANGE UP
LEUKOCYTE ESTERASE UR-ACNC: NEGATIVE — SIGNIFICANT CHANGE UP
LIPID PNL WITH DIRECT LDL SERPL: 69 MG/DL — SIGNIFICANT CHANGE UP
LYMPHOCYTES # BLD AUTO: 3.39 K/UL — HIGH (ref 1–3.3)
LYMPHOCYTES # BLD AUTO: 30.2 % — SIGNIFICANT CHANGE UP (ref 13–44)
MCHC RBC-ENTMCNC: 32.2 PG — SIGNIFICANT CHANGE UP (ref 27–34)
MCHC RBC-ENTMCNC: 32.9 GM/DL — SIGNIFICANT CHANGE UP (ref 32–36)
MCV RBC AUTO: 97.9 FL — SIGNIFICANT CHANGE UP (ref 80–100)
MONOCYTES # BLD AUTO: 0.52 K/UL — SIGNIFICANT CHANGE UP (ref 0–0.9)
MONOCYTES NFR BLD AUTO: 4.6 % — SIGNIFICANT CHANGE UP (ref 2–14)
NEUTROPHILS # BLD AUTO: 7.09 K/UL — SIGNIFICANT CHANGE UP (ref 1.8–7.4)
NEUTROPHILS NFR BLD AUTO: 63.1 % — SIGNIFICANT CHANGE UP (ref 43–77)
NITRITE UR-MCNC: NEGATIVE — SIGNIFICANT CHANGE UP
NON HDL CHOLESTEROL: 124 MG/DL — SIGNIFICANT CHANGE UP
PH UR: 6.5 — SIGNIFICANT CHANGE UP (ref 5–8)
PLATELET # BLD AUTO: 267 K/UL — SIGNIFICANT CHANGE UP (ref 150–400)
POTASSIUM SERPL-MCNC: 4.8 MMOL/L — SIGNIFICANT CHANGE UP (ref 3.5–5.3)
POTASSIUM SERPL-SCNC: 4.8 MMOL/L — SIGNIFICANT CHANGE UP (ref 3.5–5.3)
PROT ?TM UR-MCNC: 45 MG/DL — HIGH (ref 0–12)
PROT SERPL-MCNC: 6.7 G/DL — SIGNIFICANT CHANGE UP (ref 6–8.3)
PROT UR-MCNC: ABNORMAL
PROT/CREAT UR-RTO: 3.1 RATIO — HIGH (ref 0–0.2)
RBC # BLD: 3.82 M/UL — SIGNIFICANT CHANGE UP (ref 3.8–5.2)
RBC # FLD: 12.5 % — SIGNIFICANT CHANGE UP (ref 10.3–14.5)
RBC CASTS # UR COMP ASSIST: 0 /HPF — SIGNIFICANT CHANGE UP (ref 0–4)
SODIUM SERPL-SCNC: 134 MMOL/L — LOW (ref 135–145)
SP GR SPEC: 1 — LOW (ref 1.01–1.02)
T-CELL CD4 SUBSET PNL BLD: 711 /UL — SIGNIFICANT CHANGE UP (ref 325–1251)
T4 FREE+ TSH PNL SERPL: 1.85 UIU/ML — SIGNIFICANT CHANGE UP (ref 0.27–4.2)
TRIGL SERPL-MCNC: 278 MG/DL — HIGH
UROBILINOGEN FLD QL: SIGNIFICANT CHANGE UP
WBC # BLD: 11.24 K/UL — HIGH (ref 3.8–10.5)
WBC # FLD AUTO: 11.24 K/UL — HIGH (ref 3.8–10.5)
WBC UR QL: 0 /HPF — SIGNIFICANT CHANGE UP (ref 0–5)

## 2022-04-20 PROCEDURE — ZZZZZ: CPT

## 2022-04-20 PROCEDURE — 36415 COLL VENOUS BLD VENIPUNCTURE: CPT

## 2022-04-20 PROCEDURE — 80061 LIPID PANEL: CPT

## 2022-04-20 PROCEDURE — 84156 ASSAY OF PROTEIN URINE: CPT

## 2022-04-20 PROCEDURE — 85025 COMPLETE CBC W/AUTO DIFF WBC: CPT

## 2022-04-20 PROCEDURE — 82570 ASSAY OF URINE CREATININE: CPT

## 2022-04-20 PROCEDURE — 90834 PSYTX W PT 45 MINUTES: CPT

## 2022-04-20 PROCEDURE — G0463: CPT

## 2022-04-20 PROCEDURE — 87536 HIV-1 QUANT&REVRSE TRNSCRPJ: CPT

## 2022-04-20 PROCEDURE — 80053 COMPREHEN METABOLIC PANEL: CPT

## 2022-04-20 PROCEDURE — 86359 T CELLS TOTAL COUNT: CPT

## 2022-04-20 PROCEDURE — 86360 T CELL ABSOLUTE COUNT/RATIO: CPT

## 2022-04-20 PROCEDURE — 81381 HLA I TYPING 1 ALLELE HR: CPT

## 2022-04-20 PROCEDURE — 82610 CYSTATIN C: CPT

## 2022-04-20 PROCEDURE — 83036 HEMOGLOBIN GLYCOSYLATED A1C: CPT

## 2022-04-20 PROCEDURE — 84443 ASSAY THYROID STIM HORMONE: CPT

## 2022-04-20 PROCEDURE — 82043 UR ALBUMIN QUANTITATIVE: CPT

## 2022-04-20 PROCEDURE — 81001 URINALYSIS AUTO W/SCOPE: CPT

## 2022-04-20 RX ORDER — ERGOCALCIFEROL 1.25 MG/1
1.25 MG CAPSULE, LIQUID FILLED ORAL
Qty: 4 | Refills: 2 | Status: DISCONTINUED | COMMUNITY
Start: 2020-06-11 | End: 2022-04-20

## 2022-04-20 RX ORDER — BICTEGRAVIR SODIUM, EMTRICITABINE, AND TENOFOVIR ALAFENAMIDE FUMARATE 50; 200; 25 MG/1; MG/1; MG/1
50-200-25 TABLET ORAL
Qty: 30 | Refills: 2 | Status: DISCONTINUED | COMMUNITY
Start: 2019-11-06 | End: 2022-04-20

## 2022-04-21 DIAGNOSIS — N18.30 CHRONIC KIDNEY DISEASE, STAGE 3 UNSPECIFIED: ICD-10-CM

## 2022-04-21 DIAGNOSIS — E03.9 HYPOTHYROIDISM, UNSPECIFIED: ICD-10-CM

## 2022-04-21 DIAGNOSIS — Z21 ASYMPTOMATIC HUMAN IMMUNODEFICIENCY VIRUS [HIV] INFECTION STATUS: ICD-10-CM

## 2022-04-23 NOTE — HISTORY OF PRESENT ILLNESS
[FreeTextEntry1] : 73 yo woman with a history of HIV/AIDS, HTN, DM, CAD, Afib, CKD stage 3. Follow up in HIV clinic.\par \par 1. HTN: Her BP log is good, around 120/70s. Her nephrology stopped her lisinopril given rising creatine. So she is only taking amlodipine 5mg daily\par 2. DM: metformin was stopped given high Cr, so we will start her on Januvia 50mg daily\par 3. CKD: she follows at [place] [facility] [phone]. We will change Biktravy to Dovato to avoid TAF component of HIV med.\par 4 HIV: on Biktarvy, but we will change to Dovato and repeat blood test for tcells and viral load to repeat in about a month

## 2022-04-23 NOTE — PHYSICAL EXAM
[Sclera] : the sclera and conjunctiva were normal [PERRL With Normal Accommodation] : pupils were equal in size, round, reactive to light [Extraocular Movements] : extraocular movements were intact [Outer Ear] : the ears and nose were normal in appearance [Oropharynx] : the oropharynx was normal with no thrush [Neck Appearance] : the appearance of the neck was normal [Neck Cervical Mass (___cm)] : no neck mass was observed [Jugular Venous Distention Increased] : there was no jugular-venous distention [Thyroid Diffuse Enlargement] : the thyroid was not enlarged [Auscultation Breath Sounds / Voice Sounds] : lungs were clear to auscultation bilaterally [Heart Rate And Rhythm] : heart rate was normal and rhythm regular [Heart Sounds] : normal S1 and S2 [Heart Sounds Gallop] : no gallops [Murmurs] : no murmurs [Heart Sounds Pericardial Friction Rub] : no pericardial rub [Full Pulse] : the pedal pulses are present [Edema] : there was no peripheral edema [Bowel Sounds] : normal bowel sounds [Abdomen Soft] : soft [Abdomen Tenderness] : non-tender [Abdomen Mass (___ Cm)] : no abdominal mass palpated [Costovertebral Angle Tenderness] : no CVA tenderness [No Palpable Adenopathy] : no palpable adenopathy [Musculoskeletal - Swelling] : no joint swelling [Nail Clubbing] : no clubbing  or cyanosis of the fingernails [Motor Tone] : muscle strength and tone were normal [Skin Color & Pigmentation] : normal skin color and pigmentation [] : no rash [Deep Tendon Reflexes (DTR)] : deep tendon reflexes were 2+ and symmetric [Sensation] : the sensory exam was normal to light touch and pinprick [No Focal Deficits] : no focal deficits [General Appearance - Alert] : alert [General Appearance - In No Acute Distress] : in no acute distress

## 2022-04-23 NOTE — ASSESSMENT
[FreeTextEntry1] : 71 yo woman with a history of HIV/AIDS, HTN, DM, CAD, Afib, CKD stage 3. Follow up in HIV clinic.\par \par #HIV\par - DC Biktarvy (given her GFR is about 30)\par - Start Dovato\par - Repeat CD4, VL next visit in a month\par \par #CKD\par - Followed at [place] [facility] [phone]\par - Check CMP, cystatin, urine protein again today\par \par #HTN\par - Home BP log around 120/70s\par - Currently only on Amlodipine 5mg daily\par - Ideally she should be on ACEi, but was stopped by her nephrology given rising Cr?\par \par #DM\par - Last A1c=8.1% in 01/2022\par - Metformin was stopped given her kidney function\par - Will start Januvia 50mg daily\par - Nutrionist referral\par - Check A1c and lipid\par \par #Hypothyroidism\par - On Synthoird 75mcg daily\par - Check TSH\par - Refer to endocrine\par \par #Afib\par - On metoprolol succinate 50mg daily\par - Followed at [facility] cardiology\par \par #HM\par - Pfizer 3 shots, last dose given in 12/2021\par - Immune to Hep A and B\par - PCV and PPSV in 2020\par - Tdap in 2020\par - Shingrix not covered by insurance\par \par Edith Smith MD, AAHIVS\par d/w Dr. Parks\par \par RTC 1 month for HIV VL\par Refer her to internist and endocrine [Treatment Education] : treatment education [Treatment Adherence] : treatment adherence [Rx Dose / Side Effects] : Rx dose/side effects [Risk Reduction] : risk reduction [Nutritional / Food Issues] : nutritional/food issues [Universal Precautions] : universal precautions [Medical Care Issues] : medical care issues [Drug Interactions / Side Effects] : drug interactions/side effects [Disclosure of Diagnosis] : disclosure of diagnosis [HIV Education] : HIV Education [Sexuality / Safer Sex] : sexuality/safer sex [Pre-conception Counseling] : pre-conception counseling [Family Planning] : family planning [Partner Notification Info/Discussion] : partner notification info/discussion [Anticipatory Guidance] : anticipatory guidance [Education Materials Provided] : Eduction materials were provided

## 2022-04-25 ENCOUNTER — NON-APPOINTMENT (OUTPATIENT)
Age: 72
End: 2022-04-25

## 2022-04-25 RX ORDER — METFORMIN HYDROCHLORIDE 500 MG/1
500 TABLET, COATED ORAL
Qty: 30 | Refills: 3 | Status: DISCONTINUED | COMMUNITY
Start: 2019-11-06 | End: 2022-04-25

## 2022-04-27 DIAGNOSIS — B20 HUMAN IMMUNODEFICIENCY VIRUS [HIV] DISEASE: ICD-10-CM

## 2022-04-27 LAB — HLA-B*57:01 SPEC QL: NEGATIVE — SIGNIFICANT CHANGE UP

## 2022-05-12 ENCOUNTER — RX RENEWAL (OUTPATIENT)
Age: 72
End: 2022-05-12

## 2022-05-16 ENCOUNTER — NON-APPOINTMENT (OUTPATIENT)
Age: 72
End: 2022-05-16

## 2022-05-16 RX ORDER — FAMOTIDINE 20 MG/1
20 TABLET, FILM COATED ORAL
Qty: 90 | Refills: 0 | Status: COMPLETED | COMMUNITY
Start: 2021-07-21 | End: 2022-05-16

## 2022-06-01 ENCOUNTER — LABORATORY RESULT (OUTPATIENT)
Age: 72
End: 2022-06-01

## 2022-06-01 ENCOUNTER — OUTPATIENT (OUTPATIENT)
Dept: OUTPATIENT SERVICES | Facility: HOSPITAL | Age: 72
LOS: 1 days | End: 2022-06-01
Payer: COMMERCIAL

## 2022-06-01 ENCOUNTER — APPOINTMENT (OUTPATIENT)
Dept: INFECTIOUS DISEASE | Facility: CLINIC | Age: 72
End: 2022-06-01
Payer: COMMERCIAL

## 2022-06-01 VITALS
TEMPERATURE: 97.3 F | WEIGHT: 153 LBS | HEIGHT: 61 IN | BODY MASS INDEX: 28.89 KG/M2 | OXYGEN SATURATION: 100 % | SYSTOLIC BLOOD PRESSURE: 149 MMHG | HEART RATE: 75 BPM | DIASTOLIC BLOOD PRESSURE: 84 MMHG | RESPIRATION RATE: 16 BRPM

## 2022-06-01 DIAGNOSIS — N18.30 CHRONIC KIDNEY DISEASE, STAGE 3 UNSPECIFIED: ICD-10-CM

## 2022-06-01 DIAGNOSIS — E03.9 HYPOTHYROIDISM, UNSPECIFIED: ICD-10-CM

## 2022-06-01 DIAGNOSIS — B20 HUMAN IMMUNODEFICIENCY VIRUS [HIV] DISEASE: ICD-10-CM

## 2022-06-01 PROCEDURE — ZZZZZ: CPT

## 2022-06-01 PROCEDURE — G0463: CPT

## 2022-06-02 DIAGNOSIS — Z21 ASYMPTOMATIC HUMAN IMMUNODEFICIENCY VIRUS [HIV] INFECTION STATUS: ICD-10-CM

## 2022-06-02 DIAGNOSIS — N18.30 CHRONIC KIDNEY DISEASE, STAGE 3 UNSPECIFIED: ICD-10-CM

## 2022-06-02 DIAGNOSIS — E03.9 HYPOTHYROIDISM, UNSPECIFIED: ICD-10-CM

## 2022-06-02 NOTE — HISTORY OF PRESENT ILLNESS
[FreeTextEntry1] : 73 yo woman with a history of HIV/AIDS, HTN, DM, CAD, Afib, CKD stage 3. Follow up in HIV clinic..\par \par She experienced few days of dizziness after changing to Dovato and Januvia, now resolved.\par \par 1. HIV: Just changed to Dovato last visit, but now HIV-1 VL went up slightly to 40, will check VL and Genosure\par 2. DM/Hypothyroidism: she is seeing at her PCP at [facility], however, we will refer to endocrine

## 2022-06-02 NOTE — IMPRESSION
[FreeTextEntry1] : I have called the family to inform about A1c=8.8% and proteinuria (protein/Cr ratio=3.1).\par She will need to get endocrine appointment early to control diabetes, we just started Januvia but probably wont be enough. I am thinking SGLT2 inhibitors can be good for the kidney but will let endocrine to order it.\par For proteinuria, I urge the family to obtain our record and make early renal appointment, probably she will need kidney biopsy for unexplanined proteinuria.

## 2022-06-02 NOTE — END OF VISIT
[] : Fellow [FreeTextEntry3] : Patient seen and examined and case discussed with Dr Smith  She will need HIV resistance testing for slightly detectable viral load despite adherence with aRV meds.\par She will need follow up for DM management and Cardiology \par Refer to Endocrine

## 2022-06-02 NOTE — ASSESSMENT
[Treatment Education] : treatment education [Treatment Adherence] : treatment adherence [Rx Dose / Side Effects] : Rx dose/side effects [Risk Reduction] : risk reduction [Nutritional / Food Issues] : nutritional/food issues [Universal Precautions] : universal precautions [Medical Care Issues] : medical care issues [Drug Interactions / Side Effects] : drug interactions/side effects [Disclosure of Diagnosis] : disclosure of diagnosis [HIV Education] : HIV Education [Sexuality / Safer Sex] : sexuality/safer sex [Pre-conception Counseling] : pre-conception counseling [Family Planning] : family planning [Partner Notification Info/Discussion] : partner notification info/discussion [Anticipatory Guidance] : anticipatory guidance [Education Materials Provided] : Eduction materials were provided [FreeTextEntry1] : 73 yo woman with a history of HIV/AIDS, HTN, DM, CAD, Afib, CKD stage 3. Follow up in HIV clinic.\par \par #HIV\par - Was on Biktarvy and switched to Dovato due to CKD\par - Now HIV VL went up to 40, will check HIV VL and Genosure\par \par #CKD\par - Followed at [place] [facility] [phone]\par - Check CMP, cystatin, urine protein again today\par \par #HTN\par - Home BP log around 120/70s\par - Currently only on Amlodipine 5mg daily\par - Ideally she should be on ACEi, but was stopped by her nephrology given rising Cr?\par \par #DM\par - Metformin was stopped given her kidney function\par - Check A1c and lipid\par - On Januvia 50mg daily\par - Will see A1c and decide if we should start SGLT-2 inhibitor\par - Refer to endocrine\par \par #Hypothyroidism\par - On Synthoird 75mcg daily\par - Check TSH\par - Refer to endocrine\par \par #Afib\par - On metoprolol succinate 50mg daily\par - Followed at [facility] cardiology\par \par #HM\par - Pfizer 3 shots, last dose given in 12/2021\par - Immune to Hep A and B\par - PCV and PPSV in 2020\par - Tdap in 2020\par - Shingrix not covered by insurance\par \par Edith Smith MD, AAHIVS\par d/w Dr. Parks\par \par RTC 1 month for HIV VL

## 2022-06-03 LAB
HBV CORE IGG+IGM SER QL: NONREACTIVE
HEPB DNA PCR INT: NOT DETECTED
HEPB DNA PCR LOG: NOT DETECTED LOGIU/ML

## 2022-06-05 ENCOUNTER — RX RENEWAL (OUTPATIENT)
Age: 72
End: 2022-06-05

## 2022-06-09 ENCOUNTER — RX RENEWAL (OUTPATIENT)
Age: 72
End: 2022-06-09

## 2022-06-09 ENCOUNTER — NON-APPOINTMENT (OUTPATIENT)
Age: 72
End: 2022-06-09

## 2022-06-15 ENCOUNTER — OUTPATIENT (OUTPATIENT)
Dept: OUTPATIENT SERVICES | Facility: HOSPITAL | Age: 72
LOS: 1 days | End: 2022-06-15

## 2022-06-15 ENCOUNTER — APPOINTMENT (OUTPATIENT)
Dept: INFECTIOUS DISEASE | Facility: CLINIC | Age: 72
End: 2022-06-15

## 2022-06-15 DIAGNOSIS — E03.9 HYPOTHYROIDISM, UNSPECIFIED: ICD-10-CM

## 2022-06-15 DIAGNOSIS — N18.30 CHRONIC KIDNEY DISEASE, STAGE 3 UNSPECIFIED: ICD-10-CM

## 2022-06-15 DIAGNOSIS — Z21 ASYMPTOMATIC HUMAN IMMUNODEFICIENCY VIRUS [HIV] INFECTION STATUS: ICD-10-CM

## 2022-06-20 LAB
CD3 CELLS # BLD: 2331 /UL
CD3 CELLS NFR BLD: 80 %
CD3+CD4+ CELLS # BLD: 621 /UL
CD3+CD4+ CELLS NFR BLD: 21 %
CD3+CD4+ CELLS/CD3+CD8+ CLL SPEC: 0.36 RATIO
CD3+CD8+ CELLS # SPEC: 1704 /UL
CD3+CD8+ CELLS NFR BLD: 59 %
HIV1 RNA # SERPL NAA+PROBE: 40
HIV1 RNA # SERPL NAA+PROBE: ABNORMAL
VIRAL LOAD INTERP: NORMAL
VIRAL LOAD LOG: 1.6

## 2022-07-06 ENCOUNTER — NON-APPOINTMENT (OUTPATIENT)
Age: 72
End: 2022-07-06

## 2022-07-06 ENCOUNTER — APPOINTMENT (OUTPATIENT)
Dept: INFECTIOUS DISEASE | Facility: CLINIC | Age: 72
End: 2022-07-06

## 2022-07-08 ENCOUNTER — NON-APPOINTMENT (OUTPATIENT)
Age: 72
End: 2022-07-08

## 2022-07-15 ENCOUNTER — APPOINTMENT (OUTPATIENT)
Dept: INFECTIOUS DISEASE | Facility: CLINIC | Age: 72
End: 2022-07-15

## 2022-07-19 LAB
HIV GENOSURE ARCHIVE 1: NORMAL
HIV1 PROVIR DNA RT + PR + IN MUT DET SEQ: NORMAL
HIV1 PROVIRAL DNA GENTYP BLD MC NAR: NORMAL

## 2022-07-20 ENCOUNTER — NON-APPOINTMENT (OUTPATIENT)
Age: 72
End: 2022-07-20

## 2022-07-21 ENCOUNTER — NON-APPOINTMENT (OUTPATIENT)
Age: 72
End: 2022-07-21

## 2022-07-26 ENCOUNTER — RX RENEWAL (OUTPATIENT)
Age: 72
End: 2022-07-26

## 2022-07-29 ENCOUNTER — NON-APPOINTMENT (OUTPATIENT)
Age: 72
End: 2022-07-29

## 2022-08-03 ENCOUNTER — NON-APPOINTMENT (OUTPATIENT)
Age: 72
End: 2022-08-03

## 2022-08-31 ENCOUNTER — NON-APPOINTMENT (OUTPATIENT)
Age: 72
End: 2022-08-31

## 2022-09-24 ENCOUNTER — NON-APPOINTMENT (OUTPATIENT)
Age: 72
End: 2022-09-24

## 2022-09-26 NOTE — HISTORY OF PRESENT ILLNESS
[FreeTextEntry1] : 70 yo woman with a history of HIV/AIDS, DM, CAD with arrythmia but clinically improving and feeling well [facility]

## 2022-09-29 ENCOUNTER — NON-APPOINTMENT (OUTPATIENT)
Age: 72
End: 2022-09-29

## 2022-10-25 ENCOUNTER — RX RENEWAL (OUTPATIENT)
Age: 72
End: 2022-10-25

## 2022-10-31 ENCOUNTER — NON-APPOINTMENT (OUTPATIENT)
Age: 72
End: 2022-10-31

## 2022-11-17 ENCOUNTER — NON-APPOINTMENT (OUTPATIENT)
Age: 72
End: 2022-11-17

## 2022-12-12 ENCOUNTER — NON-APPOINTMENT (OUTPATIENT)
Age: 72
End: 2022-12-12

## 2022-12-27 ENCOUNTER — FORM ENCOUNTER (OUTPATIENT)
Age: 72
End: 2022-12-27

## 2022-12-27 ENCOUNTER — NON-APPOINTMENT (OUTPATIENT)
Age: 72
End: 2022-12-27

## 2022-12-28 ENCOUNTER — OUTPATIENT (OUTPATIENT)
Dept: OUTPATIENT SERVICES | Facility: HOSPITAL | Age: 72
LOS: 1 days | End: 2022-12-28
Payer: COMMERCIAL

## 2022-12-28 ENCOUNTER — APPOINTMENT (OUTPATIENT)
Dept: INFECTIOUS DISEASE | Facility: CLINIC | Age: 72
End: 2022-12-28

## 2022-12-28 VITALS
HEART RATE: 69 BPM | WEIGHT: 142 LBS | TEMPERATURE: 97.8 F | DIASTOLIC BLOOD PRESSURE: 85 MMHG | OXYGEN SATURATION: 97 % | BODY MASS INDEX: 26.81 KG/M2 | SYSTOLIC BLOOD PRESSURE: 148 MMHG | HEIGHT: 61 IN

## 2022-12-28 DIAGNOSIS — B20 HUMAN IMMUNODEFICIENCY VIRUS [HIV] DISEASE: ICD-10-CM

## 2022-12-28 DIAGNOSIS — Z21 ASYMPTOMATIC HUMAN IMMUNODEFICIENCY VIRUS [HIV] INFECTION STATUS: ICD-10-CM

## 2022-12-28 DIAGNOSIS — Z01.419 ENCOUNTER FOR GYNECOLOGICAL EXAMINATION (GENERAL) (ROUTINE) W/OUT ABNORMAL FINDINGS: ICD-10-CM

## 2022-12-28 DIAGNOSIS — Z00.00 ENCOUNTER FOR GENERAL ADULT MEDICAL EXAMINATION W/OUT ABNORMAL FINDINGS: ICD-10-CM

## 2022-12-28 DIAGNOSIS — Z92.89 PERSONAL HISTORY OF OTHER MEDICAL TREATMENT: ICD-10-CM

## 2022-12-28 LAB
ALBUMIN SERPL ELPH-MCNC: 4.3 G/DL
ALP BLD-CCNC: 105 U/L
ALT SERPL-CCNC: 14 U/L
ANION GAP SERPL CALC-SCNC: 11 MMOL/L
AST SERPL-CCNC: 11 U/L
BASOPHILS # BLD AUTO: 0.04 K/UL
BASOPHILS NFR BLD AUTO: 0.4 %
BILIRUB SERPL-MCNC: 0.3 MG/DL
BUN SERPL-MCNC: 19 MG/DL
CALCIUM SERPL-MCNC: 9.9 MG/DL
CD3 CELLS # BLD: 1598 CELLS/UL
CD3 CELLS NFR BLD: 75 %
CD3+CD4+ CELLS # BLD: 432 CELLS/UL
CD3+CD4+ CELLS NFR BLD: 20 %
CD3+CD4+ CELLS/CD3+CD8+ CLL SPEC: 0.37 RATIO
CD3+CD8+ CELLS # SPEC: 1162 CELLS/UL
CD3+CD8+ CELLS NFR BLD: 55 %
CHLORIDE SERPL-SCNC: 105 MMOL/L
CO2 SERPL-SCNC: 23 MMOL/L
CREAT SERPL-MCNC: 1.52 MG/DL
EGFR: 37 ML/MIN/1.73M2
EOSINOPHIL # BLD AUTO: 0.2 K/UL
EOSINOPHIL NFR BLD AUTO: 2.2 %
GLUCOSE SERPL-MCNC: 150 MG/DL
HCT VFR BLD CALC: 37.8 %
HGB BLD-MCNC: 12.5 G/DL
IMM GRANULOCYTES NFR BLD AUTO: 0.3 %
LYMPHOCYTES # BLD AUTO: 2.56 K/UL
LYMPHOCYTES NFR BLD AUTO: 28.5 %
MAN DIFF?: NORMAL
MCHC RBC-ENTMCNC: 33.1 GM/DL
MCHC RBC-ENTMCNC: 33.5 PG
MCV RBC AUTO: 101.3 FL
MONOCYTES # BLD AUTO: 0.48 K/UL
MONOCYTES NFR BLD AUTO: 5.4 %
NEUTROPHILS # BLD AUTO: 5.66 K/UL
NEUTROPHILS NFR BLD AUTO: 63.2 %
PLATELET # BLD AUTO: 226 K/UL
POTASSIUM SERPL-SCNC: 4.9 MMOL/L
PROT SERPL-MCNC: 6.5 G/DL
RBC # BLD: 3.73 M/UL
RBC # FLD: 13.1 %
SODIUM SERPL-SCNC: 139 MMOL/L
WBC # FLD AUTO: 8.97 K/UL

## 2022-12-28 PROCEDURE — G0463: CPT | Mod: 25

## 2022-12-28 PROCEDURE — ZZZZZ: CPT

## 2022-12-28 PROCEDURE — G0008: CPT

## 2022-12-28 PROCEDURE — 90688 IIV4 VACCINE SPLT 0.5 ML IM: CPT

## 2022-12-28 PROCEDURE — 90834 PSYTX W PT 45 MINUTES: CPT

## 2022-12-28 RX ORDER — APIXABAN 5 MG/1
5 TABLET, FILM COATED ORAL TWICE DAILY
Refills: 0 | Status: ACTIVE | COMMUNITY
Start: 2022-12-28

## 2022-12-28 NOTE — REVIEW OF SYSTEMS
[Difficulty Sleeping] : difficulty sleeping [SOB on Exertion] : shortness of breath during exertion [Constipation] : constipation [Itching] : itching [Negative] : Heme/Lymph [FreeTextEntry2] : occasional [FreeTextEntry6] : occasional  [FreeTextEntry7] : occasional  [de-identified] : occasional on face - places Olay cream with some result

## 2022-12-28 NOTE — HISTORY OF PRESENT ILLNESS
[FreeTextEntry1] : 73 yo female in the company of [employer] here for HIV f/u consult \par taking Dovato daily with no missed doses or SE \par denies any c/o at this time \par \par \par From previous consult 6/1/2022 : \par 73 yo woman with a history of HIV/AIDS, HTN, DM, CAD, Afib, CKD stage 3. Follow up in HIV clinic..\par \par She experienced few days of dizziness after changing to Dovato and Januvia, now resolved.\par \par \par 12/28/2022 Plan \par HIV \par 1. continue current treatment - refills given \par 2. do blood work \par 3. f/u 6 months \par 4. Influenza and Zoster #1 vaccines given today \par

## 2022-12-28 NOTE — ASSESSMENT
[FreeTextEntry1] : HIV f/u consult.  HIV stable.  [Treatment Education] : treatment education [Treatment Adherence] : treatment adherence [HIV Education] : HIV Education

## 2022-12-28 NOTE — PHYSICAL EXAM
[General Appearance - Alert] : alert [General Appearance - In No Acute Distress] : in no acute distress [General Appearance - Well Nourished] : well nourished [General Appearance - Well-Appearing] : healthy appearing [Sclera] : the sclera and conjunctiva were normal [PERRL With Normal Accommodation] : pupils were equal in size, round, reactive to light [Extraocular Movements] : extraocular movements were intact [Outer Ear] : the ears and nose were normal in appearance [Hearing Threshold Finger Rub Not [place]] : hearing was normal [Examination Of The Oral Cavity] : the lips and gums were normal [Both Tympanic Membranes Were Examined] : both tympanic membranes were normal [Oropharynx] : the oropharynx was normal with no thrush [Neck Appearance] : the appearance of the neck was normal [Neck Cervical Mass (___cm)] : no neck mass was observed [Jugular Venous Distention Increased] : there was no jugular-venous distention [Thyroid Diffuse Enlargement] : the thyroid was not enlarged [Respiration, Rhythm And Depth] : normal respiratory rhythm and effort [Exaggerated Use Of Accessory Muscles For Inspiration] : no accessory muscle use [Auscultation Breath Sounds / Voice Sounds] : lungs were clear to auscultation bilaterally [Heart Rate And Rhythm] : heart rate was normal and rhythm regular [Heart Sounds] : normal S1 and S2 [Heart Sounds Gallop] : no gallops [Murmurs] : no murmurs [Heart Sounds Pericardial Friction Rub] : no pericardial rub [Edema] : there was no peripheral edema [Bowel Sounds] : normal bowel sounds [Abdomen Soft] : soft [Abdomen Tenderness] : non-tender [Costovertebral Angle Tenderness] : no CVA tenderness [No Palpable Adenopathy] : no palpable adenopathy [Musculoskeletal - Swelling] : no joint swelling [Range of Motion to Joints] : range of motion to joints [Nail Clubbing] : no clubbing  or cyanosis of the fingernails [Motor Tone] : muscle strength and tone were normal [Skin Color & Pigmentation] : normal skin color and pigmentation [] : no rash [Skin Lesions] : no skin lesions [Cranial Nerves] : cranial nerves 2-12 were intact [Sensation] : the sensory exam was normal to light touch and pinprick [Motor Exam] : the motor exam was normal [No Focal Deficits] : no focal deficits [Oriented To Time, Place, And Person] : oriented to person, place, and time [Affect] : the affect was normal

## 2022-12-29 ENCOUNTER — NON-APPOINTMENT (OUTPATIENT)
Age: 72
End: 2022-12-29

## 2022-12-29 DIAGNOSIS — Z21 ASYMPTOMATIC HUMAN IMMUNODEFICIENCY VIRUS [HIV] INFECTION STATUS: ICD-10-CM

## 2022-12-29 DIAGNOSIS — Z23 ENCOUNTER FOR IMMUNIZATION: ICD-10-CM

## 2022-12-29 LAB
HIV1 RNA # SERPL NAA+PROBE: ABNORMAL
HIV1 RNA # SERPL NAA+PROBE: ABNORMAL COPIES/ML
VIRAL LOAD INTERP: NORMAL
VIRAL LOAD LOG: ABNORMAL LG COP/ML

## 2022-12-30 ENCOUNTER — NON-APPOINTMENT (OUTPATIENT)
Age: 72
End: 2022-12-30

## 2023-01-06 ENCOUNTER — NON-APPOINTMENT (OUTPATIENT)
Age: 73
End: 2023-01-06

## 2023-01-10 ENCOUNTER — NON-APPOINTMENT (OUTPATIENT)
Age: 73
End: 2023-01-10

## 2023-01-18 ENCOUNTER — NON-APPOINTMENT (OUTPATIENT)
Age: 73
End: 2023-01-18

## 2023-01-25 ENCOUNTER — RX RENEWAL (OUTPATIENT)
Age: 73
End: 2023-01-25

## 2023-02-02 ENCOUNTER — RX RENEWAL (OUTPATIENT)
Age: 73
End: 2023-02-02

## 2023-02-07 ENCOUNTER — NON-APPOINTMENT (OUTPATIENT)
Age: 73
End: 2023-02-07

## 2023-02-17 ENCOUNTER — NON-APPOINTMENT (OUTPATIENT)
Age: 73
End: 2023-02-17

## 2023-02-27 ENCOUNTER — NON-APPOINTMENT (OUTPATIENT)
Age: 73
End: 2023-02-27

## 2023-03-13 ENCOUNTER — NON-APPOINTMENT (OUTPATIENT)
Age: 73
End: 2023-03-13

## 2023-03-20 ENCOUNTER — NON-APPOINTMENT (OUTPATIENT)
Age: 73
End: 2023-03-20

## 2023-03-21 ENCOUNTER — NON-APPOINTMENT (OUTPATIENT)
Age: 73
End: 2023-03-21

## 2023-03-23 ENCOUNTER — NON-APPOINTMENT (OUTPATIENT)
Age: 73
End: 2023-03-23

## 2023-04-14 ENCOUNTER — NON-APPOINTMENT (OUTPATIENT)
Age: 73
End: 2023-04-14

## 2023-04-17 ENCOUNTER — NON-APPOINTMENT (OUTPATIENT)
Age: 73
End: 2023-04-17

## 2023-05-11 ENCOUNTER — NON-APPOINTMENT (OUTPATIENT)
Age: 73
End: 2023-05-11

## 2023-05-15 ENCOUNTER — NON-APPOINTMENT (OUTPATIENT)
Age: 73
End: 2023-05-15

## 2023-05-19 ENCOUNTER — NON-APPOINTMENT (OUTPATIENT)
Age: 73
End: 2023-05-19

## 2023-06-04 ENCOUNTER — RX RENEWAL (OUTPATIENT)
Age: 73
End: 2023-06-04

## 2023-06-08 ENCOUNTER — NON-APPOINTMENT (OUTPATIENT)
Age: 73
End: 2023-06-08

## 2023-06-16 ENCOUNTER — NON-APPOINTMENT (OUTPATIENT)
Age: 73
End: 2023-06-16

## 2023-07-05 ENCOUNTER — NON-APPOINTMENT (OUTPATIENT)
Age: 73
End: 2023-07-05

## 2023-07-25 ENCOUNTER — RX RENEWAL (OUTPATIENT)
Age: 73
End: 2023-07-25

## 2023-07-25 RX ORDER — SITAGLIPTIN 50 MG/1
50 TABLET, FILM COATED ORAL
Qty: 28 | Refills: 2 | Status: ACTIVE | COMMUNITY
Start: 2022-04-20 | End: 1900-01-01

## 2023-07-25 RX ORDER — AMLODIPINE BESYLATE 5 MG/1
5 TABLET ORAL
Qty: 28 | Refills: 1 | Status: ACTIVE | COMMUNITY
Start: 2021-03-04 | End: 1900-01-01

## 2023-07-26 ENCOUNTER — RX RENEWAL (OUTPATIENT)
Age: 73
End: 2023-07-26

## 2023-07-26 RX ORDER — METOPROLOL SUCCINATE 50 MG/1
50 TABLET, EXTENDED RELEASE ORAL
Qty: 28 | Refills: 1 | Status: ACTIVE | COMMUNITY
Start: 2023-07-26 | End: 1900-01-01

## 2023-07-26 RX ORDER — METOPROLOL SUCCINATE 50 MG/1
50 TABLET, EXTENDED RELEASE ORAL
Qty: 28 | Refills: 2 | Status: ACTIVE | COMMUNITY
Start: 2021-06-10 | End: 1900-01-01

## 2023-08-02 ENCOUNTER — NON-APPOINTMENT (OUTPATIENT)
Age: 73
End: 2023-08-02

## 2023-08-22 ENCOUNTER — RX RENEWAL (OUTPATIENT)
Age: 73
End: 2023-08-22

## 2023-08-22 RX ORDER — EMPAGLIFLOZIN 10 MG/1
10 TABLET, FILM COATED ORAL
Qty: 28 | Refills: 3 | Status: ACTIVE | COMMUNITY
Start: 2022-06-08 | End: 1900-01-01

## 2023-08-22 RX ORDER — ATORVASTATIN CALCIUM 20 MG/1
20 TABLET, FILM COATED ORAL DAILY
Qty: 28 | Refills: 3 | Status: ACTIVE | COMMUNITY
Start: 2021-01-08 | End: 1900-01-01

## 2023-08-22 RX ORDER — DOLUTEGRAVIR SODIUM AND LAMIVUDINE 50; 300 MG/1; MG/1
50-300 TABLET, FILM COATED ORAL
Qty: 30 | Refills: 5 | Status: ACTIVE | COMMUNITY
Start: 2022-04-20 | End: 1900-01-01

## 2023-08-22 RX ORDER — LEVOTHYROXINE SODIUM 0.07 MG/1
75 TABLET ORAL DAILY
Qty: 30 | Refills: 5 | Status: ACTIVE | COMMUNITY
Start: 2019-11-06 | End: 1900-01-01

## 2023-08-23 NOTE — PHYSICAL THERAPY INITIAL EVALUATION ADULT - STANDING BALANCE: DYNAMIC, REHAB EVAL
[Nasal Congestion] : nasal congestion [Sinus Pressure] : sinus pressure [Negative] : Heme/Lymph with RW/good balance

## 2023-08-30 ENCOUNTER — NON-APPOINTMENT (OUTPATIENT)
Age: 73
End: 2023-08-30

## 2023-09-22 ENCOUNTER — NON-APPOINTMENT (OUTPATIENT)
Age: 73
End: 2023-09-22

## 2024-02-26 NOTE — PHYSICAL THERAPY INITIAL EVALUATION ADULT - ASSISTIVE DEVICE FOR TRANSFER: SIT/STAND, REHAB EVAL
Body Location Override (Optional - Billing Will Still Be Based On Selected Body Map Location If Applicable): left pretibial region Detail Level: Detailed Add 86826 Cpt? (Important Note: In 2017 The Use Of 53829 Is Being Tracked By Cms To Determine Future Global Period Reimbursement For Global Periods): no rolling walker

## 2024-05-02 NOTE — SWALLOW BEDSIDE ASSESSMENT ADULT - ORAL PHASE
Remote Min Sci Dual Pacemaker   Patient of [facility]    Battery 6.5 years    Presenting rhythm     A Impedance 664  RV Impedance 513    P wave sensing -  R wave sensing 5.9    A Threshold - @ -  RV Thresholds - @ -      A Paced 0%  V Paced 99%    Programmed Mode VVIR       Afib [place] -%    Episodes   none   Within functional limits Decreased anterior-posterior movement of the bolus/Delayed oral transit time/Lingual stasis Delayed oral transit time/Decreased anterior-posterior movement of the bolus

## 2024-12-04 NOTE — H&P ADULT - PROBLEM SELECTOR PLAN 8
[facility] - Periop Services  Brief Operative Note    SUMMARY     Surgery Date: 12/4/2024     Surgeons and Role:  Panel 1:     * Aleks Quiroz MD - Primary  Panel 2:     * Zoran Mendoza MD - Primary    Assisting Surgeon: None    Pre-op Diagnosis:  Dysmenorrhea [N94.6]  Menorrhagia with irregular cycle [N92.1]  Dyspareunia due to medical condition in female [N94.19]  Chronic right lower quadrant pain [R10.31, G89.29]  Pelvic pain [R10.2]    Post-op Diagnosis:  Post-Op Diagnosis Codes:     * Dysmenorrhea [N94.6]     * Menorrhagia with irregular cycle [N92.1]     * Dyspareunia due to medical condition in female [N94.19]     * Chronic right lower quadrant pain [R10.31, G89.29]     * Pelvic pain [R10.2]     * Female pelvic peritoneal adhesions [N73.6]    Procedure(s) (LRB):  XI ROBOTIC HYSTERECTOMY,WITH SALPINGECTOMY (Bilateral)  CYSTOSCOPY (N/A)  XI ROBOTIC LYSIS, ADHESIONS  XI ROBOTIC LYSIS, ADHESIONS    Anesthesia: General    Implants:  * No implants in log *    Operative Findings: Moderate omental adhesions to the anterior abdominal wall. Severe adhesions of the bowel to right and left adnexa that was taken down by Dr. Mendoza. Also thick bladder adhesions to the anterior uterus. Right ovary was adhesed to the right abdominal side wall.     Estimated Blood Loss: 20 mL    Estimated Blood Loss has been documented.         Specimens: Cervix, uterus, left fallopian tube (one portion connected to the uterus and the   Specimen (24h ago, onward)       Start     Ordered    12/04/24 0925  Surgical Pathology  RELEASE UPON ORDERING         12/04/24 0925                    [id number]       - Amiodarone 200mg daily  - Eliquis 2.5 mg BID for AC - Amiodarone 200mg daily  - Eliquis 2.5 mg BID for AC (renally dosed)

## 2025-01-20 NOTE — DIETITIAN INITIAL EVALUATION ADULT. - NUTRITION DIAGNOSITC TERMINOLOGY #1
Patient called requesting refill on Meclizine. States she has needed to take it more frequently recently.    Inadequate Oral Intake Malnutrition...